# Patient Record
Sex: MALE | Race: WHITE | NOT HISPANIC OR LATINO | Employment: OTHER | URBAN - METROPOLITAN AREA
[De-identification: names, ages, dates, MRNs, and addresses within clinical notes are randomized per-mention and may not be internally consistent; named-entity substitution may affect disease eponyms.]

---

## 2017-06-26 ENCOUNTER — GENERIC CONVERSION - ENCOUNTER (OUTPATIENT)
Dept: OTHER | Facility: OTHER | Age: 82
End: 2017-06-26

## 2017-06-26 ENCOUNTER — APPOINTMENT (OUTPATIENT)
Dept: LAB | Facility: CLINIC | Age: 82
End: 2017-06-26
Payer: MEDICARE

## 2017-06-26 ENCOUNTER — ALLSCRIPTS OFFICE VISIT (OUTPATIENT)
Dept: OTHER | Facility: OTHER | Age: 82
End: 2017-06-26

## 2017-06-26 ENCOUNTER — TRANSCRIBE ORDERS (OUTPATIENT)
Dept: LAB | Facility: CLINIC | Age: 82
End: 2017-06-26

## 2017-06-26 DIAGNOSIS — N40.1 ENLARGED PROSTATE WITH LOWER URINARY TRACT SYMPTOMS (LUTS): ICD-10-CM

## 2017-06-26 DIAGNOSIS — I25.10 ATHEROSCLEROTIC HEART DISEASE OF NATIVE CORONARY ARTERY WITHOUT ANGINA PECTORIS: ICD-10-CM

## 2017-06-26 DIAGNOSIS — Z12.11 ENCOUNTER FOR SCREENING FOR MALIGNANT NEOPLASM OF COLON: ICD-10-CM

## 2017-06-26 LAB
ALBUMIN SERPL BCP-MCNC: 3.7 G/DL (ref 3.5–5)
ALP SERPL-CCNC: 63 U/L (ref 46–116)
ALT SERPL W P-5'-P-CCNC: 26 U/L (ref 12–78)
ANION GAP SERPL CALCULATED.3IONS-SCNC: 7 MMOL/L (ref 4–13)
AST SERPL W P-5'-P-CCNC: 21 U/L (ref 5–45)
BILIRUB SERPL-MCNC: 0.68 MG/DL (ref 0.2–1)
BILIRUB UR QL STRIP: ABNORMAL
BUN SERPL-MCNC: 25 MG/DL (ref 5–25)
CALCIUM SERPL-MCNC: 8.6 MG/DL (ref 8.3–10.1)
CHLORIDE SERPL-SCNC: 107 MMOL/L (ref 100–108)
CHOLEST SERPL-MCNC: 141 MG/DL (ref 50–200)
CLARITY UR: ABNORMAL
CO2 SERPL-SCNC: 28 MMOL/L (ref 21–32)
COLOR UR: YELLOW
CREAT SERPL-MCNC: 1.02 MG/DL (ref 0.6–1.3)
GFR SERPL CREATININE-BSD FRML MDRD: >60 ML/MIN/1.73SQ M
GLUCOSE P FAST SERPL-MCNC: 103 MG/DL (ref 65–99)
GLUCOSE UR STRIP-MCNC: NEGATIVE MG/DL
HDLC SERPL-MCNC: 67 MG/DL (ref 40–60)
HGB UR QL STRIP.AUTO: NEGATIVE
KETONES UR STRIP-MCNC: ABNORMAL MG/DL
LDLC SERPL CALC-MCNC: 64 MG/DL (ref 0–100)
LEUKOCYTE ESTERASE UR QL STRIP: NEGATIVE
NITRITE UR QL STRIP: NEGATIVE
PH UR STRIP.AUTO: 5.5 [PH] (ref 4.5–8)
POTASSIUM SERPL-SCNC: 4.6 MMOL/L (ref 3.5–5.3)
PROT SERPL-MCNC: 7.4 G/DL (ref 6.4–8.2)
PROT UR STRIP-MCNC: NEGATIVE MG/DL
SODIUM SERPL-SCNC: 142 MMOL/L (ref 136–145)
SP GR UR STRIP.AUTO: 1.03 (ref 1–1.03)
TRIGL SERPL-MCNC: 48 MG/DL
UROBILINOGEN UR QL STRIP.AUTO: 0.2 E.U./DL

## 2017-06-26 PROCEDURE — 36415 COLL VENOUS BLD VENIPUNCTURE: CPT

## 2017-06-26 PROCEDURE — 81003 URINALYSIS AUTO W/O SCOPE: CPT

## 2017-06-26 PROCEDURE — 80053 COMPREHEN METABOLIC PANEL: CPT

## 2017-06-26 PROCEDURE — 80061 LIPID PANEL: CPT

## 2017-08-09 ENCOUNTER — GENERIC CONVERSION - ENCOUNTER (OUTPATIENT)
Dept: OTHER | Facility: OTHER | Age: 82
End: 2017-08-09

## 2018-01-10 NOTE — RESULT NOTES
Verified Results  (1) COMPREHENSIVE METABOLIC PANEL [id number] 09:76DU [employer] Order Number: [id number]     Test Name Result Flag Reference   SODIUM 142 mmol/L  136-145   POTASSIUM 4 6 mmol/L  3 5-5 3   CHLORIDE 107 mmol/L  100-108   CARBON DIOXIDE 28 mmol/L  21-32   ANION GAP (CALC) 7 mmol/L  4-13   BLOOD UREA NITROGEN 25 mg/dL  5-25   CREATININE 1 02 mg/dL  0 60-1 30   Standardized to IDMS reference method   CALCIUM 8 6 mg/dL  8 3-10 1   BILI, TOTAL 0 68 mg/dL  0 20-1 00   ALK PHOSPHATAS 63 U/L     ALT (SGPT) 26 U/L  12-78   AST(SGOT) 21 U/L  5-45   ALBUMIN 3 7 g/dL  3 5-5 0   TOTAL PROTEIN 7 4 g/dL  6 4-8 2   eGFR Non-African American      >60 0 ml/min/1 73[address] [facility] Disease Education Program recommendations are as follows:  GFR calculation is accurate only with a steady state creatinine  Chronic Kidney disease less than 60 ml/min/1 73 sq  meters  Kidney failure less than 15 ml/min/1 73 sq  meters  GLUCOSE FASTING 103 mg/dL H 65-99     (1) LIPID PANEL, FASTING [id number] 11:06AM [employer] Order Number: [id number]     Test Name Result Flag Reference   CHOLESTEROL 141 mg/dL     HDL,DIRECT 67 mg/dL H 40-60   Specimen collection should occur prior to Metamizole administration due to the potential for falsely depressed results  LDL CHOLESTEROL CALCULATED 64 mg/dL  0-100   Triglyceride:         Normal              <150 mg/dl       Borderline High    150-199 mg/dl       High               200-499 mg/dl       Very High          >499 mg/dl  Cholesterol:         Desirable        <200 mg/dl      Borderline High  200-239 mg/dl      High             >239 mg/dl  HDL Cholesterol:        High    >59 mg/dL      Low     <41 mg/dL  LDL CALCULATED:    This screening LDL is a calculated result  It does not have the accuracy of the Direct Measured LDL in the monitoring of patients with hyperlipidemia and/or statin therapy     Direct Measure LDL (XJY209) must be ordered separately in these patients  TRIGLYCERIDES 48 mg/dL  <=150   Specimen collection should occur prior to N-Acetylcysteine or Metamizole administration due to the potential for falsely depressed results  (1) URINALYSIS (will reflex a microscopy if leukocytes, occult blood, protein or nitrites are not within normal limits) [id number] 11:06AM Beba Bryant Order Number: [id number]     Test Name Result Flag Reference   COLOR Yellow     CLARITY Turbid     SPECIFIC GRAVITY UA 1 030  1 003-1 030   PH UA 5 5  4 5-8 0   LEUKOCYTE ESTERASE UA Negative  Negative   NITRITE UA Negative  Negative   PROTEIN UA Negative mg/dl  Negative   GLUCOSE UA Negative mg/dl  Negative   KETONES UA Trace mg/dl A Negative   UROBILINOGEN UA 0 2 E U /dl  0 2, 1 0 E U /dl   BILIRUBIN UA  A Negative   Interference- unable to analyze   The dipstick result may be falsely positive do to interfering substances  We recommend reliance upon serum bilirubin, liver & kidney function tests to guide patient care if clinically indicated     BLOOD UA Negative  Negative

## 2018-01-13 VITALS
BODY MASS INDEX: 27.43 KG/M2 | HEIGHT: 68 IN | WEIGHT: 181 LBS | HEART RATE: 70 BPM | RESPIRATION RATE: 16 BRPM | SYSTOLIC BLOOD PRESSURE: 120 MMHG | DIASTOLIC BLOOD PRESSURE: 70 MMHG

## 2018-06-27 RX ORDER — ROSUVASTATIN CALCIUM 10 MG/1
10 TABLET, COATED ORAL DAILY
Refills: 0 | COMMUNITY
Start: 2018-04-16 | End: 2018-08-20 | Stop reason: ALTCHOICE

## 2018-06-27 RX ORDER — METOPROLOL SUCCINATE 25 MG/1
0.5 TABLET, EXTENDED RELEASE ORAL DAILY
COMMUNITY
End: 2020-05-27 | Stop reason: SDUPTHER

## 2018-06-27 RX ORDER — GLUCOSAMINE HCL 500 MG
1 TABLET ORAL DAILY
COMMUNITY

## 2018-06-27 RX ORDER — CYANOCOBALAMIN (VITAMIN B-12) 1000 MCG
1 TABLET, EXTENDED RELEASE ORAL DAILY
COMMUNITY

## 2018-06-27 RX ORDER — ASPIRIN 81 MG/1
81 TABLET ORAL DAILY
COMMUNITY
End: 2021-10-27 | Stop reason: SDUPTHER

## 2018-06-29 ENCOUNTER — OFFICE VISIT (OUTPATIENT)
Dept: FAMILY MEDICINE CLINIC | Facility: CLINIC | Age: 83
End: 2018-06-29
Payer: MEDICARE

## 2018-06-29 ENCOUNTER — APPOINTMENT (OUTPATIENT)
Dept: LAB | Facility: CLINIC | Age: 83
End: 2018-06-29
Payer: MEDICARE

## 2018-06-29 VITALS
WEIGHT: 179.4 LBS | BODY MASS INDEX: 27.19 KG/M2 | TEMPERATURE: 97.4 F | HEIGHT: 68 IN | SYSTOLIC BLOOD PRESSURE: 128 MMHG | OXYGEN SATURATION: 96 % | DIASTOLIC BLOOD PRESSURE: 72 MMHG | HEART RATE: 81 BPM

## 2018-06-29 DIAGNOSIS — I10 BENIGN ESSENTIAL HYPERTENSION: ICD-10-CM

## 2018-06-29 DIAGNOSIS — Z00.00 MEDICARE ANNUAL WELLNESS VISIT, SUBSEQUENT: Primary | ICD-10-CM

## 2018-06-29 DIAGNOSIS — M19.041 PRIMARY OSTEOARTHRITIS OF BOTH HANDS: ICD-10-CM

## 2018-06-29 DIAGNOSIS — Z95.5 HISTORY OF HEART ARTERY STENT: ICD-10-CM

## 2018-06-29 DIAGNOSIS — M19.042 PRIMARY OSTEOARTHRITIS OF BOTH HANDS: ICD-10-CM

## 2018-06-29 DIAGNOSIS — I25.10 ARTERIOSCLEROTIC CORONARY ARTERY DISEASE: ICD-10-CM

## 2018-06-29 LAB
ALBUMIN SERPL BCP-MCNC: 3.5 G/DL (ref 3.5–5)
ALP SERPL-CCNC: 73 U/L (ref 46–116)
ALT SERPL W P-5'-P-CCNC: 25 U/L (ref 12–78)
ANION GAP SERPL CALCULATED.3IONS-SCNC: 4 MMOL/L (ref 4–13)
AST SERPL W P-5'-P-CCNC: 21 U/L (ref 5–45)
BILIRUB SERPL-MCNC: 0.49 MG/DL (ref 0.2–1)
BUN SERPL-MCNC: 31 MG/DL (ref 5–25)
CALCIUM SERPL-MCNC: 8.8 MG/DL (ref 8.3–10.1)
CHLORIDE SERPL-SCNC: 106 MMOL/L (ref 100–108)
CHOLEST SERPL-MCNC: 113 MG/DL (ref 50–200)
CO2 SERPL-SCNC: 27 MMOL/L (ref 21–32)
CREAT SERPL-MCNC: 0.94 MG/DL (ref 0.6–1.3)
GFR SERPL CREATININE-BSD FRML MDRD: 75 ML/MIN/1.73SQ M
GLUCOSE P FAST SERPL-MCNC: 102 MG/DL (ref 65–99)
HDLC SERPL-MCNC: 53 MG/DL (ref 40–60)
LDLC SERPL CALC-MCNC: 47 MG/DL (ref 0–100)
NONHDLC SERPL-MCNC: 60 MG/DL
POTASSIUM SERPL-SCNC: 4.7 MMOL/L (ref 3.5–5.3)
PROT SERPL-MCNC: 7.7 G/DL (ref 6.4–8.2)
SODIUM SERPL-SCNC: 137 MMOL/L (ref 136–145)
TRIGL SERPL-MCNC: 64 MG/DL

## 2018-06-29 PROCEDURE — 36415 COLL VENOUS BLD VENIPUNCTURE: CPT | Performed by: FAMILY MEDICINE

## 2018-06-29 PROCEDURE — 80061 LIPID PANEL: CPT | Performed by: FAMILY MEDICINE

## 2018-06-29 PROCEDURE — G0439 PPPS, SUBSEQ VISIT: HCPCS | Performed by: FAMILY MEDICINE

## 2018-06-29 PROCEDURE — 99213 OFFICE O/P EST LOW 20 MIN: CPT | Performed by: FAMILY MEDICINE

## 2018-06-29 PROCEDURE — 80053 COMPREHEN METABOLIC PANEL: CPT | Performed by: FAMILY MEDICINE

## 2018-06-29 RX ORDER — MELOXICAM 7.5 MG/1
TABLET ORAL
Qty: 30 TABLET | Refills: 0 | Status: SHIPPED | OUTPATIENT
Start: 2018-06-29 | End: 2018-08-06 | Stop reason: SDUPTHER

## 2018-06-29 NOTE — PROGRESS NOTES
Rob BRODY  [facility]  [facility]  [address]  [address]  [place], [place], [id number] MEDICARE SUBSEQUENT VISIT NOTE ( part 2 )          Problem List Items Addressed This Visit        Cardiovascular and Mediastinum    Arteriosclerotic coronary artery disease    Relevant Orders    Lipid panel    Benign essential hypertension    Relevant Orders    Comprehensive metabolic panel       Musculoskeletal and Integument    Primary osteoarthritis of both hands    Relevant Medications    meloxicam (MOBIC) 7 5 mg tablet       Other    History of heart artery stent      Other Visit Diagnoses     Medicare annual wellness visit, subsequent    -  Primary          Patient Instructions     He is in today for a Subsequent Medicare evaluation/ regular check  He continues to do very well, continues to remain active at the gym over the winter months, active at home during the summer, just back from [address], no cardiovascular complaints  He will continue on aspirin 81 mg daily, Crestor 10 mg daily along with metoprolol 12 5 mg daily as is  He last saw Cardiology at [place] in August, he does plan to follow up with them  He has done Widespace screening yearly, we reviewed his results from last year which are essentially unremarkable  He does have hand arthritis, avoid overuse, could try meloxicam 7 5 mg 1 or 2 daily sparingly with food watching for stomach upset, consider hand therapy/hand specialist if persists or worsens  He has no other arthritic complaints  Immunization History   Administered Date(s) Administered    Influenza Split High Dose Preservative Free IM 10/27/2016    Pneumococcal Polysaccharide PPV23 09/01/2006, 09/16/2014    TD (adult) Preservative Free 09/01/2006    Zoster 01/01/2012       Discussed Pneumococcal vaccines,    Prevnar  is up to date   Pneumovax  is up to date  He does do yearly Flu shot     Tdap/tetanus shot will be done at a future date  (done every 10 yrs for superficial cuts, every 5 yrs for deep wounds)   Can also look into coverage for new shingles shot, Shingrix (indicated if over 48years of age ) Can do that at pharmacy  Was never a smoker     Regarding Colon Cancer screening, we discussed Colonoscopy vs ColoGuard :    Not indicated     Discussed pros and cons regarding Prostate Cancer screening,   PSA blood test not indicated    We discussed end of life planning, does have a  "LIVING WILL"       Glaucoma screening is   due  Last one 2 yrs ago    We did review previous blood work,  His blood work from June 2017 showed HDL 67, LDL 64  CMP within normal limits other than borderline glucose, urinalysis was negative  He is up to date with Lipid screening  He is up to date with Diabetes screening    Discussed importance of routine exercise, healthy diet     Sanjana Garcia -  Can see Dermatology for full body skin exam/skin cancer screening, -  Still see Dr Janet Greenberg re scalp actinics   should see Dentist routinely ( does)    We will see him again in 12 months, sooner as needed          Health Maintenance     Health Maintenance   Topic Date Due    DTaP,Tdap,and Td Vaccines (1 - Tdap) 06/14/1956    PNEUMOCOCCAL POLYSACCHARIDE VACCINE AGE 72 AND OVER  06/14/2001    GLAUCOMA SCREENING 67+ YR  06/14/2003    INFLUENZA VACCINE  09/01/2018    Fall Risk  06/29/2019    Depression Screening PHQ-9  06/29/2019       Immunization History   Administered Date(s) Administered    Influenza 10/09/2017    Influenza Split High Dose Preservative Free IM 10/27/2016    Pneumococcal Polysaccharide PPV23 09/01/2006, 09/16/2014    TD (adult) Preservative Free 09/01/2006    Zoster 01/01/2012       AWV Clinical     ISAR:   Previous hospitalizations?:  No       Once in a Lifetime Medicare Screening:       Medicare Screening Tests and Risk Assessment:   AAA Risk Assessment    Osteoporosis Risk Assessment    HIV Risk Assessment        Drug and Alcohol Use:   Tobacco use    Cigarettes:  never smoker    Tobacco use duration    Tobacco Cessation Readiness    Alcohol use    Alcohol use:  rare use    Alcohol Treatment Readiness   Illicit Drug Use    Drug use:  never        Diet & Exercise:   Diet   What is your diet?:  Regular, Low Carb, Low Saturated Fat   How many servings a day of the following:   Exercise    Do you currently exercise?:  yes    Frequency:  daily    Type of exercise:  walking, weights   stretching  minutes per day         Cognitive Impairment Screening:   Cognitive Impairment Screening    Do you have difficulty learning or retaining new information?:  No Do you have difficulty handling new tasks?:  No   Do you have difficulty with reasoning?:  No Do you have difficulty with spatial ability and orientation?:  No   Do you have difficulty with language?:  No Do you have difficulty with behavior?:  No       Functional Ability/Level of Safety:   Hearing     Bilateral:  slightly decreased   Hearing aid:  No    Hearing Impairment Assessment    Current Activities    Status:  unlimited ADL's, unlimited IADL's, unlimited social activities   Help needed with the folllowing:    Using the phone:  No Transportation:  No   Shopping:  No Preparing Meals:  No   Doing Housework:  No Doing Laundry:  No   Managing Medications:  No Managing Money:  No   ADL    Feeding:  Independant   Oral hygiene and Facial grooming:  Independant   Bathing:  Independant   Upper Body Dressing:  Independant   Lower Body Dressing:  Independant   Toileting:  Independant   Bed Mobility:  Independant   Fall Risk   Have you fallen in the last 12 months?:  No    Injury History       Home Safety:   Home Safety Risk Factors   Unfamilar with surroundings:  No Uneven floors:  No   Stairs or handrail saftey risk:  Yes Loose rugs:  No   Household clutter:  No Poor household lighting:  No   No grab bars in bathroom:  Yes        Advanced Directives:   Advanced Directives    Living Will:  Yes Patient's End of Life Decisions        Urinary Incontinence:   Do you have urinary incontinence?:  No        Glaucoma:             Patient Care Team:  Enrique Sage DO as PCP - General      ### SEE OTHER NOTE TODAY Re:   CC/HPI/ VITALS/ROS/PMHx/PSHx/PE/ALLERGIES/FAMHx/SOCHx ###

## 2018-06-29 NOTE — PROGRESS NOTES
Rob BRODY  [facility]  [facility]  [address]   [address] [place] [address], [id number] MEDICARE SUBSEQUENT VISIT NOTE ( part 1 )      NAME: Ankur Michel  AGE: 81 y o  SEX: male  : 1935   MRN: [id number]    DATE: 2018  TIME: 12:35 PM    Assessment and Plan     Problem List Items Addressed This Visit        Cardiovascular and Mediastinum    Arteriosclerotic coronary artery disease    Relevant Orders    Lipid panel    Benign essential hypertension    Relevant Orders    Comprehensive metabolic panel       Musculoskeletal and Integument    Primary osteoarthritis of both hands    Relevant Medications    meloxicam (MOBIC) 7 5 mg tablet       Other    History of heart artery stent      Other Visit Diagnoses     Medicare annual wellness visit, subsequent    -  Primary          Medicare Wellness Counseling/ Discussion    Patient Instructions     He is in today for a Subsequent Medicare evaluation/ regular check  He continues to do very well, continues to remain active at the gym over the winter months, active at home during the summer, just back from [address], no cardiovascular complaints  He will continue on aspirin 81 mg daily, Crestor 10 mg daily along with metoprolol 12 5 mg daily as is  He last saw Cardiology at [facility] in August, he does plan to follow up with them  He has done Bright & Bright screening yearly, we reviewed his results from last year which are essentially unremarkable  He does have hand arthritis, avoid overuse, could try meloxicam 7 5 mg 1 or 2 daily sparingly with food watching for stomach upset, consider hand therapy/hand specialist if persists or worsens  He has no other arthritic complaints      Immunization History   Administered Date(s) Administered    Influenza Split High Dose Preservative Free IM 10/27/2016    Pneumococcal Polysaccharide PPV23 2006, 2014    TD (adult) Preservative Free 09/01/2006    Zoster 01/01/2012       Discussed Pneumococcal vaccines,    Prevnar  is up to date   Pneumovax  is up to date  He does do yearly Flu shot  Tdap/tetanus shot will be done at a future date  (done every 10 yrs for superficial cuts, every 5 yrs for deep wounds)   Can also look into coverage for new shingles shot, Shingrix (indicated if over 48years of age ) Can do that at pharmacy  Was never a smoker     Regarding Colon Cancer screening, we discussed Colonoscopy vs ColoGuard :    Not indicated     Discussed pros and cons regarding Prostate Cancer screening,   PSA blood test not indicated    We discussed end of life planning, does have a  "LIVING WILL"       Glaucoma screening is   due  Last one 2 yrs ago    We did review previous blood work,  His blood work from June 2017 showed HDL 67, LDL 64  CMP within normal limits other than borderline glucose, urinalysis was negative  He is up to date with Lipid screening  He is up to date with Diabetes screening  Discussed importance of routine exercise, healthy diet      Also -  Can see Dermatology for full body skin exam/skin cancer screening, -  Still see Dr Kerry Son re scalp actinics   should see Dentist routinely ( does)    We will see him again in 12 months, sooner as needed            Chief Complaint     Chief Complaint   Patient presents with    Hand Pain     bilateral started back in March worsened since        History of Present Illness     Rachel Maynard is a 81y o -year-old male who is in today for an annual visit, he remains in excellent health, his only complaint is stiffness with swelling especially in the morning bilateral hands recent month or so  Occasionally uses Advil  Over the winter months he exercises routinely at [facility], in the warmer weather he is outdoors  Relates he actually sprints 60 yards x2 at the gym  As before he played basketball into his 76s    Recently was in the [address] with his family, he did some hiking, no chest pain or increased shortness of breath  He last saw Cardiology August 2017, he is using medication as directed    Review of Systems     Review of Systems   Constitutional: Negative for activity change (remains very active), appetite change, chills, fatigue, fever and unexpected weight change  HENT: Negative for congestion, mouth sores, nosebleeds, sinus pressure, sore throat and trouble swallowing  Eyes: Negative for visual disturbance  Respiratory: Negative for cough, choking, chest tightness and shortness of breath  Cardiovascular: Negative for chest pain, palpitations and leg swelling  Gastrointestinal: Negative for abdominal pain, blood in stool, constipation, diarrhea, nausea and vomiting  No acid reflux     No change in bowel   Genitourinary: Negative for dysuria and hematuria  Neurological: Negative for dizziness, syncope, speech difficulty, weakness, light-headedness and headaches  Hematological: Does not bruise/bleed easily  Psychiatric/Behavioral: Negative for behavioral problems, confusion and sleep disturbance         Active Problem List     Patient Active Problem List   Diagnosis    Actinic keratosis    Arteriosclerotic coronary artery disease    Basal cell carcinoma of skin    BPH with obstruction/lower urinary tract symptoms    Benign essential hypertension    Hyperlipidemia    History of heart artery stent    Primary osteoarthritis of both hands       Past Medical History:  Past Medical History:   Diagnosis Date    [facility]STEMI (non-ST elevated myocardial infarction) ([facility] ) 2014    Pneumonia     Last Assessed:  10/7/14    Polymyalgia rheumatica ([facility] )     Last Assessed:  11/23/12       Past Surgical History:  Past Surgical History:   Procedure Laterality Date    CORONARY ANGIOPLASTY WITH STENT PLACEMENT      HERNIA REPAIR  2000       Family History:  Family History   Problem Relation Age of Onset    Pulmonary embolism Father     Other Father         Transurethral resection of prostate    Diabetes Sister        Social History:  Social History     Social History    Marital status: /Civil Union     Spouse name: N/A    Number of children: N/A    Years of education: N/A     Occupational History    Not on file  Social History Main Topics    Smoking status: Never Smoker    Smokeless tobacco: Not on file    Alcohol use Yes      Comment: Social    Drug use: Unknown    Sexual activity: Not on file     Other Topics Concern    Not on file     Social History Narrative    Exercises regularly       Objective     Vitals:    06/29/18 1019   BP: 128/72   Pulse: 81   Temp: (!) 97 4 °F (36 3 °C)   SpO2: 96%   Weight: 81 4 kg (179 lb 6 4 oz)   Height: 5' 8" (1 727 m)     Body mass index is 27 28 kg/m²  BP Readings from Last 3 Encounters:   06/29/18 128/72   06/26/17 120/70   07/16/15 122/66       Wt Readings from Last 3 Encounters:   06/29/18 81 4 kg (179 lb 6 4 oz)   06/26/17 82 1 kg (181 lb)   07/16/15 81 8 kg (180 lb 4 oz)       Physical Exam   Constitutional: He is oriented to person, place, and time  He appears well-developed and well-nourished  No distress  Looks great   HENT:   Right Ear: External ear normal    Left Ear: External ear normal    Mouth/Throat: Oropharynx is clear and moist  No oropharyngeal exudate  TM clear   Eyes: Conjunctivae are normal  No scleral icterus  Neck: Neck supple  Cardiovascular: Normal rate and regular rhythm  Murmur (1/6 systolic ejection murmur left sternal border/right 2nd intercostal space) heard  No carotid bruit   Pulmonary/Chest: Effort normal and breath sounds normal  No respiratory distress  He has no wheezes  Abdominal: Soft  There is no tenderness  Musculoskeletal: He exhibits no edema  Mild to moderate degenerative changes DIP/PIP with old deformities distal few fingers,  slightly decreased due to swelling/pain  No open wound    Metacarpophalangeal joints nontender, wrist nontender  Good color, pulses intact   Lymphadenopathy:     He has no cervical adenopathy  Neurological: He is alert and oriented to person, place, and time  Psychiatric: He has a normal mood and affect  His behavior is normal        ALLERGIES:  No Known Allergies    Current Medications     Current Outpatient Prescriptions   Medication Sig Dispense Refill    aspirin (ECOTRIN LOW STRENGTH) 81 mg EC tablet Take 81 mg by mouth daily      Calcium Citrate-Vitamin D (CALCIUM CITRATE + D) 315-250 MG-UNIT TABS Take 1 tablet by mouth daily      Coenzyme Q10 100 MG TABS Take 1 tablet by mouth daily      metoprolol succinate (TOPROL-XL) 25 mg 24 hr tablet Take 0 5 tablets by mouth daily      OMEGA-3 FATTY ACIDS-VITAMIN E PO Take 1 capsule by mouth daily      rosuvastatin (CRESTOR) 10 MG tablet Take 10 mg by mouth daily  0    meloxicam (MOBIC) 7 5 mg tablet Use 1 or 2 a day as need for arthritis hands 30 tablet 0     No current facility-administered medications for this visit            Health Maintenance     See other note today re clinical AWV info             Most recent labs available from [address]   ( others may be available in Care Everywhere / Media sections)  Lab Results   Component Value Date    WBC 6 32 06/30/2015    HGB 13 5 06/30/2015    HCT 40 0 06/30/2015     06/30/2015    CHOL 141 06/26/2017    TRIG 48 06/26/2017    HDL 67 (H) 06/26/2017    ALT 26 06/26/2017    AST 21 06/26/2017     06/26/2017    K 4 6 06/26/2017     06/26/2017    CREATININE 1 02 06/26/2017    BUN 25 06/26/2017    CO2 28 06/26/2017    GLUF 103 (H) 06/26/2017     Lab Results   Component Value Date    LDLCALC 64 06/26/2017     No results found for: TSH    Orders Placed This Encounter   Procedures    Lipid panel    Comprehensive metabolic panel             Reji Guillory DO

## 2018-08-06 DIAGNOSIS — M19.041 PRIMARY OSTEOARTHRITIS OF BOTH HANDS: ICD-10-CM

## 2018-08-06 DIAGNOSIS — M19.042 PRIMARY OSTEOARTHRITIS OF BOTH HANDS: ICD-10-CM

## 2018-08-06 RX ORDER — MELOXICAM 7.5 MG/1
TABLET ORAL
Qty: 30 TABLET | Refills: 0 | Status: SHIPPED | OUTPATIENT
Start: 2018-08-06 | End: 2019-02-18 | Stop reason: ALTCHOICE

## 2018-08-20 ENCOUNTER — APPOINTMENT (OUTPATIENT)
Dept: RADIOLOGY | Facility: MEDICAL CENTER | Age: 83
End: 2018-08-20
Payer: MEDICARE

## 2018-08-20 ENCOUNTER — OFFICE VISIT (OUTPATIENT)
Dept: URGENT CARE | Facility: MEDICAL CENTER | Age: 83
End: 2018-08-20
Payer: MEDICARE

## 2018-08-20 VITALS
HEIGHT: 68 IN | SYSTOLIC BLOOD PRESSURE: 145 MMHG | BODY MASS INDEX: 26.37 KG/M2 | WEIGHT: 174 LBS | OXYGEN SATURATION: 96 % | DIASTOLIC BLOOD PRESSURE: 93 MMHG | RESPIRATION RATE: 20 BRPM | HEART RATE: 99 BPM | TEMPERATURE: 101.5 F

## 2018-08-20 DIAGNOSIS — R50.9 FEVER, UNSPECIFIED FEVER CAUSE: ICD-10-CM

## 2018-08-20 DIAGNOSIS — R50.9 FEVER, UNSPECIFIED FEVER CAUSE: Primary | ICD-10-CM

## 2018-08-20 PROCEDURE — 71046 X-RAY EXAM CHEST 2 VIEWS: CPT

## 2018-08-20 PROCEDURE — 99213 OFFICE O/P EST LOW 20 MIN: CPT | Performed by: FAMILY MEDICINE

## 2018-08-20 PROCEDURE — G0463 HOSPITAL OUTPT CLINIC VISIT: HCPCS | Performed by: FAMILY MEDICINE

## 2018-08-20 RX ORDER — IBUPROFEN 600 MG/1
600 TABLET ORAL ONCE
Status: COMPLETED | OUTPATIENT
Start: 2018-08-20 | End: 2018-08-20

## 2018-08-20 RX ORDER — ROSUVASTATIN CALCIUM 10 MG/1
10 TABLET, COATED ORAL DAILY
COMMUNITY
End: 2020-10-14 | Stop reason: SDUPTHER

## 2018-08-20 RX ADMIN — IBUPROFEN 600 MG: 600 TABLET ORAL at 14:06

## 2018-08-20 NOTE — PROGRESS NOTES
[facility]        NAME: Radha Zaldivar is a 81 y o  male  : 1935    MRN: [id number]  DATE: 2018  TIME: 1:59 PM    Assessment and Plan   Fever, unspecified fever cause [R50 9]  1  Fever, unspecified fever cause  ibuprofen (MOTRIN) tablet 600 mg    XR chest pa & lateral         Patient Instructions       Follow up with PCP in 3-5 days  Proceed to  ER if symptoms worsen  Chief Complaint     Chief Complaint   Patient presents with    Fever     Patient relates he started with shakiness since last night at 9:00 pm  + fever 102 0  Denies cough  Denies chest pain  Denies SOB  Took Tylenol at 10:00 am today  History of Present Illness       Patient here today with 1 day history of fever  Fever and chills began around 9 or 9:30 p m  He knows he had a temperature of a 102° F for which she took Tylenol  Symptoms seem better overnight  However fever and chills return around 12:30 p m  this afternoon  He expresses concern that he might possibly have pneumonia because in the past he had similar symptoms  He denies headache  Denies body ache  No cough for shortness of breath  No abdominal pain  Denies dysuria  Denies nausea, vomiting or diarrhea  Denies any recent exposure  Review of Systems   Review of Systems   Constitutional: Positive for fever  HENT: Negative  Respiratory: Negative  Cardiovascular: Negative  Gastrointestinal: Negative  Neurological: Negative            Current Medications       Current Outpatient Prescriptions:     aspirin (ECOTRIN LOW STRENGTH) 81 mg EC tablet, Take 81 mg by mouth daily, Disp: , Rfl:     Calcium Citrate-Vitamin D (CALCIUM CITRATE + D) 315-250 MG-UNIT TABS, Take 1 tablet by mouth daily, Disp: , Rfl:     Coenzyme Q10 100 MG TABS, Take 1 tablet by mouth daily, Disp: , Rfl:     meloxicam (MOBIC) 7 5 mg tablet, TAKE 1 OR 2 TABLETS BY MOUTH A DAY AS NEEDED FOR ARTHRITIS HANDS, Disp: 30 tablet, Rfl: 0    metoprolol succinate (TOPROL-XL) 25 mg 24 hr tablet, Take 0 5 tablets by mouth daily, Disp: , Rfl:     OMEGA-3 FATTY ACIDS-VITAMIN E PO, Take 1 capsule by mouth daily, Disp: , Rfl:     rosuvastatin (CRESTOR) 10 MG tablet, Take 10 mg by mouth daily, Disp: , Rfl:     Current Facility-Administered Medications:     ibuprofen (MOTRIN) tablet 600 mg, 600 mg, Oral, Once, Severa Counts, MD    Current Allergies     Allergies as of 08/20/2018    (No Known Allergies)            The following portions of the patient's history were reviewed and updated as appropriate: allergies, current medications, past family history, past medical history, past social history, past surgical history and problem list      Past Medical History:   Diagnosis Date    NSTEMI (non-ST elevated myocardial infarction) ([facility] ) 2014    Pneumonia     Last Assessed:  10/7/14    Polymyalgia rheumatica ([facility] )     Last Assessed:  11/23/12       Past Surgical History:   Procedure Laterality Date    CORONARY ANGIOPLASTY WITH STENT PLACEMENT      HERNIA REPAIR  2000       Family History   Problem Relation Age of Onset    Pulmonary embolism Father     Other Father         Transurethral resection of prostate    Diabetes Sister          Medications have been verified  Objective   /93   Pulse 99   Temp (!) 101 5 °F (38 6 °C) (Tympanic)   Resp 20   Ht 5' 8" (1 727 m)   Wt 78 9 kg (174 lb)   SpO2 96%   BMI 26 46 kg/m²        Physical Exam     Physical Exam   Constitutional: He appears well-developed  Tremulous   Neck: Normal range of motion  Neck supple  Cardiovascular: Normal rate, regular rhythm and normal heart sounds  Pulmonary/Chest: Effort normal  He has rales  Decreased breath sounds bilaterally  Mild rhonchi is auscultated left lower lobe  Abdominal: Soft  Bowel sounds are normal    Nursing note and vitals reviewed

## 2018-08-20 NOTE — PATIENT INSTRUCTIONS
chest x-ray reveals no infiltrates or effusion  Patient received ibuprofen 600 mg orally in the office  Advised to continue alternating dose of Tylenol and ibuprofen for now  Increase fluid intake  Follow-up per primary care provider symptoms persist within 24 hours

## 2018-10-16 PROCEDURE — 88305 TISSUE EXAM BY PATHOLOGIST: CPT | Performed by: PATHOLOGY

## 2018-10-17 ENCOUNTER — LAB REQUISITION (OUTPATIENT)
Dept: LAB | Facility: HOSPITAL | Age: 83
End: 2018-10-17
Payer: MEDICARE

## 2018-10-17 DIAGNOSIS — D49.2 NEOPLASM OF UNSPECIFIED BEHAVIOR OF BONE, SOFT TISSUE, AND SKIN: ICD-10-CM

## 2018-11-14 ENCOUNTER — OFFICE VISIT (OUTPATIENT)
Dept: URGENT CARE | Facility: MEDICAL CENTER | Age: 83
End: 2018-11-14
Payer: MEDICARE

## 2018-11-14 ENCOUNTER — APPOINTMENT (OUTPATIENT)
Dept: RADIOLOGY | Facility: MEDICAL CENTER | Age: 83
End: 2018-11-14
Payer: MEDICARE

## 2018-11-14 VITALS
BODY MASS INDEX: 26.37 KG/M2 | HEART RATE: 66 BPM | WEIGHT: 174 LBS | OXYGEN SATURATION: 99 % | SYSTOLIC BLOOD PRESSURE: 128 MMHG | RESPIRATION RATE: 16 BRPM | TEMPERATURE: 95.8 F | HEIGHT: 68 IN | DIASTOLIC BLOOD PRESSURE: 84 MMHG

## 2018-11-14 DIAGNOSIS — S62.646A CLOSED NONDISPLACED FRACTURE OF PROXIMAL PHALANX OF RIGHT LITTLE FINGER, INITIAL ENCOUNTER: Primary | ICD-10-CM

## 2018-11-14 DIAGNOSIS — M79.641 RIGHT HAND PAIN: ICD-10-CM

## 2018-11-14 PROCEDURE — 73130 X-RAY EXAM OF HAND: CPT

## 2018-11-14 PROCEDURE — 29130 APPL FINGER SPLINT STATIC: CPT | Performed by: PHYSICIAN ASSISTANT

## 2018-11-14 PROCEDURE — G0463 HOSPITAL OUTPT CLINIC VISIT: HCPCS | Performed by: PHYSICIAN ASSISTANT

## 2018-11-14 PROCEDURE — 99213 OFFICE O/P EST LOW 20 MIN: CPT | Performed by: PHYSICIAN ASSISTANT

## 2018-11-14 NOTE — PROGRESS NOTES
[facility]        NAME: Danica Colbert is a 81 y o  male  : 1935    MRN: [id number]  DATE: 2018  TIME: 1:22 PM    Assessment and Plan   Closed nondisplaced fracture of proximal phalanx of right little finger, initial encounter [[id number]]  1  Closed nondisplaced fracture of proximal phalanx of right little finger, initial encounter  XR hand 3+ vw right    Ambulatory referral to Orthopedic Surgery    Splint         Patient Instructions     Were splint until seen by Orthopedics  Call [phone] for scheduling for [address] [place] [address] and/or Tylenol as needed for pain control  Follow up with PCP in 3-5 days  Proceed to  ER if symptoms worsen  Chief Complaint     Chief Complaint   Patient presents with    Hand Pain     fell carrying a box and injured right hand mostly affecting 5th digit         History of Present Illness       62-year-old male presents with a right 5th finger injury  Patient reports that he was carrying a box and had an accidental trip and fall and landed onto right hand  Patient reports some pain in right 5th finger  Swelling and bruising as well  Denies any numbness or tingling  Hand Pain    The incident occurred 12 to 24 hours ago  The incident occurred at home  The injury mechanism was a fall  The pain is present in the right fingers  The quality of the pain is described as aching  The pain does not radiate  The pain is moderate  The pain has been fluctuating since the incident  Pertinent negatives include no chest pain, muscle weakness, numbness or tingling  The symptoms are aggravated by movement, lifting and palpation  He has tried nothing for the symptoms  The treatment provided no relief  Review of Systems   Review of Systems   Constitutional: Negative  HENT: Negative  Eyes: Negative  Respiratory: Negative  Cardiovascular: Negative  Negative for chest pain  Gastrointestinal: Negative  Musculoskeletal: Negative  Skin: Negative  Neurological: Negative  Negative for tingling and numbness  Current Medications       Current Outpatient Prescriptions:     aspirin (ECOTRIN LOW STRENGTH) 81 mg EC tablet, Take 81 mg by mouth daily, Disp: , Rfl:     Calcium Citrate-Vitamin D (CALCIUM CITRATE + D) 315-250 MG-UNIT TABS, Take 1 tablet by mouth daily, Disp: , Rfl:     Coenzyme Q10 100 MG TABS, Take 1 tablet by mouth daily, Disp: , Rfl:     meloxicam (MOBIC) 7 5 mg tablet, TAKE 1 OR 2 TABLETS BY MOUTH A DAY AS NEEDED FOR ARTHRITIS HANDS, Disp: 30 tablet, Rfl: 0    metoprolol succinate (TOPROL-XL) 25 mg 24 hr tablet, Take 0 5 tablets by mouth daily, Disp: , Rfl:     OMEGA-3 FATTY ACIDS-VITAMIN E PO, Take 1 capsule by mouth daily, Disp: , Rfl:     rosuvastatin (CRESTOR) 10 MG tablet, Take 10 mg by mouth daily, Disp: , Rfl:     Current Allergies     Allergies as of 11/14/2018    (No Known Allergies)            The following portions of the patient's history were reviewed and updated as appropriate: allergies, current medications, past family history, past medical history, past social history, past surgical history and problem list      Past Medical History:   Diagnosis Date    [facility]STEMI [facility]non-ST elevated myocardial infarction) ([facility] ) 2014    Pneumonia     Last Assessed:  10/7/14    Polymyalgia rheumatica ([facility] )     Last Assessed:  11/23/12       Past Surgical History:   Procedure Laterality Date    CORONARY ANGIOPLASTY WITH STENT PLACEMENT      HERNIA REPAIR  2000       Family History   Problem Relation Age of Onset    Pulmonary embolism Father     Other Father         Transurethral resection of prostate    Diabetes Sister          Medications have been verified          Objective   /84   Pulse 66   Temp (!) 95 8 °F (35 4 °C) (Tympanic)   Resp 16   Ht 5' 8" (1 727 m)   Wt 78 9 kg (174 lb)   SpO2 99%   BMI 26 46 kg/m²        Physical Exam     Physical Exam   Constitutional: He is oriented to person, place, and time  He appears well-developed and well-nourished  No distress  HENT:   Head: Normocephalic and atraumatic  Right Ear: External ear normal    Left Ear: External ear normal    Nose: Nose normal    Mouth/Throat: Oropharynx is clear and moist    Eyes: Conjunctivae are normal  Right eye exhibits no discharge  Left eye exhibits no discharge  Neck: Normal range of motion  Neck supple  Cardiovascular: Normal rate, regular rhythm and intact distal pulses  Pulmonary/Chest: Effort normal  No respiratory distress  Musculoskeletal:        Right hand: He exhibits decreased range of motion, tenderness and bony tenderness  He exhibits normal two-point discrimination, normal capillary refill, no deformity, no laceration and no swelling  Normal sensation noted  Decreased strength (Secondary to pain) noted  Hands:  Neurological: He is alert and oriented to person, place, and time  Skin: Skin is warm and dry  Psychiatric: He has a normal mood and affect  Nursing note and vitals reviewed  X-rays reviewed  Fracture noted to 5th finger on the proximal phalanx      Splint applied by staff

## 2018-11-14 NOTE — PATIENT INSTRUCTIONS
Were splint until seen by Orthopedics  Call [phone] for scheduling for [address] [place] [address] and/or Tylenol as needed for pain control  Follow up with PCP in 3-5 days  Proceed to  ER if symptoms worsen  Finger Fracture   AMBULATORY CARE:   A finger fracture  is a break in 1 or more of the bones in your finger  It is most commonly caused by a direct blow to the finger  Common signs and symptoms include the following:   · Pain, bruising, or swelling    · Weakness or numbness    · Trouble moving your finger    · Finger looks abnormally shaped  Seek care immediately if:   · Your cast or splint gets wet, damaged, or comes off  · Your splint or cast feels too tight  · You have severe pain  · Your injured finger is numb, cold, or pale  Contact your healthcare provider or hand specialist if:   · Your pain or swelling gets worse, even after treatment  · You have questions or concerns about your condition or care  Treatment:   · A cast or splint  may be needed to prevent movement and protect your finger so it can heal      · NSAIDs , such as ibuprofen, help decrease swelling, pain, and fever  This medicine is available with or without a doctor's order  NSAIDs can cause stomach bleeding or kidney problems in certain people  If you take blood thinner medicine, always ask your healthcare provider if NSAIDs are safe for you  Always read the medicine label and follow directions  · Acetaminophen  decreases pain and fever  It is available without a doctor's order  Ask how much to take and how often to take it  Follow directions  Acetaminophen can cause liver damage if not taken correctly  · Prescription pain medicine  may be given  Ask your healthcare provider how to take this medicine safely  Some prescription pain medicines contain acetaminophen  Do not take other medicines that contain acetaminophen without talking to your healthcare provider  Too much acetaminophen may cause liver damage  Prescription pain medicine may cause constipation  Ask your healthcare provider how to prevent or treat constipation  · Closed reduction  may be done to put your bones back into their correct position without surgery  · Open reduction surgery  may be needed to put your bones back into the correct position  An incision is made and the bones are put back in the correct position  This may include the use of special wires, pins, plates or screws  These help keep the broken pieces lined up so your finger can heal correctly  Self-care:   · Wear your splint as directed  Do not remove your splint until you follow up with your healthcare provider or hand specialist      · Apply ice  on your finger for 15 to 20 minutes every hour or as directed  Use an ice pack, or put crushed ice in a plastic bag  Cover it with a towel before you apply it to your skin  Ice helps prevent tissue damage and decreases swelling and pain  · Elevate  your finger above the level of your heart as often as you can  This will help decrease swelling and pain  Prop your hand on pillows or blankets to keep it elevated comfortably  · Go to physical therapy as directed  A physical therapist teaches you exercises to help improve movement and strength, and to decrease pain  Follow up with your healthcare provider or hand specialist within 2 days:  Write down your questions so you remember to ask them during your visits  © 2017 [address] Information is for End User's use only and may not be sold, redistributed or otherwise used for commercial purposes  All illustrations and images included in CareNotes® are the copyrighted property of A D A M [employer] Inc  or Earnest Tavares  The above information is an  only  It is not intended as medical advice for individual conditions or treatments   Talk to your doctor, nurse or pharmacist before following any medical regimen to see if it is safe and effective for you

## 2018-11-15 ENCOUNTER — OFFICE VISIT (OUTPATIENT)
Dept: OBGYN CLINIC | Facility: MEDICAL CENTER | Age: 83
End: 2018-11-15
Payer: MEDICARE

## 2018-11-15 VITALS
WEIGHT: 181 LBS | BODY MASS INDEX: 27.43 KG/M2 | DIASTOLIC BLOOD PRESSURE: 89 MMHG | SYSTOLIC BLOOD PRESSURE: 145 MMHG | HEART RATE: 64 BPM | HEIGHT: 68 IN

## 2018-11-15 DIAGNOSIS — S62.646B OPEN NONDISPLACED FRACTURE OF PROXIMAL PHALANX OF RIGHT LITTLE FINGER, INITIAL ENCOUNTER: Primary | ICD-10-CM

## 2018-11-15 PROCEDURE — 99203 OFFICE O/P NEW LOW 30 MIN: CPT | Performed by: EMERGENCY MEDICINE

## 2018-11-15 NOTE — PROGRESS NOTES
Assessment/Plan:    Diagnoses and all orders for this visit:    Open nondisplaced fracture of proximal phalanx of right little finger, initial encounter    Patient wishes to see Hand for finger fracture  We have discussed splinting options at this time however the patient states he is a very active person and needs the use of his hands  He has elected to be zita-taped with [employer] today  He would like to hold off on using his aluminum foam finger splint and declines ulnar gutter splint  The    Return for Referred to Hand  Chief Complaint:   finger injury    Subjective:   Patient ID: Meli Taylor is a 81 y o  male  New patient presents for an injury to the right 5th digit after a slip and fall at home landing on his hand  He was evaluated with x-rays which showed a spiral type fracture of the proximal phalanx of the 5th digit which is nondisplaced  He states that he is having improvement in symptoms  He has noticed several times hitting his finger causing pain especially when he tries to put his hand in his pocket        Review of Systems   Constitutional: Negative for fever  Respiratory: Negative for shortness of breath  Cardiovascular: Negative for chest pain  Gastrointestinal: Negative for abdominal pain  Musculoskeletal: Positive for arthralgias and joint swelling  Neurological: Negative for weakness  The following portions of the patient's chart were reviewed and updated as appropriate:    Allergy:  No Known Allergies      Past Medical History:   Diagnosis Date    NSTEMI (non-ST elevated myocardial infarction) ([facility] ) 2014    Pneumonia     Last Assessed:  10/7/14    Polymyalgia rheumatica ([facility] )     Last Assessed:  11/23/12       Past Surgical History:   Procedure Laterality Date    CORONARY ANGIOPLASTY WITH STENT PLACEMENT      HERNIA REPAIR  2000       Social History     Social History    Marital status: /Civil Union     Spouse name: N/A    Number of children: N/A  Years of education: N/A     Occupational History    Not on file  Social History Main Topics    Smoking status: Never Smoker    Smokeless tobacco: Never Used    Alcohol use Yes      Comment: Social    Drug use: Unknown    Sexual activity: Not on file     Other Topics Concern    Not on file     Social History Narrative    Exercises regularly       Family History   Problem Relation Age of Onset    Pulmonary embolism Father     Other Father         Transurethral resection of prostate    Diabetes Sister        Medications:    Current Outpatient Prescriptions:     aspirin (ECOTRIN LOW STRENGTH) 81 mg EC tablet, Take 81 mg by mouth daily, Disp: , Rfl:     Calcium Citrate-Vitamin D (CALCIUM CITRATE + D) 315-250 MG-UNIT TABS, Take 1 tablet by mouth daily, Disp: , Rfl:     Coenzyme Q10 100 MG TABS, Take 1 tablet by mouth daily, Disp: , Rfl:     meloxicam (MOBIC) 7 5 mg tablet, TAKE 1 OR 2 TABLETS BY MOUTH A DAY AS NEEDED FOR ARTHRITIS HANDS, Disp: 30 tablet, Rfl: 0    metoprolol succinate (TOPROL-XL) 25 mg 24 hr tablet, Take 0 5 tablets by mouth daily, Disp: , Rfl:     OMEGA-3 FATTY ACIDS-VITAMIN E PO, Take 1 capsule by mouth daily, Disp: , Rfl:     rosuvastatin (CRESTOR) 10 MG tablet, Take 10 mg by mouth daily, Disp: , Rfl:     Patient Active Problem List   Diagnosis    Actinic keratosis    Arteriosclerotic coronary artery disease    Basal cell carcinoma of skin    BPH with obstruction/lower urinary tract symptoms    Benign essential hypertension    Hyperlipidemia    History of heart artery stent    Primary osteoarthritis of both hands       Objective:  Right Hand Exam     Other   Erythema: absent  Sensation: normal    Comments: There is diffuse mild swelling about the dorsal ulnar aspect of the hand and the 5th digit  This area has bruising  He is neurovascularly intact  There is decreased range of motion of the PIP and DIP joints    There is tenderness to palpation of the proximal phalanx of the 5th digit  Skin is intact no signs of infection  There is slight varus deformity of the 5th digit compared to the left  Physical Exam      Neurologic Exam    Procedures    I have personally reviewed pertinent films in PACS  and I have personally reviewed the written report of the pertinent studies     Extensive severe OA diffusely with ND spiral fx 5th digit

## 2018-11-27 ENCOUNTER — OFFICE VISIT (OUTPATIENT)
Dept: OBGYN CLINIC | Facility: CLINIC | Age: 83
End: 2018-11-27
Payer: MEDICARE

## 2018-11-27 ENCOUNTER — HOSPITAL ENCOUNTER (OUTPATIENT)
Dept: RADIOLOGY | Facility: HOSPITAL | Age: 83
Discharge: HOME | End: 2018-11-27
Attending: ORTHOPAEDIC SURGERY
Payer: MEDICARE

## 2018-11-27 VITALS — WEIGHT: 181 LBS | HEIGHT: 68 IN | BODY MASS INDEX: 27.43 KG/M2

## 2018-11-27 DIAGNOSIS — S62.646B OPEN NONDISPLACED FRACTURE OF PROXIMAL PHALANX OF RIGHT LITTLE FINGER, INITIAL ENCOUNTER: ICD-10-CM

## 2018-11-27 DIAGNOSIS — S62.646D CLOSED NONDISPLACED FRACTURE OF PROXIMAL PHALANX OF RIGHT LITTLE FINGER WITH ROUTINE HEALING, SUBSEQUENT ENCOUNTER: Primary | ICD-10-CM

## 2018-11-27 PROBLEM — S62.646A CLOSED NONDISPLACED FRACTURE OF PROXIMAL PHALANX OF RIGHT LITTLE FINGER: Status: ACTIVE | Noted: 2018-11-27

## 2018-11-27 PROCEDURE — 73140 X-RAY EXAM OF FINGER(S): CPT

## 2018-11-27 PROCEDURE — 99213 OFFICE O/P EST LOW 20 MIN: CPT | Performed by: ORTHOPAEDIC SURGERY

## 2018-11-27 NOTE — PATIENT INSTRUCTIONS
Plan:  I showed the x-rays to the patient and his wife  I gave him the option of zita taping the 4th and 5th fingers or dorsal Alumafoam splinting over the 5th finger only  He must continue this full-time for 2-3 weeks  He should back off on his activities so as not to displace this healing fracture  He can always put ice on the area for 15 minutes 4 times a day and take Advil, Aleve, or Tylenol if needed for pain  I will see him back again in 2-3 weeks for repeat x-ray and progression of his activity level

## 2018-11-27 NOTE — PROGRESS NOTES
CC:  Broken right little finger         Assessment:  Fracture proximal phalanx right little finger    Plan:  I showed the x-rays to the patient and his wife  I gave him the option of zita taping the 4th and 5th fingers or dorsal Alumafoam splinting over the 5th finger only  He must continue this full-time for 2-3 weeks  He should back off on his activities so as not to displace this healing fracture  He can always put ice on the area for 15 minutes 4 times a day and take Advil, Aleve, or Tylenol if needed for pain  I will see him back again in 2-3 weeks for repeat x-ray and progression of his activity level  HPI:  This 81-year-old white male slipped and fell at home and injured his right little finger on 11/14/2018  He saw Dr Rob Roland in the office who wanted to splint him, but the patient refused and therefore he has just done zita taping  He is quite active and he continues to use this hand for all of his activities of daily living  He is not complaining of significant pain but still has some swelling  This is his dominant arm    Past medical history, family history,  social history, medication history, and allergies are reviewed  Please see HPI for pertinent review of systems  All other systems reviewed are negative  He does have coronary artery disease status post stenting, history of basal cell carcinoma, BPH, hypertension, hyperlipidemia     Exam:   Ht 5' 8" (1 727 m)   Wt 82 1 kg (181 lb)   BMI 27 52 kg/m²  I removed his zita taping from his right hand  He had some mild residual swelling over the proximal phalanx of the right little finger with no angulation or rotation on flexion of the finger  He was  over the fracture site  He was not tender over the middle or distal phalanges  MCP joint motion was preserved  Radial pulse was normal   He had no cellulitis  Sensation to light touch was intact in all 5 fingers  He had good elbow and wrist motion           Studies Reviewed: I personally reviewed prior x-rays and new x-rays done on 11/27/2018  He has a nondisplaced comminuted fracture of the proximal phalanx of the right little finger  The new x-rays show this comminuted fracture healing essentially nondisplaced    There is no significant new bone formation seen yet

## 2018-11-27 NOTE — LETTER
November 27, 2018     Maty Rodriguez DO  [address]    Patient: Chano Jerez   YOB: 1935   Date of Visit: 11/27/2018       Dear Dr Purvi Flores: Thank you for referring Mary Ellen Caballero to me for evaluation  Below are my notes for this consultation  If you have questions, please do not hesitate to call me  I look forward to following your patient along with you  Sincerely,        Jose Vieyra MD        CC: No Recipients  Jose Vieyra MD  11/27/2018  4:36 PM  Sign at close encounter  CC:  Broken right little finger         Assessment:  Fracture proximal phalanx right little finger    Plan:  I showed the x-rays to the patient and his wife  I gave him the option of zita taping the 4th and 5th fingers or dorsal Alumafoam splinting over the 5th finger only  He must continue this full-time for 2-3 weeks  He should back off on his activities so as not to displace this healing fracture  He can always put ice on the area for 15 minutes 4 times a day and take Advil, Aleve, or Tylenol if needed for pain  I will see him back again in 2-3 weeks for repeat x-ray and progression of his activity level  HPI:  This 81-year-old white male slipped and fell at home and injured his right little finger on 11/14/2018  He saw Dr Carmencita Boas in the office who wanted to splint him, but the patient refused and therefore he has just done zita taping  He is quite active and he continues to use this hand for all of his activities of daily living  He is not complaining of significant pain but still has some swelling  This is his dominant arm    Past medical history, family history,  social history, medication history, and allergies are reviewed  Please see HPI for pertinent review of systems  All other systems reviewed are negative    He does have coronary artery disease status post stenting, history of basal cell carcinoma, BPH, hypertension, hyperlipidemia     Exam:   Ht 5' 8" (1 727 m)   Wt 82 1 kg (181 lb)   BMI 27 52 kg/m²   I removed his zita taping from his right hand  He had some mild residual swelling over the proximal phalanx of the right little finger with no angulation or rotation on flexion of the finger  He was  over the fracture site  He was not tender over the middle or distal phalanges  MCP joint motion was preserved  Radial pulse was normal   He had no cellulitis  Sensation to light touch was intact in all 5 fingers  He had good elbow and wrist motion  Studies Reviewed:  I personally reviewed prior x-rays and new x-rays done on 11/27/2018  He has a nondisplaced comminuted fracture of the proximal phalanx of the right little finger  The new x-rays show this comminuted fracture healing essentially nondisplaced    There is no significant new bone formation seen yet

## 2018-12-10 ENCOUNTER — APPOINTMENT (OUTPATIENT)
Dept: RADIOLOGY | Facility: MEDICAL CENTER | Age: 83
End: 2018-12-10
Payer: MEDICARE

## 2018-12-10 DIAGNOSIS — S62.646D CLOSED NONDISPLACED FRACTURE OF PROXIMAL PHALANX OF RIGHT LITTLE FINGER WITH ROUTINE HEALING, SUBSEQUENT ENCOUNTER: ICD-10-CM

## 2018-12-10 PROCEDURE — 73130 X-RAY EXAM OF HAND: CPT

## 2018-12-12 ENCOUNTER — OFFICE VISIT (OUTPATIENT)
Dept: OBGYN CLINIC | Facility: CLINIC | Age: 83
End: 2018-12-12
Payer: MEDICARE

## 2018-12-12 VITALS
HEIGHT: 68 IN | SYSTOLIC BLOOD PRESSURE: 114 MMHG | WEIGHT: 181 LBS | DIASTOLIC BLOOD PRESSURE: 67 MMHG | BODY MASS INDEX: 27.43 KG/M2 | HEART RATE: 87 BPM

## 2018-12-12 DIAGNOSIS — S62.646D CLOSED NONDISPLACED FRACTURE OF PROXIMAL PHALANX OF RIGHT LITTLE FINGER WITH ROUTINE HEALING, SUBSEQUENT ENCOUNTER: Primary | ICD-10-CM

## 2018-12-12 PROCEDURE — 99213 OFFICE O/P EST LOW 20 MIN: CPT | Performed by: ORTHOPAEDIC SURGERY

## 2018-12-12 NOTE — LETTER
December 12, 2018     Priscilla Joe DO  [address] [facility]  [address]    Patient: Yu Landis   YOB: 1935   Date of Visit: 12/12/2018       Dear Dr Florecita Miller: Thank you for referring Pauline De La Fuente to me for evaluation  Below are my notes for this consultation  If you have questions, please do not hesitate to call me  I look forward to following your patient along with you  Sincerely,        Any Sharp MD        CC: No Recipients    CC:  Broken right little finger         Assessment:  Fracture proximal phalanx right little finger    Plan:  I showed the x-rays to the patient and his daughter  The fracture is healing in a nondisplaced position but there is some either soft tissue or ligament injury that is allowing the finger to deviate a  bit ulnarly  I showed her how to buddy tape the 4th and 5th fingers together for the next month to try to tighten the ligaments somewhat   I also gave him putty to use to stimulate finger motion  He can do what he wants and that is perfectly acceptable as long as he does not have pain, but I cautioned him that heavy lifting or strenuous activity should be avoided as the fracture is not totally healed  He can continue with ice and Advil on an as-needed basis  I will see him back again here in 2 months for repeat x-ray and routine follow-up    HPI:  This 78-year-old white male slipped and fell at home and injured his right little finger on 11/14/2018  He saw Dr Maria G Coleman in the office who wanted to splint him, but the patient refused and therefore he has just done buddy taping  He is quite active and he continues to use this hand for all of his activities of daily living  He is not complaining of significant pain but still has some swelling  This is his dominant arm  He returns on 12/12/18 for follow up  He presents in the alumafoam splint that was provided to him at this last visit   He admits that he takes it off to sleep at night and when he "needs to fix something " He complains that the metal is rubbing and causing a blister  He complains of only mild pain  No other complaints  Past medical history, family history,  social history, medication history, and allergies are reviewed  Please see HPI for pertinent review of systems  All other systems reviewed are negative  He does have coronary artery disease status post stenting, history of basal cell carcinoma, BPH, hypertension, hyperlipidemia  Exam:   /67   Pulse 87   Ht 5' 8" (1 727 m)   Wt 82 1 kg (181 lb)   BMI 27 52 kg/m²       On today's exam of the right small finger, there is no ecchymosis, redness or signs of infection  There is mild swelling to the small finger  The skin was warm, dry and well perfused  There is a small abrasion of the dorsum of the finger due to the metal in the splint  He remains tender to palpation at the PIP joint  He is not tender over the MP joint  The right small finger is mildly deviated to the ulnar side, not at the fracture site  He is unable to flex the small finger secondary to stiffness  He has good wrist motion  There was a 2+ distal radial pulse with brisk capillary refill to all five fingers  Studies Reviewed:  I personally reviewed prior x-rays and new x-rays done on 11/27/2018  He has a nondisplaced comminuted fracture of the proximal phalanx of the right little finger  The new x-rays show this comminuted fracture healing essentially nondisplaced  There is no significant new bone formation seen yet  New hand x-rays done on 12/12/2018 were personally reviewed showed the fracture continues to heal in anatomic position  There is some ulnar drift of the finger with no subluxation at the MCP joint   The PIP and DIP joints remain well reduced      Scribe Attestation    I,:   Marycruz Mcallister MA am acting as a scribe while in the presence of the attending physician :        I,:   Jose Vieyra MD personally performed the services described in this documentation    as scribed in my presence :

## 2018-12-12 NOTE — PROGRESS NOTES
CC:  Broken right little finger         Assessment:  Fracture proximal phalanx right little finger    Plan:  I showed the x-rays to the patient and his daughter  The fracture is healing in a nondisplaced position but there is some either soft tissue or ligament injury that is allowing the finger to deviate a  bit ulnarly  I showed her how to buddy tape the 4th and 5th fingers together for the next month to try to tighten the ligaments somewhat   I also gave him putty to use to stimulate finger motion  He can do what he wants and that is perfectly acceptable as long as he does not have pain, but I cautioned him that heavy lifting or strenuous activity should be avoided as the fracture is not totally healed  He can continue with ice and Advil on an as-needed basis  I will see him back again here in 2 months for repeat x-ray and routine follow-up    HPI:  This 50-year-old white male slipped and fell at home and injured his right little finger on 11/14/2018  He saw Dr Sara Alves in the office who wanted to splint him, but the patient refused and therefore he has just done buddy taping  He is quite active and he continues to use this hand for all of his activities of daily living  He is not complaining of significant pain but still has some swelling  This is his dominant arm  He returns on 12/12/18 for follow up  He presents in the alumafoam splint that was provided to him at this last visit  He admits that he takes it off to sleep at night and when he "needs to fix something " He complains that the metal is rubbing and causing a blister  He complains of only mild pain  No other complaints  Past medical history, family history,  social history, medication history, and allergies are reviewed  Please see HPI for pertinent review of systems  All other systems reviewed are negative  He does have coronary artery disease status post stenting, history of basal cell carcinoma, BPH, hypertension, hyperlipidemia  Exam:   /67   Pulse 87   Ht 5' 8" (1 727 m)   Wt 82 1 kg (181 lb)   BMI 27 52 kg/m²      On today's exam of the right small finger, there is no ecchymosis, redness or signs of infection  There is mild swelling to the small finger  The skin was warm, dry and well perfused  There is a small abrasion of the dorsum of the finger due to the metal in the splint  He remains tender to palpation at the PIP joint  He is not tender over the MP joint  The right small finger is mildly deviated to the ulnar side, not at the fracture site  He is unable to flex the small finger secondary to stiffness  He has good wrist motion  There was a 2+ distal radial pulse with brisk capillary refill to all five fingers  Studies Reviewed:  I personally reviewed prior x-rays and new x-rays done on 11/27/2018  He has a nondisplaced comminuted fracture of the proximal phalanx of the right little finger  The new x-rays show this comminuted fracture healing essentially nondisplaced  There is no significant new bone formation seen yet  New hand x-rays done on 12/12/2018 were personally reviewed showed the fracture continues to heal in anatomic position  There is some ulnar drift of the finger with no subluxation at the MCP joint   The PIP and DIP joints remain well reduced      Scribe Attestation    I,:   John Almanza MA am acting as a scribe while in the presence of the attending physician :        I,:   Jenna Montesinos MD personally performed the services described in this documentation    as scribed in my presence :

## 2018-12-12 NOTE — PATIENT INSTRUCTIONS
Plan:  I showed the x-rays to the patient and his daughter  The fracture is healing in a nondisplaced position but there is some either soft tissue or ligament injury that is allowing the finger to deviate a  bit ulnarly  I showed her how to buddy tape the 4th and 5th fingers together for the next month to try to tighten the ligaments somewhat   I also gave him putty to use to stimulate finger motion  He can do what he wants and that is perfectly acceptable as long as he does not have pain, but I cautioned him that heavy lifting or strenuous activity should be avoided as the fracture is not totally healed  He can continue with ice and Advil on an as-needed basis    I will see him back again here in 2 months for repeat x-ray and routine follow-up

## 2019-02-18 ENCOUNTER — CONSULT (OUTPATIENT)
Dept: FAMILY MEDICINE CLINIC | Facility: CLINIC | Age: 84
End: 2019-02-18
Payer: MEDICARE

## 2019-02-18 VITALS
DIASTOLIC BLOOD PRESSURE: 68 MMHG | HEART RATE: 99 BPM | OXYGEN SATURATION: 70 % | WEIGHT: 187.8 LBS | BODY MASS INDEX: 28.46 KG/M2 | HEIGHT: 68 IN | SYSTOLIC BLOOD PRESSURE: 126 MMHG

## 2019-02-18 DIAGNOSIS — I25.10 ARTERIOSCLEROTIC CORONARY ARTERY DISEASE: Primary | ICD-10-CM

## 2019-02-18 DIAGNOSIS — H25.9 AGE-RELATED CATARACT OF BOTH EYES, UNSPECIFIED AGE-RELATED CATARACT TYPE: ICD-10-CM

## 2019-02-18 DIAGNOSIS — I10 BENIGN ESSENTIAL HYPERTENSION: ICD-10-CM

## 2019-02-18 PROCEDURE — 99213 OFFICE O/P EST LOW 20 MIN: CPT | Performed by: FAMILY MEDICINE

## 2019-02-18 RX ORDER — BIMATOPROST 0.01 %
1 DROPS OPHTHALMIC (EYE) DAILY
Refills: 5 | COMMUNITY
Start: 2019-02-11 | End: 2022-02-21 | Stop reason: ALTCHOICE

## 2019-02-18 NOTE — PATIENT INSTRUCTIONS
He is doing very well here today, he is medically cleared to undergo upcoming staged cataract/eye procedures  I will defer holding aspirin to his surgeon, he can hold this for up to 1 week if needed  He did see Cardiology again back in October, EKG was stable then, continues on Metoprolol 25 milligram 1/2 tablet daily, Crestor 10 milligram daily  Back in June CMP and lipids were fine, cholesterol 113 with HDL 53, LDL 47  I did give him a slip to redo CBC, CMP around June, see no indication to redo lipid panel this year  He will be seeing plastic surgeon in follow-up regarding history BCC scalp/scalp lesions  Continue with routine exercise as is  We will see him again in 6 to 8 months with annual wellness    Call sooner if needed

## 2019-02-18 NOTE — PROGRESS NOTES
FAMILY PRACTICE OFFICE VISIT  [facility] [address]  [address], [address]      NAME: Genna Weber  AGE: 81 y o  SEX: male  : 1935   MRN: [id number]    DATE: 2019  TIME: 4:13 PM    Assessment and Plan     Problem List Items Addressed This Visit        Cardiovascular and Mediastinum    Arteriosclerotic coronary artery disease - Primary    Relevant Orders    Comprehensive metabolic panel    CBC    Benign essential hypertension    Relevant Orders    Comprehensive metabolic panel      Other Visit Diagnoses     Age-related cataract of both eyes, unspecified age-related cataract type        Relevant Medications    LUMIGAN 0 01 % ophthalmic drops          Patient Instructions   He is doing very well here today, he is medically cleared to undergo upcoming staged cataract/eye procedures  I will defer holding aspirin to his surgeon, he can hold this for up to 1 week if needed  He did see Cardiology again back in October, EKG was stable then, continues on Metoprolol 25 milligram 1/2 tablet daily, Crestor 10 milligram daily  Back in  CMP and lipids were fine, cholesterol 113 with HDL 53, LDL 47  I did give him a slip to redo CBC, CMP around , see no indication to redo lipid panel this year  He will be seeing plastic surgeon in follow-up regarding history BCC scalp/scalp lesions  Continue with routine exercise as is  We will see him again in 6 to 8 months with annual wellness    Call sooner if needed      Chief Complaint     Chief Complaint   Patient presents with    Pre-op Exam     Cataract surgery schedule for  right eye, 3/12 left eye w/ Dr Shy Diamond        History of Present Illness   Genna Weber is a 81y o -year-old male who is in today for clearance regarding upcoming cataract surgeries, he has been feeling very well since I saw him last year, he did see his cardiologist in October, has no chest pain or increased shortness of breath  He continues to exercise routinely at the gym  He did have a fall late 2018 with fracture right 5th finger, that healed with some deformity but is much improved  Has had no other falls  Review of Systems   Review of Systems   Constitutional: Negative for activity change, appetite change, fatigue, fever and unexpected weight change  HENT: Negative for sore throat and trouble swallowing  Respiratory: Negative for cough, chest tightness, shortness of breath and wheezing  Cardiovascular: Negative for chest pain, palpitations and leg swelling  Gastrointestinal: Negative for abdominal pain, blood in stool, nausea and vomiting  No acid reflux     No change in bowel   Genitourinary: Negative for dysuria and hematuria  Skin:        Sees plastic surgeon regarding scalp lesions   Neurological: Negative for dizziness, light-headedness and headaches  Hematological: Does not bruise/bleed easily  Psychiatric/Behavioral: Negative for behavioral problems and confusion         Active Problem List     Patient Active Problem List   Diagnosis    Actinic keratosis    Arteriosclerotic coronary artery disease    Basal cell carcinoma of skin    BPH with obstruction/lower urinary tract symptoms    Benign essential hypertension    Hyperlipidemia    History of heart artery stent    Primary osteoarthritis of both hands    Closed nondisplaced fracture of proximal phalanx of right little finger       Past Medical History:  Past Medical History:   Diagnosis Date    [facility]STEMI [facility]non-ST elevated [facility] infarction) ([facility] ) 2014    Pneumonia     Last Assessed:  10/7/14    [facility] )     Last Assessed:  11/23/12       Past Surgical History:  Past Surgical History:   Procedure Laterality Date    CORONARY ANGIOPLASTY WITH STENT PLACEMENT      HERNIA REPAIR  2000       Family History:  Family History   Problem Relation Age of Onset    Pulmonary embolism Father     Other Father         Transurethral resection of prostate    Diabetes Sister        Social History:  Social History     Tobacco Use    Smoking status: Never Smoker    Smokeless tobacco: Never Used   Substance Use Topics    Alcohol use: Yes     Comment: Social       Objective     Vitals:    02/18/19 1515   BP: 126/68   BP Location: Left arm   Patient Position: Sitting   Cuff Size: Large   Pulse: 99   SpO2: (!) 70%   Weight: 85 2 kg (187 lb 12 8 oz)   Height: 5' 8" (1 727 m)     Body mass index is 28 55 kg/m²  BP Readings from Last 3 Encounters:   02/18/19 126/68   12/12/18 114/67   11/15/18 145/89       Wt Readings from Last 3 Encounters:   02/18/19 85 2 kg (187 lb 12 8 oz)   12/12/18 82 1 kg (181 lb)   11/27/18 82 1 kg (181 lb)       Physical Exam   Constitutional: He is oriented to person, place, and time  He appears well-developed and well-nourished  No distress  HENT:   Mouth/Throat: Oropharynx is clear and moist    TM clear b/l   Eyes: No scleral icterus  Neck: Carotid bruit is not present  Cardiovascular: Normal rate and regular rhythm  Murmur (1/6 systolic ejection murmur) heard  No carotid bruit   Pulmonary/Chest: Effort normal and breath sounds normal  No respiratory distress  He has no wheezes  He has no rales  Abdominal: Soft  There is no tenderness  Musculoskeletal: He exhibits no edema  Lymphadenopathy:     He has no cervical adenopathy  Neurological: He is alert and oriented to person, place, and time  No cranial nerve deficit  Coordination normal    Skin: He is not diaphoretic  Psychiatric: He has a normal mood and affect   His behavior is normal        ALLERGIES:  No Known Allergies    Current Medications     Current Outpatient Medications   Medication Sig Dispense Refill    aspirin (ECOTRIN LOW STRENGTH) 81 mg EC tablet Take 81 mg by mouth daily      Calcium Citrate-Vitamin D (CALCIUM CITRATE + D) 315-250 MG-UNIT TABS Take 1 tablet by mouth daily      Coenzyme Q10 100 MG TABS Take 1 tablet by mouth daily      LUMIGAN 0 01 % ophthalmic drops Administer 1 drop to both eyes daily  5    metoprolol succinate (TOPROL-XL) 25 mg 24 hr tablet Take 0 5 tablets by mouth daily      OMEGA-3 FATTY ACIDS-VITAMIN E PO Take 1 capsule by mouth daily      rosuvastatin (CRESTOR) 10 MG tablet Take 10 mg by mouth daily       No current facility-administered medications for this visit                Most recent labs available from [address]   ( others may be available in Care Everywhere / Media sections)  Lab Results   Component Value Date    WBC 6 32 06/30/2015    HGB 13 5 06/30/2015    HCT 40 0 06/30/2015     06/30/2015    TRIG 64 06/29/2018    HDL 53 06/29/2018    ALT 25 06/29/2018    AST 21 06/29/2018     06/30/2015    K 4 7 06/29/2018     06/29/2018    CREATININE 0 94 06/29/2018    BUN 31 (H) 06/29/2018    CO2 27 06/29/2018    GLUF 102 (H) 06/29/2018     Lab Results   Component Value Date    LDLCALC 47 06/29/2018     Lab Results   Component Value Date    [id number] 2 970 03/12/2014         Orders Placed This Encounter   Procedures    Comprehensive metabolic panel    CBC         Marlis Nissen, DO

## 2019-05-22 ENCOUNTER — APPOINTMENT (OUTPATIENT)
Dept: LAB | Facility: MEDICAL CENTER | Age: 84
End: 2019-05-22
Payer: MEDICARE

## 2019-05-22 LAB
ALBUMIN SERPL BCP-MCNC: 3.5 G/DL (ref 3.5–5)
ALP SERPL-CCNC: 78 U/L (ref 46–116)
ALT SERPL W P-5'-P-CCNC: 21 U/L (ref 12–78)
ANION GAP SERPL CALCULATED.3IONS-SCNC: 7 MMOL/L (ref 4–13)
AST SERPL W P-5'-P-CCNC: 18 U/L (ref 5–45)
BILIRUB SERPL-MCNC: 0.6 MG/DL (ref 0.2–1)
BUN SERPL-MCNC: 28 MG/DL (ref 5–25)
CALCIUM SERPL-MCNC: 8.2 MG/DL (ref 8.3–10.1)
CHLORIDE SERPL-SCNC: 110 MMOL/L (ref 100–108)
CO2 SERPL-SCNC: 24 MMOL/L (ref 21–32)
CREAT SERPL-MCNC: 0.86 MG/DL (ref 0.6–1.3)
ERYTHROCYTE [DISTWIDTH] IN BLOOD BY AUTOMATED COUNT: 15 % (ref 11.6–15.1)
GFR SERPL CREATININE-BSD FRML MDRD: 81 ML/MIN/1.73SQ M
GLUCOSE P FAST SERPL-MCNC: 95 MG/DL (ref 65–99)
HCT VFR BLD AUTO: 42.7 % (ref 36.5–49.3)
HGB BLD-MCNC: 13.9 G/DL (ref 12–17)
MCH RBC QN AUTO: 27.7 PG (ref 26.8–34.3)
MCHC RBC AUTO-ENTMCNC: 32.6 G/DL (ref 31.4–37.4)
MCV RBC AUTO: 85 FL (ref 82–98)
PLATELET # BLD AUTO: 302 THOUSANDS/UL (ref 149–390)
PMV BLD AUTO: 10.5 FL (ref 8.9–12.7)
POTASSIUM SERPL-SCNC: 4.7 MMOL/L (ref 3.5–5.3)
PROT SERPL-MCNC: 7.3 G/DL (ref 6.4–8.2)
RBC # BLD AUTO: 5.01 MILLION/UL (ref 3.88–5.62)
SODIUM SERPL-SCNC: 141 MMOL/L (ref 136–145)
WBC # BLD AUTO: 6.05 THOUSAND/UL (ref 4.31–10.16)

## 2019-05-22 PROCEDURE — 36415 COLL VENOUS BLD VENIPUNCTURE: CPT | Performed by: FAMILY MEDICINE

## 2019-05-22 PROCEDURE — 85027 COMPLETE CBC AUTOMATED: CPT | Performed by: FAMILY MEDICINE

## 2019-05-22 PROCEDURE — 80053 COMPREHEN METABOLIC PANEL: CPT | Performed by: FAMILY MEDICINE

## 2019-10-11 ENCOUNTER — TELEPHONE (OUTPATIENT)
Dept: FAMILY MEDICINE CLINIC | Facility: CLINIC | Age: 84
End: 2019-10-11

## 2019-10-11 NOTE — TELEPHONE ENCOUNTER
Pt scheduled his awv with you on 11/22 and wants to know if you you're okay ordering his labs prior to the visit

## 2019-10-11 NOTE — TELEPHONE ENCOUNTER
He did have blood work back in May, just keep appointment and we can discuss the timing of further blood work then

## 2019-11-22 ENCOUNTER — OFFICE VISIT (OUTPATIENT)
Dept: FAMILY MEDICINE CLINIC | Facility: CLINIC | Age: 84
End: 2019-11-22
Payer: MEDICARE

## 2019-11-22 VITALS
SYSTOLIC BLOOD PRESSURE: 124 MMHG | HEART RATE: 64 BPM | DIASTOLIC BLOOD PRESSURE: 84 MMHG | RESPIRATION RATE: 18 BRPM | OXYGEN SATURATION: 94 % | BODY MASS INDEX: 28.19 KG/M2 | WEIGHT: 186 LBS | HEIGHT: 68 IN

## 2019-11-22 DIAGNOSIS — Z23 NEED FOR PNEUMOCOCCAL VACCINE: ICD-10-CM

## 2019-11-22 DIAGNOSIS — E78.5 HYPERLIPIDEMIA, UNSPECIFIED HYPERLIPIDEMIA TYPE: ICD-10-CM

## 2019-11-22 DIAGNOSIS — Z95.5 HISTORY OF HEART ARTERY STENT: ICD-10-CM

## 2019-11-22 DIAGNOSIS — E66.3 OVERWEIGHT WITH BODY MASS INDEX (BMI) 25.0-29.9: ICD-10-CM

## 2019-11-22 DIAGNOSIS — Z00.00 MEDICARE ANNUAL WELLNESS VISIT, SUBSEQUENT: Primary | ICD-10-CM

## 2019-11-22 DIAGNOSIS — I25.10 ARTERIOSCLEROTIC CORONARY ARTERY DISEASE: ICD-10-CM

## 2019-11-22 DIAGNOSIS — I10 BENIGN ESSENTIAL HYPERTENSION: ICD-10-CM

## 2019-11-22 PROCEDURE — 99213 OFFICE O/P EST LOW 20 MIN: CPT | Performed by: FAMILY MEDICINE

## 2019-11-22 PROCEDURE — G0438 PPPS, INITIAL VISIT: HCPCS | Performed by: FAMILY MEDICINE

## 2019-11-22 PROCEDURE — G0009 ADMIN PNEUMOCOCCAL VACCINE: HCPCS | Performed by: FAMILY MEDICINE

## 2019-11-22 PROCEDURE — 90670 PCV13 VACCINE IM: CPT | Performed by: FAMILY MEDICINE

## 2019-11-22 RX ORDER — CARBOXYMETHYLCELLULOSE SODIUM 5 MG/ML
1 SOLUTION/ DROPS OPHTHALMIC 2 TIMES DAILY PRN
COMMUNITY

## 2019-11-22 NOTE — PROGRESS NOTES
Assessment and Plan:     Problem List Items Addressed This Visit        Cardiovascular and Mediastinum    Arteriosclerotic coronary artery disease    Relevant Orders    Ambulatory referral to Cardiology    CBC    Comprehensive metabolic panel    Lipid panel    Benign essential hypertension    Relevant Orders    CBC    Comprehensive metabolic panel    Lipid panel       Other    History of heart artery stent    Relevant Orders    Ambulatory referral to Cardiology    Hyperlipidemia    Relevant Orders    Lipid panel      Other Visit Diagnoses     Medicare annual wellness visit, subsequent    -  Primary    acceptable weight        Need for pneumococcal vaccine        Relevant Orders    PNEUMOCOCCAL CONJUGATE VACCINE 13-VALENT GREATER THAN 6 MONTHS (Completed)           Preventive health issues were discussed with patient, and age appropriate screening tests were ordered as noted in patient's After Visit Summary  Personalized health advice and appropriate referrals for health education or preventive services given if needed, as noted in patient's After Visit Summary      See other note today regarding provider information     History of Present Illness:     Patient presents for Medicare Annual Wellness visit    Patient Care Team:  Heidi Torrez DO as PCP - General     Problem List:     Patient Active Problem List   Diagnosis    Actinic keratosis    Arteriosclerotic coronary artery disease    Basal cell carcinoma of skin    BPH with obstruction/lower urinary tract symptoms    Benign essential hypertension    Hyperlipidemia    History of heart artery stent    Primary osteoarthritis of both hands    Closed nondisplaced fracture of proximal phalanx of right little finger      Past Medical and Surgical History:     Past Medical History:   Diagnosis Date    [facility]STEMI (non-ST elevated myocardial infarction) ([facility] ) 2014    Pneumonia     Last Assessed:  10/7/14    Polymyalgia rheumatica ([facility] )     Last Assessed:  11/23/12 Past Surgical History:   Procedure Laterality Date    CORONARY ANGIOPLASTY WITH STENT PLACEMENT      HERNIA REPAIR  2000      Family History:     Family History   Problem Relation Age of Onset    Pulmonary embolism Father     Other Father         Transurethral resection of prostate    Diabetes Sister       Social History:     Social History     Socioeconomic History    Marital status: /Civil Union     Spouse name: None    Number of children: None    Years of education: None    Highest education level: None   Occupational History    None   Social Needs    Financial resource strain: None    Food insecurity:     Worry: None     Inability: None    Transportation needs:     Medical: None     Non-medical: None   Tobacco Use    Smoking status: Never Smoker    Smokeless tobacco: Never Used   Substance and Sexual Activity    Alcohol use: Yes     Comment: Social    Drug use: Never    Sexual activity: Yes   Lifestyle    Physical activity:     Days per week: None     Minutes per session: None    Stress: None   Relationships    Social connections:     Talks on phone: None     Gets together: None     Attends [employer] service: None     Active member of club or organization: None     Attends meetings of clubs or organizations: None     Relationship status: None    Intimate partner violence:     Fear of current or ex partner: None     Emotionally abused: None     Physically abused: None     Forced sexual activity: None   Other Topics Concern    None   Social History Narrative    Exercises regularly       Medications and Allergies:     Current Outpatient Medications   Medication Sig Dispense Refill    aspirin (ECOTRIN LOW STRENGTH) 81 mg EC tablet Take 81 mg by mouth daily      Calcium Citrate-Vitamin D (CALCIUM CITRATE + D) 315-250 MG-UNIT TABS Take 1 tablet by mouth daily      carboxymethylcellulose (REFRESH TEARS) 0 5 % SOLN 1 drop 2 (two) times a day as needed for dry eyes      Coenzyme Q10 100 MG TABS Take 1 tablet by mouth daily      metoprolol succinate (TOPROL-XL) 25 mg 24 hr tablet Take 0 5 tablets by mouth daily      Misc Natural Products (OSTEO BI-FLEX ADV TRIPLE ST PO) Take by mouth      OMEGA-3 FATTY ACIDS-VITAMIN E PO Take 1 capsule by mouth daily      rosuvastatin (CRESTOR) 10 MG tablet Take 10 mg by mouth daily      LUMIGAN 0 01 % ophthalmic drops Administer 1 drop to both eyes daily  5     No current facility-administered medications for this visit  No Known Allergies   Immunizations:     Immunization History   Administered Date(s) Administered    INFLUENZA 10/09/2017    Influenza Split High Dose Preservative Free IM 10/27/2016, 09/29/2018, 10/23/2019    Influenza TIV (IM) 10/29/2007, 01/12/2009    Pneumococcal Conjugate 13-Valent 11/22/2019    Pneumococcal Polysaccharide PPV23 09/01/2006, 09/16/2014    TD (adult) Preservative Free 09/01/2006    Zoster 01/01/2012      Health Maintenance: There are no preventive care reminders to display for this patient  Topic Date Due    DTaP,Tdap,and Td Vaccines (1 - Tdap) 06/14/1946      Medicare Health Risk Assessment:     /84   Pulse 64   Resp 18   Ht 5' 8" (1 727 m)   Wt 84 4 kg (186 lb)   SpO2 94%   BMI 28 28 kg/m²      Isaias Martínez is here for his Subsequent Wellness visit  Health Risk Assessment:   Patient rates overall health as good  Patient feels that their physical health rating is same  Eyesight was rated as same  Hearing was rated as same  Patient feels that their emotional and mental health rating is same  Pain experienced in the last 7 days has been some  Depression Screening:   PHQ-2 Score: 0      Fall Risk Screening: In the past year, patient has experienced: no history of falling in past year      Home Safety:  Patient does not have trouble with stairs inside or outside of their home  Patient has working smoke alarms and has working carbon monoxide detector  Home safety hazards include: none  Nutrition:   Current diet is Regular, Low Cholesterol, Low Saturated Fat, Low Carb and No Added Salt  Medications:   Patient is currently taking over-the-counter supplements  OTC medications include: see medication list  Patient is able to manage medications  Activities of Daily Living (ADLs)/Instrumental Activities of Daily Living (IADLs):   Walk and transfer into and out of bed and chair?: Yes  Dress and groom yourself?: Yes    Bathe or shower yourself?: Yes    Feed yourself? Yes  Do your laundry/housekeeping?: Yes  Manage your money, pay your bills and track your expenses?: Yes  Make your own meals?: Yes    Do your own shopping?: Yes    Previous Hospitalizations:   Any hospitalizations or ED visits within the last 12 months?: No      Advance Care Planning:   Living will: Yes    Durable POA for healthcare:  Yes    Advanced directive: Yes      PREVENTIVE SCREENINGS      Cardiovascular Screening:    General: Screening Not Indicated and History Lipid Disorder      Diabetes Screening:     General: Screening Current      Prostate Cancer Screening:    General: Screening Not Indicated      Flori Garcia DO

## 2019-11-22 NOTE — PATIENT INSTRUCTIONS
Reviewed health history along with medications, he continues to do very well  We did review previous blood work, back in May CBC, CMP were fine  He is up to date with Lipid screening  Does have history NSTEMI 2014 with past stents  He desires new cardiologist with [facility], referral placed for Dr Mary Boswell -  continue with cardiovascular risk reduction as can, continue aspirin 81 mg daily, metoprolol succinate 12 5 mg daily, Crestor 10 mg daily  Cholesterol last year 113 with HDL 53, LDL 47  I did give slip to redo blood work at least by May 2020  He will continue with his routine exercise, get to the gym routinely  He will re-evaluate with his orthopedist at [facility] regarding restricted range of motion shoulder, saw them years ago  He is up to date with Diabetes screening  Immunization History   Administered Date(s) Administered    INFLUENZA 10/09/2017    Influenza Split High Dose Preservative Free IM 10/27/2016, 09/29/2018, 10/23/2019    Influenza TIV (IM) 10/29/2007, 01/12/2009    Pneumococcal Polysaccharide PPV23 09/01/2006, 09/16/2014    TD (adult) Preservative Free 09/01/2006    Zoster 01/01/2012     Discussed Vaccines,    Prevnar  was given today   Pneumovax  is up to date  He does do yearly Flu shot  Tdap/tetanus shot will be done at a future date  (done every 10 yrs for superficial cuts, every 5 yrs for deep wounds)   Can also look into coverage for new shingles shot, Shingrix  Can do that at pharmacy  Was never a smoker     Regarding Colon Cancer screening,    Not indicated     Discussed pros and cons regarding Prostate Cancer screening,   PSA blood test not indicated    We discussed end of life planning, does have a  "LIVING WILL"     Glaucoma screening is up-to-date -  he will be undergoing laser treatment in February  We will see him again in 12 months, sooner as needed

## 2019-11-22 NOTE — PROGRESS NOTES
BMI Counseling: Body mass index is 28 28 kg/m²  The BMI is above normal  Nutrition recommendations include encouraging healthy choices of fruits and vegetables  Rob MARY GRACE  [facility]  [address]   [address]  [place], [place], [address] MEDICARE SUBSEQUENT VISIT NOTE ( part 1 )      NAME: Justin Parish  AGE: 81 y o  SEX: male  : 1935   MRN: [id number]    DATE: 2019  TIME: 9:30 AM    Assessment and Plan     Problem List Items Addressed This Visit        Cardiovascular and Mediastinum    Arteriosclerotic coronary artery disease    Relevant Orders    Ambulatory referral to Cardiology    CBC    Comprehensive metabolic panel    Lipid panel    Benign essential hypertension    Relevant Orders    CBC    Comprehensive metabolic panel    Lipid panel       Other    History of heart artery stent    Relevant Orders    Ambulatory referral to Cardiology    Hyperlipidemia    Relevant Orders    Lipid panel      Other Visit Diagnoses     Medicare annual wellness visit, subsequent    -  Primary    acceptable weight        Need for pneumococcal vaccine        Relevant Orders    PNEUMOCOCCAL CONJUGATE VACCINE 13-VALENT GREATER THAN 6 MONTHS (Completed)          Medicare Wellness Counseling/ Discussion    Patient Instructions     Reviewed health history along with medications, he continues to do very well  We did review previous blood work, back in May CBC, CMP were fine  He is up to date with Lipid screening  Does have history NSTEMI  with past stents  He desires new cardiologist with [facility], referral placed for Dr Larissa Anglin -  continue with cardiovascular risk reduction as can, continue aspirin 81 mg daily, metoprolol succinate 12 5 mg daily, Crestor 10 mg daily  Cholesterol last year 113 with HDL 53, LDL 47  I did give slip to redo blood work at least by May 2020    He will continue with his routine exercise, get to the gym routinely  He will re-evaluate with his orthopedist at [facility] regarding restricted range of motion shoulder, saw them years ago  He is up to date with Diabetes screening  Immunization History   Administered Date(s) Administered    INFLUENZA 10/09/2017    Influenza Split High Dose Preservative Free IM 10/27/2016, 09/29/2018, 10/23/2019    Influenza TIV (IM) 10/29/2007, 01/12/2009    Pneumococcal Polysaccharide PPV23 09/01/2006, 09/16/2014    TD (adult) Preservative Free 09/01/2006    Zoster 01/01/2012     Discussed Vaccines,    Prevnar  was given today   Pneumovax  is up to date  He does do yearly Flu shot  Tdap/tetanus shot will be done at a future date  (done every 10 yrs for superficial cuts, every 5 yrs for deep wounds)   Can also look into coverage for new shingles shot, Shingrix  Can do that at pharmacy  Was never a smoker     Regarding Colon Cancer screening,    Not indicated     Discussed pros and cons regarding Prostate Cancer screening,   PSA blood test not indicated    We discussed end of life planning, does have a  "LIVING WILL"     Glaucoma screening is up-to-date -  he will be undergoing laser treatment in February  We will see him again in 12 months, sooner as needed  Chief Complaint     Chief Complaint   Patient presents with    Medicare Wellness Visit       History of Present Illness     Alejandrina Aguilar is a 81y o -year-old male who is in today for a routine follow-up/annual wellness, overall he is doing well, continues to exercise routinely at the gym, his only complaint today is some restricted range of motion of shoulders, had a fall years ago  Continues on medication as before, does request a new cardiologist with [facility],    Review of Systems     Review of Systems   Constitutional: Negative for appetite change, fatigue, fever and unexpected weight change  HENT: Negative for sore throat and trouble swallowing      Respiratory: Negative for cough, chest tightness and shortness of breath  Cardiovascular: Negative for chest pain, palpitations and leg swelling  Gastrointestinal: Negative for abdominal pain, blood in stool, nausea and vomiting  No acid reflux     No change in bowel   Genitourinary: Negative for dysuria and hematuria  Neurological: Negative for dizziness, light-headedness and headaches  Hematological: Does not bruise/bleed easily  Psychiatric/Behavioral: Negative for behavioral problems and confusion  Active Problem List     Patient Active Problem List   Diagnosis    Actinic keratosis    Arteriosclerotic coronary artery disease    Basal cell carcinoma of skin    BPH with obstruction/lower urinary tract symptoms    Benign essential hypertension    Hyperlipidemia    History of heart artery stent    Primary osteoarthritis of both hands    Closed nondisplaced fracture of proximal phalanx of right little finger       Past Medical History:  Past Medical History:   Diagnosis Date    NSTEMI (non-ST elevated myocardial infarction) ([facility] ) 2014    Pneumonia     Last Assessed:  10/7/14    Polymyalgia rheumatica ([facility] )     Last Assessed:  11/23/12       Past Surgical History:  Past Surgical History:   Procedure Laterality Date    CORONARY ANGIOPLASTY WITH STENT PLACEMENT      HERNIA REPAIR  2000       Family History:  Family History   Problem Relation Age of Onset    Pulmonary embolism Father     Other Father         Transurethral resection of prostate    Diabetes Sister        Social History:  Social History     Tobacco Use    Smoking status: Never Smoker    Smokeless tobacco: Never Used   Substance Use Topics    Alcohol use: Yes     Comment: Social       Objective     Vitals:    11/22/19 0815   BP: 124/84   Pulse: 64   Resp: 18   SpO2: 94%   Weight: 84 4 kg (186 lb)   Height: 5' 8" (1 727 m)     Body mass index is 28 28 kg/m²      BP Readings from Last 3 Encounters:   11/22/19 124/84   02/18/19 126/68   12/12/18 114/67 Wt Readings from Last 3 Encounters:   11/22/19 84 4 kg (186 lb)   02/18/19 85 2 kg (187 lb 12 8 oz)   12/12/18 82 1 kg (181 lb)       Physical Exam   Constitutional: He is oriented to person, place, and time  He appears well-developed and well-nourished  No distress  HENT:   Right Ear: External ear normal    Left Ear: External ear normal    Mouth/Throat: Oropharynx is clear and moist  No oropharyngeal exudate  TM clear   Eyes: Conjunctivae are normal  No scleral icterus  Cardiovascular: Normal rate, regular rhythm and normal heart sounds  No murmur (1/6 DEBORA LSB) heard  No carotid bruit   Pulmonary/Chest: Effort normal and breath sounds normal  No respiratory distress  Abdominal: Soft  There is no tenderness  Musculoskeletal: He exhibits no edema  Mild to moderate restriction internal/external rotation shoulders bilateral   Lymphadenopathy:     He has no cervical adenopathy  Neurological: He is alert and oriented to person, place, and time  Psychiatric: He has a normal mood and affect   His behavior is normal        ALLERGIES:  No Known Allergies    Current Medications     Current Outpatient Medications   Medication Sig Dispense Refill    aspirin (ECOTRIN LOW STRENGTH) 81 mg EC tablet Take 81 mg by mouth daily      Calcium Citrate-Vitamin D (CALCIUM CITRATE + D) 315-250 MG-UNIT TABS Take 1 tablet by mouth daily      carboxymethylcellulose (REFRESH TEARS) 0 5 % SOLN 1 drop 2 (two) times a day as needed for dry eyes      Coenzyme Q10 100 MG TABS Take 1 tablet by mouth daily      metoprolol succinate (TOPROL-XL) 25 mg 24 hr tablet Take 0 5 tablets by mouth daily      Misc Natural Products (OSTEO BI-FLEX ADV TRIPLE ST PO) Take by mouth      OMEGA-3 FATTY ACIDS-VITAMIN E PO Take 1 capsule by mouth daily      rosuvastatin (CRESTOR) 10 MG tablet Take 10 mg by mouth daily      LUMIGAN 0 01 % ophthalmic drops Administer 1 drop to both eyes daily  5     No current facility-administered medications for this visit            Health Maintenance     See other note today re clinical AWV info             Most recent labs available from [address]   ( others may be available in Care Everywhere / Media sections)  Lab Results   Component Value Date    WBC 6 05 05/22/2019    HGB 13 9 05/22/2019    HCT 42 7 05/22/2019     05/22/2019    TRIG 64 06/29/2018    HDL 53 06/29/2018    ALT 21 05/22/2019    AST 18 05/22/2019     06/30/2015    K 4 7 05/22/2019     (H) 05/22/2019    CREATININE 0 86 05/22/2019    BUN 28 (H) 05/22/2019    CO2 24 05/22/2019    GLUF 95 05/22/2019       Orders Placed This Encounter   Procedures    PNEUMOCOCCAL CONJUGATE VACCINE 13-VALENT GREATER THAN 6 MONTHS    CBC    Comprehensive metabolic panel    Lipid panel    Ambulatory referral to Cardiology             Laila Levin DO

## 2020-05-27 DIAGNOSIS — I10 BENIGN ESSENTIAL HYPERTENSION: ICD-10-CM

## 2020-05-27 DIAGNOSIS — I25.10 ARTERIOSCLEROTIC CORONARY ARTERY DISEASE: Primary | ICD-10-CM

## 2020-05-27 RX ORDER — METOPROLOL SUCCINATE 25 MG/1
12.5 TABLET, EXTENDED RELEASE ORAL DAILY
Qty: 45 TABLET | Refills: 3 | Status: SHIPPED | OUTPATIENT
Start: 2020-05-27 | End: 2020-10-14 | Stop reason: SDUPTHER

## 2020-10-14 DIAGNOSIS — I10 BENIGN ESSENTIAL HYPERTENSION: ICD-10-CM

## 2020-10-14 DIAGNOSIS — I25.10 ARTERIOSCLEROTIC CORONARY ARTERY DISEASE: ICD-10-CM

## 2020-10-14 DIAGNOSIS — E78.5 HYPERLIPIDEMIA, UNSPECIFIED HYPERLIPIDEMIA TYPE: Primary | ICD-10-CM

## 2020-10-14 RX ORDER — METOPROLOL SUCCINATE 25 MG/1
12.5 TABLET, EXTENDED RELEASE ORAL DAILY
Qty: 45 TABLET | Refills: 3 | Status: CANCELLED | OUTPATIENT
Start: 2020-10-14

## 2020-10-14 RX ORDER — METOPROLOL SUCCINATE 25 MG/1
12.5 TABLET, EXTENDED RELEASE ORAL DAILY
Qty: 45 TABLET | Refills: 3 | Status: SHIPPED | OUTPATIENT
Start: 2020-10-14 | End: 2021-10-27 | Stop reason: SDUPTHER

## 2020-10-14 RX ORDER — ROSUVASTATIN CALCIUM 10 MG/1
10 TABLET, COATED ORAL DAILY
Qty: 90 TABLET | Refills: 3 | Status: SHIPPED | OUTPATIENT
Start: 2020-10-14 | End: 2021-10-27 | Stop reason: SDUPTHER

## 2021-01-20 DIAGNOSIS — Z23 ENCOUNTER FOR IMMUNIZATION: ICD-10-CM

## 2021-01-21 ENCOUNTER — IMMUNIZATIONS (OUTPATIENT)
Dept: FAMILY MEDICINE CLINIC | Facility: HOSPITAL | Age: 86
End: 2021-01-21

## 2021-01-21 DIAGNOSIS — Z23 ENCOUNTER FOR IMMUNIZATION: Primary | ICD-10-CM

## 2021-01-21 PROCEDURE — 91301 SARS-COV-2 / COVID-19 MRNA VACCINE (MODERNA) 100 MCG: CPT

## 2021-01-21 PROCEDURE — 0011A SARS-COV-2 / COVID-19 MRNA VACCINE (MODERNA) 100 MCG: CPT

## 2021-02-18 ENCOUNTER — IMMUNIZATIONS (OUTPATIENT)
Dept: FAMILY MEDICINE CLINIC | Facility: HOSPITAL | Age: 86
End: 2021-02-18

## 2021-02-18 DIAGNOSIS — Z23 ENCOUNTER FOR IMMUNIZATION: Primary | ICD-10-CM

## 2021-02-18 PROCEDURE — 91301 SARS-COV-2 / COVID-19 MRNA VACCINE (MODERNA) 100 MCG: CPT

## 2021-02-18 PROCEDURE — 0012A SARS-COV-2 / COVID-19 MRNA VACCINE (MODERNA) 100 MCG: CPT

## 2021-06-16 ENCOUNTER — TELEPHONE (OUTPATIENT)
Dept: FAMILY MEDICINE CLINIC | Facility: CLINIC | Age: 86
End: 2021-06-16

## 2021-07-08 ENCOUNTER — EVALUATION (OUTPATIENT)
Dept: PHYSICAL THERAPY | Facility: MEDICAL CENTER | Age: 86
End: 2021-07-08
Payer: MEDICARE

## 2021-07-08 DIAGNOSIS — M25.562 LEFT KNEE PAIN, UNSPECIFIED CHRONICITY: Primary | ICD-10-CM

## 2021-07-08 PROCEDURE — 97161 PT EVAL LOW COMPLEX 20 MIN: CPT | Performed by: PHYSICAL MEDICINE & REHABILITATION

## 2021-07-08 PROCEDURE — 97112 NEUROMUSCULAR REEDUCATION: CPT | Performed by: PHYSICAL MEDICINE & REHABILITATION

## 2021-07-08 NOTE — LETTER
2021    Seversherita DO Anastasiya  [address] [facility]    Patient: Ricco Castillo   YOB: 1935   Date of Visit: 2021     Encounter Diagnosis     ICD-10-CM    1  Left knee pain, unspecified chronicity  M25 562        Dear Dr Reddy Alcantara:    Thank you for your recent referral of Ricco Castillo  Please review the attached evaluation summary from Jeremías's recent visit  Please verify that you agree with the plan of care by signing the attached order  If you have any questions or concerns, please do not hesitate to call  I sincerely appreciate the opportunity to share in the care of one of your patients and hope to have another opportunity to work with you in the near future  Sincerely,    Veronica Veliz      Referring Provider:      I certify that I have read the below Plan of Care and certify the need for these services furnished under this plan of treatment while under my care  Severa Messick, DO [address] [place] [address]  Via Fax: [phone]          PT Evaluation     Today's date: 2021  Patient name: Ricco Castillo  : 1935  MRN: [id number]  Referring provider: Betzy Schuster DO  Dx:   Encounter Diagnosis     ICD-10-CM    1  Left knee pain, unspecified chronicity  M25 562                   Assessment  Assessment details: Patient is a 81 y o  male who reports to outpatient physical therapy with left knee pain  No further referral appears necessary at this time based upon examination results  Probable movement impairment diagnosis of decreased muscular coordination resulting in pathoanatomical symptoms of pain, decreased ROM, decreased strength and limiting ability to ambulate and exercise  Skilled physical therapy is warranted for the application of the interventions found below in the planned intervention section  Prognosis is good given HEP compliance and attendance to physical therapy 2x for 8 weeks       Please contact me if you have any questions or recommendations  Thank you for the referral and the opportunity to share in Jeremías's care  Patient verbalized understanding of POC, HEP, and return demonstrated HEP  Impairments: abnormal coordination, abnormal gait, abnormal muscle firing, abnormal or restricted ROM, abnormal movement, activity intolerance, impaired balance, impaired physical strength, lacks appropriate home exercise program, pain with function and weight-bearing intolerance  Understanding of Dx/Px/POC: good   Prognosis: good    Goals  Impairment Goals  - Decrease pain by 50% in 4 weeks  - Increase left flexibility by 50% in 4 weeks  - Increase left lower extremity strength golbally to 4/5 in 6 weeks  - Increase left hip abductor and external rotator strength strength to 4/5  6 weeks  Functional Goals  - Return to Prior Level of Function in 8 weeks  - Patient will be independent with HEP in 8 weeks    Plan  Patient would benefit from: skilled PT  Planned modality interventions: cryotherapy  Planned therapy interventions: joint mobilization, manual therapy, neuromuscular re-education, patient education, strengthening, stretching, therapeutic activities, therapeutic exercise, home exercise program, functional ROM exercises, Hall taping and postural training  Frequency: 2-3x week  Treatment plan discussed with: patient        Subjective Evaluation    History of Present Illness  Mechanism of injury: Work/School: retired  Home Life: lives with his wife  Hobbies/exercise: biking, basketball, running,   Pain location: left knee, lateral  HPI: Patient reported he was less active secondary to [place]  He attempted to bike and found he was unable to bike and noted swelling and pain after the attempt along his left leg     Aggravating factors: biking  Relieving factors: rest  Pain  Current pain ratin  At best pain ratin  At worst pain ratin    Patient Goals  Patient goal: increase strength and ROM        Objective     Static Posture     Comments  MMTs:  Hip flex (L1-L2): R: 4/5, L: 4/5  Knee ext (L3-L4): R: 4+/5, L: 4+/5  Knee flex (S2-S3): R: 4+/5, L: 4+/5  Standing PF (S1): R: 3/5, L: 3/5  Sidelying Hip Abd (L4-S1): R: 3/5, L: 3/5  Hip Ext: R: 4/5, L 3+/5    ROM:  Knee Flex (0-130): AROM: R: 120, L: 110:  SLR (0-90): AROM: R: 75, L: 77: Clinical Examination Tests:    Knee:    Trisha's: -;   [place] Brace Compression: -,  Apprehension: -;   Patellar Lift Test: -;   [place] Knee rule: -             Precautions: none      Manuals 7/8/2021                                                                Neuro Re-Ed                                                                                           HEP and Return Demo 10'            Ther Ex                                                                                                                     Ther Activity                                       Gait Training                                       Modalities

## 2021-07-08 NOTE — PROGRESS NOTES
PT Evaluation     Today's date: 2021  Patient name: Freddie Arteaga  : 1935  MRN: [id number]  Referring provider: Otto Abraham DO  Dx:   Encounter Diagnosis     ICD-10-CM    1  Left knee pain, unspecified chronicity  M25 562                   Assessment  Assessment details: Patient is a 81 y o  male who reports to outpatient physical therapy with left knee pain  No further referral appears necessary at this time based upon examination results  Probable movement impairment diagnosis of decreased muscular coordination resulting in pathoanatomical symptoms of pain, decreased ROM, decreased strength and limiting ability to ambulate and exercise  Skilled physical therapy is warranted for the application of the interventions found below in the planned intervention section  Prognosis is good given HEP compliance and attendance to physical therapy 2x for 8 weeks  Please contact me if you have any questions or recommendations  Thank you for the referral and the opportunity to share in Jeremías's care  Patient verbalized understanding of POC, HEP, and return demonstrated HEP  Impairments: abnormal coordination, abnormal gait, abnormal muscle firing, abnormal or restricted ROM, abnormal movement, activity intolerance, impaired balance, impaired physical strength, lacks appropriate home exercise program, pain with function and weight-bearing intolerance  Understanding of Dx/Px/POC: good   Prognosis: good    Goals  Impairment Goals  - Decrease pain by 50% in 4 weeks  - Increase left flexibility by 50% in 4 weeks  - Increase left lower extremity strength golbally to 4/5 in 6 weeks  - Increase left hip abductor and external rotator strength strength to 4/5  6 weeks      Functional Goals  - Return to Prior Level of Function in 8 weeks  - Patient will be independent with HEP in 8 weeks    Plan  Patient would benefit from: skilled PT  Planned modality interventions: cryotherapy  Planned therapy interventions: joint mobilization, manual therapy, neuromuscular re-education, patient education, strengthening, stretching, therapeutic activities, therapeutic exercise, home exercise program, functional ROM exercises, Hall taping and postural training  Frequency: 2-3x week  Treatment plan discussed with: patient        Subjective Evaluation    History of Present Illness  Mechanism of injury: Work/School: retired  Home Life: lives with his wife  Hobbies/exercise: biking, basketball, running,   Pain location: left knee, lateral  HPI: Patient reported he was less active secondary to [place]  He attempted to bike and found he was unable to bike and noted swelling and pain after the attempt along his left leg  Aggravating factors: biking  Relieving factors: rest  Pain  Current pain ratin  At best pain ratin  At worst pain ratin    Patient Goals  Patient goal: increase strength and ROM        Objective     Static Posture     Comments  MMTs:  Hip flex (L1-L2): R: 4/5, L: 4/5  Knee ext (L3-L4): R: 4+/5, L: 4+/5  Knee flex (S2-S3): R: 4+/5, L: 4+/5  Standing PF (S1): R: 3/5, L: 3/5  Sidelying Hip Abd (L4-S1): R: 3/5, L: 3/5  Hip Ext: R: 4/5, L 3+/5    ROM:  Knee Flex (0-130): AROM: R: 120, L: 110:  SLR (0-90): AROM: R: 75, L: 77: Clinical Examination Tests:    Knee:    Trisha's: -;   [place] Compression: -,  Apprehension: -;   Patellar Lift Test: -;   [place] Knee rule: -             Precautions: none      Manuals 2021                                                                Neuro Re-Ed                                                                                           HEP and Return Demo 10'            Ther Ex                                                                                                                     Ther Activity                                       Gait Training                                       Modalities

## 2021-07-16 ENCOUNTER — OFFICE VISIT (OUTPATIENT)
Dept: PHYSICAL THERAPY | Facility: MEDICAL CENTER | Age: 86
End: 2021-07-16
Payer: MEDICARE

## 2021-07-16 DIAGNOSIS — M25.562 LEFT KNEE PAIN, UNSPECIFIED CHRONICITY: Primary | ICD-10-CM

## 2021-07-16 PROCEDURE — 97110 THERAPEUTIC EXERCISES: CPT | Performed by: PHYSICAL MEDICINE & REHABILITATION

## 2021-07-16 PROCEDURE — 97112 NEUROMUSCULAR REEDUCATION: CPT | Performed by: PHYSICAL MEDICINE & REHABILITATION

## 2021-07-16 NOTE — PROGRESS NOTES
Daily Note     Today's date: 2021  Patient name: Jimy Juan  : 1935  MRN: [id number]  Referring provider: Edward Vargas DO  Dx:   Encounter Diagnosis     ICD-10-CM    1  Left knee pain, unspecified chronicity  M25 562                   Subjective: Weston Bradford reported "I had some issues with some of the exercises "      Objective: See treatment diary below      Assessment: Tolerated treatment well  Patient would benefit from continued PT  Jimy Juan was able to initiate his therapy program which shows progress towards his goals  After review his HEP he reported "that works better, I was doing some of them too hard "      Plan: Continue per plan of care        Precautions: none      Manuals 2021                                                               Neuro Re-Ed             SLR  3x10           Sidelying SLR  3x10           Prone SLR  3x10                                                  HEP and Return Demo 10'            Ther Ex             Quad stretch  4x30"           Heel slides  3x10           Standing Calf Stretch  4x30"                                                                            Ther Activity                                       Gait Training                                       Modalities

## 2021-07-21 ENCOUNTER — APPOINTMENT (OUTPATIENT)
Dept: PHYSICAL THERAPY | Facility: MEDICAL CENTER | Age: 86
End: 2021-07-21
Payer: MEDICARE

## 2021-07-23 ENCOUNTER — APPOINTMENT (OUTPATIENT)
Dept: PHYSICAL THERAPY | Facility: MEDICAL CENTER | Age: 86
End: 2021-07-23
Payer: MEDICARE

## 2021-07-28 ENCOUNTER — APPOINTMENT (OUTPATIENT)
Dept: PHYSICAL THERAPY | Facility: MEDICAL CENTER | Age: 86
End: 2021-07-28
Payer: MEDICARE

## 2021-07-30 ENCOUNTER — APPOINTMENT (OUTPATIENT)
Dept: PHYSICAL THERAPY | Facility: MEDICAL CENTER | Age: 86
End: 2021-07-30
Payer: MEDICARE

## 2021-10-27 ENCOUNTER — OFFICE VISIT (OUTPATIENT)
Dept: FAMILY MEDICINE CLINIC | Facility: CLINIC | Age: 86
End: 2021-10-27
Payer: MEDICARE

## 2021-10-27 VITALS
HEART RATE: 76 BPM | DIASTOLIC BLOOD PRESSURE: 90 MMHG | RESPIRATION RATE: 12 BRPM | BODY MASS INDEX: 27.49 KG/M2 | SYSTOLIC BLOOD PRESSURE: 140 MMHG | OXYGEN SATURATION: 98 % | WEIGHT: 181.4 LBS | HEIGHT: 68 IN

## 2021-10-27 DIAGNOSIS — R93.89 ABNORMAL FINDINGS ON DIAGNOSTIC IMAGING OF OTHER SPECIFIED BODY STRUCTURES: ICD-10-CM

## 2021-10-27 DIAGNOSIS — R79.9 ABNORMAL FINDING OF BLOOD CHEMISTRY, UNSPECIFIED: ICD-10-CM

## 2021-10-27 DIAGNOSIS — E78.5 HYPERLIPIDEMIA, UNSPECIFIED HYPERLIPIDEMIA TYPE: ICD-10-CM

## 2021-10-27 DIAGNOSIS — Z00.00 MEDICARE ANNUAL WELLNESS VISIT, SUBSEQUENT: Primary | ICD-10-CM

## 2021-10-27 DIAGNOSIS — I10 BENIGN ESSENTIAL HYPERTENSION: ICD-10-CM

## 2021-10-27 DIAGNOSIS — I25.10 ARTERIOSCLEROTIC CORONARY ARTERY DISEASE: ICD-10-CM

## 2021-10-27 PROCEDURE — 1123F ACP DISCUSS/DSCN MKR DOCD: CPT | Performed by: INTERNAL MEDICINE

## 2021-10-27 PROCEDURE — G0439 PPPS, SUBSEQ VISIT: HCPCS | Performed by: INTERNAL MEDICINE

## 2021-10-27 PROCEDURE — 99214 OFFICE O/P EST MOD 30 MIN: CPT | Performed by: INTERNAL MEDICINE

## 2021-10-27 RX ORDER — METOPROLOL SUCCINATE 25 MG/1
12.5 TABLET, EXTENDED RELEASE ORAL DAILY
Qty: 45 TABLET | Refills: 3 | Status: SHIPPED | OUTPATIENT
Start: 2021-10-27

## 2021-10-27 RX ORDER — ROSUVASTATIN CALCIUM 10 MG/1
10 TABLET, COATED ORAL DAILY
Qty: 90 TABLET | Refills: 3 | Status: ON HOLD | OUTPATIENT
Start: 2021-10-27 | End: 2022-02-17 | Stop reason: SDUPTHER

## 2021-10-27 RX ORDER — ASPIRIN 81 MG/1
81 TABLET ORAL DAILY
Qty: 90 TABLET | Refills: 1 | Status: SHIPPED | OUTPATIENT
Start: 2021-10-27 | End: 2022-03-29 | Stop reason: ALTCHOICE

## 2021-11-01 ENCOUNTER — APPOINTMENT (OUTPATIENT)
Dept: LAB | Facility: MEDICAL CENTER | Age: 86
End: 2021-11-01
Payer: MEDICARE

## 2021-11-01 DIAGNOSIS — R79.9 ABNORMAL FINDING OF BLOOD CHEMISTRY, UNSPECIFIED: ICD-10-CM

## 2021-11-01 DIAGNOSIS — R93.89 ABNORMAL FINDINGS ON DIAGNOSTIC IMAGING OF OTHER SPECIFIED BODY STRUCTURES: ICD-10-CM

## 2021-11-01 DIAGNOSIS — I25.10 ARTERIOSCLEROTIC CORONARY ARTERY DISEASE: ICD-10-CM

## 2021-11-01 LAB
25(OH)D3 SERPL-MCNC: 28.9 NG/ML (ref 30–100)
ALBUMIN SERPL BCP-MCNC: 3.5 G/DL (ref 3.5–5)
ALP SERPL-CCNC: 76 U/L (ref 46–116)
ALT SERPL W P-5'-P-CCNC: 24 U/L (ref 12–78)
ANION GAP SERPL CALCULATED.3IONS-SCNC: 7 MMOL/L (ref 4–13)
AST SERPL W P-5'-P-CCNC: 22 U/L (ref 5–45)
BASOPHILS # BLD AUTO: 0.05 THOUSANDS/ΜL (ref 0–0.1)
BASOPHILS NFR BLD AUTO: 1 % (ref 0–1)
BILIRUB SERPL-MCNC: 0.96 MG/DL (ref 0.2–1)
BILIRUB UR QL STRIP: NEGATIVE
BUN SERPL-MCNC: 29 MG/DL (ref 5–25)
CALCIUM SERPL-MCNC: 8.7 MG/DL (ref 8.3–10.1)
CHLORIDE SERPL-SCNC: 109 MMOL/L (ref 100–108)
CHOLEST SERPL-MCNC: 154 MG/DL (ref 50–200)
CLARITY UR: CLEAR
CO2 SERPL-SCNC: 24 MMOL/L (ref 21–32)
COLOR UR: NORMAL
CREAT SERPL-MCNC: 1.08 MG/DL (ref 0.6–1.3)
EOSINOPHIL # BLD AUTO: 0.15 THOUSAND/ΜL (ref 0–0.61)
EOSINOPHIL NFR BLD AUTO: 2 % (ref 0–6)
ERYTHROCYTE [DISTWIDTH] IN BLOOD BY AUTOMATED COUNT: 15.3 % (ref 11.6–15.1)
EST. AVERAGE GLUCOSE BLD GHB EST-MCNC: 120 MG/DL
GFR SERPL CREATININE-BSD FRML MDRD: 62 ML/MIN/1.73SQ M
GLUCOSE P FAST SERPL-MCNC: 99 MG/DL (ref 65–99)
GLUCOSE UR STRIP-MCNC: NEGATIVE MG/DL
HBA1C MFR BLD: 5.8 %
HCT VFR BLD AUTO: 43.1 % (ref 36.5–49.3)
HDLC SERPL-MCNC: 65 MG/DL
HGB BLD-MCNC: 13.9 G/DL (ref 12–17)
HGB UR QL STRIP.AUTO: NEGATIVE
IMM GRANULOCYTES # BLD AUTO: 0.01 THOUSAND/UL (ref 0–0.2)
IMM GRANULOCYTES NFR BLD AUTO: 0 % (ref 0–2)
KETONES UR STRIP-MCNC: NEGATIVE MG/DL
LDLC SERPL CALC-MCNC: 72 MG/DL (ref 0–100)
LEUKOCYTE ESTERASE UR QL STRIP: NEGATIVE
LYMPHOCYTES # BLD AUTO: 1.38 THOUSANDS/ΜL (ref 0.6–4.47)
LYMPHOCYTES NFR BLD AUTO: 22 % (ref 14–44)
MCH RBC QN AUTO: 27.9 PG (ref 26.8–34.3)
MCHC RBC AUTO-ENTMCNC: 32.3 G/DL (ref 31.4–37.4)
MCV RBC AUTO: 87 FL (ref 82–98)
MONOCYTES # BLD AUTO: 0.7 THOUSAND/ΜL (ref 0.17–1.22)
MONOCYTES NFR BLD AUTO: 11 % (ref 4–12)
NEUTROPHILS # BLD AUTO: 4 THOUSANDS/ΜL (ref 1.85–7.62)
NEUTS SEG NFR BLD AUTO: 64 % (ref 43–75)
NITRITE UR QL STRIP: NEGATIVE
NONHDLC SERPL-MCNC: 89 MG/DL
NRBC BLD AUTO-RTO: 0 /100 WBCS
PH UR STRIP.AUTO: 5.5 [PH]
PLATELET # BLD AUTO: 280 THOUSANDS/UL (ref 149–390)
PMV BLD AUTO: 10.8 FL (ref 8.9–12.7)
POTASSIUM SERPL-SCNC: 5.1 MMOL/L (ref 3.5–5.3)
PROT SERPL-MCNC: 6.9 G/DL (ref 6.4–8.2)
PROT UR STRIP-MCNC: NEGATIVE MG/DL
RBC # BLD AUTO: 4.98 MILLION/UL (ref 3.88–5.62)
SODIUM SERPL-SCNC: 140 MMOL/L (ref 136–145)
SP GR UR STRIP.AUTO: 1.03 (ref 1–1.03)
TRIGL SERPL-MCNC: 83 MG/DL
TSH SERPL DL<=0.05 MIU/L-ACNC: 2.34 UIU/ML (ref 0.36–3.74)
UROBILINOGEN UR QL STRIP.AUTO: 0.2 E.U./DL
WBC # BLD AUTO: 6.29 THOUSAND/UL (ref 4.31–10.16)

## 2021-11-01 PROCEDURE — 83036 HEMOGLOBIN GLYCOSYLATED A1C: CPT

## 2021-11-01 PROCEDURE — 80061 LIPID PANEL: CPT

## 2021-11-01 PROCEDURE — 84443 ASSAY THYROID STIM HORMONE: CPT

## 2021-11-01 PROCEDURE — 80053 COMPREHEN METABOLIC PANEL: CPT

## 2021-11-01 PROCEDURE — 36415 COLL VENOUS BLD VENIPUNCTURE: CPT

## 2021-11-01 PROCEDURE — 81003 URINALYSIS AUTO W/O SCOPE: CPT | Performed by: INTERNAL MEDICINE

## 2021-11-01 PROCEDURE — 85025 COMPLETE CBC W/AUTO DIFF WBC: CPT

## 2021-11-01 PROCEDURE — 82306 VITAMIN D 25 HYDROXY: CPT

## 2022-01-19 ENCOUNTER — HOSPITAL ENCOUNTER (INPATIENT)
Facility: HOSPITAL | Age: 87
LOS: 1 days | Discharge: HOME | DRG: 312 | End: 2022-01-21
Attending: EMERGENCY MEDICINE | Admitting: HOSPITALIST
Payer: MEDICARE

## 2022-01-19 ENCOUNTER — APPOINTMENT (EMERGENCY)
Dept: RADIOLOGY | Facility: HOSPITAL | Age: 87
DRG: 312 | End: 2022-01-19
Payer: MEDICARE

## 2022-01-19 ENCOUNTER — APPOINTMENT (EMERGENCY)
Dept: CT IMAGING | Facility: HOSPITAL | Age: 87
DRG: 312 | End: 2022-01-19
Payer: MEDICARE

## 2022-01-19 DIAGNOSIS — R55 SYNCOPE: Primary | ICD-10-CM

## 2022-01-19 PROBLEM — R11.10 VOMITING: Status: ACTIVE | Noted: 2022-01-19

## 2022-01-19 LAB
2HR DELTA HS TROPONIN: 0 NG/L
4HR DELTA HS TROPONIN: 0 NG/L
ALBUMIN SERPL BCP-MCNC: 3.3 G/DL (ref 3.5–5)
ALP SERPL-CCNC: 69 U/L (ref 46–116)
ALT SERPL W P-5'-P-CCNC: 22 U/L (ref 12–78)
ANION GAP SERPL CALCULATED.3IONS-SCNC: 10 MMOL/L (ref 4–13)
AST SERPL W P-5'-P-CCNC: 22 U/L (ref 5–45)
ATRIAL RATE: 59 BPM
ATRIAL RATE: 60 BPM
BASOPHILS # BLD AUTO: 0.06 THOUSANDS/ΜL (ref 0–0.1)
BASOPHILS NFR BLD AUTO: 1 % (ref 0–1)
BILIRUB SERPL-MCNC: 0.64 MG/DL (ref 0.2–1)
BUN SERPL-MCNC: 27 MG/DL (ref 5–25)
CALCIUM ALBUM COR SERPL-MCNC: 8.6 MG/DL (ref 8.3–10.1)
CALCIUM SERPL-MCNC: 8 MG/DL (ref 8.3–10.1)
CARDIAC TROPONIN I PNL SERPL HS: 5 NG/L
CHLORIDE SERPL-SCNC: 105 MMOL/L (ref 100–108)
CO2 SERPL-SCNC: 25 MMOL/L (ref 21–32)
CREAT SERPL-MCNC: 0.95 MG/DL (ref 0.6–1.3)
EOSINOPHIL # BLD AUTO: 0.17 THOUSAND/ΜL (ref 0–0.61)
EOSINOPHIL NFR BLD AUTO: 2 % (ref 0–6)
ERYTHROCYTE [DISTWIDTH] IN BLOOD BY AUTOMATED COUNT: 14.7 % (ref 11.6–15.1)
GFR SERPL CREATININE-BSD FRML MDRD: 72 ML/MIN/1.73SQ M
GLUCOSE SERPL-MCNC: 130 MG/DL (ref 65–140)
HCT VFR BLD AUTO: 41.6 % (ref 36.5–49.3)
HGB BLD-MCNC: 13.7 G/DL (ref 12–17)
IMM GRANULOCYTES # BLD AUTO: 0.03 THOUSAND/UL (ref 0–0.2)
IMM GRANULOCYTES NFR BLD AUTO: 0 % (ref 0–2)
LIPASE SERPL-CCNC: 67 U/L (ref 73–393)
LYMPHOCYTES # BLD AUTO: 1.28 THOUSANDS/ΜL (ref 0.6–4.47)
LYMPHOCYTES NFR BLD AUTO: 16 % (ref 14–44)
MAGNESIUM SERPL-MCNC: 1.8 MG/DL (ref 1.6–2.6)
MCH RBC QN AUTO: 28 PG (ref 26.8–34.3)
MCHC RBC AUTO-ENTMCNC: 32.9 G/DL (ref 31.4–37.4)
MCV RBC AUTO: 85 FL (ref 82–98)
MONOCYTES # BLD AUTO: 0.9 THOUSAND/ΜL (ref 0.17–1.22)
MONOCYTES NFR BLD AUTO: 11 % (ref 4–12)
NEUTROPHILS # BLD AUTO: 5.65 THOUSANDS/ΜL (ref 1.85–7.62)
NEUTS SEG NFR BLD AUTO: 70 % (ref 43–75)
NRBC BLD AUTO-RTO: 0 /100 WBCS
P AXIS: 45 DEGREES
P AXIS: 63 DEGREES
PLATELET # BLD AUTO: 260 THOUSANDS/UL (ref 149–390)
PMV BLD AUTO: 10.1 FL (ref 8.9–12.7)
POTASSIUM SERPL-SCNC: 4.9 MMOL/L (ref 3.5–5.3)
PR INTERVAL: 178 MS
PR INTERVAL: 184 MS
PROT SERPL-MCNC: 6.9 G/DL (ref 6.4–8.2)
QRS AXIS: 25 DEGREES
QRS AXIS: 68 DEGREES
QRSD INTERVAL: 96 MS
QRSD INTERVAL: 96 MS
QT INTERVAL: 408 MS
QT INTERVAL: 414 MS
QTC INTERVAL: 408 MS
QTC INTERVAL: 409 MS
RBC # BLD AUTO: 4.9 MILLION/UL (ref 3.88–5.62)
SODIUM SERPL-SCNC: 140 MMOL/L (ref 136–145)
T WAVE AXIS: -31 DEGREES
T WAVE AXIS: 23 DEGREES
VENTRICULAR RATE: 59 BPM
VENTRICULAR RATE: 60 BPM
WBC # BLD AUTO: 8.09 THOUSAND/UL (ref 4.31–10.16)

## 2022-01-19 PROCEDURE — 70450 CT HEAD/BRAIN W/O DYE: CPT

## 2022-01-19 PROCEDURE — 93005 ELECTROCARDIOGRAM TRACING: CPT

## 2022-01-19 PROCEDURE — 96360 HYDRATION IV INFUSION INIT: CPT

## 2022-01-19 PROCEDURE — 85025 COMPLETE CBC W/AUTO DIFF WBC: CPT | Performed by: EMERGENCY MEDICINE

## 2022-01-19 PROCEDURE — 99285 EMERGENCY DEPT VISIT HI MDM: CPT

## 2022-01-19 PROCEDURE — 80053 COMPREHEN METABOLIC PANEL: CPT | Performed by: EMERGENCY MEDICINE

## 2022-01-19 PROCEDURE — 99285 EMERGENCY DEPT VISIT HI MDM: CPT | Performed by: EMERGENCY MEDICINE

## 2022-01-19 PROCEDURE — 99220 PR INITIAL OBSERVATION CARE/DAY 70 MINUTES: CPT | Performed by: INTERNAL MEDICINE

## 2022-01-19 PROCEDURE — 84484 ASSAY OF TROPONIN QUANT: CPT | Performed by: INTERNAL MEDICINE

## 2022-01-19 PROCEDURE — G1004 CDSM NDSC: HCPCS

## 2022-01-19 PROCEDURE — 96361 HYDRATE IV INFUSION ADD-ON: CPT

## 2022-01-19 PROCEDURE — 71045 X-RAY EXAM CHEST 1 VIEW: CPT

## 2022-01-19 PROCEDURE — 93010 ELECTROCARDIOGRAM REPORT: CPT | Performed by: INTERNAL MEDICINE

## 2022-01-19 PROCEDURE — 36415 COLL VENOUS BLD VENIPUNCTURE: CPT | Performed by: EMERGENCY MEDICINE

## 2022-01-19 PROCEDURE — 83690 ASSAY OF LIPASE: CPT | Performed by: EMERGENCY MEDICINE

## 2022-01-19 PROCEDURE — 83735 ASSAY OF MAGNESIUM: CPT | Performed by: EMERGENCY MEDICINE

## 2022-01-19 PROCEDURE — 72125 CT NECK SPINE W/O DYE: CPT

## 2022-01-19 PROCEDURE — 84484 ASSAY OF TROPONIN QUANT: CPT | Performed by: EMERGENCY MEDICINE

## 2022-01-19 RX ORDER — METOPROLOL SUCCINATE 25 MG/1
12.5 TABLET, EXTENDED RELEASE ORAL DAILY
Status: DISCONTINUED | OUTPATIENT
Start: 2022-01-20 | End: 2022-01-20

## 2022-01-19 RX ORDER — ASPIRIN 325 MG
325 TABLET ORAL ONCE
Status: COMPLETED | OUTPATIENT
Start: 2022-01-19 | End: 2022-01-19

## 2022-01-19 RX ORDER — ASPIRIN 81 MG/1
81 TABLET ORAL DAILY
Status: DISCONTINUED | OUTPATIENT
Start: 2022-01-20 | End: 2022-01-21 | Stop reason: HOSPADM

## 2022-01-19 RX ORDER — ACETAMINOPHEN 325 MG/1
650 TABLET ORAL EVERY 6 HOURS PRN
Status: DISCONTINUED | OUTPATIENT
Start: 2022-01-19 | End: 2022-01-21 | Stop reason: HOSPADM

## 2022-01-19 RX ORDER — HEPARIN SODIUM 5000 [USP'U]/ML
5000 INJECTION, SOLUTION INTRAVENOUS; SUBCUTANEOUS EVERY 8 HOURS SCHEDULED
Status: DISCONTINUED | OUTPATIENT
Start: 2022-01-19 | End: 2022-01-21 | Stop reason: HOSPADM

## 2022-01-19 RX ORDER — ONDANSETRON 2 MG/ML
4 INJECTION INTRAMUSCULAR; INTRAVENOUS EVERY 6 HOURS PRN
Status: DISCONTINUED | OUTPATIENT
Start: 2022-01-19 | End: 2022-01-21 | Stop reason: HOSPADM

## 2022-01-19 RX ORDER — ATORVASTATIN CALCIUM 40 MG/1
40 TABLET, FILM COATED ORAL
Status: DISCONTINUED | OUTPATIENT
Start: 2022-01-20 | End: 2022-01-21 | Stop reason: HOSPADM

## 2022-01-19 RX ORDER — MINERAL OIL AND PETROLATUM 150; 830 MG/G; MG/G
OINTMENT OPHTHALMIC
Status: DISCONTINUED | OUTPATIENT
Start: 2022-01-19 | End: 2022-01-21 | Stop reason: HOSPADM

## 2022-01-19 RX ORDER — B-COMPLEX WITH VITAMIN C
2 TABLET ORAL
Status: DISCONTINUED | OUTPATIENT
Start: 2022-01-20 | End: 2022-01-21 | Stop reason: HOSPADM

## 2022-01-19 RX ORDER — ONDANSETRON 2 MG/ML
1 INJECTION INTRAMUSCULAR; INTRAVENOUS ONCE
Status: COMPLETED | OUTPATIENT
Start: 2022-01-19 | End: 2022-01-19

## 2022-01-19 RX ADMIN — ASPIRIN 325 MG ORAL TABLET 325 MG: 325 PILL ORAL at 19:55

## 2022-01-19 RX ADMIN — SODIUM CHLORIDE 500 ML: 0.9 INJECTION, SOLUTION INTRAVENOUS at 13:31

## 2022-01-19 NOTE — ASSESSMENT & PLAN NOTE
Unclear if related to trauma from fall  Could possibly be inferior related angina given EKG with inferior related T-wave inversions  No prior EKGs to compare    No further events  Continue to monitor

## 2022-01-19 NOTE — ED PROVIDER NOTES
History  Chief Complaint   Patient presents with    Vomiting     Patient began vomiting today  Started to feel "off" and confused  Had near syncopal episode  Patient fell and hit head/nose on floor  Feels similar to previous MI      82 YO male presents after an episodes of dizziness and weakness preceding a fall  Pt states he had not eaten much today, had been shoveling some snow, he notes this was pretty mild work  States he was walking inside his residence when he had a sudden onset of confusion and weakness  States his vision seemed wavy  He denies feeling of lightheadedness during this but notes falling and striking his head  Pt does not recall losing consciousness  He had no chest pain or shortness of breath, no palpitations  Pt was brought by his wife to the cough to lay down, states he did have retching  Pt states he did have a similar episode years prior, at that time he went to the hospital and needed to have stents placed  At time of physician evaluation pt denies any symptoms, states he feels generally well  Pt denies CP/SOB/F/C/D/C, no dysuria, burning on urination or blood in urine  History provided by:  Patient and significant other   used: No    Dizziness  Quality:  Room spinning and imbalance  Severity:  Moderate  Onset quality:  Sudden  Duration:  1 hour  Timing:  Constant  Progression:  Improving  Chronicity:  Recurrent  Relieved by:  Nothing  Worsened by:  Nothing  Ineffective treatments:  None tried  Associated symptoms: nausea and vomiting    Associated symptoms: no chest pain, no shortness of breath and no weakness        Prior to Admission Medications   Prescriptions Last Dose Informant Patient Reported? Taking?    Calcium Citrate-Vitamin D (CALCIUM CITRATE + D) 315-250 MG-UNIT TABS  Self Yes No   Sig: Take 1 tablet by mouth daily   Coenzyme Q10 100 MG TABS  Self Yes No   Sig: Take 1 tablet by mouth daily   LUMIGAN 0 01 % ophthalmic drops  Self Yes No   Sig: Administer 1 drop to both eyes daily   Misc Natural Products (OSTEO BI-FLEX ADV TRIPLE ST PO)  Self Yes No   Sig: Take by mouth   OMEGA-3 FATTY ACIDS-VITAMIN E PO  Self Yes No   Sig: Take 1 capsule by mouth daily   aspirin (ECOTRIN LOW STRENGTH) 81 mg EC tablet   No No   Sig: Take 1 tablet (81 mg total) by mouth daily   carboxymethylcellulose (REFRESH TEARS) 0 5 % SOLN  Self Yes No   Si drop 2 (two) times a day as needed for dry eyes   metoprolol succinate (TOPROL-XL) 25 mg 24 hr tablet   No No   Sig: Take 0 5 tablets (12 5 mg total) by mouth daily   rosuvastatin (CRESTOR) 10 MG tablet   No No   Sig: Take 1 tablet (10 mg total) by mouth daily      Facility-Administered Medications: None       Past Medical History:   Diagnosis Date    [facility] )     NSTEMI (non-ST elevated myocardial infarction) ([facility] )     Pneumonia     Last Assessed:  10/7/14    Polymyalgia rheumatica ([facility] )     Last Assessed:  12       Past Surgical History:   Procedure Laterality Date    CORONARY ANGIOPLASTY WITH STENT PLACEMENT      HERNIA REPAIR         Family History   Problem Relation Age of Onset    Pulmonary embolism Father     Other Father         Transurethral resection of prostate    Diabetes Sister      I have reviewed and agree with the history as documented  E-Cigarette/Vaping     E-Cigarette/Vaping Substances     Social History     Tobacco Use    Smoking status: Never Smoker    Smokeless tobacco: Never Used   Substance Use Topics    Alcohol use: Yes     Comment: Social    Drug use: Never       Review of Systems   Constitutional: Negative for fever  HENT: Negative for dental problem  Eyes: Negative for visual disturbance  Respiratory: Negative for shortness of breath  Cardiovascular: Negative for chest pain  Gastrointestinal: Positive for nausea and vomiting  Negative for abdominal pain  Genitourinary: Negative for dysuria and frequency     Musculoskeletal: Negative for neck pain and neck stiffness  Skin: Negative for rash  Neurological: Positive for dizziness  Negative for weakness and light-headedness  Psychiatric/Behavioral: Negative for agitation, behavioral problems and confusion  All other systems reviewed and are negative  Physical Exam  Physical Exam  Vitals and nursing note reviewed  Constitutional:       Appearance: He is well-developed  HENT:      Head: Normocephalic  Comments: Abrasion to the frontal scalp  Eyes:      Extraocular Movements: Extraocular movements intact  Cardiovascular:      Rate and Rhythm: Normal rate  Pulmonary:      Effort: Pulmonary effort is normal    Abdominal:      General: There is no distension  Musculoskeletal:         General: Normal range of motion  Cervical back: Normal range of motion  Skin:     Findings: No rash  Neurological:      Mental Status: He is alert and oriented to person, place, and time     Psychiatric:         Behavior: Behavior normal          Vital Signs  ED Triage Vitals   Temperature Pulse Respirations Blood Pressure SpO2   01/19/22 1304 01/19/22 1303 01/19/22 1303 01/19/22 1304 01/19/22 1303   97 6 °F (36 4 °C) 63 16 162/73 100 %      Temp Source Heart Rate Source Patient Position - Orthostatic VS BP Location FiO2 (%)   01/19/22 1304 01/19/22 1303 -- -- --   Oral Monitor         Pain Score       01/19/22 1259       4           Vitals:    01/19/22 1303 01/19/22 1304   BP:  162/73   Pulse: 63          Visual Acuity      ED Medications  Medications   ondansetron (FOR EMS ONLY) (ZOFRAN) 4 mg/2 mL injection 4 mg (0 mg Does not apply Given to EMS 1/19/22 1328)   sodium chloride 0 9 % bolus 500 mL (500 mL Intravenous New Bag 1/19/22 1331)       Diagnostic Studies  Results Reviewed     Procedure Component Value Units Date/Time    HS Troponin I 4hr [[id number]]     Lab Status: No result Specimen: Blood     Lipase [[id number]]  (Abnormal) Collected: 01/19/22 1310    Lab Status: Final result Specimen: Blood Updated: 01/19/22 1422     Lipase 67 u/L     Magnesium [[id number]]  (Normal) Collected: 01/19/22 1310    Lab Status: Final result Specimen: Blood Updated: 01/19/22 1422     Magnesium 1 8 mg/dL     HS Troponin 0hr (reflex protocol) [[id number]]  (Normal) Collected: 01/19/22 1310    Lab Status: Final result Specimen: Blood Updated: 01/19/22 1335     hs TnI 0hr 5 ng/L     HS Troponin I 2hr [[id number]]     Lab Status: No result Specimen: Blood     Comprehensive metabolic panel [[id number]]  (Abnormal) Collected: 01/19/22 1310    Lab Status: Final result Specimen: Blood Updated: 01/19/22 1334     Sodium 140 mmol/L      Potassium 4 9 mmol/L      Chloride 105 mmol/L      CO2 25 mmol/L      ANION GAP 10 mmol/L      BUN 27 mg/dL      Creatinine 0 95 mg/dL      Glucose 130 mg/dL      Calcium 8 0 mg/dL      Corrected Calcium 8 6 mg/dL      AST 22 U/L      ALT 22 U/L      Alkaline Phosphatase 69 U/L      Total Protein 6 9 g/dL      Albumin 3 3 g/dL      Total Bilirubin 0 64 mg/dL      eGFR 72 ml/min/1 73sq m     Narrative:      [place] guidelines for Chronic Kidney Disease (CKD):     Stage 1 with normal or high GFR (GFR > 90 mL/min/1 73 square meters)    Stage 2 Mild CKD (GFR = 60-89 mL/min/1 73 square meters)    Stage 3A Moderate CKD (GFR = 45-59 mL/min/1 73 square meters)    Stage 3B Moderate CKD (GFR = 30-44 mL/min/1 73 square meters)    Stage 4 Severe CKD (GFR = 15-29 mL/min/1 73 square meters)    Stage 5 End Stage CKD (GFR <15 mL/min/1 73 square meters)  Note: GFR calculation is accurate only with a steady state creatinine    CBC and differential [[id number]] Collected: 01/19/22 1309    Lab Status: Final result Specimen: Blood Updated: 01/19/22 1312     WBC 8 09 Thousand/uL      RBC 4 90 Million/uL      Hemoglobin 13 7 g/dL      Hematocrit 41 6 %      MCV 85 fL      MCH 28 0 pg      MCHC 32 9 g/dL      RDW 14 7 %      MPV 10 1 fL      Platelets 915 Thousands/uL      nRBC 0 /100 WBCs      Neutrophils Relative 70 %      Immat GRANS % 0 %      Lymphocytes Relative 16 %      Monocytes Relative 11 %      Eosinophils Relative 2 %      Basophils Relative 1 %      Neutrophils Absolute 5 65 Thousands/µL      Immature Grans Absolute 0 03 Thousand/uL      Lymphocytes Absolute 1 28 Thousands/µL      Monocytes Absolute 0 90 Thousand/µL      Eosinophils Absolute 0 17 Thousand/µL      Basophils Absolute 0 06 Thousands/µL                  CT head without contrast   Final Result by Vianca Su MD (01/19 1438)      No acute intracranial abnormality  Workstation performed: [id number]         CT cervical spine without contrast   Final Result by Vianca Su MD (01/19 1438)      No cervical spine fracture or traumatic malalignment  Workstation performed: [id number]         XR chest 1 view portable   Final Result by Ivelisse Erazo MD (01/19 1445)      No acute cardiopulmonary disease                    Workstation performed: [id number]                    Procedures  ECG 12 Lead Documentation Only    Date/Time: 1/19/2022 1:34 PM  Performed by: Candance Blow, MD  Authorized by: Candance Blow, MD     ECG reviewed by me, the ED Provider: yes    Patient location:  ED  Previous ECG:     Previous ECG:  Unavailable  Interpretation:     Interpretation: non-specific    Rate:     ECG rate:  59    ECG rate assessment: normal    Rhythm:     Rhythm: sinus rhythm and sinus bradycardia    QRS:     QRS axis:  Normal    QRS intervals:  Normal  Conduction:     Conduction: normal    ST segments:     ST segments:  Normal  T waves:     T waves: inverted      Inverted:  II, III and aVF             ED Course                                 JACKIE Risk Score      Most Recent Value   Age >= 65 1 Filed at: 01/19/2022 1529   Known CAD (stenosis >= 50%) 1 Filed at: 01/19/2022 1529   Recent (<=24 hrs) Service Angina 0 Filed at: 01/19/2022 1529   ST Deviation >= 0 5 mm 1 Filed at: 01/19/2022 1529   3+ CAD Risk Factors (FHx, HTN, HLP, DM, Smoker) 0 Filed at: 01/19/2022 1529   Aspirin Use Past 7 Days 1 Filed at: 01/19/2022 1529   Elevated Cardiac Markers 0 Filed at: 01/19/2022 1529   JACKIE Risk Score (Calculated) 4 Filed at: 01/19/2022 1529                  MDM  Number of Diagnoses or Management Options  Syncope: new and requires workup  Diagnosis management comments: 1  Fall - Pt states sudden confusion, weakness  Pt states similar in the past when he was found to have CAD requiring stenting  Pt denies any symptoms at this time  Will check ECG and troponin, CBC for anemia, metabolic panel for electrolyte abnormalities and dehydration, urine for infection  Pt may require admission due to concern for a cardiac etiology  Will CT head and C-spine to rule out significant trauma  Amount and/or Complexity of Data Reviewed  Clinical lab tests: ordered and reviewed  Tests in the radiology section of CPT®: ordered and reviewed  Obtain history from someone other than the patient: yes  Independent visualization of images, tracings, or specimens: yes        Disposition  Final diagnoses:   Syncope     Time reflects when diagnosis was documented in both MDM as applicable and the Disposition within this note     Time User Action Codes Description Comment    1/19/2022  3:06 PM Trena Snellen E Add [R55] Syncope       ED Disposition     ED Disposition Condition Date/Time Comment    Admit Stable Wed Jan 19, 2022  3:06 PM Case was discussed with VIVIANA and the patient's admission status was agreed to be Admission Status: observation status to the service of Dr Simpson    Follow-up Information    None         Patient's Medications   Discharge Prescriptions    No medications on file       No discharge procedures on file      PDMP Review     None          ED Provider  Electronically Signed by           Latricia Miranda MD  01/19/22 0944

## 2022-01-19 NOTE — ASSESSMENT & PLAN NOTE
Patient presents with syncopal event with presyncopal aura described as lightheadedness and dizziness  He denies losing consciousness  He denies any chest pain, palpitations, shortness of breath during event  He currently is asymptomatic and at baseline  Likely this was a vasovagal event    But given patient's cardiac history is reasonable to observe in trend troponins/EKGs and monitor overnight on telemetry  Patient also has had no recent cardiac imaging and most recent follow-up with cardiologist within 2018  Will update echocardiogram, if this cannot be completed in a timely manner and can be followed up as an outpatient

## 2022-01-19 NOTE — H&P
Emile 18 1935, 81 y o  male MRN: [id number]  Unit/Bed#: ED 20 Encounter: [id number]  Primary Care Provider: Marina Julio DO   Date and time admitted to hospital: 1/19/2022 12:56 PM    * Syncope  Assessment & Plan  Patient presents with syncopal event with presyncopal aura described as lightheadedness and dizziness  He denies losing consciousness  He denies any chest pain, palpitations, shortness of breath during event  He currently is asymptomatic and at baseline  Likely this was a vasovagal event  But given patient's cardiac history is reasonable to observe in trend troponins/EKGs and monitor overnight on telemetry  Patient also has had no recent cardiac imaging and most recent follow-up with cardiologist within 2018  Will update echocardiogram, if this cannot be completed in a timely manner and can be followed up as an outpatient    Vomiting  Assessment & Plan  Unclear if related to trauma from fall  Could possibly be inferior related angina given EKG with inferior related T-wave inversions  No prior EKGs to compare  No further events  Continue to monitor    Hyperlipidemia  Assessment & Plan  Crestor as an outpatient, atorvastatin while inpatient    Benign essential hypertension  Assessment & Plan  Continue metoprolol    Arteriosclerotic coronary artery disease  Assessment & Plan  Prior NSTEMI in 2014 status post NATALIA to LAD x2    VTE Prophylaxis: heparin   Code Status: Level 1 full code     Anticipated Length of Stay:  Patient will be admitted on an Observation basis with an anticipated length of stay of  Less than 2 midnights  Justification for Hospital Stay:  Concern for cardiac syncope related event warrants overnight observation and telemetry monitoring    Total Time for Visit, including Counseling / Coordination of Care: 60 minutes  Greater than 50% of this total time spent on direct patient counseling and coordination of care      Chief Complaint:   Syncope    History of Present Illness:    Prince Cronin is a 81 y o  male With past medical history of coronary artery disease status post PCI 2014, hypertension who presents the hospital with syncopal event  Patient was shoveling snow  He went to walk inside and reported sudden onset of confusion and weakness as well as change in vision  He did fall and strike his head  He did not lose consciousness  Following this event he felt nauseous and had episode of vomiting  He denies any chest pain, palpitations, shortness of breath, diaphoresis prior to event  He is currently asymptomatic at this time  In 2014 prior to NSTEMI, patient woke up from bed with confusion  He did not have chest pain at that time     CT head, CTA unremarkable for fracture/acute findings on admission  Review of Systems:    Review of Systems   Constitutional: Negative for chills and fever  HENT: Negative for sore throat and trouble swallowing  Eyes: Negative for photophobia and visual disturbance  Respiratory: Negative for shortness of breath and wheezing  Cardiovascular: Negative for chest pain and palpitations  Gastrointestinal: Negative for constipation, diarrhea, nausea and vomiting  Genitourinary: Negative for difficulty urinating and dysuria  Musculoskeletal: Negative for arthralgias and myalgias  Skin: Negative for rash and wound  Neurological: Negative for dizziness, light-headedness and headaches  Past Medical and Surgical History:     Past Medical History:   Diagnosis Date    Cancer [facility])     NSTEMI (non-ST elevated myocardial infarction) ([facility] ) 2014    Pneumonia     Last Assessed:  10/7/14    Polymyalgia rheumatica ([facility] )     Last Assessed:  11/23/12       Past Surgical History:   Procedure Laterality Date    CORONARY ANGIOPLASTY WITH STENT PLACEMENT      HERNIA REPAIR  2000       Meds/Allergies:    Prior to Admission medications    Medication Sig Start Date End Date Taking? Authorizing Provider   aspirin (ECOTRIN LOW STRENGTH) 81 mg EC tablet Take 1 tablet (81 mg total) by mouth daily 10/27/21   Scott Meigs, MD   Calcium Citrate-Vitamin D (CALCIUM CITRATE + D) 315-250 MG-UNIT TABS Take 1 tablet by mouth daily    Historical Provider, MD   carboxymethylcellulose (REFRESH TEARS) 0 5 % SOLN 1 drop 2 (two) times a day as needed for dry eyes    Historical Provider, MD   Coenzyme Q10 100 MG TABS Take 1 tablet by mouth daily    Historical Provider, MD   LUMIGAN 0 01 % ophthalmic drops Administer 1 drop to both eyes daily 2/11/19   Historical Provider, MD   metoprolol succinate (TOPROL-XL) 25 mg 24 hr tablet Take 0 5 tablets (12 5 mg total) by mouth daily 10/27/21   Scott Meigs, MD   Misc Natural Products (OSTEO BI-FLEX ADV TRIPLE ST PO) Take by mouth    Historical Provider, MD   OMEGA-3 FATTY ACIDS-VITAMIN E PO Take 1 capsule by mouth daily    Historical Provider, MD   rosuvastatin (CRESTOR) 10 MG tablet Take 1 tablet (10 mg total) by mouth daily 10/27/21   Scott Meigs, MD       Allergies: No Known Allergies    Social History:     Marital Status: /Civil Union   Substance Use History:   Social History     Substance and Sexual Activity   Alcohol Use Yes    Comment: Social     Social History     Tobacco Use   Smoking Status Never Smoker   Smokeless Tobacco Never Used     Social History     Substance and Sexual Activity   Drug Use Never       Family History:    non-contributory    Physical Exam:     Vitals:   Blood Pressure: 162/73 (01/19/22 1304)  Pulse: 63 (01/19/22 1303)  Temperature: 97 6 °F (36 4 °C) (01/19/22 1304)  Temp Source: Oral (01/19/22 1304)  Respirations: 16 (01/19/22 1303)  SpO2: 100 % (01/19/22 1303)    Physical Exam  Vitals reviewed  Constitutional:       General: He is not in acute distress  Appearance: He is well-developed  He is not ill-appearing, toxic-appearing or diaphoretic  HENT:      Head: Normocephalic and atraumatic        Mouth/Throat:      Mouth: Mucous membranes are moist       Pharynx: No oropharyngeal exudate  Eyes:      General: No scleral icterus  Extraocular Movements: Extraocular movements intact  Conjunctiva/sclera: Conjunctivae normal    Cardiovascular:      Rate and Rhythm: Normal rate and regular rhythm  Heart sounds: Normal heart sounds  Pulmonary:      Effort: Pulmonary effort is normal  No respiratory distress  Breath sounds: Normal breath sounds  No wheezing or rales  Abdominal:      General: There is no distension  Palpations: Abdomen is soft  Tenderness: There is no abdominal tenderness  There is no guarding or rebound  Musculoskeletal:         General: No swelling, tenderness or deformity  Skin:     General: Skin is warm and dry  Neurological:      General: No focal deficit present  Mental Status: He is alert  Mental status is at baseline  Psychiatric:         Mood and Affect: Mood normal          Behavior: Behavior normal          Thought Content: Thought content normal          Judgment: Judgment normal           Additional Data:     Lab Results: I have reviewed pertinent results     Results from last 7 days   Lab Units 01/19/22  1309   WBC Thousand/uL 8 09   HEMOGLOBIN g/dL 13 7   HEMATOCRIT % 41 6   PLATELETS Thousands/uL 260   NEUTROS PCT % 70   LYMPHS PCT % 16   MONOS PCT % 11   EOS PCT % 2     Results from last 7 days   Lab Units 01/19/22  1310   SODIUM mmol/L 140   POTASSIUM mmol/L 4 9   CHLORIDE mmol/L 105   CO2 mmol/L 25   BUN mg/dL 27*   CREATININE mg/dL 0 95   ANION GAP mmol/L 10   CALCIUM mg/dL 8 0*   ALBUMIN g/dL 3 3*   TOTAL BILIRUBIN mg/dL 0 64   ALK PHOS U/L 69   ALT U/L 22   AST U/L 22   GLUCOSE RANDOM mg/dL 130                       Imaging: I have reviewed pertinent imaging     CT head without contrast   Final Result by Harlan Briggs MD (01/19 3228)      No acute intracranial abnormality                    Workstation performed: [id number]         CT cervical spine without contrast   Final Result by Rollo Bernheim, MD (01/19 1304)      No cervical spine fracture or traumatic malalignment  Workstation performed: [id number]         XR chest 1 view portable   Final Result by Ana Lucero MD (01/19 3004)      No acute cardiopulmonary disease  Workstation performed: [id number]             EKG, Pathology, and Other Studies Reviewed on Admission:   · EKG: Reviewed     Allscripts / Epic Records Reviewed    ** Please Note: This note has been constructed using a voice recognition system   **

## 2022-01-19 NOTE — APP STUDENT NOTE
PROMISE STUDENT  Inpatient Progress Note for TRAINING ONLY  Not Part of Legal Medical Record       H&P Exam - Pretty Sanchez 81 y o  male MRN: [id number]    Unit/Bed#: ED 20 Encounter: [id number]    Assessment/Plan:  1  Syncope  - Pt presented to ED s/p syncope at home  He noted dizziness and lightheadedness prior to falling and hitting his head  Denies loss of consciousness  Denies CP, SOB, palpitations during event and on physical examination today  Notes he hadn't eaten or had any water prior to event that day  - Admit pt on observation with telemetry given pt's past hx of NSTEMI 2014 s/p 2 stents  - no recent cardiac events  - Troponin - 5  - CXR, CT head w/o, and CT cervical spine w/o - negative  - EKG - sinus siena  - Follow troponin and EKGs, monitor on telemetry for observation  - ordered echocardiogram     2  Vomiting  - unsure if secondary to fall  - has since resolved  - continue to monitor    3  HLD  - pt takes crestor as outpatient  - continue crestor    4  HTN  - Continue metroprolol    5  Arteriosclerotic coronary artery disease  - Prior NSTEMI 2014 s/p NATALIA to LAD x2  - [facility] cardiology    History of Present Illness   H C  is a 82 y/o male with significant PMH of CAD with prior NSTEMI in 2014 s/p NATALIA to LADx2,, HTN, HLD, presenting to the ED today accompanied by wife due to syncope and fall  Around 11 am pt went to retrieve the mail in the house, suddenly felt dizzy and fell to the ground hitting his head  Denies loss of consciousness  After the fall, pt noted significant nausea and nonproductive retching  He denied CP, SOB, confusion at time of fall and at time of evaluation  Notes last time he had similar symptoms was when he had a prior MI in 2014  He required 2 stents placed in the LAD       ROS: History obtained from the patient and wife  General ROS: negative for - chills, fatigue or fever  Psychological ROS: negative for - concentration difficulties, disorientation or memory difficulties  Ophthalmic ROS: negative for - blurry vision, decreased vision or double vision  Respiratory ROS: no cough, shortness of breath, or wheezing  Cardiovascular ROS: no chest pain or dyspnea on exertion  Gastrointestinal ROS: no abdominal pain, change in bowel habits, or black or bloody stools  Neurological ROS: negative    Historical Information   Past Medical History:   Diagnosis Date    Cancer ([place] [address] )     NSTEMI (non-ST elevated myocardial infarction) ([place] [address] ) 2014    Pneumonia     Last Assessed:  10/7/14    Polymyalgia rheumatica ([place] [address] )     Last Assessed:  11/23/12     Past Surgical History:   Procedure Laterality Date    CORONARY ANGIOPLASTY WITH STENT PLACEMENT      HERNIA REPAIR  2000     Social History   Social History     Substance and Sexual Activity   Alcohol Use Yes    Comment: Social     Social History     Substance and Sexual Activity   Drug Use Never     Social History     Tobacco Use   Smoking Status Never Smoker   Smokeless Tobacco Never Used     Family History: non-contributory    Meds/Allergies   all medications and allergies reviewed  No Known Allergies    Objective   First Vitals:   Blood Pressure: 162/73 (01/19/22 1304)  Pulse: 63 (01/19/22 1303)  Temperature: 97 6 °F (36 4 °C) (01/19/22 1304)  Temp Source: Oral (01/19/22 1304)  Respirations: 16 (01/19/22 1303)  SpO2: 100 % (01/19/22 1303)    Current Vitals:   Blood Pressure: 162/73 (01/19/22 1304)  Pulse: 63 (01/19/22 1303)  Temperature: 97 6 °F (36 4 °C) (01/19/22 1304)  Temp Source: Oral (01/19/22 1304)  Respirations: 16 (01/19/22 1303)  SpO2: 100 % (01/19/22 1303)    No intake or output data in the 24 hours ending 01/19/22 1533    Invasive Devices  Report    Peripheral Intravenous Line            Peripheral IV 01/19/22 Left Antecubital <1 day                Physical Exam: General appearance: alert and oriented, in no acute distress  Head: Normocephalic, without obvious abnormality, atraumatic  Eyes: conjunctivae/corneas clear  PERRL, EOM's intact  Fundi benign  Lungs: clear to auscultation bilaterally  Heart: regular rate and rhythm, S1, S2 normal, no murmur, click, rub or gallop  Abdomen: soft, non-tender; bowel sounds normal; no masses,  no organomegaly  Extremities: extremities normal, warm and well-perfused; no cyanosis, clubbing, or edema  Pulses: 2+ and symmetric  Skin: Skin color, texture, turgor normal  No rashes or lesions     Lab Results:   Component      Latest Ref Rng & Units 1/19/2022   WBC      4 31 - 10 16 Thousand/uL 8 09   Red Blood Cell Count      3 88 - 5 62 Million/uL 4 90   Hemoglobin      12 0 - 17 0 g/dL 13 7   HCT      36 5 - 49 3 % 41 6   MCV      82 - 98 fL 85   MCH      26 8 - 34 3 pg 28 0   MCHC      31 4 - 37 4 g/dL 32 9   RDW      11 6 - 15 1 % 14 7   MPV      8 9 - 12 7 fL 10 1   Platelet Count      569 - 390 Thousands/uL 260     Component      Latest Ref Rng & Units 1/19/2022   Sodium      136 - 145 mmol/L 140   Potassium      3 5 - 5 3 mmol/L 4 9   Chloride      100 - 108 mmol/L 105   CO2      21 - 32 mmol/L 25   Anion Gap      4 - 13 mmol/L 10   BUN      5 - 25 mg/dL 27 (H)   Creatinine      0 60 - 1 30 mg/dL 0 95   Glucose, Random      65 - 140 mg/dL 130   Calcium      8 3 - 10 1 mg/dL 8 0 (L)   CORRECTED CALCIUM      8 3 - 10 1 mg/dL 8 6   AST      5 - 45 U/L 22   ALT      12 - 78 U/L 22   Alkaline Phosphatase      46 - 116 U/L 69   Total Protein      6 4 - 8 2 g/dL 6 9   Albumin      3 5 - 5 0 g/dL 3 3 (L)   TOTAL BILIRUBIN      0 20 - 1 00 mg/dL 0 64   eGFR      ml/min/1 73sq m 72     Component      Latest Ref Rng & Units 1/19/2022   hs TnI 0hr      "Refer to ACS Flowchart"- see link ng/L 5     Component      Latest Ref Rng & Units 1/19/2022   Lipase      73 - 393 u/L 67 (L)     Imaging:   - CXR - No acute cardiopulmonary disease   - CT head w/o - No acute intracranial abnormality  - CT cervical spine w/o - No cervical spine fracture or traumatic malalignment    EKG, Pathology, and Other Studies: EKG showed inferior related T-wave inversions, no prior to compare  Code Status: No Order  Advance Directive and Living Will:      Power of :    POLST:      Counseling / Coordination of Care: Total floor / unit time spent today 30 minutes  and Greater than 50% of total time was spent with the patient and / or family counseling and / or coordination of care       Nathanael AVILES

## 2022-01-19 NOTE — NURSING NOTE
Bedside admission nurse completed inpatient admission questionnaire  Patient comfortable and has no further needs at this time  Call bell in place  Pt frustrated why he has to stay here  Updated primary RN  Wife at bedside

## 2022-01-20 ENCOUNTER — APPOINTMENT (OUTPATIENT)
Dept: NON INVASIVE DIAGNOSTICS | Facility: HOSPITAL | Age: 87
DRG: 312 | End: 2022-01-20
Payer: MEDICARE

## 2022-01-20 LAB
ANION GAP SERPL CALCULATED.3IONS-SCNC: 9 MMOL/L (ref 4–13)
AORTIC ROOT: 2.9 CM
APICAL FOUR CHAMBER EJECTION FRACTION: 59 %
BILIRUB UR QL STRIP: NEGATIVE
BUN SERPL-MCNC: 24 MG/DL (ref 5–25)
CALCIUM SERPL-MCNC: 8.1 MG/DL (ref 8.3–10.1)
CHLORIDE SERPL-SCNC: 106 MMOL/L (ref 100–108)
CHOLEST SERPL-MCNC: 134 MG/DL
CLARITY UR: CLEAR
CO2 SERPL-SCNC: 26 MMOL/L (ref 21–32)
COLOR UR: ABNORMAL
CREAT SERPL-MCNC: 0.85 MG/DL (ref 0.6–1.3)
E WAVE DECELERATION TIME: 255 MS
FRACTIONAL SHORTENING: 40 % (ref 28–44)
GFR SERPL CREATININE-BSD FRML MDRD: 79 ML/MIN/1.73SQ M
GLUCOSE SERPL-MCNC: 85 MG/DL (ref 65–140)
GLUCOSE UR STRIP-MCNC: NEGATIVE MG/DL
HDLC SERPL-MCNC: 63 MG/DL
HGB UR QL STRIP.AUTO: NEGATIVE
INTERVENTRICULAR SEPTUM IN DIASTOLE (PARASTERNAL SHORT AXIS VIEW): 0.9 CM
KETONES UR STRIP-MCNC: ABNORMAL MG/DL
LAAS-AP4: 28 CM2
LDLC SERPL CALC-MCNC: 61 MG/DL (ref 0–100)
LEFT ATRIUM SIZE: 4.1 CM
LEFT INTERNAL DIMENSION IN SYSTOLE: 2.8 CM (ref 2.1–4)
LEFT VENTRICULAR INTERNAL DIMENSION IN DIASTOLE: 4.7 CM (ref 4.78–7.12)
LEFT VENTRICULAR POSTERIOR WALL IN END DIASTOLE: 0.9 CM
LEFT VENTRICULAR STROKE VOLUME: 73 ML
LEUKOCYTE ESTERASE UR QL STRIP: NEGATIVE
MV E'TISSUE VEL-SEP: 9 CM/S
MV PEAK A VEL: 0.6 M/S
MV PEAK E VEL: 63 CM/S
NITRITE UR QL STRIP: NEGATIVE
NONHDLC SERPL-MCNC: 71 MG/DL
PH UR STRIP.AUTO: 5.5 [PH]
POTASSIUM SERPL-SCNC: 4.3 MMOL/L (ref 3.5–5.3)
PROT UR STRIP-MCNC: NEGATIVE MG/DL
RIGHT ATRIAL 2D VOLUME: 66 ML
RIGHT ATRIUM AREA SYSTOLE A4C: 21.3 CM2
RIGHT VENTRICLE ID DIMENSION: 3.8 CM
SL CV LV EF: 65
SL CV PED ECHO LEFT VENTRICLE DIASTOLIC VOLUME (MOD BIPLANE) 2D: 102 ML
SL CV PED ECHO LEFT VENTRICLE SYSTOLIC VOLUME (MOD BIPLANE) 2D: 29 ML
SODIUM SERPL-SCNC: 141 MMOL/L (ref 136–145)
SP GR UR STRIP.AUTO: >=1.03 (ref 1–1.03)
TR MAX PG: 22 MMHG
TRICUSPID VALVE PEAK REGURGITATION VELOCITY: 2.35 M/S
TRIGL SERPL-MCNC: 50 MG/DL
UROBILINOGEN UR QL STRIP.AUTO: 0.2 E.U./DL
Z-SCORE OF LEFT VENTRICULAR DIMENSION IN END SYSTOLE: -2.15

## 2022-01-20 PROCEDURE — 81003 URINALYSIS AUTO W/O SCOPE: CPT | Performed by: INTERNAL MEDICINE

## 2022-01-20 PROCEDURE — 93306 TTE W/DOPPLER COMPLETE: CPT | Performed by: INTERNAL MEDICINE

## 2022-01-20 PROCEDURE — 93306 TTE W/DOPPLER COMPLETE: CPT

## 2022-01-20 PROCEDURE — 99222 1ST HOSP IP/OBS MODERATE 55: CPT | Performed by: INTERNAL MEDICINE

## 2022-01-20 PROCEDURE — 97162 PT EVAL MOD COMPLEX 30 MIN: CPT

## 2022-01-20 PROCEDURE — 80048 BASIC METABOLIC PNL TOTAL CA: CPT | Performed by: INTERNAL MEDICINE

## 2022-01-20 PROCEDURE — 97166 OT EVAL MOD COMPLEX 45 MIN: CPT

## 2022-01-20 PROCEDURE — 80061 LIPID PANEL: CPT | Performed by: INTERNAL MEDICINE

## 2022-01-20 RX ORDER — METOPROLOL SUCCINATE 25 MG/1
12.5 TABLET, EXTENDED RELEASE ORAL DAILY
Status: DISCONTINUED | OUTPATIENT
Start: 2022-01-22 | End: 2022-01-21 | Stop reason: HOSPADM

## 2022-01-20 RX ADMIN — ASPIRIN 81 MG: 81 TABLET, COATED ORAL at 08:39

## 2022-01-20 RX ADMIN — Medication 2 TABLET: at 08:39

## 2022-01-20 RX ADMIN — HEPARIN SODIUM 5000 UNITS: 5000 INJECTION INTRAVENOUS; SUBCUTANEOUS at 14:35

## 2022-01-20 RX ADMIN — HEPARIN SODIUM 5000 UNITS: 5000 INJECTION INTRAVENOUS; SUBCUTANEOUS at 22:27

## 2022-01-20 RX ADMIN — METOPROLOL SUCCINATE 12.5 MG: 25 TABLET, EXTENDED RELEASE ORAL at 08:39

## 2022-01-20 RX ADMIN — BIMATOPROST 1 DROP: 0.1 SOLUTION/ DROPS OPHTHALMIC at 08:38

## 2022-01-20 RX ADMIN — ATORVASTATIN CALCIUM 40 MG: 40 TABLET, FILM COATED ORAL at 18:05

## 2022-01-20 NOTE — PHYSICAL THERAPY NOTE
PHYSICAL THERAPY EVALUATION          Patient Name: Win ABBASI Date: 1/20/2022   PT EVALUATION    81 y o     [id number]    Syncope [R55]  Vomiting [R11 10]    Past Medical History:   Diagnosis Date    Cancer [facility])     [facility]    Pneumonia     Last Assessed:  10/7/14    [facility]ymyalgia rheumatica ([facility] )     Last Assessed:  11/23/12    Polymyalgia rheumatica ([facility] ) 2011     Past Surgical History:   Procedure Laterality Date    APPENDECTOMY      CORONARY ANGIOPLASTY WITH STENT PLACEMENT      HERNIA REPAIR  2000 01/20/22 1140   PT Last Visit   PT Visit Date 01/20/22   Note Type   Note type Evaluation   Pain Assessment   Pain Assessment Tool 0-10   Pain Score No Pain   Restrictions/Precautions   Other Precautions Fall Risk  (neema)   Home Living   Type of [address] [place] [address] Two level;Bed/bath upstairs   Home Equipment Walker; Other (Comment)  (walking sticks)   Prior Function   Level of [place] Independent with ADLs and functional mobility   Lives With Spouse   Falls in the last 6 months 1 to 4  (2)   Vocational Retired   Comments at baseline without an AD  spouse has some medical issues but indep  uses grocery  services   General   Additional Pertinent History pt admitted 1/19/22 for syncope  activity as tolerated orders  hx of CA, PMR, NSTEMI   Cognition   Overall Cognitive Status WFL   Arousal/Participation Cooperative   Orientation Level Oriented X4   Memory Within functional limits   Following Commands Follows all commands and directions without difficulty   Subjective   Subjective "I have some balance issues"   RLE Assessment   RLE Assessment WFL   LLE Assessment   LLE Assessment WFL   Coordination   Sensation WFL  (pt denies n/t)   Bed Mobility   Supine to Sit 6  Modified independent   Additional items HOB elevated; Increased time required   Transfers   Sit to Stand 5 Supervision   Stand to Sit 5  Supervision   Additional items Armrests; Increased time required   Ambulation/Elevation   Gait pattern Wide ELADIA;WNL  (occasionally unsteady)   Gait Assistance 5  Supervision   Additional items Assist x 1   Assistive Device None   Distance >350'   Balance   Dynamic Sitting Fair +   Static Standing Fair   Dynamic Standing Fair -   Ambulatory Fair -   Endurance Deficit   Endurance Deficit No  (Vitals 132/94, 76, 100% at rest; 132/80, 76, 100% post amb )   Activity Tolerance   Activity Tolerance Patient tolerated treatment well   Medical Staff Kathy OT   Nurse Made Aware Flori VALENCIA   Assessment   Prognosis Good   Problem List Impaired balance   Assessment Meredeth Runner is a 81 y o  male admitted to [facility] on 1/19/2022 for Syncope  PT was consulted and pt was seen on 1/20/2022 for mobility assessment and d/c planning  Pt presents w fall risk, neema monitoring  At baseline is indep without an AD  States living active lifestyle and interested in working out at home (was going to gym but stopped with pandemic)  Pt is currently functioning at a modified independent assistance level for bed mobility, supervision assistance x1 level for transfers and ambulation without an AD  Pt demonstrated no difficulty ambulating community distances  Was occasionally unsteady but no gross LOB observed  Given occ unsteadiness and pt concerns about balance issues proceeded with further balance testing  Current DGI score is predictive of falls  Romberg FTEO=30" w mild sway; FTEC=20" moderate sway, testing stopped d/t eyes open  Would discussed benefits of OPPT f/u to address balance issues contributing to fall risk  We also reviewed safety precautions pt could take at home including rest breaks between positional changes to monitor sx  Verbally instructed on standing HEP including hip abd, hip ext and marches in place (to work on stability in SLS) w use of stable surface (ie counter) for support   Pt will benefit from continued skilled IP PT to address the above mentioned impairments  in order to maximize recovery and increase functional independence when completing mobility and ADLs  At this time PT recommendations for d/c are home w OPPT when medically stable  Barriers to Discharge None   Goals   Patient Goals go home, improve balance   STG Expiration Date 01/30/22   Short Term Goal #1 1)  Pt will perform bed mobility indep demonstrating appropriate technique 100% of the time in order to improve function  2)  Perform all transfers Eloy demonstrating safe and appropriate technique 100% of the time in order to improve ability to negotiate safely in home environment  3) Amb with least restrictive AD > 500'x1 with mod I in order to demonstrate ability to negotiate in home environment  4)  Improve ambulatory balance 1/2 grade in order to optimize ability to perform functional tasks and reduce fall risk  5) Increase activity tolerance to 45 minutes in order to improve endurance to functional tasks  6)  Negotiate stairs using most appropriate technique and mod I in order to be able to negotiate safely in home environment  7) PT for ongoing patient and family/caregiver education, DME needs and d/c planning in order to promote highest level of function in least restrictive environment  Plan   Treatment/Interventions Functional transfer training;LE strengthening/ROM; Elevations; Therapeutic exercise;Patient/family training;Equipment eval/education; Bed mobility;Gait training; Compensatory technique education;Spoke to nursing;OT   PT Frequency 1-2x/wk   Recommendation   PT Discharge Recommendation Home with outpatient rehabilitation   Additional Comments The patient's AM-[facility] Basic Mobility Inpatient Short Form Raw Score is 20  A Raw score of greater than 16 suggests the patient may benefit from discharge to home  Please also refer to the recommendation of the Physical Therapist for safe discharge planning     AM-PAC Basic Mobility Inpatient   Turning in Bed Without Bedrails 4   Lying on Back to Sitting on Edge of Flat Bed 4   Moving Bed to Chair 3   Standing Up From Chair 3   Walk in Room 3   Climb 3-5 Stairs 3   Basic Mobility Inpatient Raw Score 20   Basic Mobility Standardized Score 43 99   Highest Level Of Mobility   JH-HLM Goal 6: Walk 10 steps or more   JH-HLM Highest Level of Mobility 8: Walk 250 feet ot more   JH-HLM Goal Achieved Yes   Dynamic Gait Index   Gait level surface  2  (occ imbalance)   Change in gait speed 3   Gait with horizontal head turns  2   Gait with vertical head turns  3   Gait and pivot turn 2   Step over obstacle 2   Step around obstacle 2   Steps 2  (assumed, unable to test on exam)   Total score  18   End of Consult   Patient Position at End of Consult Bedside chair; All needs within reach   End of Consult Comments verbalized understanding to fall risk precautions   History: co - morbidities, fall risk  Exam: impairments in systems including balance, neuromuscular (balance, gait, transfers, motor function and sensation), am-pac, cardiopulmonary, cognition  Clinical: unstable/unpredictable; ongoing medical testing for admitting dx  Complexity: moderate      Milton Dugan, PT

## 2022-01-20 NOTE — ASSESSMENT & PLAN NOTE
Unclear if related to trauma from fall  Could possibly be inferior related angina given EKG with inferior related T-wave inversions  No prior EKGs to compare  No further events    Improved  Continue to monitor

## 2022-01-20 NOTE — OCCUPATIONAL THERAPY NOTE
Occupational Therapy Evaluation     Patient Name: Yu MEDINA Date: 1/20/2022  Problem List  Principal Problem:    Syncope  Active Problems:    Arteriosclerotic coronary artery disease    Benign essential hypertension    Hyperlipidemia    Vomiting    Past Medical History  Past Medical History:   Diagnosis Date    Cancer [facility])     [facility] 2014    Pneumonia     Last Assessed:  10/7/14    [facility]a [facility] )     Last Assessed:  11/23/12    [facility]     Past Surgical History  Past Surgical History:   Procedure Laterality Date    APPENDECTOMY      CORONARY ANGIOPLASTY WITH STENT PLACEMENT      HERNIA REPAIR  2000 01/20/22 1141   OT Last Visit   OT Visit Date 01/20/22   Note Type   Note type Evaluation   Restrictions/Precautions   Other Precautions Fall Risk  (Jemal)   Pain Assessment   Pain Assessment Tool 0-10   Pain Score No Pain   Home Living   Type of [address] [place] [address] Two level;Bed/bath upstairs;1/2 bath on main level   Bathroom Shower/Tub Walk-in shower   Bathroom Toilet Standard   Bathroom Equipment Grab bars in shower; Shower chair   [facility]  (walking sticks, not using PTA)   Additional Comments pt lives w/ spouse who is able to assist w/ him   Prior Function   Level of [place] Independent with ADLs and functional mobility   Lives With [place] Help From Family   ADL Assistance Independent   IADLs Independent   Falls in the last 6 months 1 to 4  (2)   Vocational Retired   Comments pt driving PTA, reports no AD use of in home, shares IADL tasks w/ wife   Lifestyle   Autonomy per pt independent w/ ADLs, independent w/ functional transfers and mobility w/ no AD, independent w/ IADLs, driving   Reciprocal Relationships spouse   Service to Others retired from [employer] then worked other jobs   Intrinsic Gratification walking, used to run marathons and bike   Subjective   Subjective "I am doing better"   ADL   Where Assessed Chair   Eating Assistance 7  Independent   Grooming Assistance [address] 5  Supervision/Setup   LB Bathing Assistance 5  Supervision/Setup   UB Dressing Assistance 5  Supervision/Setup   LB Dressing Assistance 5  Supervision/Setup   Toileting Assistance  5  Supervision/Setup   Functional Assistance 5  Supervision/Setup   Bed Mobility   Supine to Sit 6  Modified independent   Additional items HOB elevated; Bedrails; Increased time required   Transfers   Sit to Stand 5  Supervision   Additional items Armrests   Stand to Sit 5  Supervision   Additional items Armrests; Increased time required   Functional Mobility   Functional Mobility 5  Supervision   Additional Comments w/ no AD, 1-2 LOB when challenging his balance and able to maintain   Balance   Static Sitting Good   Dynamic Sitting Fair +   Static Standing Fair   Dynamic Standing Fair -   Ambulatory Fair -   Activity Tolerance   Activity Tolerance Patient tolerated treatment well   Medical Staff Made Aware PT Edwina Genao   Nurse Made Aware appropriate to see per Myra VALENCIA   RUE Assessment   RUE Assessment WFL  (4/5)   LUE Assessment   LUE Assessment WFL  (4/5)   Hand Function   Gross Motor Coordination Functional   Fine Motor Coordination Functional   Sensation   Light Touch No apparent deficits   Proprioception   Proprioception No apparent deficits   Vision-Basic Assessment   Current Vision Wears glasses all the time   Vision - Complex Assessment   Ocular Range of Motion [facility]   Acuity Able to read clock/calendar on wall without difficulty   Perception   Inattention/Neglect Appears intact   Cognition   Overall Cognitive Status [facility]   Arousal/Participation Alert; Cooperative   Attention Within functional limits   Orientation Level Oriented X4   Memory Within functional limits   Following Commands Follows one step commands without difficulty   Comments pt engages in appropriate conversations, receptive to safety education on taking transitions slowly counting to 10 prior to positional changes   Assessment   Assessment Pt is a 81 y o  male seen for OT evaluation s/p admit to [facility] on 1/19/2022 w/ possible vasovagal event when shoveling snow and increased confusion  Comorbidities affecting pt's functional performance at time of assessment include: hyperlipidemia, HTN, vomiting, arteriosclerotic CAD  Personal factors affecting pt at time of IE include:difficulty performing IADLS   Prior to admission, pt was living w/ spouse and reports independent w/ ADLs, independent w/ functional transfers and mobility w/ no AD, independent w/ IADLs, driving  Upon evaluation: Pt requires MOD I UB ADLs, setup LB ADLs, supervision functional transfers, MOD I bed mobility, supervision functional mobility w/ one LOB and able to maintain balance 2* the following deficits impacting occupational performance: decreased endurance, slightly impaired balance, fall risk  BP: 132/94 EOB and 132/80  Pt appears to be at baseline for ADLs, functional transfers and mobility w/ no inpt OT needs warranted at this time  Will d/c OT  From OT standpoint, recommendation at time of d/c would be home w/ family support  The patient's raw score on the AM-PAC Daily Activity inpatient short form is 22, standardized score is 47 1, greater than 39 4  Patients at this level are likely to benefit from discharge to home  Please refer to the recommendation of the Occupational Therapist for safe discharge planning     Goals   Patient Goals "go home and improve balance"   Plan   OT Frequency Eval only   Recommendation   OT Discharge Recommendation No rehabilitation needs  (HOME w/ support)   OT - OK to Discharge Yes  (when medically stable)   AM-PAC Daily Activity Inpatient   Lower Body Dressing 3   Bathing 3   Toileting 4   Upper Body Dressing 4   Grooming 4   Eating 4   Daily Activity Raw Score 22   Daily Activity Standardized Score (Calc for Raw Score >=11) 47  1   AM-PAC Applied Cognition Inpatient   Following a Speech/Presentation 4   Understanding Ordinary Conversation 4   Taking Medications 4   Remembering Where Things Are Placed or Put Away 4   Remembering List of 4-5 Errands 4   Taking Care of Complicated Tasks 4   Applied Cognition Raw Score 24   Applied Cognition Standardized Score 62 21     Documentation completed by: Niru Contreras MS, OTR/L

## 2022-01-20 NOTE — ASSESSMENT & PLAN NOTE
Prior NSTEMI in 2014 status post NATALIA to LAD x2  Pt presented with presyncopal episode  Cardiology evaluated patient  For myocardial stress test tomorrow morning

## 2022-01-20 NOTE — ED NOTES
Orthostatic VS  Lying, BP- 127/79, 79bpm  Sitting, BP- 112/70, 75bpm  Standing, BP- 115/72, 90bpm     Rob Bell, ANNIE  01/19/22 1953

## 2022-01-20 NOTE — CONSULTS
Consult - Cardiology   Meli Taylor 81 y o  male MRN: [id number]  Unit/Bed#: E5 -01 Encounter: [id number]        Reason For Consult:  Syncope            ASSESSMENT:  Recurrent syncope   Coronary artery disease   - prior NSTEMI in 2014, S/P NATALIA to LAD x 2  Hypertension   - Rx, Toprol XL 12 5 mg daily HS  Hyperlipidemia   - Rx, rosuvastatin 10 mg daily  - most recent lipid panel 1/20/22: Cholesterol 134, triglycerides 50, HDL 63, LDL 61  Other:  - type 2 diabetes mellitus    PLAN/ DISCUSSION:     · Repeat orthostatic blood pressures today  · Continue to monitor on telemetry for dysrhythmias  · Negative cardiac enzymes: HS troponin trend 5, 5, 5  However, ECG appears ischemic, will discuss possible nuclear stress test with patient  · Currently on Toprol XL 12 5 mg daily  · Echocardiogram ordered to assess cardiac structure and function  · Continue aspirin 81 mg daily, atorvastatin 40 mg daily, Toprol XL 12 5 mg daily  · Monitor volume status with strict intake/output, daily weight  · Monitor electrolytes closely; maintain potassium > 4, magnesium > 2  History Of Present Illness:  56-year-old male presented to [facility] post syncopal event  Per patient, he had walked to the mail box and back into the house; when walking through the house he noted that he became "disoriented"  He denied loss of consciousness, notes that his wife was speaking to him through the event  He developed dry heaves as he was still lying on the floor  The dry heaves resolved upon sitting in he chair  He denied dizziness, lightheadedness, chest pain, shortness of breath with this event  To note, patient stated that he had a similar episode in November when he was cutting his hedges  He woke up on the ground after landing on his shoulder  Upon assessment and evaluation, patient resting in bed comfortably  He denies current shortness of breath, chest pain, dizziness, lightheadedness   When sitting at the edge of the bed and standing, he notes that he felt off balance  Please see significant cardiac history and problems enumerated above  Patient was last seen by his outpatient cardiologist, Dr Emperatriz Machado with [facility]  in 2018  During visit, patient had no cardiac complaints  Noted normal LV function from note review  Past Medical History:        Past Medical History:   Diagnosis Date    [facility]     NSTEMI (non-ST elevated myocardial infarction) ([facility] ) 2014    Pneumonia     Last Assessed:  10/7/14    Polymyalgia rheumatica ([facility] )     Last Assessed:  11/23/12    Polymyalgia rheumatica ([facility] ) 2011      Past Surgical History:   Procedure Laterality Date    APPENDECTOMY      CORONARY ANGIOPLASTY WITH STENT PLACEMENT      HERNIA REPAIR  2000        Allergy:        No Known Allergies    Medications:       Prior to Admission medications    Medication Sig Start Date End Date Taking?  Authorizing Provider   aspirin (ECOTRIN LOW STRENGTH) 81 mg EC tablet Take 1 tablet (81 mg total) by mouth daily 10/27/21  Yes Marv Beach MD   Calcium Citrate-Vitamin D (CALCIUM CITRATE + D) 315-250 MG-UNIT TABS Take 1 tablet by mouth daily   Yes Historical Provider, MD   carboxymethylcellulose (REFRESH TEARS) 0 5 % SOLN 1 drop 2 (two) times a day as needed for dry eyes   Yes Historical Provider, MD   Coenzyme Q10 100 MG TABS Take 1 tablet by mouth daily   Yes Historical Provider, MD   metoprolol succinate (TOPROL-XL) 25 mg 24 hr tablet Take 0 5 tablets (12 5 mg total) by mouth daily  Patient taking differently: Take 12 5 mg by mouth daily Takes at night  10/27/21  Yes Marv Beach MD   Misc Natural Products (OSTEO BI-FLEX ADV TRIPLE ST PO) Take by mouth   Yes Historical Provider, MD   OMEGA-3 FATTY ACIDS-VITAMIN E PO Take 1 capsule by mouth daily   Yes Historical Provider, MD SCHWABIGAN 0 01 % ophthalmic drops Administer 1 drop to both eyes daily  Patient not taking: Reported on 1/19/2022 2/11/19   Historical Provider, MD rosuvastatin (CRESTOR) 10 MG tablet Take 1 tablet (10 mg total) by mouth daily  Patient not taking: Reported on 1/19/2022  10/27/21   Casper Peña MD       Family History:     Family History   Problem Relation Age of Onset    Pulmonary embolism Father     Other Father         Transurethral resection of prostate    Diabetes Sister         Social History:       Social History     Socioeconomic History    Marital status: /Civil Union     Spouse name: None    Number of children: None    Years of education: None    Highest education level: None   Occupational History    None   Tobacco Use    Smoking status: Never Smoker    Smokeless tobacco: Never Used   Vaping Use    Vaping Use: Never used   Substance and Sexual Activity    Alcohol use: Yes     Comment: Social    Drug use: Never    Sexual activity: Yes   Other Topics Concern    None   Social History Narrative    Exercises regularly     Social Determinants of Health     Financial Resource Strain: Not on file   Food Insecurity: Not on file   Transportation Needs: Not on file   Physical Activity: Not on file   Stress: Not on file   Social Connections: Not on file   Intimate Partner Violence: Not on file   Housing Stability: Not on file       ROS:  Negative except otherwise aforementioned above  Remainder review of systems is negative    Exam:  General:  alert, oriented and in no distress, cooperative  Head: Normocephalic, atraumatic  Eyes:  EOMI  Pupils - equal, round, reactive to accomodation  No icterus  Normal Conjunctiva     Oropharynx: moist and normal-appearing mucosa  Neck: supple, symmetrical, trachea midline and no JVD  Heart: regular rate, regular rhythm  Respiratory effort / Chest Inspection: unlabored  Lungs:  normal air entry, lungs clear to auscultation and no rales, rhonchi or wheezing   Abdomen: flat, normal findings: bowel sounds normal and soft, non-tender  Lower Limbs:  no pitting edema  Musculoskeletal: ROM grossly normal    DATA:      ECG:                       Telemetry: NSR upon exam          Weights: Wt Readings from Last 3 Encounters:   10/27/21 82 3 kg (181 lb 6 4 oz)   11/22/19 84 4 kg (186 lb)   02/18/19 85 2 kg (187 lb 12 8 oz)   , There is no height or weight on file to calculate BMI           Lab Studies:           Results from last 7 days   Lab Units 01/20/22  0541   TRIGLYCERIDES mg/dL 50   HDL mg/dL 63     Results from last 7 days   Lab Units 01/19/22  1309   WBC Thousand/uL 8 09   HEMOGLOBIN g/dL 13 7   HEMATOCRIT % 41 6   PLATELETS Thousands/uL 260   ,   Results from last 7 days   Lab Units 01/20/22  0541 01/19/22  1310   POTASSIUM mmol/L 4 3 4 9   CHLORIDE mmol/L 106 105   CO2 mmol/L 26 25   BUN mg/dL 24 27*   CREATININE mg/dL 0 85 0 95   CALCIUM mg/dL 8 1* 8 0*   ALK PHOS U/L  --  69   ALT U/L  --  22   AST U/L  --  22

## 2022-01-20 NOTE — ASSESSMENT & PLAN NOTE
Patient presents with syncopal event with presyncopal aura described as lightheadedness and dizziness  He denies losing consciousness  He denies any chest pain, palpitations, shortness of breath during event  He currently is asymptomatic and at baseline  Likely this was a vasovagal event  But given patient's cardiac history is reasonable to observe in trend troponins/EKGs and monitor overnight on telemetry  Patient also has had no recent cardiac imaging and most recent follow-up with cardiologist within 2018  Echocardiogram is pending  Appreciate cardiology evaluation  Patient for myocardial stress test tomorrow morning

## 2022-01-20 NOTE — PLAN OF CARE
Problem: Potential for Falls  Goal: Patient will remain free of falls  Description: INTERVENTIONS:  - Educate patient/family on patient safety including physical limitations  - Instruct patient to call for assistance with activity   - Consult OT/PT to assist with strengthening/mobility   - Keep Call bell within reach  - Keep bed low and locked with side rails adjusted as appropriate  - Keep care items and personal belongings within reach  - Initiate and maintain comfort rounds  - Make Fall Risk Sign visible to staff  - Apply yellow socks and bracelet for high fall risk patients  - Consider moving patient to room near nurses station  Outcome: Progressing     Problem: PAIN - ADULT  Goal: Verbalizes/displays adequate comfort level or baseline comfort level  Description: Interventions:  - Encourage patient to monitor pain and request assistance  - Assess pain using appropriate pain scale  - Administer analgesics based on type and severity of pain and evaluate response  - Implement non-pharmacological measures as appropriate and evaluate response  - Consider cultural and social influences on pain and pain management  - Notify physician/advanced practitioner if interventions unsuccessful or patient reports new pain  Outcome: Progressing     Problem: SAFETY ADULT  Goal: Maintain or return to baseline ADL function  Description: INTERVENTIONS:  -  Assess patient's ability to carry out ADLs; assess patient's baseline for ADL function and identify physical deficits which impact ability to perform ADLs (bathing, care of mouth/teeth, toileting, grooming, dressing, etc )  - Assess/evaluate cause of self-care deficits   - Assess range of motion  - Assess patient's mobility; develop plan if impaired  - Assess patient's need for assistive devices and provide as appropriate  - Encourage maximum independence but intervene and supervise when necessary  - Involve family in performance of ADLs  - Assess for home care needs following discharge   - Consider OT consult to assist with ADL evaluation and planning for discharge  - Provide patient education as appropriate  Outcome: Progressing  Goal: Maintains/Returns to pre admission functional level  Description: INTERVENTIONS:  - Perform BMAT or MOVE assessment daily    - Set and communicate daily mobility goal to care team and patient/family/caregiver     - Collaborate with rehabilitation services on mobility goals if consulted  - Out of bed for toileting  - Record patient progress and toleration of activity level   Outcome: Progressing     Problem: DISCHARGE PLANNING  Goal: Discharge to home or other facility with appropriate resources  Description: INTERVENTIONS:  - Identify barriers to discharge w/patient and caregiver  - Arrange for needed discharge resources and transportation as appropriate  - Identify discharge learning needs (meds, wound care, etc )  - Arrange for interpretive services to assist at discharge as needed  - Refer to Case Management Department for coordinating discharge planning if the patient needs post-hospital services based on physician/advanced practitioner order or complex needs related to functional status, cognitive ability, or social support system  Outcome: Progressing

## 2022-01-20 NOTE — PROGRESS NOTES
[facility]  Progress Note - Ese Michaels 1935, 81 y o  male MRN: [id number]  Unit/Bed#: E5 -01 Encounter: [id number]  Primary Care Provider: Wilman Martell DO   Date and time admitted to hospital: 1/19/2022 12:56 PM    Vomiting  Assessment & Plan  Unclear if related to trauma from fall  Could possibly be inferior related angina given EKG with inferior related T-wave inversions  No prior EKGs to compare  No further events  Improved  Continue to monitor    Hyperlipidemia  Assessment & Plan  Crestor as an outpatient, atorvastatin while inpatient    Benign essential hypertension  Assessment & Plan  Continue metoprolol    Arteriosclerotic coronary artery disease  Assessment & Plan  Prior NSTEMI in 2014 status post NATALIA to LAD x2  Pt presented with presyncopal episode  Cardiology evaluated patient  For myocardial stress test tomorrow morning  * Syncope  Assessment & Plan  Patient presents with syncopal event with presyncopal aura described as lightheadedness and dizziness  He denies losing consciousness  He denies any chest pain, palpitations, shortness of breath during event  He currently is asymptomatic and at baseline  Likely this was a vasovagal event  But given patient's cardiac history is reasonable to observe in trend troponins/EKGs and monitor overnight on telemetry  Patient also has had no recent cardiac imaging and most recent follow-up with cardiologist within 2018  Echocardiogram is pending  Appreciate cardiology evaluation  Patient for myocardial stress test tomorrow morning  VTE Pharmacologic Prophylaxis:  Heparin subQ    Patient Centered Rounds: I performed bedside rounds with nursing staff today  Discussions with Specialists or Other Care Team Provider:  Discussed with cardiology    Education and Discussions with Family / Patient: Updated  (wife) at bedside  Time Spent for Care: 20 minutes   More than 50% of total time spent on counseling and coordination of care as described above  Current Length of Stay: 0 day(s)  Current Patient Status: Observation   Certification Statement: The patient will continue to require additional inpatient hospital stay due to Cardiac workup  Discharge Plan:  Probably tomorrow if cardiac stress test comes back okay  Await echo  PT/OT recommended home with outpatient PT    Code Status: Level 1 - Full Code    Subjective:   Patient was resting comfortably in chair  He denies having any headache or neck pain  He denies having any chest pain or shortness of breadth  He did not get further episode of lightheadedness  PT OT evaluated him and recommended home with outpatient PT  Objective:     Vitals:   Temp (24hrs), Av 5 °F (36 4 °C), Min:97 3 °F (36 3 °C), Max:97 7 °F (36 5 °C)    Temp:  [97 3 °F (36 3 °C)-97 7 °F (36 5 °C)] 97 3 °F (36 3 °C)  HR:  [54-90] 62  Resp:  [16-18] 18  BP: ()/(58-94) 97/58  SpO2:  [96 %-100 %] 100 %  Body mass index is 27 52 kg/m²  Input and Output Summary (last 24 hours):      Intake/Output Summary (Last 24 hours) at 2022 1452  Last data filed at 2022 1153  Gross per 24 hour   Intake 500 ml   Output 200 ml   Net 300 ml       Physical Exam:   Awake alert oriented x3, elderly pleasant gentleman  Neck supple  S1-S2 audible, no murmur  Abdomen soft nontender nondistended  No edema cyanosis or clubbing  Nonfocal neurological exam    Additional Data:     Labs:  Results from last 7 days   Lab Units 22  1309   WBC Thousand/uL 8 09   HEMOGLOBIN g/dL 13 7   HEMATOCRIT % 41 6   PLATELETS Thousands/uL 260   NEUTROS PCT % 70   LYMPHS PCT % 16   MONOS PCT % 11   EOS PCT % 2     Results from last 7 days   Lab Units 22  0541 22  1310 22  1310   SODIUM mmol/L 141   < > 140   POTASSIUM mmol/L 4 3   < > 4 9   CHLORIDE mmol/L 106   < > 105   CO2 mmol/L 26   < > 25   BUN mg/dL 24   < > 27*   CREATININE mg/dL 0 85   < > 0 95   ANION GAP mmol/L 9   < > 10   CALCIUM mg/dL 8 1*   < > 8 0*   ALBUMIN g/dL  --   --  3 3*   TOTAL BILIRUBIN mg/dL  --   --  0 64   ALK PHOS U/L  --   --  69   ALT U/L  --   --  22   AST U/L  --   --  22   GLUCOSE RANDOM mg/dL 85   < > 130    < > = values in this interval not displayed  Lines/Drains:  Invasive Devices  Report    Peripheral Intravenous Line            Peripheral IV 01/19/22 Left Antecubital 1 day                  Telemetry:  Telemetry Orders (From admission, onward)             24 Hour Telemetry Monitoring  Continuous x 24 Hours (Telem)        Expiring   References:    Telemetry Guidelines   Question:  Reason for 24 Hour Telemetry  Answer:  Syncope suspected to be cardiac in origin                 Telemetry Reviewed:  Yes  Indication for Continued Telemetry Use:  To rule out any arrhythmias/ cardiac workup             Imaging: Reviewed radiology reports from this admission including: chest xray, CT head and cervical spine    Recent Cultures (last 7 days):         Last 24 Hours Medication List:   Current Facility-Administered Medications   Medication Dose Route Frequency Provider Last Rate    acetaminophen  650 mg Oral Q6H PRN Jeremías Nestle, DO      artificial tear   Both Eyes HS PRN Jeremías Nestle, DO      aspirin  81 mg Oral Daily Jeremías Nestle, DO      atorvastatin  40 mg Oral Daily With Bright Things, DO      bimatoprost  1 drop Both Eyes Daily Jeremías Auryle, DO      calcium carbonate-vitamin D  2 tablet Oral Daily With Breakfast Jeremías Emely, DO      heparin (porcine)  5,000 Units Subcutaneous [facility] Jeremías Han, DO      [START ON 1/22/2022] metoprolol succinate  12 5 mg Oral Daily HUSAM Vasquez      ondansetron  4 mg Intravenous Q6H PRN Jeremías Han DO          Today, Patient Was Seen By: Annabelle Chin MD    **Please Note: This note may have been constructed using a voice recognition system  **

## 2022-01-20 NOTE — PLAN OF CARE
Problem: Potential for Falls  Goal: Patient will remain free of falls  Description: INTERVENTIONS:  - Educate patient/family on patient safety including physical limitations  - Instruct patient to call for assistance with activity   - Consult OT/PT to assist with strengthening/mobility   - Keep Call bell within reach  - Keep bed low and locked with side rails adjusted as appropriate  - Keep care items and personal belongings within reach  - Initiate and maintain comfort rounds  - Make Fall Risk Sign visible to staff    Problem: PAIN - ADULT  Goal: Verbalizes/displays adequate comfort level or baseline comfort level  Description: Interventions:  - Encourage patient to monitor pain and request assistance  - Assess pain using appropriate pain scale  - Administer analgesics based on type and severity of pain and evaluate response  - Implement non-pharmacological measures as appropriate and evaluate response  - Consider cultural and social influences on pain and pain management  - Notify physician/advanced practitioner if interventions unsuccessful or patient reports new pain  Outcome: Progressing     Problem: SAFETY ADULT  Goal: Maintain or return to baseline ADL function  Description: INTERVENTIONS:  -  Assess patient's ability to carry out ADLs; assess patient's baseline for ADL function and identify physical deficits which impact ability to perform ADLs (bathing, care of mouth/teeth, toileting, grooming, dressing, etc )  - Assess/evaluate cause of self-care deficits   - Assess range of motion  - Assess patient's mobility; develop plan if impaired  - Assess patient's need for assistive devices and provide as appropriate  - Encourage maximum independence but intervene and supervise when necessary  - Involve family in performance of ADLs  - Assess for home care needs following discharge   - Consider OT consult to assist with ADL evaluation and planning for discharge  - Provide patient education as appropriate  Outcome: Progressing  Goal: Maintains/Returns to pre admission functional level  Description: INTERVENTIONS:  - Perform BMAT or MOVE assessment daily    - Set and communicate daily mobility goal to care team and patient/family/caregiver     - Collaborate with rehabilitation services on mobility goals if consulted  -  Problem: DISCHARGE PLANNING  Goal: Discharge to home or other facility with appropriate resources  Description: INTERVENTIONS:  - Identify barriers to discharge w/patient and caregiver  - Arrange for needed discharge resources and transportation as appropriate  - Identify discharge learning needs (meds, wound care, etc )  - Arrange for interpretive services to assist at discharge as needed  - Refer to Case Management Department for coordinating discharge planning if the patient needs post-hospital services based on physician/advanced practitioner order or complex needs related to functional status, cognitive ability, or social support system  Outcome: Progressing   - Out of bed for toileting  - Record patient progress and toleration of activity level   Outcome: Progressing   - Apply yellow socks and bracelet for high fall risk patients  - Consider moving patient to room near nurses station  Outcome: Progressing

## 2022-01-21 ENCOUNTER — APPOINTMENT (INPATIENT)
Dept: NUCLEAR MEDICINE | Facility: HOSPITAL | Age: 87
DRG: 312 | End: 2022-01-21
Payer: MEDICARE

## 2022-01-21 ENCOUNTER — APPOINTMENT (INPATIENT)
Dept: NON INVASIVE DIAGNOSTICS | Facility: HOSPITAL | Age: 87
DRG: 312 | End: 2022-01-21
Payer: MEDICARE

## 2022-01-21 VITALS
RESPIRATION RATE: 16 BRPM | TEMPERATURE: 97.8 F | HEART RATE: 60 BPM | WEIGHT: 181 LBS | SYSTOLIC BLOOD PRESSURE: 101 MMHG | OXYGEN SATURATION: 95 % | BODY MASS INDEX: 27.43 KG/M2 | DIASTOLIC BLOOD PRESSURE: 65 MMHG | HEIGHT: 68 IN

## 2022-01-21 PROBLEM — R11.10 VOMITING: Status: RESOLVED | Noted: 2022-01-19 | Resolved: 2022-01-21

## 2022-01-21 LAB
MAX HR PERCENT: 85 %
NUC STRESS EJECTION FRACTION: 72 %
RATE PRESSURE PRODUCT: NORMAL
SL CV REST NUCLEAR ISOTOPE DOSE: 10 MCI
SL CV STRESS NUCLEAR ISOTOPE DOSE: 31.2 MCI
SL CV STRESS RECOVERY BP: NORMAL MMHG
SL CV STRESS RECOVERY HR: 99 BPM
STRESS ANGINA INDEX: 0
STRESS BASELINE BP: NORMAL MMHG
STRESS BASELINE HR: 68 BPM
STRESS O2 SAT REST: 98 %
STRESS PEAK HR: 115 BPM
STRESS POST ESTIMATED WORKLOAD: 1 METS
STRESS POST EXERCISE DUR MIN: 3 MIN
STRESS POST EXERCISE DUR SEC: 4 SEC
STRESS POST O2 SAT PEAK: 96 %
STRESS POST PEAK BP: 147 MMHG
STRESS/REST PERFUSION RATIO: 1.08

## 2022-01-21 PROCEDURE — A9502 TC99M TETROFOSMIN: HCPCS

## 2022-01-21 PROCEDURE — 99239 HOSP IP/OBS DSCHRG MGMT >30: CPT | Performed by: HOSPITALIST

## 2022-01-21 PROCEDURE — 93016 CV STRESS TEST SUPVJ ONLY: CPT | Performed by: INTERNAL MEDICINE

## 2022-01-21 PROCEDURE — 99232 SBSQ HOSP IP/OBS MODERATE 35: CPT | Performed by: INTERNAL MEDICINE

## 2022-01-21 PROCEDURE — 78452 HT MUSCLE IMAGE SPECT MULT: CPT

## 2022-01-21 PROCEDURE — 93018 CV STRESS TEST I&R ONLY: CPT | Performed by: INTERNAL MEDICINE

## 2022-01-21 PROCEDURE — 78452 HT MUSCLE IMAGE SPECT MULT: CPT | Performed by: INTERNAL MEDICINE

## 2022-01-21 PROCEDURE — 93017 CV STRESS TEST TRACING ONLY: CPT

## 2022-01-21 PROCEDURE — G1004 CDSM NDSC: HCPCS

## 2022-01-21 RX ADMIN — HEPARIN SODIUM 5000 UNITS: 5000 INJECTION INTRAVENOUS; SUBCUTANEOUS at 14:18

## 2022-01-21 RX ADMIN — REGADENOSON 0.4 MG: 0.08 INJECTION, SOLUTION INTRAVENOUS at 11:32

## 2022-01-21 RX ADMIN — ATORVASTATIN CALCIUM 40 MG: 40 TABLET, FILM COATED ORAL at 16:01

## 2022-01-21 RX ADMIN — HEPARIN SODIUM 5000 UNITS: 5000 INJECTION INTRAVENOUS; SUBCUTANEOUS at 06:32

## 2022-01-21 RX ADMIN — ASPIRIN 81 MG: 81 TABLET, COATED ORAL at 08:15

## 2022-01-21 RX ADMIN — BIMATOPROST 1 DROP: 0.1 SOLUTION/ DROPS OPHTHALMIC at 08:15

## 2022-01-21 RX ADMIN — Medication 2 TABLET: at 08:15

## 2022-01-21 NOTE — CASE MANAGEMENT
Case Management Assessment & Discharge Planning Note    Patient name Lily Joyce  Location [place] [address] /[facility]-* MRN [id number]  : 1935 Date 2022       Current Admission Date: 2022  Current Admission Diagnosis:Syncope   Patient Active Problem List    Diagnosis Date Noted    Vomiting 2022    Syncope 2022    Closed nondisplaced fracture of proximal phalanx of right little finger 2018    History of heart artery stent 2018    Primary osteoarthritis of both hands 2018    Basal cell carcinoma of skin 2015    BPH with obstruction/lower urinary tract symptoms 2014    Benign essential hypertension 2013    Actinic keratosis 2013    Arteriosclerotic coronary artery disease 2013    Hyperlipidemia 2012      LOS (days): 1  Geometric Mean LOS (GMLOS) (days): 2 30  Days to GMLOS:1 4     OBJECTIVE:    Risk of Unplanned Readmission Score: 10         Current admission status: Inpatient       Preferred Pharmacy:   [place] [address]  Phone: [phone] Fax: [phone]    Primary Care Provider: Floyd Willard DO    Primary Insurance: MEDICARE  Secondary Insurance: BLUE CROSS    ASSESSMENT:    Patient Information  Admitted from[de-identified] Home  Mental Status: Alert  During Assessment patient was accompanied by: Not accompanied during assessment  Assessment information provided by[de-identified] Patient  Primary Caregiver: Self  Support Systems: Spouse/significant other  [place] of Residence: [address] [place] [address] do you live in?: [address] entry access options   Select all that apply : Stairs  Number of steps to enter home : 1  Type of Current Residence: 2 story home  Living Arrangements: Lives w/ Spouse/significant other    Activities of Daily Living Prior to Admission  Functional Status: Independent  Completes ADLs independently?: Yes  Ambulates independently?: Yes    Patient Information Continued  Income Source: Pension/[facility]  Does patient have a history of substance abuse?: No  Does patient have a history of Mental Health Diagnosis?: No    Means of Transportation  Means of Transport to Appts[de-identified] Drives Self    DISCHARGE DETAILS:    Discharge planning discussed with[de-identified] Patient and spouse  Freedom of Choice: Yes     CM contacted family/caregiver?: Yes  Were Treatment Team discharge recommendations reviewed with patient/caregiver?: Yes  Did patient/caregiver verbalize understanding of patient care needs?: Yes  Were patient/caregiver advised of the risks associated with not following Treatment Team discharge recommendations?: Yes    Contacts  Patient Contacts: Le Hughes  Relationship to Patient[de-identified] Family  Contact Method:  In Person  Reason/Outcome: Continuity of [address] Planning    Treatment Team Recommendation: Home (Home with Outpatient PT services)  Discharge Destination Plan[de-identified] Home (Home with Outpatient PT services)  Transport at Discharge : Family

## 2022-01-21 NOTE — PROGRESS NOTES
Progress Note - Cardiology   Chiquita Job 81 y o  male MRN: [id number]  Unit/Bed#: E5 -01 Encounter: [id number]          Assessment / Plan  Recurrent near syncope    - HPI:  Had been on his feet at least several minutes w/ acute feeling of lightheadedness/weakness  W/o CP or ectopy then fall striking his head on a piece of furniture denying any LOC, then some dry heaves while on the ground without other sequelae and able to get up shortly thereafter   - Unremarkable troponin but ischemic ECG changes on arrival (inferolateral TWI) - improved on subsequent tracing   - Negative orthostatic BP check   - Echo with normal EF and no wall motion abnormalities, mild valve disease  Coronary artery disease              - prior NSTEMI in 2014 --> S/P NATALIA x2 -LAD   Hypertension              - Rx, Toprol XL 12 5 mg HS  Hyperlipidemia              - Rx, rosuvastatin 10 mg daily               - FLP 1/20/22: Cholesterol 134, triglycerides 50, HDL 63, LDL 61  Other:  - type 2 diabetes mellitus    · Stress test completed this morning with review forthcoming  If negative for myocardial ischemia advise outpatient event recorder  · Continue aspirin, atorvastatin, Toprol as taken prior to admission      Subjective:  Patient offers no complaint this morning  He denies any subjective orthostasis, chest pain, dyspnea, or perceived cardiac ectopy      Vitals:  Vitals:    01/20/22 1015   Weight: 82 1 kg (181 lb)   ,  Vitals:    01/20/22 1425 01/20/22 1511 01/20/22 2254 01/21/22 0756   BP: 97/58 110/65 135/78 101/57   BP Location: Right arm   Left arm   Pulse: 62 55  62   Resp:   18 18   Temp:  (!) 97 2 °F (36 2 °C) 97 9 °F (36 6 °C) (!) 97 3 °F (36 3 °C)   TempSrc:    Oral   SpO2:  96%  95%   Weight:       Height:           Exam:  General:  Alert normally conversant and comfortable-appearing  Heart:  Regular with controlled rate and no pathologic murmur or rub  Lungs:  Clear throughout  Lower Limbs:  No edema           Telemetry: Not presently on telemetry    Medications:    Current Facility-Administered Medications:     acetaminophen (TYLENOL) tablet 650 mg, 650 mg, Oral, Q6H PRN, Lara Batch, DO    artificial tear (LUBRIFRESH P M ) ophthalmic ointment, , Both Eyes, HS PRN, Lara Batch, DO    aspirin (ECOTRIN LOW STRENGTH) EC tablet 81 mg, 81 mg, Oral, Daily, [facility], DO, 81 mg at 01/21/22 0815    atorvastatin (LIPITOR) tablet 40 mg, 40 mg, Oral, Daily With Gene Ervin, DO, 40 mg at 01/20/22 1805    bimatoprost (LUMIGAN) 0 01 % ophthalmic solution 1 drop, 1 drop, Both Eyes, Daily, Lara Batch, DO, 1 drop at 01/21/22 0815    calcium carbonate-vitamin D (OSCAL-D) 500 mg-200 units per tablet 2 tablet, 2 tablet, Oral, Daily With Breakfast, [facility], DO, 2 tablet at 01/21/22 0815    heparin (porcine) subcutaneous injection 5,000 Units, 5,000 Units, Subcutaneous, Q8H [facility], [facility], DO, 5,000 Units at 01/21/22 0632    [START ON 1/22/2022] metoprolol succinate (TOPROL-XL) 24 hr tablet 12 5 mg, 12 5 mg, Oral, Daily, HUSAM Vasquez    ondansetron (ZOFRAN) injection 4 mg, 4 mg, Intravenous, Q6H PRN, Lara Batch, DO      Labs/Data:        Results from last 7 days   Lab Units 01/19/22  1309   WBC Thousand/uL 8 09   HEMOGLOBIN g/dL 13 7   HEMATOCRIT % 41 6   PLATELETS Thousands/uL 260     Results from last 7 days   Lab Units 01/20/22  0541 01/19/22  1310   POTASSIUM mmol/L 4 3 4 9   CHLORIDE mmol/L 106 105   CO2 mmol/L 26 25   BUN mg/dL 24 27*

## 2022-01-21 NOTE — NURSING NOTE
Discharge instructions reviewed with pt  He is aware of follow up appointments and medication changes, he has no questions or concerns  Family is providing ride home

## 2022-01-21 NOTE — DISCHARGE SUMMARY
[address]  Discharge- Ese Michaels 1935, 81 y o  male MRN: [id number]  Unit/Bed#: E5 -01 Encounter: [id number]  Primary Care Provider: Wilman Martell DO   Date and time admitted to hospital: 1/19/2022 12:56 PM    Hyperlipidemia  Assessment & Plan  Crestor as an outpatient    Benign essential hypertension  Assessment & Plan  Continue metoprolol    Arteriosclerotic coronary artery disease  Assessment & Plan  Prior NSTEMI in 2014 status post NATALIA to LAD x2  Pt presented with presyncopal episode  Cardiology evaluated patient  Pt had myocardial stress test 1/21/22 that was neg      * Syncope  Assessment & Plan  Patient presented with syncopal event with presyncopal aura described as lightheadedness and dizziness  He denies losing consciousness  He denies any chest pain, palpitations, shortness of breath during event  He currently is asymptomatic and at baseline  Likely this was a vasovagal event  But given patient's cardiac history is reasonable to observe in trend troponins/EKGs and monitor overnight on telemetry  Patient also has had no recent cardiac imaging and most recent follow-up with cardiologist within 2018  Echocardiogram done  Appreciate cardiology evaluation  Status post myocardial perfusion stress test:  Negative  Patient is stable for discharge home today per Cardiology        Cardiology will arrange outpatient event monitor for this patient  Patient is to follow with Cardiology as an outpatient        Medical Problems             Resolved Problems  Date Reviewed: 7/8/2021          Resolved    Vomiting 1/21/2022     Resolved by  Crys Ambrose MD                Admission Date:   Admission Orders (From admission, onward)     Ordered        01/20/22 1524  Inpatient Admission  Once            01/19/22 1506  Place in Observation  Once                        Admitting Diagnosis: Syncope [R55]  Vomiting [R11 10]    HPI:Jeremías Coyle is a 81 y o  male With past medical history of coronary artery disease status post PCI 2014, hypertension who presents the hospital with syncopal event  Patient was shoveling snow  He went to walk inside and reported sudden onset of confusion and weakness as well as change in vision  He did fall and strike his head  He did not lose consciousness  Following this event he felt nauseous and had episode of vomiting  He denies any chest pain, palpitations, shortness of breath, diaphoresis prior to event  He is currently asymptomatic at this time  In 2014 prior to NSTEMI, patient woke up from bed with confusion  He did not have chest pain at that time     CT head, CTA unremarkable for fracture/acute findings on admission  Procedures Performed:   Orders Placed This Encounter   Procedures    ED ECG Documentation Only       Summary of Hospital Course: see the [facility]    Complications:  None    Condition at Discharge: good         Discharge instructions/Information to patient and family:   See after visit summary for information provided to patient and family  Provisions for Follow-Up Care:  See after visit summary for information related to follow-up care and any pertinent home health orders  PCP: Ariadne Herzog DO    Disposition: Home    Planned Readmission: No    Discharge Statement   I spent 35 minutes discharging the patient  This time was spent on the day of discharge  I had direct contact with the patient on the day of discharge  Additional documentation is required if more than 30 minutes were spent on discharge  Discharge Medications:  See after visit summary for reconciled discharge medications provided to patient and family

## 2022-01-21 NOTE — PLAN OF CARE
Problem: Potential for Falls  Goal: Patient will remain free of falls  Description: INTERVENTIONS:  - Educate patient/family on patient safety including physical limitations  - Instruct patient to call for assistance with activity   - Consult OT/PT to assist with strengthening/mobility   - Keep Call bell within reach  - Keep bed low and locked with side rails adjusted as appropriate  - Keep care items and personal belongings within reach  - Initiate and maintain comfort rounds  - Make Fall Risk Sign visible to staff  - Offer Toileting every 2 Hours, in advance of need  - Initiate/Maintain bed alarm  - Apply yellow socks and bracelet for high fall risk patients  - Consider moving patient to room near nurses station  Outcome: Progressing     Problem: PAIN - ADULT  Goal: Verbalizes/displays adequate comfort level or baseline comfort level  Description: Interventions:  - Encourage patient to monitor pain and request assistance  - Assess pain using appropriate pain scale  - Administer analgesics based on type and severity of pain and evaluate response  - Implement non-pharmacological measures as appropriate and evaluate response  - Consider cultural and social influences on pain and pain management  - Notify physician/advanced practitioner if interventions unsuccessful or patient reports new pain  Outcome: Progressing     Problem: SAFETY ADULT  Goal: Maintain or return to baseline ADL function  Description: INTERVENTIONS:  -  Assess patient's ability to carry out ADLs; assess patient's baseline for ADL function and identify physical deficits which impact ability to perform ADLs (bathing, care of mouth/teeth, toileting, grooming, dressing, etc )  - Assess/evaluate cause of self-care deficits   - Assess range of motion  - Assess patient's mobility; develop plan if impaired  - Assess patient's need for assistive devices and provide as appropriate  - Encourage maximum independence but intervene and supervise when necessary  - Involve family in performance of ADLs  - Assess for home care needs following discharge   - Consider OT consult to assist with ADL evaluation and planning for discharge  - Provide patient education as appropriate  Outcome: Progressing  Goal: Maintains/Returns to pre admission functional level  Description: INTERVENTIONS:  - Perform BMAT or MOVE assessment daily    - Set and communicate daily mobility goal to care team and patient/family/caregiver  - Collaborate with rehabilitation services on mobility goals if consulted  - Perform Range of Motion 3 times a day  - Reposition patient every 2 hours    - Dangle patient 2 times a day  - Stand patient 3 times a day  - Ambulate patient 3 times a day  - Out of bed to chair 3 times a day   - Out of bed for meals 3 times a day  - Out of bed for toileting  - Record patient progress and toleration of activity level   Outcome: Progressing     Problem: DISCHARGE PLANNING  Goal: Discharge to home or other facility with appropriate resources  Description: INTERVENTIONS:  - Identify barriers to discharge w/patient and caregiver  - Arrange for needed discharge resources and transportation as appropriate  - Identify discharge learning needs (meds, wound care, etc )  - Arrange for interpretive services to assist at discharge as needed  - Refer to Case Management Department for coordinating discharge planning if the patient needs post-hospital services based on physician/advanced practitioner order or complex needs related to functional status, cognitive ability, or social support system  Outcome: Progressing

## 2022-01-21 NOTE — ASSESSMENT & PLAN NOTE
Patient presented with syncopal event with presyncopal aura described as lightheadedness and dizziness  He denies losing consciousness  He denies any chest pain, palpitations, shortness of breath during event  He currently is asymptomatic and at baseline  Likely this was a vasovagal event    But given patient's cardiac history is reasonable to observe in trend troponins/EKGs and monitor overnight on telemetry  Patient also has had no recent cardiac imaging and most recent follow-up with cardiologist within 2018  Echocardiogram done  Appreciate cardiology evaluation  Status post myocardial perfusion stress test:  Negative  Patient is stable for discharge home today per Cardiology

## 2022-01-21 NOTE — ASSESSMENT & PLAN NOTE
Prior NSTEMI in 2014 status post NATALIA to LAD x2  Pt presented with presyncopal episode  Cardiology evaluated patient    Pt had myocardial stress test 1/21/22 that was neg

## 2022-01-21 NOTE — DISCHARGE INSTRUCTIONS
You will be receiving a heart monitor in the mail with instructions for use  Any questions please call the office of Dr Ayanna Lino Cardiology        ------------------------------------------------------------------------------------------

## 2022-01-24 ENCOUNTER — TRANSITIONAL CARE MANAGEMENT (OUTPATIENT)
Dept: FAMILY MEDICINE CLINIC | Facility: CLINIC | Age: 87
End: 2022-01-24

## 2022-01-28 NOTE — PHYSICIAN ADVISOR
Current patient class: Inpatient  The patient is currently on Hospital Day: 3 at [address] [place] [address]      The patient was admitted to the hospital at  3:24 PM on 1/20/22 for the following diagnosis:  Syncope [R55]  Vomiting [R11 10]       There was documentation in the medical record of an expected length of stay of at least 2 midnights  The patient was therefore expected to satisfy the 2 midnight benchmark and given the 2 midnight presumption was appropriate for INPATIENT ADMISSION  Given this expectation of a satisfying stay, CMS instructs us that the patient is most often appropriate for inpatient admission under part A provided medical necessity is documented in the chart  After review of the relevant documentation, labs, vital signs and test results, the patient is appropriate for INPATIENT ADMISSION  Admission to the hospital as an inpatient is a complex decision making process which requires the practitioner to consider the patients presenting complaint, history and physical examination and all relevant testing  With this in mind, in this case, the patient was deemed appropriate for INPATIENT ADMISSION  After review of the documentation and testing available at the time of the admission, I concur with this clinical determination of medical necessity  For the reason noted, the patient was discharged before reaching 2 midnights as an inpatient  Rationale is as follows: The patient is a 81 yrs old Male who presented to the ED at 1/19/2022 12:56 PM with a chief complaint of Vomiting (Patient began vomiting today  Started to feel "off" and confused  Had near syncopal episode  Patient fell and hit head/nose on floor  Feels similar to previous MI )  Patient was seen in consultation by Cardiology  They felt this was secondary to vasovagal versus orthostatic  Patient was continued on telemetry on day 2 of admission    Given the patient's ischemic findings in near syncope patient was schedule for a stress test as well  Given the need for further diagnostic studies in a patient with near syncopal episode with concern for an ischemic event, the patient did cross the 2 midnight benchmark as set by Medicare and is inpatient status appropriate  The patients vitals on arrival were   ED Triage Vitals   Temperature Pulse Respirations Blood Pressure SpO2   01/19/22 1304 01/19/22 1303 01/19/22 1303 01/19/22 1304 01/19/22 1303   97 6 °F (36 4 °C) 63 16 162/73 100 %      Temp Source Heart Rate Source Patient Position - Orthostatic VS BP Location FiO2 (%)   01/19/22 1304 01/19/22 1303 01/19/22 1700 01/19/22 1700 --   Oral Monitor Lying Right arm       Pain Score       01/19/22 1259       4           Past Medical History:   Diagnosis Date    Cancer ([facility] )     NSTEMI (non-ST elevated myocardial infarction) ([facility] ) 2014    Pneumonia     Last Assessed:  10/7/14    Polymyalgia rheumatica ([facility] )     Last Assessed:  11/23/12    Polymyalgia rheumatica ([facility] ) 2011     Past Surgical History:   Procedure Laterality Date    APPENDECTOMY      CORONARY ANGIOPLASTY WITH STENT PLACEMENT      HERNIA REPAIR  2000           Consults have been placed to:   IP CONSULT TO CARDIOLOGY    Vitals:    01/20/22 1511 01/20/22 2254 01/21/22 0756 01/21/22 1513   BP: 110/65 135/78 101/57 101/65   BP Location:   Left arm Left arm   Pulse: 55  62 60   Resp:  18 18 16   Temp: (!) 97 2 °F (36 2 °C) 97 9 °F (36 6 °C) (!) 97 3 °F (36 3 °C) 97 8 °F (36 6 °C)   TempSrc:   Oral Oral   SpO2: 96%  95% 95%   Weight:       Height:           Most recent labs:    No results for input(s): WBC, HGB, HCT, PLT, K, NA, CALCIUM, BUN, CREATININE, LIPASE, AMYLASE, INR, TROPONINI, CKTOTAL, AST, ALT, ALKPHOS, BILITOT in the last 72 hours  Scheduled Meds:  Continuous Infusions:No current facility-administered medications for this encounter  PRN Meds:      Surgical procedures (if appropriate):

## 2022-02-14 ENCOUNTER — APPOINTMENT (EMERGENCY)
Dept: CT IMAGING | Facility: HOSPITAL | Age: 87
DRG: 066 | End: 2022-02-14
Payer: MEDICARE

## 2022-02-14 ENCOUNTER — HOSPITAL ENCOUNTER (INPATIENT)
Facility: HOSPITAL | Age: 87
LOS: 2 days | Discharge: HOME | DRG: 066 | End: 2022-02-17
Attending: EMERGENCY MEDICINE | Admitting: INTERNAL MEDICINE
Payer: MEDICARE

## 2022-02-14 DIAGNOSIS — I25.10 ARTERIOSCLEROTIC CORONARY ARTERY DISEASE: ICD-10-CM

## 2022-02-14 DIAGNOSIS — R42 VERTIGO: Primary | ICD-10-CM

## 2022-02-14 DIAGNOSIS — R11.2 NAUSEA AND VOMITING: ICD-10-CM

## 2022-02-14 DIAGNOSIS — R10.9 ABDOMINAL PAIN: ICD-10-CM

## 2022-02-14 DIAGNOSIS — E78.5 HYPERLIPIDEMIA, UNSPECIFIED HYPERLIPIDEMIA TYPE: ICD-10-CM

## 2022-02-14 DIAGNOSIS — R07.9 CHEST PAIN: ICD-10-CM

## 2022-02-14 DIAGNOSIS — R00.2 HEART PALPITATIONS: ICD-10-CM

## 2022-02-14 DIAGNOSIS — I63.9 CEREBELLAR STROKE (HCC): ICD-10-CM

## 2022-02-14 DIAGNOSIS — R55 SYNCOPE: ICD-10-CM

## 2022-02-14 PROBLEM — R29.90 STROKE-LIKE SYMPTOMS: Status: ACTIVE | Noted: 2022-02-14

## 2022-02-14 LAB
2HR DELTA HS TROPONIN: -2 NG/L
4HR DELTA HS TROPONIN: -2 NG/L
ALBUMIN SERPL BCP-MCNC: 3.4 G/DL (ref 3.5–5)
ALP SERPL-CCNC: 68 U/L (ref 46–116)
ALT SERPL W P-5'-P-CCNC: 23 U/L (ref 12–78)
ANION GAP SERPL CALCULATED.3IONS-SCNC: 13 MMOL/L (ref 4–13)
APTT PPP: 25 SECONDS (ref 23–37)
AST SERPL W P-5'-P-CCNC: 22 U/L (ref 5–45)
ATRIAL RATE: 84 BPM
BASOPHILS # BLD AUTO: 0.05 THOUSANDS/ΜL (ref 0–0.1)
BASOPHILS NFR BLD AUTO: 1 % (ref 0–1)
BILIRUB DIRECT SERPL-MCNC: 0.15 MG/DL (ref 0–0.2)
BILIRUB SERPL-MCNC: 0.75 MG/DL (ref 0.2–1)
BUN SERPL-MCNC: 34 MG/DL (ref 5–25)
CALCIUM SERPL-MCNC: 8.6 MG/DL (ref 8.3–10.1)
CARDIAC TROPONIN I PNL SERPL HS: 5 NG/L
CARDIAC TROPONIN I PNL SERPL HS: 5 NG/L
CARDIAC TROPONIN I PNL SERPL HS: 7 NG/L
CHLORIDE SERPL-SCNC: 104 MMOL/L (ref 100–108)
CO2 SERPL-SCNC: 20 MMOL/L (ref 21–32)
CREAT SERPL-MCNC: 1.05 MG/DL (ref 0.6–1.3)
EOSINOPHIL # BLD AUTO: 0.14 THOUSAND/ΜL (ref 0–0.61)
EOSINOPHIL NFR BLD AUTO: 2 % (ref 0–6)
ERYTHROCYTE [DISTWIDTH] IN BLOOD BY AUTOMATED COUNT: 14.7 % (ref 11.6–15.1)
FLUAV RNA RESP QL NAA+PROBE: NEGATIVE
FLUBV RNA RESP QL NAA+PROBE: NEGATIVE
GFR SERPL CREATININE-BSD FRML MDRD: 64 ML/MIN/1.73SQ M
GLUCOSE SERPL-MCNC: 146 MG/DL (ref 65–140)
GLUCOSE SERPL-MCNC: 195 MG/DL (ref 65–140)
HCT VFR BLD AUTO: 40.5 % (ref 36.5–49.3)
HGB BLD-MCNC: 13.7 G/DL (ref 12–17)
IMM GRANULOCYTES # BLD AUTO: 0.02 THOUSAND/UL (ref 0–0.2)
IMM GRANULOCYTES NFR BLD AUTO: 0 % (ref 0–2)
INR PPP: 0.98 (ref 0.84–1.19)
LIPASE SERPL-CCNC: 47 U/L (ref 73–393)
LYMPHOCYTES # BLD AUTO: 1.59 THOUSANDS/ΜL (ref 0.6–4.47)
LYMPHOCYTES NFR BLD AUTO: 25 % (ref 14–44)
MCH RBC QN AUTO: 28 PG (ref 26.8–34.3)
MCHC RBC AUTO-ENTMCNC: 33.8 G/DL (ref 31.4–37.4)
MCV RBC AUTO: 83 FL (ref 82–98)
MONOCYTES # BLD AUTO: 0.81 THOUSAND/ΜL (ref 0.17–1.22)
MONOCYTES NFR BLD AUTO: 13 % (ref 4–12)
NEUTROPHILS # BLD AUTO: 3.89 THOUSANDS/ΜL (ref 1.85–7.62)
NEUTS SEG NFR BLD AUTO: 59 % (ref 43–75)
NRBC BLD AUTO-RTO: 0 /100 WBCS
P AXIS: 90 DEGREES
PLATELET # BLD AUTO: 245 THOUSANDS/UL (ref 149–390)
PLATELET # BLD AUTO: 265 THOUSANDS/UL (ref 149–390)
PMV BLD AUTO: 10.4 FL (ref 8.9–12.7)
PMV BLD AUTO: 10.4 FL (ref 8.9–12.7)
POTASSIUM SERPL-SCNC: 4.3 MMOL/L (ref 3.5–5.3)
PR INTERVAL: 174 MS
PROT SERPL-MCNC: 7.2 G/DL (ref 6.4–8.2)
PROTHROMBIN TIME: 12.8 SECONDS (ref 11.6–14.5)
QRS AXIS: 69 DEGREES
QRSD INTERVAL: 102 MS
QT INTERVAL: 340 MS
QTC INTERVAL: 401 MS
RBC # BLD AUTO: 4.9 MILLION/UL (ref 3.88–5.62)
RSV RNA RESP QL NAA+PROBE: NEGATIVE
SARS-COV-2 RNA RESP QL NAA+PROBE: NEGATIVE
SODIUM SERPL-SCNC: 137 MMOL/L (ref 136–145)
T WAVE AXIS: 85 DEGREES
VENTRICULAR RATE: 84 BPM
WBC # BLD AUTO: 6.5 THOUSAND/UL (ref 4.31–10.16)

## 2022-02-14 PROCEDURE — 84484 ASSAY OF TROPONIN QUANT: CPT | Performed by: INTERNAL MEDICINE

## 2022-02-14 PROCEDURE — 83690 ASSAY OF LIPASE: CPT | Performed by: EMERGENCY MEDICINE

## 2022-02-14 PROCEDURE — 82948 REAGENT STRIP/BLOOD GLUCOSE: CPT

## 2022-02-14 PROCEDURE — 93005 ELECTROCARDIOGRAM TRACING: CPT

## 2022-02-14 PROCEDURE — 99220 PR INITIAL OBSERVATION CARE/DAY 70 MINUTES: CPT | Performed by: INTERNAL MEDICINE

## 2022-02-14 PROCEDURE — 80048 BASIC METABOLIC PNL TOTAL CA: CPT | Performed by: EMERGENCY MEDICINE

## 2022-02-14 PROCEDURE — 36415 COLL VENOUS BLD VENIPUNCTURE: CPT | Performed by: EMERGENCY MEDICINE

## 2022-02-14 PROCEDURE — 84484 ASSAY OF TROPONIN QUANT: CPT | Performed by: EMERGENCY MEDICINE

## 2022-02-14 PROCEDURE — 70450 CT HEAD/BRAIN W/O DYE: CPT

## 2022-02-14 PROCEDURE — 85730 THROMBOPLASTIN TIME PARTIAL: CPT | Performed by: EMERGENCY MEDICINE

## 2022-02-14 PROCEDURE — 74174 CTA ABD&PLVS W/CONTRAST: CPT

## 2022-02-14 PROCEDURE — 0241U HB NFCT DS VIR RESP RNA 4 TRGT: CPT | Performed by: INTERNAL MEDICINE

## 2022-02-14 PROCEDURE — 96375 TX/PRO/DX INJ NEW DRUG ADDON: CPT

## 2022-02-14 PROCEDURE — 99204 OFFICE O/P NEW MOD 45 MIN: CPT | Performed by: PSYCHIATRY & NEUROLOGY

## 2022-02-14 PROCEDURE — 96361 HYDRATE IV INFUSION ADD-ON: CPT

## 2022-02-14 PROCEDURE — 93010 ELECTROCARDIOGRAM REPORT: CPT | Performed by: INTERNAL MEDICINE

## 2022-02-14 PROCEDURE — G1004 CDSM NDSC: HCPCS

## 2022-02-14 PROCEDURE — 80076 HEPATIC FUNCTION PANEL: CPT | Performed by: EMERGENCY MEDICINE

## 2022-02-14 PROCEDURE — 85610 PROTHROMBIN TIME: CPT | Performed by: EMERGENCY MEDICINE

## 2022-02-14 PROCEDURE — 71275 CT ANGIOGRAPHY CHEST: CPT

## 2022-02-14 PROCEDURE — 99285 EMERGENCY DEPT VISIT HI MDM: CPT

## 2022-02-14 PROCEDURE — 96374 THER/PROPH/DIAG INJ IV PUSH: CPT

## 2022-02-14 PROCEDURE — 99285 EMERGENCY DEPT VISIT HI MDM: CPT | Performed by: EMERGENCY MEDICINE

## 2022-02-14 PROCEDURE — 85025 COMPLETE CBC W/AUTO DIFF WBC: CPT | Performed by: EMERGENCY MEDICINE

## 2022-02-14 PROCEDURE — 85049 AUTOMATED PLATELET COUNT: CPT | Performed by: INTERNAL MEDICINE

## 2022-02-14 RX ORDER — ONDANSETRON 2 MG/ML
4 INJECTION INTRAMUSCULAR; INTRAVENOUS ONCE
Status: COMPLETED | OUTPATIENT
Start: 2022-02-14 | End: 2022-02-14

## 2022-02-14 RX ORDER — KETOROLAC TROMETHAMINE 10 MG/1
10 TABLET, FILM COATED ORAL ONCE
Status: DISCONTINUED | OUTPATIENT
Start: 2022-02-14 | End: 2022-02-17 | Stop reason: HOSPADM

## 2022-02-14 RX ORDER — ONDANSETRON 2 MG/ML
4 INJECTION INTRAMUSCULAR; INTRAVENOUS EVERY 6 HOURS PRN
Status: DISCONTINUED | OUTPATIENT
Start: 2022-02-14 | End: 2022-02-17 | Stop reason: HOSPADM

## 2022-02-14 RX ORDER — MAGNESIUM HYDROXIDE/ALUMINUM HYDROXICE/SIMETHICONE 120; 1200; 1200 MG/30ML; MG/30ML; MG/30ML
30 SUSPENSION ORAL EVERY 6 HOURS PRN
Status: DISCONTINUED | OUTPATIENT
Start: 2022-02-14 | End: 2022-02-17 | Stop reason: HOSPADM

## 2022-02-14 RX ORDER — SODIUM CHLORIDE 9 MG/ML
75 INJECTION, SOLUTION INTRAVENOUS CONTINUOUS
Status: DISPENSED | OUTPATIENT
Start: 2022-02-14 | End: 2022-02-15

## 2022-02-14 RX ORDER — ASPIRIN 81 MG/1
81 TABLET, CHEWABLE ORAL DAILY
Status: DISCONTINUED | OUTPATIENT
Start: 2022-02-14 | End: 2022-02-17 | Stop reason: HOSPADM

## 2022-02-14 RX ORDER — MECLIZINE HCL 12.5 MG/1
25 TABLET ORAL ONCE
Status: COMPLETED | OUTPATIENT
Start: 2022-02-14 | End: 2022-02-14

## 2022-02-14 RX ORDER — DIAZEPAM 5 MG/ML
2 INJECTION, SOLUTION INTRAMUSCULAR; INTRAVENOUS ONCE
Status: COMPLETED | OUTPATIENT
Start: 2022-02-14 | End: 2022-02-14

## 2022-02-14 RX ORDER — ASPIRIN 81 MG/1
243 TABLET, CHEWABLE ORAL ONCE
Status: COMPLETED | OUTPATIENT
Start: 2022-02-14 | End: 2022-02-14

## 2022-02-14 RX ORDER — ATORVASTATIN CALCIUM 40 MG/1
40 TABLET, FILM COATED ORAL EVERY EVENING
Status: DISCONTINUED | OUTPATIENT
Start: 2022-02-14 | End: 2022-02-17 | Stop reason: HOSPADM

## 2022-02-14 RX ORDER — CALCIUM CARBONATE 200(500)MG
1000 TABLET,CHEWABLE ORAL DAILY PRN
Status: DISCONTINUED | OUTPATIENT
Start: 2022-02-14 | End: 2022-02-17 | Stop reason: HOSPADM

## 2022-02-14 RX ORDER — ONDANSETRON 2 MG/ML
1 INJECTION INTRAMUSCULAR; INTRAVENOUS ONCE
Status: COMPLETED | OUTPATIENT
Start: 2022-02-14 | End: 2022-02-14

## 2022-02-14 RX ORDER — METOPROLOL SUCCINATE 25 MG/1
12.5 TABLET, EXTENDED RELEASE ORAL DAILY
Status: DISCONTINUED | OUTPATIENT
Start: 2022-02-14 | End: 2022-02-17 | Stop reason: HOSPADM

## 2022-02-14 RX ADMIN — ATORVASTATIN CALCIUM 40 MG: 40 TABLET, FILM COATED ORAL at 17:27

## 2022-02-14 RX ADMIN — BIMATOPROST 1 DROP: 0.1 SOLUTION/ DROPS OPHTHALMIC at 15:48

## 2022-02-14 RX ADMIN — SODIUM CHLORIDE 75 ML/HR: 0.9 INJECTION, SOLUTION INTRAVENOUS at 16:28

## 2022-02-14 RX ADMIN — ENOXAPARIN SODIUM 40 MG: 40 INJECTION SUBCUTANEOUS at 14:35

## 2022-02-14 RX ADMIN — MECLIZINE 25 MG: 12.5 TABLET ORAL at 11:51

## 2022-02-14 RX ADMIN — MORPHINE SULFATE 1 MG: 2 INJECTION, SOLUTION INTRAMUSCULAR; INTRAVENOUS at 17:27

## 2022-02-14 RX ADMIN — SODIUM CHLORIDE 500 ML: 0.9 INJECTION, SOLUTION INTRAVENOUS at 10:34

## 2022-02-14 RX ADMIN — DIAZEPAM 2 MG: 10 INJECTION, SOLUTION INTRAMUSCULAR; INTRAVENOUS at 10:33

## 2022-02-14 RX ADMIN — IOHEXOL 100 ML: 350 INJECTION, SOLUTION INTRAVENOUS at 10:52

## 2022-02-14 RX ADMIN — ASPIRIN 243 MG: 81 TABLET, CHEWABLE ORAL at 11:51

## 2022-02-14 RX ADMIN — ONDANSETRON 4 MG: 2 INJECTION INTRAMUSCULAR; INTRAVENOUS at 10:31

## 2022-02-14 NOTE — ED PROVIDER NOTES
History  Chief Complaint   Patient presents with    Chest Pain     Pt  brought in via EMS  Pt  had a episode of dizzines at home where he lowered himself to the ground  Pt  began to have pressure like chest pain and vomitting  Pt  given 4mg of zofran prehospital      80 yo M h/o CAD s/p stenting presenting with EMS from home for evaluation  Difficulty obtaining history from patient initially as he was retching and uncomfortable  C/o feeling dizzy, nauseous, CP and abdominal pain with diffuse tenderness  Accucheck/EKG done immediately and taken for CT scan  Ultimately able to obtain more history from pt and wife- States he woke up in normal state of health and was standing getting his clothes on to get ready for an appointment when he got "dizzy" and fell/slid down recliner, unclear how he landed  He does not know if he struck his head or lost consciousness  Wife did not witness the fall, heard the fall and wife states he was conscious when she found him  Pt did have loss of bladder/bowel control per EMS  He reports vertigo as dizziness and dry heaving/retching, was given zofran prehospital with mild improvement  He c/o abdominal and chest pain  He states this is the same thing that happened when he was just admitted to the hospital a couple weeks ago  He had Zio patch placed within the past week  Prior to Admission Medications   Prescriptions Last Dose Informant Patient Reported? Taking?    Calcium Citrate-Vitamin D (CALCIUM CITRATE + D) 315-250 MG-UNIT TABS  Self Yes No   Sig: Take 1 tablet by mouth daily   Coenzyme Q10 100 MG TABS  Self Yes No   Sig: Take 1 tablet by mouth daily   LUMIGAN 0 01 % ophthalmic drops  Self Yes No   Sig: Administer 1 drop to both eyes daily   Patient not taking: Reported on 1/19/2022    [facility] Natural Products (OSTEO BI-FLEX ADV TRIPLE ST PO)  Self Yes No   Sig: Take by mouth   OMEGA-3 FATTY ACIDS-VITAMIN E PO  Self Yes No   Sig: Take 1 capsule by mouth daily   aspirin (ECOTRIN LOW STRENGTH) 81 mg EC tablet  Self No No   Sig: Take 1 tablet (81 mg total) by mouth daily   carboxymethylcellulose (REFRESH TEARS) 0 5 % SOLN  Self Yes No   Si drop 2 (two) times a day as needed for dry eyes   metoprolol succinate (TOPROL-XL) 25 mg 24 hr tablet  Self No No   Sig: Take 0 5 tablets (12 5 mg total) by mouth daily   Patient taking differently: Take 12 5 mg by mouth daily Takes at night    rosuvastatin (CRESTOR) 10 MG tablet  Self No No   Sig: Take 1 tablet (10 mg total) by mouth daily   Patient not taking: Reported on 2022       Facility-Administered Medications: None       Past Medical History:   Diagnosis Date    Cancer [facility])     NSTEMI (non-ST elevated myocardial infarction) [facility] [address] )     Pneumonia     Last Assessed:  10/7/14    [facility] )     Last Assessed:  12    [facility]algia rheumatica ([facility] )        Past Surgical History:   Procedure Laterality Date    APPENDECTOMY      CORONARY ANGIOPLASTY WITH STENT PLACEMENT      HERNIA REPAIR         Family History   Problem Relation Age of Onset    Pulmonary embolism Father     Other Father         Transurethral resection of prostate    Diabetes Sister      I have reviewed and agree with the history as documented  E-Cigarette/Vaping    E-Cigarette Use Never User      E-Cigarette/Vaping Substances    Nicotine No     THC No     CBD No     Flavoring No     Other No     Unknown No      Social History     Tobacco Use    Smoking status: Never Smoker    Smokeless tobacco: Never Used   Vaping Use    Vaping Use: Never used   Substance Use Topics    Alcohol use: Yes     Comment: Social    Drug use: Never       Review of Systems   Constitutional: Negative for chills, fever and unexpected weight change  HENT: Negative for ear pain, rhinorrhea and sore throat  Eyes: Negative for pain and visual disturbance  Respiratory: Negative for cough and shortness of breath      Cardiovascular: Positive for chest pain  Negative for leg swelling  Gastrointestinal: Positive for abdominal pain, nausea and vomiting  Negative for constipation and diarrhea  Endocrine: Negative for polydipsia, polyphagia and polyuria  Genitourinary: Negative for dysuria, frequency, hematuria and urgency  Musculoskeletal: Negative for back pain, myalgias and neck pain  Skin: Negative for color change and rash  Allergic/Immunologic: Negative for environmental allergies and immunocompromised state  Neurological: Positive for dizziness  Negative for seizures, facial asymmetry, speech difficulty, weakness, light-headedness, numbness and headaches  Hematological: Negative for adenopathy  Does not bruise/bleed easily  Psychiatric/Behavioral: Negative for agitation and confusion  All other systems reviewed and are negative  Physical Exam  Physical Exam  Vitals and nursing note reviewed  Constitutional:       Appearance: He is well-developed  He is ill-appearing and diaphoretic  HENT:      Head: Normocephalic and atraumatic  Nose: Nose normal    Eyes:      Extraocular Movements: Extraocular movements intact  Conjunctiva/sclera: Conjunctivae normal       Pupils: Pupils are equal, round, and reactive to light  Comments: EOMI, No nystagmus   Cardiovascular:      Rate and Rhythm: Normal rate and regular rhythm  Heart sounds: Normal heart sounds  Pulmonary:      Effort: Pulmonary effort is normal  No respiratory distress  Breath sounds: Normal breath sounds  No stridor  No wheezing or rales  Chest:      Chest wall: No tenderness  Abdominal:      General: There is no distension  Palpations: Abdomen is soft  Tenderness: There is abdominal tenderness  There is no guarding or rebound  Comments: Diffuse abdominal ttp, no rebound/guarding   Musculoskeletal:         General: No deformity  Cervical back: Normal range of motion and neck supple  Skin:     General: Skin is warm  Findings: No rash  Neurological:      Mental Status: He is alert and oriented to person, place, and time  Cranial Nerves: No cranial nerve deficit  Sensory: No sensory deficit  Motor: No weakness or abnormal muscle tone  Coordination: Coordination normal       Comments: CN 2-12 intact, Clear speech, oriented x3, tongue midline, normal coordination, strength equal throughout, sensation grossly intact, normal coordination, unable to assess gait due to sx   Psychiatric:         Thought Content:  Thought content normal          Judgment: Judgment normal          Vital Signs  ED Triage Vitals   Temperature Pulse Respirations Blood Pressure SpO2   02/14/22 1014 02/14/22 1014 02/14/22 1014 02/14/22 1014 02/14/22 1014   98 3 °F (36 8 °C) 83 20 129/57 95 %      Temp Source Heart Rate Source Patient Position - Orthostatic VS BP Location FiO2 (%)   02/14/22 1014 02/14/22 1014 02/14/22 1014 02/14/22 1014 --   Oral Monitor Lying Right arm       Pain Score       02/14/22 1306       No Pain           Vitals:    02/14/22 1014 02/14/22 1231 02/14/22 1306   BP: 129/57 109/65 129/74   Pulse: 83 65 57   Patient Position - Orthostatic VS: Lying Lying Lying         Visual Acuity  Visual Acuity      Most Recent Value   R Pupil Size (mm) 3          ED Medications  Medications   metoprolol succinate (TOPROL-XL) 24 hr tablet 12 5 mg (has no administration in time range)   atorvastatin (LIPITOR) tablet 40 mg (has no administration in time range)   ondansetron (ZOFRAN) injection 4 mg (has no administration in time range)   aluminum-magnesium hydroxide-simethicone (MYLANTA) oral suspension 30 mL (has no administration in time range)   calcium carbonate (TUMS) chewable tablet 1,000 mg (has no administration in time range)   enoxaparin (LOVENOX) subcutaneous injection 40 mg (has no administration in time range)   aspirin chewable tablet 81 mg (has no administration in time range)   bimatoprost (LUMIGAN) 0 01 % ophthalmic solution 1 drop (has no administration in time range)   ondansetron (FOR EMS ONLY) (ZOFRAN) 4 mg/2 mL injection 4 mg (0 mg Does not apply Given to EMS 2/14/22 1014)   ondansetron (ZOFRAN) injection 4 mg (4 mg Intravenous Given 2/14/22 1031)   diazepam (VALIUM) injection 2 mg (2 mg Intravenous Given 2/14/22 1033)   sodium chloride 0 9 % bolus 500 mL (0 mL Intravenous Stopped 2/14/22 1235)   iohexol (OMNIPAQUE) 350 MG/ML injection (SINGLE-DOSE) 100 mL (100 mL Intravenous Given 2/14/22 1052)   meclizine (ANTIVERT) tablet 25 mg (25 mg Oral Given 2/14/22 1151)   aspirin chewable tablet 243 mg (243 mg Oral Given 2/14/22 1151)       Diagnostic Studies  Results Reviewed     Procedure Component Value Units Date/Time    HS Troponin I 2hr [[id number]]  (Normal) Collected: 02/14/22 1226    Lab Status: Final result Specimen: Blood from Arm, Right Updated: 02/14/22 1304     hs TnI 2hr 5 ng/L      Delta 2hr hsTnI -2 ng/L     HS Troponin I 4hr [[id number]]     Lab Status: No result Specimen: Blood     Hepatic function panel [[id number]]  (Abnormal) Collected: 02/14/22 1031    Lab Status: Final result Specimen: Blood from Arm, Right Updated: 02/14/22 1120     Total Bilirubin 0 75 mg/dL      Bilirubin, Direct 0 15 mg/dL      Alkaline Phosphatase 68 U/L      AST 22 U/L      ALT 23 U/L      Total Protein 7 2 g/dL      Albumin 3 4 g/dL     Lipase [[id number]]  (Abnormal) Collected: 02/14/22 1031    Lab Status: Final result Specimen: Blood from Arm, Right Updated: 02/14/22 1120     Lipase 47 u/L     Basic metabolic panel [[id number]]  (Abnormal) Collected: 02/14/22 1031    Lab Status: Final result Specimen: Blood from Arm, Right Updated: 02/14/22 1120     Sodium 137 mmol/L      Potassium 4 3 mmol/L      Chloride 104 mmol/L      CO2 20 mmol/L      ANION GAP 13 mmol/L      BUN 34 mg/dL      Creatinine 1 05 mg/dL      Glucose 195 mg/dL      Calcium 8 6 mg/dL      eGFR 64 ml/min/1 73sq m     Narrative:      [place] guidelines for Chronic Kidney Disease (CKD):     Stage 1 with normal or high GFR (GFR > 90 mL/min/1 73 square meters)    Stage 2 Mild CKD (GFR = 60-89 mL/min/1 73 square meters)    Stage 3A Moderate CKD (GFR = 45-59 mL/min/1 73 square meters)    Stage 3B Moderate CKD (GFR = 30-44 mL/min/1 73 square meters)    Stage 4 Severe CKD (GFR = 15-29 mL/min/1 73 square meters)    Stage 5 End Stage CKD (GFR <15 mL/min/1 73 square meters)  Note: GFR calculation is accurate only with a steady state creatinine    HS Troponin 0hr (reflex protocol) [[id number]]  (Normal) Collected: 02/14/22 1031    Lab Status: Final result Specimen: Blood from Arm, Right Updated: 02/14/22 1108     hs TnI 0hr 7 ng/L     Protime-INR [[id number]]  (Normal) Collected: 02/14/22 1031    Lab Status: Final result Specimen: Blood from Arm, Right Updated: 02/14/22 1059     Protime 12 8 seconds      INR 0 98    APTT [[id number]]  (Normal) Collected: 02/14/22 1031    Lab Status: Final result Specimen: Blood from Arm, Right Updated: 02/14/22 1059     PTT 25 seconds     CBC and differential [[id number]]  (Abnormal) Collected: 02/14/22 1031    Lab Status: Final result Specimen: Blood from Arm, Right Updated: 02/14/22 1042     WBC 6 50 Thousand/uL      RBC 4 90 Million/uL      Hemoglobin 13 7 g/dL      Hematocrit 40 5 %      MCV 83 fL      MCH 28 0 pg      MCHC 33 8 g/dL      RDW 14 7 %      MPV 10 4 fL      Platelets 723 Thousands/uL      nRBC 0 /100 WBCs      Neutrophils Relative 59 %      Immat GRANS % 0 %      Lymphocytes Relative 25 %      Monocytes Relative 13 %      Eosinophils Relative 2 %      Basophils Relative 1 %      Neutrophils Absolute 3 89 Thousands/µL      Immature Grans Absolute 0 02 Thousand/uL      Lymphocytes Absolute 1 59 Thousands/µL      Monocytes Absolute 0 81 Thousand/µL      Eosinophils Absolute 0 14 Thousand/µL      Basophils Absolute 0 05 Thousands/µL     Fingerstick Glucose (POCT) [[id number]]  (Abnormal) Collected: 02/14/22 1024 Lab Status: Final result Updated: 02/14/22 1026     POC Glucose 146 mg/dl                  CTA dissection protocol chest/abdomen/pelvis   ED Interpretation by Basil Gonzalez DO (02/14 1132)   IMPRESSION:     No thoracic or abdominal aortic intramural hematoma, dissection, aneurysm or penetrating ulcer      No acute findings in the chest, abdomen or pelvis  Final Result by Sky Mccord MD (02/14 1130)      No thoracic or abdominal aortic intramural hematoma, dissection, aneurysm or penetrating ulcer  No acute findings in the chest, abdomen or pelvis  Workstation performed: [id number]         CT head without contrast   ED Interpretation by Basil Gonzalez DO (02/14 1121)   FINDINGS:     PARENCHYMA:  No intracranial mass, mass effect or midline shift  No CT signs of acute infarction  No acute parenchymal hemorrhage      VENTRICLES AND EXTRA-AXIAL SPACES:  Mildly dilated compatible with mild atrophy      VISUALIZED ORBITS AND PARANASAL SINUSES:  Mild mucosal thickening of the ethmoid sinuses bilaterally      CALVARIUM AND EXTRACRANIAL SOFT TISSUES:  Normal      IMPRESSION:     No acute intracranial abnormality  Final Result by Chasity Taylor MD (02/14 1112)      No acute intracranial abnormality  Workstation performed: [id number]         MRI Inpatient Order    (Results Pending)              Procedures  Procedures         ED Course  ED Course as of 02/14/22 1354   Mon Feb 14, 2022   1025 Accucheck 146   1033 EKG: NSR @ 84 bpm, RAD, normal intervals, nonspecific st cahnges   1038 EKG: NSR @ 84 bpm, normal intervals, qtc 401, poor R wave progression, nonspecific st changes, similar to prior from 1/19/22   1057 Pt reassessed after going to CT scan  He reports feeling better than when he came in but still having some nausea and dizziness and wants to lay on his side  No focal deficits on repeat exam and still having abdominal tenderness   Repeat vitals WNL   1103 Called pt's wife, no answer, he thinks she is coming to the hospital   1129 Wife at bedside, no further history obtained other than having episode of slurred speech a few days ago that lasted about 5 minutes and then resolved, no other sx at that time  Sx same as last admission, but more severe today  Pt still with dizziness/abdominal pain             HEART Risk Score      Most Recent Value   Heart Score Risk Calculator    History 1 Filed at: 02/14/2022 1120   ECG 0 Filed at: 02/14/2022 1120   Age 2 Filed at: 02/14/2022 1120   Risk Factors 2 Filed at: 02/14/2022 1120   Troponin 0 Filed at: 02/14/2022 1120   HEART Score 5 Filed at: 02/14/2022 1120           Stroke Assessment     Row Name 02/14/22 1040             NIH Stroke Scale    Interval Baseline      Level of Consciousness (1a ) 0      LOC Questions (1b ) 0      LOC Commands (1c ) 0      Best Gaze (2 ) 0      Visual (3 ) 0      Facial Palsy (4 ) 0      Motor Arm, Left (5a ) 0      Motor Arm, Right (5b ) 0      Motor Leg, Left (6a ) 0      Motor Leg, Right (6b ) 0      Limb Ataxia (7 ) 0      Sensory (8 ) 0      Best Language (9 ) 0      Dysarthria (10 ) 0      Extinction and Inattention (11 ) (Formerly Neglect) 0      Total 0                Most Recent Value   TPA Decision Options    TPA Decision Patient not a TPA candidate  Patient is not a candidate options Unclear time of onset outside appropriate time window  SBIRT 22yo+      Most Recent Value   SBIRT (22 yo +)    In order to provide better care to our patients, we are screening all of our patients for alcohol and drug use  Would it be okay to ask you these screening questions? No Filed at: 02/14/2022 1052                    MDM  Number of Diagnoses or Management Options  Abdominal pain  Chest pain  Nausea and vomiting  Vertigo  Diagnosis management comments: 80 yo M with vertigo/CP/abdominal pain/N/V, concern for cardiac event or CVA  Given ASA   Admitted for further workup/management Amount and/or Complexity of Data Reviewed  Clinical lab tests: reviewed and ordered  Tests in the radiology section of CPT®: ordered and reviewed  Tests in the medicine section of CPT®: ordered and reviewed  Review and summarize past medical records: yes  Independent visualization of images, tracings, or specimens: yes        Disposition  Final diagnoses:   Vertigo   Chest pain   Abdominal pain   Nausea and vomiting     Time reflects when diagnosis was documented in both MDM as applicable and the Disposition within this note     Time User Action Codes Description Comment    2/14/2022 11:38 AM Lannis Gurpreet A Add [R42] Vertigo     2/14/2022 11:38 AM Lannis Gurpreet A Add [R07 9] Chest pain     2/14/2022 11:38 AM Lannis Gurpreet A Add [R10 9] Abdominal pain     2/14/2022 11:38 AM Lannis Gurpreet A Add [R11 2] Nausea and vomiting     2/14/2022 11:54 AM Jere Quest Add [R55] Syncope       ED Disposition     ED Disposition Condition Date/Time Comment    Admit Stable Mon Feb 14, 2022 11:38 AM Case was discussed with VIVIANA and the patient's admission status was agreed to be Admission Status: observation status to the service of Dr Manuel Slater           Follow-up Information    None         Current Discharge Medication List      CONTINUE these medications which have NOT CHANGED    Details   aspirin (ECOTRIN LOW STRENGTH) 81 mg EC tablet Take 1 tablet (81 mg total) by mouth daily  Qty: 90 tablet, Refills: 1    Associated Diagnoses: Arteriosclerotic coronary artery disease      Calcium Citrate-Vitamin D (CALCIUM CITRATE + D) 315-250 MG-UNIT TABS Take 1 tablet by mouth daily      carboxymethylcellulose (REFRESH TEARS) 0 5 % SOLN 1 drop 2 (two) times a day as needed for dry eyes      Coenzyme Q10 100 MG TABS Take 1 tablet by mouth daily      LUMIGAN 0 01 % ophthalmic drops Administer 1 drop to both eyes daily  Refills: 5      metoprolol succinate (TOPROL-XL) 25 mg 24 hr tablet Take 0 5 tablets (12 5 mg total) by mouth daily  Qty: 45 tablet, Refills: 3 Associated Diagnoses: Arteriosclerotic coronary artery disease; Benign essential hypertension      Misc Natural Products (OSTEO BI-FLEX ADV TRIPLE ST PO) Take by mouth      OMEGA-3 FATTY ACIDS-VITAMIN E PO Take 1 capsule by mouth daily      rosuvastatin (CRESTOR) 10 MG tablet Take 1 tablet (10 mg total) by mouth daily  Qty: 90 tablet, Refills: 3    Associated Diagnoses: Arteriosclerotic coronary artery disease; Hyperlipidemia, unspecified hyperlipidemia type             No discharge procedures on file      PDMP Review     None          ED Provider  Electronically Signed by           Maryam Jackson DO  02/14/22 5973

## 2022-02-14 NOTE — CONSULTS
Consultation - Neurology   Meli Tayolr 81 y o  male MRN: [id number]  Unit/Bed#: E4 -01 Encounter: [id number]      Assessment/Plan   60-year-old male with history of CAD/NSTEMI, documented history of PMR, presenting today 2/14 with episode of dizziness, fall/near syncope with associated chest pain, palpitations and dry heaving/retching  As noted below, had similar type presentation several weeks ago; was admitted with thorough cardiac workup including Zio patch placement  Unclear origin of symptoms, will rule out posterior circulation pathology/cerebrovascular event  * Stroke-like symptoms  Assessment & Plan  -see above:  -CT head negative for acute intracranial pathology  -CT dissection protocol negative for acute pathology/injury  -obtain vessel imaging (CTA head/neck, will order)  -MRI brain pending  -recent echocardiogram in late January 2022 with normal EF, no regional wall motion abnormalities, mild to moderate atrial dilation bilaterally; no need to repeat from neurologic standpoint  -continue aspirin/statin  -hemoglobin A1c and lipid panel pending  -frequent neuro checks  -telemetry monitoring  -stroke education to be provided  -therapy evaluations  -reviewed recent cardiac workup January 2022: Unrevealing stress test  -would interrogate ZIO patch during admission    Will further discuss plan of care with attending neurologist      Recommendations for outpatient neurological follow up have yet to be determined  History of Present Illness     Reason for Consult / Principal Problem:  Dizziness, fall, nausea, chest pain  History limited as patient/wife are tangential historians  HPI: Meli Taylor is a 81 y o  male with history as mentioned above in assessment who neurology is asked to evaluate in regards to dizziness/near syncopal episode, with chest discomfort, palpitations, dry heaving/retching      Patient presented from home this morning via EMS after experiencing disease/syncopal event:  Event was discussed with patient and wife at bedside this afternoon:  He awoke in her normal state of health, was putting on clothing/getting dressed, upon doing so he began to feel suddenly lightheaded and if he was going to fall  He ultimately fell into a nearby recliner; called for his wife and then recommended she put him on the floor  Following this, he began with chest discomfort, repeated dry heaving/retching  During this time period, he felt somewhat improved lying on his side, symptoms were ear intensified with lying on his back  He was so generally weak he could not get up on his own, thus EMS was called (they carried him into the stretcher)  Additionally he was incontinent of bowel during this episode which he has never experienced before  Upon arrival, patient was still symptomatic:  CT head was negative for acute intracranial pathology, CT dissection protocol was with no clear thoracic/abdominal aortic injury/pathology  Throughout the course of the morning and afternoon here he continued to feel symptomatic when lying on his back (would feel some improvement lying on aside); approximately half an hour before exam (around 3:30 PM) he finally began to feel better  Of note he was recently hospitalized for similar type of presentation in mid January 2022:  Presyncopal aura with lightheadedness, dizziness, no clear loss of consciousness  Patient white noted with is quite similar in regards to the symptoms, just not as long-lasting nor as severe as the episode today  It was felt this was potentially vasovagal in nature; echo was with normal left ventricular function, no severe valvular disease, stress test was negative for myocardial ischemia, cardiac enzymes were negative  He was advised to have hold her/prolonged cardiac rhythm monitoring (had ZIO patch placed recently)  Patient quite active/functional baseline, takes baby aspirin daily      Inpatient consult to Neurology  Consult performed by: Mak Campoverde PA-C  Consult ordered by: Janell Singletary DO          Review of Systems   Constitutional: Negative for chills, diaphoresis, fatigue and fever  HENT: Negative  Eyes: Negative for photophobia and visual disturbance  Respiratory: Positive for chest tightness  Negative for shortness of breath  Cardiovascular: Positive for chest pain  Negative for palpitations  Gastrointestinal: Positive for nausea and vomiting  Historical Information   Past Medical History:   Diagnosis Date    Cancer [facility])     NSTEMI (non-ST elevated myocardial infarction) ([facility] ) 2014    Pneumonia     Last Assessed:  10/7/14    Polymyalgia rheumatica ([place] [address] )     Last Assessed:  11/23/12    Polymyalgia rheumatica ([place] [address] ) 2011     Past Surgical History:   Procedure Laterality Date    APPENDECTOMY      CORONARY ANGIOPLASTY WITH STENT PLACEMENT      HERNIA REPAIR  2000     Social History   Social History     Substance and Sexual Activity   Alcohol Use Yes    Comment: Social     Social History     Substance and Sexual Activity   Drug Use Never     E-Cigarette/Vaping    E-Cigarette Use Never User      E-Cigarette/Vaping Substances    Nicotine No     THC No     CBD No     Flavoring No     Other No     Unknown No      Social History     Tobacco Use   Smoking Status Never Smoker   Smokeless Tobacco Never Used     Family History:   Family History   Problem Relation Age of Onset    Pulmonary embolism Father     Other Father         Transurethral resection of prostate    Diabetes Sister        Review of previous medical records was completed       Meds/Allergies   current meds:   Current Facility-Administered Medications   Medication Dose Route Frequency    aluminum-magnesium hydroxide-simethicone (MYLANTA) oral suspension 30 mL  30 mL Oral Q6H PRN    aspirin chewable tablet 81 mg  81 mg Oral Daily    atorvastatin (LIPITOR) tablet 40 mg  40 mg Oral QPM    bimatoprost (LUMIGAN) 0 01 % ophthalmic solution 1 drop  1 drop Both Eyes Daily    calcium carbonate (TUMS) chewable tablet 1,000 mg  1,000 mg Oral Daily PRN    enoxaparin (LOVENOX) subcutaneous injection 40 mg  40 mg Subcutaneous Daily    metoprolol succinate (TOPROL-XL) 24 hr tablet 12 5 mg  12 5 mg Oral Daily    ondansetron (ZOFRAN) injection 4 mg  4 mg Intravenous Q6H PRN    and PTA meds:   Prior to Admission Medications   Prescriptions Last Dose Informant Patient Reported? Taking? Calcium Citrate-Vitamin D (CALCIUM CITRATE + D) 315-250 MG-UNIT TABS  Self Yes No   Sig: Take 1 tablet by mouth daily   Coenzyme Q10 100 MG TABS  Self Yes No   Sig: Take 1 tablet by mouth daily   LUMIGAN 0 01 % ophthalmic drops  Self Yes No   Sig: Administer 1 drop to both eyes daily   Patient not taking: Reported on 2022    [facility] Natural Products (OSTEO BI-FLEX ADV TRIPLE ST PO)  Self Yes No   Sig: Take by mouth   OMEGA-3 FATTY ACIDS-VITAMIN E PO  Self Yes No   Sig: Take 1 capsule by mouth daily   aspirin (ECOTRIN LOW STRENGTH) 81 mg EC tablet  Self No No   Sig: Take 1 tablet (81 mg total) by mouth daily   carboxymethylcellulose (REFRESH TEARS) 0 5 % SOLN  Self Yes No   Si drop 2 (two) times a day as needed for dry eyes   metoprolol succinate (TOPROL-XL) 25 mg 24 hr tablet  Self No No   Sig: Take 0 5 tablets (12 5 mg total) by mouth daily   Patient taking differently: Take 12 5 mg by mouth daily Takes at night    rosuvastatin (CRESTOR) 10 MG tablet  Self No No   Sig: Take 1 tablet (10 mg total) by mouth daily   Patient not taking: Reported on 2022       Facility-Administered Medications: None       No Known Allergies    Objective   Vitals:Blood pressure 123/63, pulse 58, temperature (!) 95 8 °F (35 4 °C), temperature source Temporal, resp  rate 18, SpO2 100 %  ,There is no height or weight on file to calculate BMI      Intake/Output Summary (Last 24 hours) at 2022 1456  Last data filed at 2022 1430  Gross per 24 hour   Intake 500 ml   Output 100 ml   Net 400 ml       Invasive Devices: Invasive Devices  Report    Peripheral Intravenous Line            Peripheral IV 02/14/22 Right Antecubital <1 day                Physical Exam  Constitutional:       Appearance: Normal appearance  HENT:      Head: Normocephalic and atraumatic  Eyes:      Extraocular Movements: Extraocular movements intact and EOM normal       Conjunctiva/sclera: Conjunctivae normal       Pupils: Pupils are equal, round, and reactive to light  Cardiovascular:      Rate and Rhythm: Normal rate and regular rhythm  Pulmonary:      Effort: Pulmonary effort is normal       Breath sounds: Normal breath sounds  Abdominal:      General: There is no distension  Musculoskeletal:         General: Normal range of motion  Cervical back: Normal range of motion and neck supple  Skin:     General: Skin is warm and dry  Neurological:      Mental Status: He is alert and oriented to person, place, and time  Coordination: Finger-Nose-Finger Test and Heel to [facility] Test normal       Deep Tendon Reflexes: Strength normal    Psychiatric:         Speech: Speech normal        Neurologic Exam     Mental Status   Oriented to person, place, and time  Speech: speech is normal   Able to read  Able to repeat  Normal comprehension  Cranial Nerves     CN II   Visual fields full to confrontation  CN III, IV, VI   Pupils are equal, round, and reactive to light  Extraocular motions are normal    Nystagmus: none     CN V   Facial sensation intact  CN VII   Facial expression full, symmetric  CN VIII   CN VIII normal      CN IX, X   CN IX normal    CN X normal      CN XI   CN XI normal      CN XII   CN XII normal      No appreciable nystagmus, recurrence of symptoms with EOMs, no symptomatic head turning side to side  Motor Exam   Muscle bulk: normal  Overall muscle tone: normal  Right arm pronator drift: absent  Left arm pronator drift: absent    Strength   Strength 5/5 throughout  Sensory Exam   Light touch normal    Vibration normal      Temperature sensation cool throughout  Gait, Coordination, and Reflexes     Coordination   Finger to nose coordination: normal  Heel to shin coordination: normal    Tremor   Resting tremor: absent  Intention tremor: absent  Patient would not relax LE, thus patellar DTR's testing inaccurate, otherwise diminished DTR's throughout, no ankle clonus, no up-going plantar response  Lab Results:   CBC:   Results from last 7 days   Lab Units 02/14/22  1434 02/14/22  1031   WBC Thousand/uL  --  6 50   RBC Million/uL  --  4 90   HEMOGLOBIN g/dL  --  13 7   HEMATOCRIT %  --  40 5   MCV fL  --  83   PLATELETS Thousands/uL 245 265   , BMP/CMP:   Results from last 7 days   Lab Units 02/14/22  1031   SODIUM mmol/L 137   POTASSIUM mmol/L 4 3   CHLORIDE mmol/L 104   CO2 mmol/L 20*   BUN mg/dL 34*   CREATININE mg/dL 1 05   CALCIUM mg/dL 8 6   AST U/L 22   ALT U/L 23   ALK PHOS U/L 68   EGFR ml/min/1 73sq m 64   , Vitamin B12:   , HgBA1C:   , TSH:   , Coagulation:   Results from last 7 days   Lab Units 02/14/22  1031   INR  0 98   , Lipid Profile:   , Ammonia:   , Urinalysis:       Invalid input(s): URIBILINOGEN, Drug Screen:   , Medication Drug Levels:       Invalid input(s): CARBAMAZEPINE,  PHENOBARB, LACOSAMIDE, OXCARBAZEPINE  Imaging Studies: I have personally reviewed pertinent films in PACS   CTA dissection protocol chest/abdomen/pelvis   ED Interpretation by Angel Lam DO (02/14 1132)   IMPRESSION:     No thoracic or abdominal aortic intramural hematoma, dissection, aneurysm or penetrating ulcer      No acute findings in the chest, abdomen or pelvis  Final Result by Cyndi Doran MD (02/14 1130)      No thoracic or abdominal aortic intramural hematoma, dissection, aneurysm or penetrating ulcer  No acute findings in the chest, abdomen or pelvis                 Workstation performed: [id number]         CT head without contrast   ED Interpretation by Bebe Scruggs DO (02/14 1121)   FINDINGS:     PARENCHYMA:  No intracranial mass, mass effect or midline shift  No CT signs of acute infarction  No acute parenchymal hemorrhage      VENTRICLES AND EXTRA-AXIAL SPACES:  Mildly dilated compatible with mild atrophy      VISUALIZED ORBITS AND PARANASAL SINUSES:  Mild mucosal thickening of the ethmoid sinuses bilaterally      CALVARIUM AND EXTRACRANIAL SOFT TISSUES:  Normal      IMPRESSION:     No acute intracranial abnormality  Final Result by Syed Galvez MD (02/14 1112)      No acute intracranial abnormality  Workstation performed: [id number]         MRI Inpatient Order    (Results Pending)       EKG, Pathology, and Other Studies: I have personally reviewed pertinent reports  VTE Prophylaxis: Sequential compression device (Venodyne)  and Enoxaparin (Lovenox)    Code Status: Level 1 - Full Code    Total time spent today 45 minutes  Discussed plan of care with patient and primary team: will obtain CTA/MRI, unclear origin of symptoms, neuro checks, therapies

## 2022-02-14 NOTE — PLAN OF CARE
Problem: Potential for Falls  Goal: Patient will remain free of falls  Description: INTERVENTIONS:  - Educate patient/family on patient safety including physical limitations  - Instruct patient to call for assistance with activity   - Consult OT/PT to assist with strengthening/mobility   - Keep Call bell within reach  - Keep bed low and locked with side rails adjusted as appropriate  - Keep care items and personal belongings within reach  - Initiate and maintain comfort rounds  - Make Fall Risk Sign visible to staff  - Offer Toileting every 2 Hours, in advance of need  - Initiate/Maintain bed alarm  - Obtain necessary fall risk management equipment: call bell, bed alarm  - Apply yellow socks and bracelet for high fall risk patients  - Consider moving patient to room near nurses station  Outcome: Progressing     Problem: MOBILITY - ADULT  Goal: Maintain or return to baseline ADL function  Description: INTERVENTIONS:  -  Assess patient's ability to carry out ADLs; assess patient's baseline for ADL function and identify physical deficits which impact ability to perform ADLs (bathing, care of mouth/teeth, toileting, grooming, dressing, etc )  - Assess/evaluate cause of self-care deficits   - Assess range of motion  - Assess patient's mobility; develop plan if impaired  - Assess patient's need for assistive devices and provide as appropriate  - Encourage maximum independence but intervene and supervise when necessary  - Involve family in performance of ADLs  - Assess for home care needs following discharge   - Consider OT consult to assist with ADL evaluation and planning for discharge  - Provide patient education as appropriate  Outcome: Progressing     Problem: Prexisting or High Potential for Compromised Skin Integrity  Goal: Skin integrity is maintained or improved  Description: INTERVENTIONS:  - Identify patients at risk for skin breakdown  - Assess and monitor skin integrity  - Assess and monitor nutrition and hydration status  - Monitor labs   - Assess for incontinence   - Turn and reposition patient  - Assist with mobility/ambulation  - Relieve pressure over bony prominences  - Avoid friction and shearing  - Provide appropriate hygiene as needed including keeping skin clean and dry  - Evaluate need for skin moisturizer/barrier cream  - Collaborate with interdisciplinary team   - Patient/family teaching  - Consider wound care consult   Outcome: Progressing     Problem: Nutrition  Goal: Nutrition/Hydration status is improving  Description: Monitor and assess patient's nutrition/hydration status for malnutrition (ex- brittle hair, bruises, dry skin, pale skin and conjunctiva, muscle wasting, smooth red tongue, and disorientation)  Collaborate with interdisciplinary team and initiate plan and interventions as ordered  Monitor patient's weight and dietary intake as ordered or per policy  Utilize nutrition screening tool and intervene per policy  Determine patient's food preferences and provide high-protein, high-caloric foods as appropriate  - Assist patient with eating   - Allow adequate time for meals   - Encourage patient to take dietary supplement as ordered  - Collaborate with clinical nutritionist   - Include patient/family/caregiver in decisions related to nutrition    Outcome: Progressing

## 2022-02-14 NOTE — ASSESSMENT & PLAN NOTE
81-year-old male with history of CAD/NSTEMI, history of PMR, presented 2/14 with episode of dizziness, fall/near syncope with associated chest pain, palpitations and dry heaving/retching      Patient had similar type presentation several weeks ago; was admitted with thorough cardiac workup including Zio patch placement  He has also had other suspicious episodes, including episode dysarthria while driving, and instance of being unable to  objects off the floor  MRI brain per Radiology demonstrated possible punctate infarct in the cerebellar vermis versus artifact (no ADC or FLAIR correlate)  CTA head/neck demonstrated no flow consequential stenosis or LVO  Recent echocardiogram in late January 2022 with normal EF, no regional wall motion abnormalities, mild to moderate atrial dilation bilaterally  A1c 6 2, LDL 53      Significance of cerebellar diffusion abnormality unclear  Would be interested to see with the Zio Patch recorded leading up to and during patient's most recent event  Cannot entirely rule out cerebellar infarct as etiology of patient's latest symptomatic episode  Plan:  -Zio patch has been sent back to be interpreted  No evidence of AFib on telemetry thus far   -patient should follow-up with Cardiology and Neurology as outpatient, and return to the ED should he have any further suspicious symptoms  -ENT consulted  Appreciate recommendations  -orthostatic vital signs ordered but not yet checked  -continue aspirin 81 mg and will add Plavix 75mg daily; continue DAPT x3 weeks and then d/c ASA and continue Plavix monotherapy   Continue atorvastatin 40 mg daily   -telemetry monitoring  -PT/OT

## 2022-02-14 NOTE — H&P
[address]  H&P- Pretty Sanchez 1935, 81 y o  male MRN: [id number]  Unit/Bed#: ED 22 Encounter: [id number]  Primary Care Provider: Lulu Rubi DO   Date and time admitted to hospital: 2/14/2022 10:06 AM    Assessment and Plan  * Stroke-like symptoms  Assessment & Plan  Patient presenting with stroke-like symptoms, reported slurring of speech approximately 2 days ago  Currently undergoing evaluation for heart palpitations  NIH stroke score of 0  Will place on stroke pathway given concern for cerebellar etiology  Has recent echocardiogram, will not need to be reordered  MRI of the brain ordered  Neurologic checks  Risk stratification, previous A1c of 5 8  Start Lipitor, previously on Crestor    Heart palpitations  Assessment & Plan  Patient undergoing workup for symptomatic PVCs  As the Zio Patch in place      Hyperlipidemia  Assessment & Plan  Resume Lipitor    Benign essential hypertension  Assessment & Plan  Resume with hold parameters        Code Status:  Full code    VTE Prophylaxis: Enoxaparin (Lovenox)  / sequential compression device     POLST: There is no POLST form on file for this patient (pre-hospital)  Discussion with family:  Wife at bedside    Anticipated Length of Stay:  Patient will be admitted on an Observation basis with an anticipated length of stay of  less than 2 midnights  Justification for Hospital Stay: Stroke-like symptoms     Total Time for Visit, including Counseling / Coordination of Care: 45 minutes  Greater than 50% of this total time spent on direct patient counseling and coordination of care  Chief Complaint:     Chief Complaint   Patient presents with    Chest Pain     Pt  brought in via EMS  Pt  had a episode of dizzines at home where he lowered himself to the ground  Pt  began to have pressure like chest pain and vomitting   Pt  given 4mg of zofran prehospital        History of Present Illness:    Pretty Sanchez is a 81 y o  male who presents with vertigo lasting approximately 1 hour  He also reported having chest discomfort and heart palpitations  There was some reported of slurring speech approximately 3 days ago  He denies any difficulty swallowing, reports having right arm tremors  Prior to my evaluation be given meclizine and Valium without any relief  He denies any medication change, he has been using his Zio patch to record any arrhythmias  Review of Systems:    A complete and comprehensive 14 point organ system review was performed and all other systems are negative other than stated above in the HPI    Past Medical and Surgical History:     Past Medical History:   Diagnosis Date    Cancer [facility])     [facility]    Pneumonia     Last Assessed:  10/7/14    [facility]a ([email or web address] )     Last Assessed:  11/23/12    Polymyalgia rheumatica ([email or web address] ) 2011       Past Surgical History:   Procedure Laterality Date    APPENDECTOMY      CORONARY ANGIOPLASTY WITH STENT PLACEMENT      HERNIA REPAIR  2000       Meds/Allergies:    Prior to Admission medications    Medication Sig Start Date End Date Taking?  Authorizing Provider   aspirin (ECOTRIN LOW STRENGTH) 81 mg EC tablet Take 1 tablet (81 mg total) by mouth daily 10/27/21   Claudell Cool, MD   Calcium Citrate-Vitamin D (CALCIUM CITRATE + D) 315-250 MG-UNIT TABS Take 1 tablet by mouth daily    Historical Provider, MD   carboxymethylcellulose (REFRESH TEARS) 0 5 % SOLN 1 drop 2 (two) times a day as needed for dry eyes    Historical Provider, MD   Coenzyme Q10 100 MG TABS Take 1 tablet by mouth daily    Historical Provider, MD GUTIERREZ 0 01 % ophthalmic drops Administer 1 drop to both eyes daily  Patient not taking: Reported on 1/19/2022 2/11/19   Historical Provider, MD   metoprolol succinate (TOPROL-XL) 25 mg 24 hr tablet Take 0 5 tablets (12 5 mg total) by mouth daily  Patient taking differently: Take 12 5 mg by mouth daily Takes at night  10/27/21   Timur Cruz MD   [facility] (OSTEO BI-FLEX ADV TRIPLE ST PO) Take by mouth    Historical Provider, MD   OMEGA-3 FATTY ACIDS-VITAMIN E PO Take 1 capsule by mouth daily    Historical Provider, MD   rosuvastatin (CRESTOR) 10 MG tablet Take 1 tablet (10 mg total) by mouth daily  Patient not taking: Reported on 1/19/2022  10/27/21   Timur Cruz MD     I have reviewed home medications with patient personally      Allergies: No Known Allergies    Social History:     Marital Status: /Civil Union   Occupation:  Retired    Substance Use History:   Social History     Substance and Sexual Activity   Alcohol Use Yes    Comment: Social     Social History     Tobacco Use   Smoking Status Never Smoker   Smokeless Tobacco Never Used     Social History     Substance and Sexual Activity   Drug Use Never       Family History:    Family History   Problem Relation Age of Onset    Pulmonary embolism Father     Other Father         Transurethral resection of prostate    Diabetes Sister        Physical Exam:     Vitals:   Blood Pressure: 109/65 (02/14/22 1231)  Pulse: 65 (02/14/22 1231)  Temperature: 98 3 °F (36 8 °C) (02/14/22 1014)  Temp Source: Oral (02/14/22 1014)  Respirations: 22 (02/14/22 1231)  SpO2: 99 % (02/14/22 1231)      General: well appearing, no acute distress  HEENT: atraumatic, PERRLA, moist mucosa, normal pharynx, normal tonsils and adenoids, normal tongue, no fluid in sinuses  Neck: Trachea midline, no carotid bruit, no masses  Respiratory: normal chest wall expansion, CTA B, no r/r/w, no rubs  Cardiovascular: RRR, no m/r/g, Normal S1 and S2  Abdomen: Soft, non-tender, non-distended, normal bowel sounds in all quadrants, no hepatosplenomegaly, no tympany  Rectal: deferred  Musculoskeletal: normal ROM in upper and lower extremities  Integumentary: warm, dry, and pink, with no rash, purpura, or petechia  Heme/Lymph: no lymphadenopathy, no bruises  Neurological: Cranial Nerves II-XII grossly intact  Psychiatric: cooperative with normal mood, affect, and cognition    Additional Data:     Lab Results: I have personally reviewed pertinent reports  Results from last 7 days   Lab Units 02/14/22  1031   WBC Thousand/uL 6 50   HEMOGLOBIN g/dL 13 7   HEMATOCRIT % 40 5   PLATELETS Thousands/uL 265   NEUTROS PCT % 59   LYMPHS PCT % 25   MONOS PCT % 13*   EOS PCT % 2     Results from last 7 days   Lab Units 02/14/22  1031   SODIUM mmol/L 137   POTASSIUM mmol/L 4 3   CHLORIDE mmol/L 104   CO2 mmol/L 20*   BUN mg/dL 34*   CREATININE mg/dL 1 05   ANION GAP mmol/L 13   CALCIUM mg/dL 8 6   ALBUMIN g/dL 3 4*   TOTAL BILIRUBIN mg/dL 0 75   ALK PHOS U/L 68   ALT U/L 23   AST U/L 22   GLUCOSE RANDOM mg/dL 195*     Results from last 7 days   Lab Units 02/14/22  1031   INR  0 98     Results from last 7 days   Lab Units 02/14/22  1024   POC GLUCOSE mg/dl 146*             Results from last 7 days   Lab Units 02/14/22  1031   HS TNI 0HR ng/L 7       Imaging: I have personally reviewed pertinent reports  CTA dissection protocol chest/abdomen/pelvis   ED Interpretation by Capo Chadwick DO (02/14 1132)   IMPRESSION:     No thoracic or abdominal aortic intramural hematoma, dissection, aneurysm or penetrating ulcer      No acute findings in the chest, abdomen or pelvis  Final Result by Maki Galdamez MD (02/14 1130)      No thoracic or abdominal aortic intramural hematoma, dissection, aneurysm or penetrating ulcer  No acute findings in the chest, abdomen or pelvis  Workstation performed: [id number]         CT head without contrast   ED Interpretation by Capo Chadwick DO (02/14 1121)   FINDINGS:     PARENCHYMA:  No intracranial mass, mass effect or midline shift  No CT signs of acute infarction    No acute parenchymal hemorrhage      VENTRICLES AND EXTRA-AXIAL SPACES:  Mildly dilated compatible with mild atrophy      VISUALIZED ORBITS AND PARANASAL SINUSES:  Mild mucosal thickening of the ethmoid sinuses bilaterally      CALVARIUM AND EXTRACRANIAL SOFT TISSUES:  Normal      IMPRESSION:     No acute intracranial abnormality  Final Result by Terra Caceres MD (02/14 1112)      No acute intracranial abnormality  Workstation performed: [id number]             EKG, Pathology, and Other Studies Reviewed on Admission:   · EKG:  Head CT reviewed, no acute intracranial abnormality    Allscripts / Epic Records Reviewed: Yes     ** Please Note: This note was completed in part utilizing M-Modal Fluency Direct Software  Grammatical errors, random word insertions, spelling mistakes, and incomplete sentences may be an occasional consequence of this system secondary to software limitations, ambient noise, and hardware issues  If you have any questions or concerns about the content, text, or information contained within the body of this dictation, please contact the provider for clarification  **

## 2022-02-14 NOTE — ASSESSMENT & PLAN NOTE
Patient presenting with stroke-like symptoms, reported slurring of speech approximately 2 days ago  Currently undergoing evaluation for heart palpitations  NIH stroke score of 0  Will place on stroke pathway given concern for cerebellar etiology    Has recent echocardiogram, will not need to be reordered  MRI of the brain ordered  Neurologic checks  Risk stratification, previous A1c of 5 8  Start Lipitor, previously on Crestor

## 2022-02-15 ENCOUNTER — APPOINTMENT (OUTPATIENT)
Dept: MRI IMAGING | Facility: HOSPITAL | Age: 87
DRG: 066 | End: 2022-02-15
Payer: MEDICARE

## 2022-02-15 ENCOUNTER — APPOINTMENT (OUTPATIENT)
Dept: CT IMAGING | Facility: HOSPITAL | Age: 87
DRG: 066 | End: 2022-02-15
Payer: MEDICARE

## 2022-02-15 ENCOUNTER — TELEPHONE (OUTPATIENT)
Dept: CARDIOLOGY CLINIC | Facility: CLINIC | Age: 87
End: 2022-02-15

## 2022-02-15 PROBLEM — R42 DIZZINESS AND GIDDINESS: Status: ACTIVE | Noted: 2022-02-14

## 2022-02-15 LAB
ANION GAP SERPL CALCULATED.3IONS-SCNC: 9 MMOL/L (ref 4–13)
BASOPHILS # BLD AUTO: 0.02 THOUSANDS/ΜL (ref 0–0.1)
BASOPHILS NFR BLD AUTO: 0 % (ref 0–1)
BUN SERPL-MCNC: 25 MG/DL (ref 5–25)
CALCIUM SERPL-MCNC: 8.4 MG/DL (ref 8.3–10.1)
CHLORIDE SERPL-SCNC: 105 MMOL/L (ref 100–108)
CHOLEST SERPL-MCNC: 126 MG/DL
CO2 SERPL-SCNC: 24 MMOL/L (ref 21–32)
CREAT SERPL-MCNC: 0.92 MG/DL (ref 0.6–1.3)
EOSINOPHIL # BLD AUTO: 0.03 THOUSAND/ΜL (ref 0–0.61)
EOSINOPHIL NFR BLD AUTO: 0 % (ref 0–6)
ERYTHROCYTE [DISTWIDTH] IN BLOOD BY AUTOMATED COUNT: 15 % (ref 11.6–15.1)
EST. AVERAGE GLUCOSE BLD GHB EST-MCNC: 131 MG/DL
GFR SERPL CREATININE-BSD FRML MDRD: 75 ML/MIN/1.73SQ M
GLUCOSE SERPL-MCNC: 100 MG/DL (ref 65–140)
HBA1C MFR BLD: 6.2 %
HCT VFR BLD AUTO: 39.2 % (ref 36.5–49.3)
HDLC SERPL-MCNC: 60 MG/DL
HGB BLD-MCNC: 12.6 G/DL (ref 12–17)
IMM GRANULOCYTES # BLD AUTO: 0.03 THOUSAND/UL (ref 0–0.2)
IMM GRANULOCYTES NFR BLD AUTO: 0 % (ref 0–2)
LDLC SERPL CALC-MCNC: 53 MG/DL (ref 0–100)
LYMPHOCYTES # BLD AUTO: 0.86 THOUSANDS/ΜL (ref 0.6–4.47)
LYMPHOCYTES NFR BLD AUTO: 12 % (ref 14–44)
MCH RBC QN AUTO: 26.8 PG (ref 26.8–34.3)
MCHC RBC AUTO-ENTMCNC: 32.1 G/DL (ref 31.4–37.4)
MCV RBC AUTO: 83 FL (ref 82–98)
MONOCYTES # BLD AUTO: 0.75 THOUSAND/ΜL (ref 0.17–1.22)
MONOCYTES NFR BLD AUTO: 10 % (ref 4–12)
NEUTROPHILS # BLD AUTO: 5.68 THOUSANDS/ΜL (ref 1.85–7.62)
NEUTS SEG NFR BLD AUTO: 78 % (ref 43–75)
NRBC BLD AUTO-RTO: 0 /100 WBCS
PLATELET # BLD AUTO: 262 THOUSANDS/UL (ref 149–390)
PMV BLD AUTO: 10.4 FL (ref 8.9–12.7)
POTASSIUM SERPL-SCNC: 4.3 MMOL/L (ref 3.5–5.3)
RBC # BLD AUTO: 4.71 MILLION/UL (ref 3.88–5.62)
SODIUM SERPL-SCNC: 138 MMOL/L (ref 136–145)
TRIGL SERPL-MCNC: 65 MG/DL
WBC # BLD AUTO: 7.37 THOUSAND/UL (ref 4.31–10.16)

## 2022-02-15 PROCEDURE — 70498 CT ANGIOGRAPHY NECK: CPT

## 2022-02-15 PROCEDURE — G1004 CDSM NDSC: HCPCS

## 2022-02-15 PROCEDURE — 80048 BASIC METABOLIC PNL TOTAL CA: CPT | Performed by: INTERNAL MEDICINE

## 2022-02-15 PROCEDURE — 99232 SBSQ HOSP IP/OBS MODERATE 35: CPT | Performed by: INTERNAL MEDICINE

## 2022-02-15 PROCEDURE — 70496 CT ANGIOGRAPHY HEAD: CPT

## 2022-02-15 PROCEDURE — 80061 LIPID PANEL: CPT | Performed by: INTERNAL MEDICINE

## 2022-02-15 PROCEDURE — 70551 MRI BRAIN STEM W/O DYE: CPT

## 2022-02-15 PROCEDURE — 99214 OFFICE O/P EST MOD 30 MIN: CPT | Performed by: PSYCHIATRY & NEUROLOGY

## 2022-02-15 PROCEDURE — 83036 HEMOGLOBIN GLYCOSYLATED A1C: CPT | Performed by: INTERNAL MEDICINE

## 2022-02-15 PROCEDURE — 92610 EVALUATE SWALLOWING FUNCTION: CPT

## 2022-02-15 PROCEDURE — 85025 COMPLETE CBC W/AUTO DIFF WBC: CPT | Performed by: INTERNAL MEDICINE

## 2022-02-15 RX ORDER — MECLIZINE HCL 12.5 MG/1
25 TABLET ORAL EVERY 8 HOURS PRN
Status: DISCONTINUED | OUTPATIENT
Start: 2022-02-15 | End: 2022-02-17 | Stop reason: HOSPADM

## 2022-02-15 RX ORDER — ECHINACEA PURPUREA EXTRACT 125 MG
1 TABLET ORAL
Status: DISCONTINUED | OUTPATIENT
Start: 2022-02-15 | End: 2022-02-17 | Stop reason: HOSPADM

## 2022-02-15 RX ADMIN — ATORVASTATIN CALCIUM 40 MG: 40 TABLET, FILM COATED ORAL at 17:27

## 2022-02-15 RX ADMIN — IOHEXOL 85 ML: 350 INJECTION, SOLUTION INTRAVENOUS at 12:08

## 2022-02-15 RX ADMIN — BIMATOPROST 1 DROP: 0.1 SOLUTION/ DROPS OPHTHALMIC at 09:59

## 2022-02-15 RX ADMIN — ALUMINUM HYDROXIDE, MAGNESIUM HYDROXIDE, AND SIMETHICONE 30 ML: 200; 200; 20 SUSPENSION ORAL at 14:09

## 2022-02-15 RX ADMIN — MECLIZINE 25 MG: 12.5 TABLET ORAL at 14:10

## 2022-02-15 RX ADMIN — ENOXAPARIN SODIUM 40 MG: 40 INJECTION SUBCUTANEOUS at 10:03

## 2022-02-15 RX ADMIN — ASPIRIN 81 MG: 81 TABLET, CHEWABLE ORAL at 10:03

## 2022-02-15 NOTE — PHYSICAL THERAPY NOTE
PHYSICAL THERAPY NOTE          Patient Name: Eligio Rdidle  JTYTG'C Date: 2/15/2022    PT consult received  Chart reviewed  Attempted PT eval however RN & pt requested to defer at this time 2* to severe dizziness & overall not feeling well  Will continue to follow as appropriate   Ramonia Screen, PT

## 2022-02-15 NOTE — PLAN OF CARE
Problem: Potential for Falls  Goal: Patient will remain free of falls  Description: INTERVENTIONS:  - Educate patient/family on patient safety including physical limitations  - Instruct patient to call for assistance with activity   - Consult OT/PT to assist with strengthening/mobility   - Keep Call bell within reach  - Keep bed low and locked with side rails adjusted as appropriate  - Keep care items and personal belongings within reach  - Initiate and maintain comfort rounds  - Make Fall Risk Sign visible to staff  - Offer Toileting every  Hours, in advance of need  - Initiate/Maintain alarm  - Obtain necessary fall risk management equipment:   - Apply yellow socks and bracelet for high fall risk patients  - Consider moving patient to room near nurses station  Outcome: Progressing     Problem: MOBILITY - ADULT  Goal: Maintain or return to baseline ADL function  Description: INTERVENTIONS:  -  Assess patient's ability to carry out ADLs; assess patient's baseline for ADL function and identify physical deficits which impact ability to perform ADLs (bathing, care of mouth/teeth, toileting, grooming, dressing, etc )  - Assess/evaluate cause of self-care deficits   - Assess range of motion  - Assess patient's mobility; develop plan if impaired  - Assess patient's need for assistive devices and provide as appropriate  - Encourage maximum independence but intervene and supervise when necessary  - Involve family in performance of ADLs  - Assess for home care needs following discharge   - Consider OT consult to assist with ADL evaluation and planning for discharge  - Provide patient education as appropriate  Outcome: Progressing  Goal: Maintains/Returns to pre admission functional level  Description: INTERVENTIONS:  - Perform BMAT or MOVE assessment daily    - Set and communicate daily mobility goal to care team and patient/family/caregiver     - Collaborate with rehabilitation services on mobility goals if consulted  - Perform Range of Motion  times a day  - Reposition patient every  hours  - Dangle patient  times a day  - Stand patient  times a day  - Ambulate patient  times a day  - Out of bed to chair times a day   - Out of bed for meals times a day  - Out of bed for toileting  - Record patient progress and toleration of activity level   Outcome: Progressing     Problem: Prexisting or High Potential for Compromised Skin Integrity  Goal: Skin integrity is maintained or improved  Description: INTERVENTIONS:  - Identify patients at risk for skin breakdown  - Assess and monitor skin integrity  - Assess and monitor nutrition and hydration status  - Monitor labs   - Assess for incontinence   - Turn and reposition patient  - Assist with mobility/ambulation  - Relieve pressure over bony prominences  - Avoid friction and shearing  - Provide appropriate hygiene as needed including keeping skin clean and dry  - Evaluate need for skin moisturizer/barrier cream  - Collaborate with interdisciplinary team   - Patient/family teaching  - Consider wound care consult   Outcome: Progressing     Problem: Neurological Deficit  Goal: Neurological status is stable or improving  Description: Interventions:  - Monitor and assess patient's level of consciousness, motor function, sensory function, and level of assistance needed for ADLs  - Monitor and report changes from baseline  Collaborate with interdisciplinary team to initiate plan and implement interventions as ordered  - Provide and maintain a safe environment  - Consider seizure precautions  - Consider fall precautions  - Consider aspiration precautions  - Consider bleeding precautions  Outcome: Progressing     Problem: Activity Intolerance/Impaired Mobility  Goal: Mobility/activity is maintained at optimum level for patient  Description: Interventions:  - Assess and monitor patient  barriers to mobility and need for assistive/adaptive devices    - Assess patient's emotional response to limitations  - Collaborate with interdisciplinary team and initiate plans and interventions as ordered  - Encourage independent activity per ability   - Maintain proper body alignment  - Perform active/passive rom as tolerated/ordered  - Plan activities to conserve energy   - Turn patient as appropriate  Outcome: Progressing     Problem: Communication Impairment  Goal: Ability to express needs and understand communication  Description: Assess patient's communication skills and ability to understand information  Patient will demonstrate use of effective communication techniques, alternative methods of communication and understanding even if not able to speak  - Encourage communication and provide alternate methods of communication as needed  - Collaborate with case management/ for discharge needs  - Include patient/family/caregiver in decisions related to communication  Outcome: Progressing     Problem: Potential for Aspiration  Goal: Non-ventilated patient's risk of aspiration is minimized  Description: Assess and monitor vital signs, respiratory status, and labs (WBC)  Monitor for signs of aspiration (tachypnea, cough, rales, wheezing, cyanosis, fever)  - Assess and monitor patient's ability to swallow  - Place patient up in chair to eat if possible  - HOB up at 90 degrees to eat if unable to get patient up into chair   - Supervise patient during oral intake  - Instruct patient/ family to take small bites  - Instruct patient/ family to take small single sips when taking liquids  - Follow patient-specific strategies generated by speech pathologist   Outcome: Progressing  Goal: Ventilated patient's risk of aspiration is minimized  Description: Assess and monitor vital signs, respiratory status, airway cuff pressure, and labs (WBC)  Monitor for signs of aspiration (tachypnea, cough, rales, wheezing, cyanosis, fever)      - Elevate head of bed 30 degrees if patient has tube feeding   - Monitor tube feeding  Outcome: Progressing     Problem: Nutrition  Goal: Nutrition/Hydration status is improving  Description: Monitor and assess patient's nutrition/hydration status for malnutrition (ex- brittle hair, bruises, dry skin, pale skin and conjunctiva, muscle wasting, smooth red tongue, and disorientation)  Collaborate with interdisciplinary team and initiate plan and interventions as ordered  Monitor patient's weight and dietary intake as ordered or per policy  Utilize nutrition screening tool and intervene per policy  Determine patient's food preferences and provide high-protein, high-caloric foods as appropriate  - Assist patient with eating   - Allow adequate time for meals   - Encourage patient to take dietary supplement as ordered  - Collaborate with clinical nutritionist   - Include patient/family/caregiver in decisions related to nutrition    Outcome: Progressing

## 2022-02-15 NOTE — ASSESSMENT & PLAN NOTE
Patient presenting with stroke-like symptoms,   Will place on stroke pathway given concern for cerebellar etiology    MRI negative for acute stroke  Suspect peripheral etiology  Due to ongoing symptoms, ENT consultation placed  Has failed Valium, meclizine  Suspect viral labyrinthitis versus Meniere's versus benign paradoxical positional vertigo  Symptoms appear to reproduce with head movement

## 2022-02-15 NOTE — PROGRESS NOTES
[address] [place] [address]  Progress Note - Meredeth Runner 1935, 81 y o  male MRN: [id number]  Unit/Bed#: E4 [facility]-01 Encounter: [id number]  Primary Care Provider: Dulce Renteria DO   Date and time admitted to hospital: 2/14/2022 10:06 AM    * Dizziness and giddiness  Assessment & Plan  Patient presenting with stroke-like symptoms,   Will place on stroke pathway given concern for cerebellar etiology  MRI negative for acute stroke  Suspect peripheral etiology  Due to ongoing symptoms, ENT consultation placed  Has failed Valium, meclizine  Suspect viral labyrinthitis versus Meniere's versus benign paradoxical positional vertigo  Symptoms appear to reproduce with head movement      Heart palpitations  Assessment & Plan  Patient undergoing workup for symptomatic PVCs  As the Zio Patch in place      Hyperlipidemia  Assessment & Plan  Resume Lipitor    Benign essential hypertension  Assessment & Plan  Resume with hold parameters      The patient was changed from observation to inpatient secondary to persistent vertigo, disequilibrium  requiring an additional 2 midnights for treatment and management  For the past family and social history and review of systems please see observation H&P which was dated 02/14/2022, and is located in the patient's chart  I have reviewed the information and there have been no changes        [facility] Progress Note  Patient: Meredeth Runner 81 y o  male   MRN: [id number]  PCP: Dulce Renteria DO  Unit/Bed#: E4 [facility]-01 Encounter: [id number]  Date Of Visit: 02/15/22    Assessment:    Principal Problem:    Dizziness and giddiness  Active Problems:    Benign essential hypertension    Hyperlipidemia    Heart palpitations      Plan:    · ENT evaluation  · Symptom control       VTE Pharmacologic Prophylaxis:   Pharmacologic: Enoxaparin (Lovenox)  Mechanical VTE Prophylaxis in Place: Yes    Patient Centered Rounds: I have performed bedside rounds with nursing staff today     Discussions with Specialists or Other Care Team Provider:      Education and Discussions with Family / Patient:  Discussed with wife at bedside    Time Spent for Care: 45 minutes  More than 50% of total time spent on counseling and coordination of care as described above  Current Length of Stay: 0 day(s)    Current Patient Status: Inpatient   Certification Statement: The patient will continue to require additional inpatient hospital stay due to Persistent vertigo disequilibrium    Discharge Plan / Estimated Discharge Date:  48 hours    Code Status: Level 1 - Full Code      Subjective:   Patient seen and examined, reports feeling a sensation of dizziness  Also reports wavy lines when looking at the air conditioning system  No chest pain, no nausea vomiting    Reports worse with head movement    A complete and comprehensive 14 point organ system review has been performed and all other systems are negative other than stated above  Objective:     Vitals:   Temp (24hrs), Av 1 °F (36 2 °C), Min:96 4 °F (35 8 °C), Max:97 6 °F (36 4 °C)    Temp:  [96 4 °F (35 8 °C)-97 6 °F (36 4 °C)] 96 8 °F (36 °C)  HR:  [58-87] 60  Resp:  [18-20] 18  BP: (104-142)/(67-89) 125/75  SpO2:  [90 %-97 %] 97 %  Body mass index is 26 1 kg/m²  Input and Output Summary (last 24 hours):        Intake/Output Summary (Last 24 hours) at 2/15/2022 1745  Last data filed at 2/15/2022 0759  Gross per 24 hour   Intake --   Output 700 ml   Net -700 ml       Physical Exam:     General: well appearing, no acute distress  HEENT: atraumatic, PERRLA, moist mucosa, normal pharynx, normal tonsils and adenoids, normal tongue, no fluid in sinuses  Neck: Trachea midline, no carotid bruit, no masses  Respiratory: normal chest wall expansion, CTA B, no r/r/w, no rubs  Cardiovascular: RRR, no m/r/g, Normal S1 and S2  Abdomen: Soft, non-tender, non-distended, normal bowel sounds in all quadrants, no hepatosplenomegaly, no tympany  Rectal: deferred  Musculoskeletal: normal ROM in upper and lower extremities  Integumentary: warm, dry, and pink, with no rash, purpura, or petechia  Heme/Lymph: no lymphadenopathy, no bruises  Neurological: Cranial Nerves II-XII grossly intact, no tics, normal sensation to pressure and light touch  Psychiatric: cooperative with normal mood, affect, and cognition      Additional Data:     Labs:    Results from last 7 days   Lab Units 02/15/22  0444   WBC Thousand/uL 7 37   HEMOGLOBIN g/dL 12 6   HEMATOCRIT % 39 2   PLATELETS Thousands/uL 262   NEUTROS PCT % 78*   LYMPHS PCT % 12*   MONOS PCT % 10   EOS PCT % 0     Results from last 7 days   Lab Units 02/15/22  0444 02/14/22  1031 02/14/22  1031   POTASSIUM mmol/L 4 3   < > 4 3   CHLORIDE mmol/L 105   < > 104   CO2 mmol/L 24   < > 20*   BUN mg/dL 25   < > 34*   CREATININE mg/dL 0 92   < > 1 05   CALCIUM mg/dL 8 4   < > 8 6   ALK PHOS U/L  --   --  68   ALT U/L  --   --  23   AST U/L  --   --  22    < > = values in this interval not displayed  Results from last 7 days   Lab Units 02/14/22  1031   INR  0 98       * I Have Reviewed All Lab Data Listed Above  * Additional Pertinent Lab Tests Reviewed:  All [facility] Admission Reviewed    Imaging:    Imaging Reports Reviewed Today Include:  MRI of the brain, CT of the head  Imaging Personally Reviewed by Myself Includes:  MRI of the brain CTA of the head    Recent Cultures (last 7 days):           Last 24 Hours Medication List:   Current Facility-Administered Medications   Medication Dose Route Frequency Provider Last Rate    aluminum-magnesium hydroxide-simethicone  30 mL Oral Q6H PRN Katlin Breach, DO      aspirin  81 mg Oral Daily Dipesh D Gib Seats, DO      atorvastatin  40 mg Oral QPM Dipesh Heard, DO      bimatoprost  1 drop Both Eyes Daily Dipesh HENRIQUEZ Heard, DO      calcium carbonate  1,000 mg Oral Daily PRN Katlin Breach, DO      enoxaparin  40 mg Subcutaneous Daily Dipesh D Gib Seats, DO      ketorolac  10 mg Oral Once Dipesh Heard, DO      meclizine  25 mg Oral Q8H PRN Gene Rung, DO      metoprolol succinate  12 5 mg Oral Daily Dipesh Heard, DO      ondansetron  4 mg Intravenous Q6H PRN [place] Rung, DO      sodium chloride  1 spray Each Nare Q1H PRN Gene Rung, DO          Today, Patient Was Seen By: Checo Cornejo DO    ** Please Note: This note was completed in part utilizing UserEvents Direct Software  Grammatical errors, random word insertions, spelling mistakes, and incomplete sentences may be an occasional consequence of this system secondary to software limitations, ambient noise, and hardware issues  If you have any questions or concerns about the content, text, or information contained within the body of this dictation, please contact the provider for clarification   **

## 2022-02-15 NOTE — TELEPHONE ENCOUNTER
Wife patient was admitted due to episode Zio was removed due to needing MRI instructed wife to place in mail and send back so can be reviewed to see if anything shows when event occurred  Pt has upcoming OV next week

## 2022-02-15 NOTE — SPEECH THERAPY NOTE
Speech Language/Pathology  Speech/Language Pathology  Assessment    Patient Name: Jaye Heredia  ESSRM'O Date: 2/15/2022     Problem List  Principal Problem:    Stroke-like symptoms  Active Problems:    Benign essential hypertension    Hyperlipidemia    Heart palpitations    Past Medical History  Past Medical History:   Diagnosis Date    Cancer ([facility] )     NSTEMI (non-ST elevated myocardial infarction) ([facility] ) 2014    Pneumonia     Last Assessed:  10/7/14    Polymyalgia rheumatica ([facility] )     Last Assessed:  11/23/12    Polymyalgia rheumatica ([facility] ) 2011     Past Surgical History  Past Surgical History:   Procedure Laterality Date    APPENDECTOMY      CORONARY ANGIOPLASTY WITH STENT PLACEMENT      HERNIA REPAIR  2000        Bedside Swallow Evaluation:    Summary:  Pt presented w/ clear and fluent speech  Reported he feels dizzy, particularly when turning his head to the left  He was able to eat breakfast  Did not want a pillow  Pt/wife advised to not eat/drink w/ his head tilted back as this increases risk of choking  He was easily able to bring his head to midline  Stated this was the first jagjit he was able to sit upright and feels a bit better  No facial asymmetry  Mastication, bolus formation,and transfer wnl  prompt swallow  Laryngeal rise appeared adequate  Oral and pharyngeal stages WNL  Recommendations:  Diet: Regular  Liquid:thin  Meds:as tolerated  Supervision:assist if needed  Positioning:Upright  Strategies: Pt to take PO/Meds only when fully alert and upright  Head at midline  Oral care  Aspiration precautions  Reflux precautions  Eval only, No f/u tx indicated  Please reconsult if status declines/concerns arise  Consider consult w/:  Nutrition    H&P/Admit info/ pertinent provider notes: (PMH noted above)  Attestation signed by Estephanie Belle DO at 2/14/2022  7:44 PM       Split/Shared Statement (No Critical Care Time)     I saw/examined the patient   I agree with the Advanced Practitioner's note with the following additions/exceptions:     Reviewed the history again with patient it and his wife  Basically as described below  He developed sensation of dizziness weakness requiring him to go to ground  Thereafter he had severe nausea and retching, possibly with some associated vertiginous symptoms  In a separate episode a week ago he describes dropping a pen and being unable to initially pick it up with his right hand those eventually successfully did so  Minutes later while driving his wife to an appointment there was apparently some transient difficulty with inability to get his words out  That episode was dissimilar to this, but the prior hospital admission a appears to have been for an event similar to now  His neurological examination is quite unremarkable  Without focal deficits or nystagmus on exam   There was some minor action tremor in both limbs right slightly more than left, but not really ataxic movements  Symptom complex does not particularly fit into any classic vascular distribution nor is it suggestive of epileptic origin given his preserved recall  Cannot entirely exclude posterior circulation TIA, but would agree with need to exclude paroxysmal dysrhythmia with global hypoperfusion  We will request CTA of head and neck to exclude any predisposing large vessel stenosis, and pursue MRI of the for any residual ischemic changes, despite unremarkable exam      Sonido Eaton, DO       Special Studies:  CT head neg acute  Noted cta dissection protocol and CT c spine  MRI and cta head/neck pending  Previous VBS:  none    Patient's goal: wants to know what is wrong w/ him    Did the pt report pain? no  If yes, was nursing notified/was it addressed?     Reason for consult:  R/o aspiration  Determine safest and least restrictive diet  New neuro event  Stroke protocol  Food allergies:  none  Current diet:  regular  Premorbid diet[de-identified]  regular  O2 requirement:  none  Voice/Speech:  wnl  Social:  Home w/ wife  Follows commands:  yes                      Cognitive Status:  A&O  Oral [facility] exam:  Dentition:natural  Labial strength and ROM:+  Lingual strength and ROM:+  Mandibular strength and ROM:+  Secretion management:+    Items administered:  Oatmeal, banana   Thin liquid    Results d/w:  Pt, nursing, family

## 2022-02-15 NOTE — OCCUPATIONAL THERAPY NOTE
Occupational Therapy Note:    Patient Name: Danica Colbert  [id number] Date: 2/15/2022    OT consult received  Chart reviewed  Attempted to see pt for OT eval this PM, however RN and pt requested to defer at this time 2* dizziness and pt not feeling well  Will cx and complete OT eval at later time as medically appropriate      HERBERTH Mays/CONOR

## 2022-02-15 NOTE — NURSING NOTE
Complains of not feeling right  BP checked and Ok  Complains of dizziness and some epigastric discomfort

## 2022-02-15 NOTE — CONSULTS
Consult for stroke  Pt's wife was in the room and answered most of my questions  Per recall, pt has been eating well at home, but since the stroke-like symptoms started, he has been eating less due to not being able to sit up for a while because of dizziness  He reports having no issues chewing or swallowing  Pt and pt's wife stated they have been educated on a heart healthy diet, and did not have any questions at the time  Will follow up to monitor for adequate PO intake

## 2022-02-15 NOTE — PROGRESS NOTES
Progress Note - Neurology   Pretty Sanchez 81 y o  male MRN: [id number]  Unit/Bed#: E4 -01 Encounter: [id number]      Assessment/Plan     * Dizziness and giddiness  Assessment & Plan  79-year-old male with history of CAD/NSTEMI, history of PMR, presented 2/14 with episode of dizziness, fall/near syncope with associated chest pain, palpitations and dry heaving/retching      Patient had similar type presentation several weeks ago; was admitted with thorough cardiac workup including Zio patch placement  MRI brain demonstrated no acute changes and specifically no infarct  CTA head/neck demonstrated no flow consequential stenosis or LVO  Recent echocardiogram in late January 2022 with normal EF, no regional wall motion abnormalities, mild to moderate atrial dilation bilaterally      Unclear origin of symptoms  Would send Zio patch to rule out cardiac dysrhythmia  Do not feel strongly episodes are neurologic in origin  Plan:  -send Zio patch back to be interpreted   -check orthostatics  -continue aspirin/statin  -telemetry monitoring  -PT/OT    Hyperlipidemia  Assessment & Plan  Stable; continue statin  Benign essential hypertension  Assessment & Plan  Goal normotension  Check orthostatics  Pretty Sanchez will not need outpatient follow up with Neurology  He will not require outpatient neurological testing  Subjective:   Patient reports sitting up for lunch and starting to eat, but having to lie back down in the left lateral decubitus position due to intense headache  The headache seemed to resolve after lying down  No retching today  ROS: 12 point review of systems performed and negative except as indicated above  Vitals: Blood pressure 125/69, pulse 58, temperature 97 6 °F (36 4 °C), temperature source Temporal, resp  rate 18, height 5' 10" (1 778 m), weight 82 5 kg (181 lb 14 1 oz), SpO2 90 %  ,Body mass index is 26 1 kg/m²      Physical Exam:   General:  Patient is well-developed, well-nourished, and in no acute distress  HEENT:  Head normocephalic  Eyes anicteric  Cardiovascular:  With regular rhythm  Lungs:  Normal effort  Nonlabored breathing  Extremities:  With no significant edema  Skin: No rashes  Neurologic:  Patient is alert, pleasantly interactive, and appropriately conversational   No obvious symbolic language difficulty or dysarthria  Gait deferred for safety  Cranial Nerves:   II: Visual fields full to confrontation  Pupils equal, round, reactive to light with normal accomodation  Cannot visualize optic fundi  III,IV,VI: extraocular movements intact with no nystagmus  V: Sensation in the V1 through V3 distributions intact to light touch bilaterally  VII: Face is symmetric with no weakness noted  VIII: Audition intact to finger rub bilaterally  IX/X: Uvula midline  Soft palate elevation symmetric  XII: Tongue midline with no atrophy or fasciculations with appropriate movement  Coordination:  Accurate with finger-to-nose maneuvers bilaterally  No obvious lateralized extremity weakness  Lab, Imaging and other studies:   CBC:   Results from last 7 days   Lab Units 02/15/22  0444 02/14/22  1434 02/14/22  1031   WBC Thousand/uL 7 37  --  6 50   RBC Million/uL 4 71  --  4 90   HEMOGLOBIN g/dL 12 6  --  13 7   HEMATOCRIT % 39 2  --  40 5   MCV fL 83  --  83   PLATELETS Thousands/uL 262 245 265   , BMP/CMP:   Results from last 7 days   Lab Units 02/15/22  0444 02/14/22  1031   SODIUM mmol/L 138 137   POTASSIUM mmol/L 4 3 4 3   CHLORIDE mmol/L 105 104   CO2 mmol/L 24 20*   BUN mg/dL 25 34*   CREATININE mg/dL 0 92 1 05   CALCIUM mg/dL 8 4 8 6   AST U/L  --  22   ALT U/L  --  23   ALK PHOS U/L  --  68   EGFR ml/min/1 73sq m 75 64     VTE Prophylaxis: Sequential compression device (Venodyne)     Counseling / Coordination of Care  Total time spent today 25 minutes   Greater than 50% of total time was spent with the patient and / or family counseling and / or coordination of care  A description of the counseling / coordination of care: Extensive discussion with patient's wife at bedside regarding results of MRI brain and CTA head/neck  Discussed with primary team as well

## 2022-02-16 PROBLEM — I63.9 CEREBELLAR STROKE (HCC): Status: ACTIVE | Noted: 2022-02-16

## 2022-02-16 PROCEDURE — 99232 SBSQ HOSP IP/OBS MODERATE 35: CPT | Performed by: INTERNAL MEDICINE

## 2022-02-16 PROCEDURE — 97163 PT EVAL HIGH COMPLEX 45 MIN: CPT

## 2022-02-16 PROCEDURE — 97166 OT EVAL MOD COMPLEX 45 MIN: CPT

## 2022-02-16 PROCEDURE — 99233 SBSQ HOSP IP/OBS HIGH 50: CPT | Performed by: PSYCHIATRY & NEUROLOGY

## 2022-02-16 PROCEDURE — 99222 1ST HOSP IP/OBS MODERATE 55: CPT | Performed by: INTERNAL MEDICINE

## 2022-02-16 RX ORDER — CLOPIDOGREL BISULFATE 75 MG/1
75 TABLET ORAL DAILY
Status: DISCONTINUED | OUTPATIENT
Start: 2022-02-16 | End: 2022-02-17 | Stop reason: HOSPADM

## 2022-02-16 RX ADMIN — ASPIRIN 81 MG: 81 TABLET, CHEWABLE ORAL at 09:44

## 2022-02-16 RX ADMIN — ENOXAPARIN SODIUM 40 MG: 40 INJECTION SUBCUTANEOUS at 09:45

## 2022-02-16 RX ADMIN — METOPROLOL SUCCINATE 12.5 MG: 25 TABLET, EXTENDED RELEASE ORAL at 09:44

## 2022-02-16 RX ADMIN — BIMATOPROST 1 DROP: 0.1 SOLUTION/ DROPS OPHTHALMIC at 09:46

## 2022-02-16 RX ADMIN — ATORVASTATIN CALCIUM 40 MG: 40 TABLET, FILM COATED ORAL at 17:28

## 2022-02-16 RX ADMIN — CLOPIDOGREL BISULFATE 75 MG: 75 TABLET ORAL at 17:28

## 2022-02-16 NOTE — PLAN OF CARE
Problem: Potential for Falls  Goal: Patient will remain free of falls  Description: INTERVENTIONS:  - Educate patient/family on patient safety including physical limitations  - Instruct patient to call for assistance with activity   - Consult OT/PT to assist with strengthening/mobility   - Keep Call bell within reach  - Keep bed low and locked with side rails adjusted as appropriate  - Keep care items and personal belongings within reach  - Initiate and maintain comfort rounds  - Make Fall Risk Sign visible to staff  - Offer Toileting every  Hours, in advance of need  - Initiate/Maintain alarm  - Obtain necessary fall risk management equipment  - Apply yellow socks and bracelet for high fall risk patients  - Consider moving patient to room near nurses station  Outcome: Progressing     Problem: MOBILITY - ADULT  Goal: Maintain or return to baseline ADL function  Description: INTERVENTIONS:  -  Assess patient's ability to carry out ADLs; assess patient's baseline for ADL function and identify physical deficits which impact ability to perform ADLs (bathing, care of mouth/teeth, toileting, grooming, dressing, etc )  - Assess/evaluate cause of self-care deficits   - Assess range of motion  - Assess patient's mobility; develop plan if impaired  - Assess patient's need for assistive devices and provide as appropriate  - Encourage maximum independence but intervene and supervise when necessary  - Involve family in performance of ADLs  - Assess for home care needs following discharge   - Consider OT consult to assist with ADL evaluation and planning for discharge  - Provide patient education as appropriate  Outcome: Progressing  Goal: Maintains/Returns to pre admission functional level  Description: INTERVENTIONS:  - Perform BMAT or MOVE assessment daily    - Set and communicate daily mobility goal to care team and patient/family/caregiver     - Collaborate with rehabilitation services on mobility goals if consulted  - Perform Range of Motion  times a day  - Reposition patient every  hours  - Dangle patient  times a day  - Stand patient  times a day  - Ambulate patient  times a day  - Out of bed to chair  times a day   - Out of bed for meals  times a day  - Out of bed for toileting  - Record patient progress and toleration of activity level   Outcome: Progressing     Problem: Prexisting or High Potential for Compromised Skin Integrity  Goal: Skin integrity is maintained or improved  Description: INTERVENTIONS:  - Identify patients at risk for skin breakdown  - Assess and monitor skin integrity  - Assess and monitor nutrition and hydration status  - Monitor labs   - Assess for incontinence   - Turn and reposition patient  - Assist with mobility/ambulation  - Relieve pressure over bony prominences  - Avoid friction and shearing  - Provide appropriate hygiene as needed including keeping skin clean and dry  - Evaluate need for skin moisturizer/barrier cream  - Collaborate with interdisciplinary team   - Patient/family teaching  - Consider wound care consult   Outcome: Progressing     Problem: Neurological Deficit  Goal: Neurological status is stable or improving  Description: Interventions:  - Monitor and assess patient's level of consciousness, motor function, sensory function, and level of assistance needed for ADLs  - Monitor and report changes from baseline  Collaborate with interdisciplinary team to initiate plan and implement interventions as ordered  - Provide and maintain a safe environment  - Consider seizure precautions  - Consider fall precautions  - Consider aspiration precautions  - Consider bleeding precautions  Outcome: Progressing     Problem: Activity Intolerance/Impaired Mobility  Goal: Mobility/activity is maintained at optimum level for patient  Description: Interventions:  - Assess and monitor patient  barriers to mobility and need for assistive/adaptive devices    - Assess patient's emotional response to limitations  - Collaborate with interdisciplinary team and initiate plans and interventions as ordered  - Encourage independent activity per ability   - Maintain proper body alignment  - Perform active/passive rom as tolerated/ordered  - Plan activities to conserve energy   - Turn patient as appropriate  Outcome: Progressing     Problem: Communication Impairment  Goal: Ability to express needs and understand communication  Description: Assess patient's communication skills and ability to understand information  Patient will demonstrate use of effective communication techniques, alternative methods of communication and understanding even if not able to speak  - Encourage communication and provide alternate methods of communication as needed  - Collaborate with case management/ for discharge needs  - Include patient/family/caregiver in decisions related to communication  Outcome: Progressing     Problem: Potential for Aspiration  Goal: Non-ventilated patient's risk of aspiration is minimized  Description: Assess and monitor vital signs, respiratory status, and labs (WBC)  Monitor for signs of aspiration (tachypnea, cough, rales, wheezing, cyanosis, fever)  - Assess and monitor patient's ability to swallow  - Place patient up in chair to eat if possible  - HOB up at 90 degrees to eat if unable to get patient up into chair   - Supervise patient during oral intake  - Instruct patient/ family to take small bites  - Instruct patient/ family to take small single sips when taking liquids  - Follow patient-specific strategies generated by speech pathologist   Outcome: Progressing  Goal: Ventilated patient's risk of aspiration is minimized  Description: Assess and monitor vital signs, respiratory status, airway cuff pressure, and labs (WBC)  Monitor for signs of aspiration (tachypnea, cough, rales, wheezing, cyanosis, fever)      - Elevate head of bed 30 degrees if patient has tube feeding   - Monitor tube feeding  Outcome: Progressing     Problem: Nutrition  Goal: Nutrition/Hydration status is improving  Description: Monitor and assess patient's nutrition/hydration status for malnutrition (ex- brittle hair, bruises, dry skin, pale skin and conjunctiva, muscle wasting, smooth red tongue, and disorientation)  Collaborate with interdisciplinary team and initiate plan and interventions as ordered  Monitor patient's weight and dietary intake as ordered or per policy  Utilize nutrition screening tool and intervene per policy  Determine patient's food preferences and provide high-protein, high-caloric foods as appropriate  - Assist patient with eating   - Allow adequate time for meals   - Encourage patient to take dietary supplement as ordered  - Collaborate with clinical nutritionist   - Include patient/family/caregiver in decisions related to nutrition    Outcome: Progressing

## 2022-02-16 NOTE — CONSULTS
Consult - Cardiology   Genna Weber 81 y o  male MRN: [id number]  Unit/Bed#: E4 -01 Encounter: [id number]        Reason For Consult: Recurrent near-syncope            ASSESSMENT:  · Recurrent near-syncope; with recent history of near syncope/syncope x 2         - patient felt strongly that he did not fully lose consciousness prior and current hospitalization  · Coronary artery disease   - NSTEMI S/P PCI/NATALIA to LAD x 2, 2014  - pharmacologic nuclear stress test negative for myocardial ischemia, rare PVCs, gated EF 72%, January 2022  · LVEF 22%, normal diastolic function, top normal RV size with normal function, mild LA dilatation, moderate RA dilatation, mild MR, MAC, trace TR with PASP 25-30 mmHg, January 2022  · Hypertension        - OP Rx, Toprol-XL 0 5 mg daily HS  · Dyslipidemia   - OP Rx, rosuvastatin 10 mg daily  - most recent lipid panel 2/15/22: Cholesterol 126, triglycerides 65, HDL 60, LDL 53      Other:  - type 2 diabetes mellitus    PLAN/ DISCUSSION:     · Patient with recurrent near- syncope; currently undergoing neurology, cardiology, and ENT evaluation  · Zio patch had been sent out for interpretation yesterday, 2/15/22  · CT brain: Stable examination with no mass, hemorrhage or acute territorial infarction identified  CT angiography: Minor atherosclerotic change of the left bifurcation  No occlusion, thrombus or vascular abnormality identified otherwise  · MRI: Punctate focus of diffusion abnormality in the cerebral vermis may represent punctuate recent lacunar infarct or may be artifactual as there is no corresponding T2 or FLAIR abnormality  Mild scattered chronic microangiopathic changes noted  · Recommend to avoid empirically anticoagulating the patient unless patient has been diagnosed with a dysrhythmia  Patient with recurrent near-syncope/syncope, thus current anticoagulation risk is elevated     · Rule out dysrhythmias, tachy-siena syndrome, pauses, etc that attribute to the near-syncope/syncope  If this is the case, will need to discuss with electrophysiology team for potential device placement  · Agree to check orthostatic vital signs  · Negative cardiac enzymes; high sensitivity troponin trend 7, 5, 5   · Continue aspirin 81 mg daily, atorvastatin 40 mg daily  · Currently on Toprol XL 12 5 mg daily, will continue to monitor heart rate rhythm on telemetry  · Monitor volume status with strict intake/output, daily weight  · Maintain potassium > 4, magnesium > 2  History Of Present Illness:  52-year-old male presented to [facility] on 2/14/22 with near-syncope, dry heaving/wretching  Per patient and wife today, patient denied palpitations, chest pain, shortness of breath with this event  Per patient, he had "lost his footing", felt weak, and lowered himself to the chair  In the past, the son told him if this occurred, to lower himself to the floor, thus patient lowered himself from the chair to the floor  Patient then began to dry heave and wretch loudly  His wife placed him on his side and he continued to dry heave and wretch  Additionally, last Tuesday, when patient attempted to  a pen from the ground it took him multiple attempts until he was able to complete the task  When he was driving his wife to [employer], he was noted to have slurred speech without any associated symptoms  Upon assessment and evaluation, patient resting comfortably in the chair  Patient is without shortness of breath, chest pain, dizziness, lightheadedness  Per patient and wife, patient with headache yesterday and today after eating breakfast  He denies palpitations  He states that when he moves his head from side to side, he felt the headache more  Please see significant cardiac history and problems enumerated above  Patient was recently hospitalized on 1/19/22 through 1/21/22 secondary to near-syncope/syncope   During hospitalization, ischemic evaluation with stress test completed revealing no ischemia  Please see significant cardiac history and problems enumerated above  Past Medical History:        Past Medical History:   Diagnosis Date    Cancer ([place] [address] )     NSTEMI (non-ST elevated myocardial infarction) ([place] [address] ) 2014    Pneumonia     Last Assessed:  10/7/14    Polym[facility]algia rheumatica ([facility] )     Last Assessed:  11/23/12    Polymyalgia rheumatica ([place] [address] ) 2011      Past Surgical History:   Procedure Laterality Date    APPENDECTOMY      CORONARY ANGIOPLASTY WITH STENT PLACEMENT      HERNIA REPAIR  2000        Allergy:        No Known Allergies    Medications:       Prior to Admission medications    Medication Sig Start Date End Date Taking?  Authorizing Provider   aspirin (ECOTRIN LOW STRENGTH) 81 mg EC tablet Take 1 tablet (81 mg total) by mouth daily 10/27/21  Yes Casper Peña MD   Calcium Citrate-Vitamin D (CALCIUM CITRATE + D) 315-250 MG-UNIT TABS Take 1 tablet by mouth daily    Historical Provider, MD   carboxymethylcellulose (REFRESH TEARS) 0 5 % SOLN 1 drop 2 (two) times a day as needed for dry eyes    Historical Provider, MD   Coenzyme Q10 100 MG TABS Take 1 tablet by mouth daily    Historical Provider, MD SCHWABIGAN 0 01 % ophthalmic drops Administer 1 drop to both eyes daily  Patient not taking: Reported on 1/19/2022 2/11/19   Historical Provider, MD   metoprolol succinate (TOPROL-XL) 25 mg 24 hr tablet Take 0 5 tablets (12 5 mg total) by mouth daily  Patient taking differently: Take 12 5 mg by mouth daily Takes at night  10/27/21   Casper Peña MD   Misc Natural Products (OSTEO BI-FLEX ADV TRIPLE ST PO) Take by mouth    Historical Provider, MD   OMEGA-3 FATTY ACIDS-VITAMIN E PO Take 1 capsule by mouth daily    Historical Provider, MD   rosuvastatin (CRESTOR) 10 MG tablet Take 1 tablet (10 mg total) by mouth daily  Patient not taking: Reported on 1/19/2022  10/27/21   Casper Peña MD       Family History:     Family History   Problem Relation Age of Onset    Pulmonary embolism Father     Other Father         Transurethral resection of prostate    Diabetes Sister         Social History:       Social History     Socioeconomic History    Marital status: /Civil Union     Spouse name: None    Number of children: None    Years of education: None    Highest education level: None   Occupational History    None   Tobacco Use    Smoking status: Never Smoker    Smokeless tobacco: Never Used   Vaping Use    Vaping Use: Never used   Substance and Sexual Activity    Alcohol use: Yes     Comment: Social    Drug use: Never    Sexual activity: Yes   Other Topics Concern    None   Social History Narrative    Exercises regularly     Social Determinants of Health     Financial Resource Strain: Not on file   Food Insecurity: Not on file   Transportation Needs: Not on file   Physical Activity: Not on file   Stress: Not on file   Social Connections: Not on file   Intimate Partner Violence: Not on file   Housing Stability: Not on file       ROS:  Negative except otherwise aforementioned above  Remainder review of systems is negative    Exam:  General:  alert, oriented and in no distress, cooperative  Head: Normocephalic, atraumatic  Eyes:  EOMI  Pupils - equal, round, reactive to accomodation  No icterus  Normal Conjunctiva     Oropharynx: moist and normal-appearing mucosa  Neck: supple, symmetrical, trachea midline   Heart:  Regular rate, regular rhythm   Respiratory effort / Chest Inspection: unlabored  Lungs:  normal air entry, lungs clear to auscultation and no rales, rhonchi or wheezing   Abdomen: flat, normal findings: bowel sounds normal and soft, non-tender  Lower Limbs:  no pitting edema  Pulses[de-identified]  RLE - DP: present 2+                 LLE - DP: present 2+  Musculoskeletal: ROM grossly normal    DATA:      ECG:                       Telemetry: sinus bradycardia           Echocardiogram completed on 1/20/22:         Left Ventricle: Left ventricular cavity size is normal  The left ventricular ejection fraction is 65% by visual estimation  Systolic function is normal  Wall motion is normal  Diastolic function is normal     Right Ventricle: Right ventricular cavity size is top-normal  Systolic function is normal     Left Atrium: The atrium is mildly dilated    Right Atrium: The atrium is moderately dilated    Aortic Valve: There is mild regurgitation    Mitral Valve: There is mild thickening  There is mild calcification  The leaflets exhibit normal mobility  There is mild annular calcification  There is trace regurgitation    Tricuspid Valve: There is trace regurgitation  Pulmonary artery systolic pressures are estimated at 25-30 mmHg  Ischemic Testing/ stress test completed on 1/21/22:       Pharmacologic nuclear stress test appears negative for myocardial ischemia    ECG portion of stress test is negative for myocardial ischemia    Stress ECG: The patient after exercising for 3 min and 4 sec and had a maximal HR of 115 bpm (85 % of MPHR) and 1 0 METS  The patient experienced no angina during the test  Blood pressure demonstrated a normal response and heart rate demonstrated a normal response to stress    Stress Function: Left ventricular function post-stress is normal  Post-stress ejection fraction is 72 %    Rare PVCs  Weights: Wt Readings from Last 3 Encounters:   02/14/22 82 5 kg (181 lb 14 1 oz)   01/20/22 82 1 kg (181 lb)   10/27/21 82 3 kg (181 lb 6 4 oz)   , Body mass index is 26 1 kg/m²           Lab Studies:           Results from last 7 days   Lab Units 02/15/22  0444   TRIGLYCERIDES mg/dL 65   HDL mg/dL 60     Results from last 7 days   Lab Units 02/15/22  0444 02/14/22  1434 02/14/22  1031   WBC Thousand/uL 7 37  --  6 50   HEMOGLOBIN g/dL 12 6  --  13 7   HEMATOCRIT % 39 2  --  40 5   PLATELETS Thousands/uL 262 245 265   ,   Results from last 7 days   Lab Units 02/15/22  0444 02/14/22  1031   POTASSIUM mmol/L 4 3 4 3   CHLORIDE mmol/L 105 104   CO2 mmol/L 24 20*   BUN mg/dL 25 34*   CREATININE mg/dL 0 92 1 05   CALCIUM mg/dL 8 4 8 6   ALK PHOS U/L  --  68   ALT U/L  --  23   AST U/L  --  22

## 2022-02-16 NOTE — OCCUPATIONAL THERAPY NOTE
Occupational Therapy Evaluation     Patient Name: Kecia Ball  CEMTJ'A Date: 2/16/2022  Problem List  Principal Problem:    Dizziness and giddiness  Active Problems:    Benign essential hypertension    Hyperlipidemia    Heart palpitations    Past Medical History  Past Medical History:   Diagnosis Date    Cancer ([facility] )     NSTEMI (non-ST elevated myocardial infarction) ([place] [address] ) 2014    Pneumonia     Last Assessed:  10/7/14    Polymyalgia rheumatica ([facility] )     Last Assessed:  11/23/12    Polymyalgia rheumatica ([facility] ) 2011     Past Surgical History  Past Surgical History:   Procedure Laterality Date    APPENDECTOMY      CORONARY ANGIOPLASTY WITH STENT PLACEMENT      HERNIA REPAIR  2000 02/16/22 1034   OT Last Visit   OT Visit Date 02/16/22   Note Type   Note type Evaluation   Restrictions/Precautions   Weight Bearing Precautions Per Order No   Other Precautions Telemetry; Fall Risk   Pain Assessment   Pain Assessment Tool 0-10   Pain Score No Pain   Home Living   Type of [address] [place] [address] Two level;1/2 bath on main level;Bed/bath upstairs; Laundry in basement  (1+1 SAM, FOS to 2nd floor bed/bath  Laundry in basement)   Bathroom Shower/Tub Tub/shower unit   Bathroom Toilet Raised   Bathroom Equipment Grab bars in shower;Commode   [facility]; Other (Comment)  (Walking sticks)   Additional Comments Pt lives with spouse in a two level house with 1+1 SAM and FOS to 2nd floor bed/bath  Spouse is retired and able to assist as needed  Prior Function   Level of [place] Independent with ADLs and functional mobility   Lives With Spouse   Receives Help From Family   ADL Assistance Independent   IADLs Independent   Falls in the last 6 months 1 to 4  (1 2* tripping; 2 2* dizziness)   Vocational Retired   Comments At baseline, pt was I w/ ADLs, IADLs, and functional transfers/mobility w/o use of AD  Very active at baseline  (+)   (+) falls PTA  Lifestyle   Autonomy At baseline, pt was I w/ ADLs, IADLs, and functional transfers/mobility w/o use of AD  Very active at baseline  (+)   (+) falls PTA  Reciprocal Relationships Spouse   Service to Others Retired- Pt reports was in the [employer], Traveling- plans to go to [facility]  66  (WDL) WD   ADL   Where Assessed Edge of bed   Eating Assistance 7  Independent   Grooming Assistance 7  Independent   UB Bathing Assistance 6  Modified Independent   LB Bathing Assistance 5  Supervision/Setup   UB Dressing Assistance 6  Modified independent   [address] [facility]  5  Supervision/Setup   Functional Assistance 5  Supervision/Setup   Bed Mobility   Supine to Sit 6  Modified independent   Additional items HOB elevated; Bedrails; Increased time required   Sit to Supine Unable to assess   Additional Comments Pt seated EOB w/ PT present at end of session  Call bell and phone within reach  All needs met and pt reports no further questions for OT at this time   Transfers   Sit to Stand 5  Supervision   Additional items Bedrails; Increased time required;Verbal cues   Stand to Sit 5  Supervision   Additional items Bedrails; Increased time required;Verbal cues   Functional Mobility   Functional Mobility 4  Minimal assistance   Additional Comments CGA, Assist x1 w/o use of AD  Pt reported slight lightheadedness with mobility/turning   Resolved when seated   Balance   Static Sitting Good   Dynamic Sitting Fair +   Static Standing Fair   Dynamic Standing Fair -   Ambulatory Poor +   Activity Tolerance   Activity Tolerance Patient tolerated treatment well;Treatment limited secondary to medical complications (Comment)   Medical Staff Made Aware DARIAN Gross   Nurse Made Aware yes; ANNIE Sorto   RUE Assessment   RUE Assessment WFL   RUE Strength   RUE Overall Strength Within Functional Limits - able to perform ADL tasks with strength  (4/5 throughout)   LUE Assessment   LUE Assessment X  (limited shldr flex (*), elbow-distal=WFL)   LUE Strength   LUE Overall Strength Within Functional Limits - able to perform ADL tasks with strength  (4-/5 throughout)   Hand Function   Gross Motor Coordination Functional   Fine Motor Coordination Functional   Sensation   Light Touch No apparent deficits   Proprioception   Proprioception No apparent deficits   Vision-Basic Assessment   Current Vision Wears glasses all the time   Vision - Complex Assessment   Ocular Range of Motion [facility]   Acuity Able to read clock/calendar on wall without difficulty; Able to read employee name badge without difficulty   Perception   Inattention/Neglect Appears intact   Cognition   Overall Cognitive Status [facility]   Arousal/Participation Alert; Cooperative   Attention Within functional limits   Orientation Level Oriented X4   Memory Within functional limits   Following Commands Follows all commands and directions without difficulty   Comments Pleasant and cooperative  Engages in conversation appropriately   Assessment   Prognosis Good   Assessment Pt is a 81 y o  male seen for OT evaluation s/p adm to [facility]  on 2/14/2022 w/ vertigo for approx  1 hour, chest discomfort, heart palpitations, and slurred speech 3 days ago  Pt admitted w/ dizziness and giddiness  CT head (-) for acute intracranial abnormality  MRI brain: "Punctate focus of diffusion abnormality in the cerebellar vermis may represent punctate recent lacunar infarct or may be artifactual as there is no corresponding T2 or FLAIR abnormality " Comorbidities affecting pts functional performance include a significant PMH of CA, NSTEMI, and Polymyalgia rheumatica  Pt with active OT orders and activity orders for Activity as tolerated  Pt lives with spouse in a two level house with 1+1 SAM and FOS to 2nd floor bed/bath  Spouse is retired and able to assist as needed   At baseline, pt was I w/ ADLs, IADLs, and functional transfers/mobility w/o use of AD  Very active at baseline  (+)   (+) falls PTA  Upon evaluation, pt currently functioning at a [address] I level for overall ADLs, Mod I for bed mobility, Supervision for transfers, and Min A (CGA) for functional mobility 2* the following deficits impacting occupational performance: lightheadedness/dizziness and decreased tolerance and personal factors of: steps to enter environment, difficulty performing IADLS  and steps within home environment  These impairments, however, do not limit pts ability to safely engage in all baseline areas of occupation  No further acute OT needs identified at this time  Recommend continued mobilization with hospital staff while in the hospital to increase pts endurance and strength upon D/C  From OT standpoint, recommend D/C home with family support when medically cleared  D/C pt from OT caseload at this time  Goals   Patient Goals To figure out the cause behind his symptoms   Plan   OT Frequency Eval only  (D/C OT)   Recommendation   OT Discharge Recommendation No rehabilitation needs   OT - OK to Discharge Yes  (when medically cleared)   Additional Comments  The patient's raw score on the AM-PAC Daily Activity inpatient short form is 21, standardized score is 44 27, greater than 39 4  Patients at this level are likely to benefit from discharge to home  Please refer to the recommendation of the Occupational Therapist for safe discharge planning     AM-PAC Daily Activity Inpatient   Lower Body Dressing 3   Bathing 3   Toileting 3   Upper Body Dressing 4   Grooming 4   Eating 4   Daily Activity Raw Score 21   Daily Activity Standardized Score (Calc for Raw Score >=11) 44 27   AM-PAC Applied Cognition Inpatient   Following a Speech/Presentation 4   Understanding Ordinary Conversation 4   Taking Medications 4   Remembering Where Things Are Placed or Put Away 4   Remembering List of 4-5 Errands 4   Taking Care of Complicated Tasks 4   Applied Cognition Raw Score 24   Applied Cognition Standardized Score 62 21       Tiera Enriquez, OTR/L

## 2022-02-16 NOTE — PLAN OF CARE
Problem: PHYSICAL THERAPY ADULT  Goal: Performs mobility at highest level of function for planned discharge setting  See evaluation for individualized goals  Description: Treatment/Interventions: Functional transfer training,LE strengthening/ROM,Elevations,Therapeutic exercise,Endurance training,Patient/family training,Bed mobility,Gait training,Spoke to nursing,OT,Family,Spoke to MD  Equipment Recommended: Sheri Turner (trial RW next tx session)       See flowsheet documentation for full assessment, interventions and recommendations  Note: Prognosis: Good  Problem List: Decreased endurance,Impaired balance,Decreased mobility  Assessment: Pt  81 y  o male presented w/ c/o dizziness, near syncope, chest discomfort & heart palpitations & stroke-like symptoms w/ reports of slurring of speech x 2days ago prior to admission  Pt admitted to r/o stroke, vertigo & for cardiac work-up  MRI: Punctate focus of diffusion abnormality in the cerebellar vermis may represent punctate recent lacunar infarct or may be artifactual as there is no corresponding T2 or FLAIR abnormality  Correlate with clinical symptoms  Pt referred to PT for mobility assessment & D/C planning w/ orders to ambulate pt  Please see flowsheet above for information re: home set-up & PLOF as well as objective findings during PT assessment  PTA, pt reports being I w/o AD  On eval, pt functioning minimally below baseline hence will continue skilled PT to improve function & safety  NO dizziness & nystagmus noted during change in positions as well as w/ eye & head movements  (+) slight dizziness reported w/ turns during amb but immediately resolved w/ rest  Pt reports NO ear pain, ringing in the ear & recent ear/ sinus infection  Pt also describe dizziness as lightheadedness & NOT room spinning sensation  Pt also reports one episode of dizziness associated w/ low BP of 95 systolic while here in hospital  Orthostatic BP order in placed, Nsg staff to follow  [place]-Hallpike manuever not indicated at this time as symptoms less likely BPPV  More likely neurologic w/ (+) MRI finding  The patient's AM-PAC Basic Mobility Inpatient Short Form Raw Score is 20  A Raw score of greater than 16 suggests the patient may benefit from discharge to home  Please also refer to the recommendation of the Physical Therapist for safe discharge planning  From PT standpoint, will anticipate good progress in PT for safe D/C to home  Will recommend OPPT & family support at D/C  No SOB & dizziness reported t/o session  Nsg staff most recent vital signs as follows: /86 (BP Location: Right arm)   Pulse 73   Temp (!) 97 4 °F (36 3 °C) (Temporal)   Resp 18   Ht 5' 10" (1 778 m)   Wt 82 5 kg (181 lb 14 1 oz)   SpO2 93%   BMI 26 10 kg/m²   Fall precautions reinforced w/ good understanding  CM to follow  Nsg staff to continue to mobilized pt (OOB in chair for all meals & ambulate in room/unit) as tolerated to prevent further decline in function  Nsg notified  Co-eval was necessary to complete this PT eval for the pt's best interest given pt's medical acuity & complexity  Barriers to Discharge: None        PT Discharge Recommendation: Home with outpatient rehabilitation (OPPT pending progress)          See flowsheet documentation for full assessment

## 2022-02-16 NOTE — ASSESSMENT & PLAN NOTE
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

## 2022-02-16 NOTE — PROGRESS NOTES
Progress Note - Neurology   Pretty Laura 81 y o  male MRN: [id number]  Unit/Bed#: E4 -01 Encounter: [id number]      Assessment/Plan     * Dizziness and giddiness  Assessment & Plan  43-year-old male with history of CAD/NSTEMI, history of PMR, presented 2/14 with episode of dizziness, fall/near syncope with associated chest pain, palpitations and dry heaving/retching      Patient had similar type presentation several weeks ago; was admitted with thorough cardiac workup including Zio patch placement  He has also had other suspicious episodes, including episode dysarthria while driving, and instance of being unable to  objects off the floor  MRI brain per Radiology demonstrated possible punctate infarct in the cerebellar vermis versus artifact (no ADC or FLAIR correlate)  CTA head/neck demonstrated no flow consequential stenosis or LVO  Recent echocardiogram in late January 2022 with normal EF, no regional wall motion abnormalities, mild to moderate atrial dilation bilaterally  A1c 6 2, LDL 53      Significance of cerebellar diffusion abnormality unclear  Would be interested to see with the Zio Patch recorded leading up to and during patient's most recent event  Cannot entirely rule out cerebellar infarct as etiology of patient's latest symptomatic episode  Plan:  -Zio patch has been sent back to be interpreted  No evidence of AFib on telemetry thus far   -patient should follow-up with Cardiology and Neurology as outpatient, and return to the ED should he have any further suspicious symptoms  -ENT consulted  Appreciate recommendations  -orthostatic vital signs ordered but not yet checked  -continue aspirin 81 mg and will add Plavix 75mg daily; continue DAPT x3 weeks and then d/c ASA and continue Plavix monotherapy  Continue atorvastatin 40 mg daily   -telemetry monitoring  -PT/OT    Hyperlipidemia  Assessment & Plan  Stable; continue statin      Benign essential hypertension  Assessment & Plan  Goal normotension  Check orthostatics  Chuck Lester will need follow up in in 6 weeks with neurovascular attending  He will not require outpatient neurological testing  Subjective:   Patient felt lightheaded after breakfast, with frontal headache which seemed worse when turning his head to the right  MRI read as possible cerebellar infarct vs  Artifact  Patient's wife mailed in [place] patch last evening to be interpreted  ROS: 12 point review of systems performed and negative except as indicated above  Vitals: Blood pressure 124/86, pulse 73, temperature (!) 97 4 °F (36 3 °C), temperature source Temporal, resp  rate 18, height 5' 10" (1 778 m), weight 82 5 kg (181 lb 14 1 oz), SpO2 93 %  ,Body mass index is 26 1 kg/m²  Physical Exam:   General:  Patient is well-developed, well-nourished, and in no acute distress  HEENT:  Head normocephalic  Eyes anicteric  Cardiovascular:  With regular rhythm  Lungs:  Normal effort  Nonlabored breathing  Extremities:  With no significant edema  Skin: No rashes  Neurologic:  Patient is alert, pleasantly interactive, and appropriately conversational   No obvious symbolic language difficulty or dysarthria  Gait deferred for safety  EOMs appear intact  No facial asymmetry  Patient moving all 4 extremities without obvious lateralized weakness      Lab, Imaging and other studies:   CBC:   Results from last 7 days   Lab Units 02/15/22  0444 02/14/22  1434 02/14/22  1031   WBC Thousand/uL 7 37  --  6 50   RBC Million/uL 4 71  --  4 90   HEMOGLOBIN g/dL 12 6  --  13 7   HEMATOCRIT % 39 2  --  40 5   MCV fL 83  --  83   PLATELETS Thousands/uL 262 245 265   , BMP/CMP:   Results from last 7 days   Lab Units 02/15/22  0444 02/14/22  1031   SODIUM mmol/L 138 137   POTASSIUM mmol/L 4 3 4 3   CHLORIDE mmol/L 105 104   CO2 mmol/L 24 20*   BUN mg/dL 25 34*   CREATININE mg/dL 0 92 1 05   CALCIUM mg/dL 8 4 8 6   AST U/L  --  22   ALT U/L  --  23   ALK PHOS U/L  --  68   EGFR ml/min/1 73sq m 75 64   , HgBA1C:   Results from last 7 days   Lab Units 02/15/22  0444   HEMOGLOBIN A1C % 6 2*   , Lipid Profile:   Results from last 7 days   Lab Units 02/15/22  0444   HDL mg/dL 60   LDL CALC mg/dL 53   TRIGLYCERIDES mg/dL 65     VTE Prophylaxis: Enoxaparin (Lovenox)    Counseling / Coordination of Care  Total time spent today 45 minutes  Greater than 50% of total time was spent with the patient and / or family counseling and / or coordination of care  A description of the counseling / coordination of care: extensive d/w patient, his wife, daughter, grandson, and Cardiology at bedside re: MRI result and plan going forward to check Zio patch and f/u [facility]   D/w primary team

## 2022-02-16 NOTE — CONSULTS
Consultation - ENT   Devi Velarde 81 y o  male MRN: [id number]  Unit/Bed#: E4 -01 Encounter: [id number]      Assessment/Plan     Assessment:  Dizziness of unknown etiology  - presently  Patient asymoptomatic  Symptoms may be related to acute labyrinthitis vs bppv; must rule out cardiac vs neurologis etiology first    Holter monitor recently sent for evaluation and results pending with recent blood work neegative for acute cardiac event and negative stress test related to workup in January  CTA of brain and neck performed today with no abnormality to explain present complaints  MRI performed today shows 'Punctate focus of diffusion abnormality in the cerebellar vermis may represent punctate recent lacunar infarct or may be artifactual as there is no corresponding T2 or FLAIR abnormality " defer to neurology to determine significance  Plan:  Rule out neurologic and or cardiac etiology for recent events  As described by patient and his wife initial event and recent event that required this hospiitalization can possibly be variant of vertigo , such as labyrinthitis or benign positional vertigo  Event last week is concerning since patient and wife describe ? Of neurologic deficits with slurred speech and inability to articulate for short period of time which is not consistent with vertiginous event  I discussed vestibular evaluation with physical therapist  She will review whether safe to perform jared-Hallpike on patient to determine if BPPV plays a roll  Once patient cleared neurologically and from cardiac perspective may follow up with ENT as outpatient  Further evaluation can be performed in office and VNG may be considered as outpatient  If vertigo appears to be primary concern:   Not c/w menieres, which onset is usually earlier in life and associated with triad symptoms (fluctuating hearing loss, tinnitus as well as often increased tinnitus prior to event)   Diagnosis is often made by exclusion of all other possible causes of dizziness and additional workup can be considered as outpatient  Information regarding Meniere's listed below:      [Ménière's is a disorder of the inner ear with recurring set of symptoms of episodic vertigo (hallucination of motion), fluctuating hearing loss that eventually can be permanent and tinnitus  It usually affects only 1 ear  It can occur at any age but in most cases it begins b/t 19-56 years old  Audiometry is part of the workup and can be performed in outpatient setting  VNG can be considered for persistent symptoms  Usual treatment is a low-sodium diet (less than 2 g/daily, no caffeine, decrease stress  Addition of daily (potassium sparing) diuretic is often helpful  Additional pharmacologic options include betahistine (75 mg 3x/daily)   Transtympanic steroid injection may also be considered  Neurology evaluation and brain imaging may be useful to rule out neoplasm versus alternate neurologic process - this is ongoing during present hospitalization      Neuro-otologic labs can be considered (r/o autoimmune/infectious/metabolic process) including:  ELHAM, RF,lyme titer/western blot, TSH/Free T4, FTA ab, HSP-70/68kd Ab ]    -patient should be discharged with antiemetic (suppository or po - patient preference) to be taken prn    -Meclizine may be taken 2-3x/daily (12 5 mg) with valium 5 mg q12 hours prn severe vertigo  Aware that patient had no relief with Meclizine and valium recently but no alternate medications for sudden vertigo    -Patient instructed to take antiemetic at first sign of nausea, may take  Meclizine or if severe symptoms, valium and go directly to bed  Of course if neurologic deficits , patient has cognitive impairment, chest pain etc   He/wife should call [phone]  Outpatient follow up will be arranged          History of Present Illness   Physician Requesting Consult: Manny Moritz, DO  Reason for Consult / Principal Problem: Dizziness  HPI: Belen Silva is a 81y o  year old male who presents with recurrent episodes of dizziness associated with nausea and dry heaving  Initial event in January occurred suddenly  Patient reports he bent over and when he stood up he felt as if he was in motion with immediate nausea and dry heaving  He denies syncope and was able to get to a chair  Unfortunately he felt so weak he required transport to the hospital for further evaluation  His hospitalization was uneventful and he was discharged with plans for Holter monitoring  He received the Holter monitor last week, placed on Tuesday  He describes a 2nd event which differs from the 1st and most recent event for which he is presently hospitalized described as 1 week ago he had difficulty bending over to  a pen  After multiple tries he was successful iand thought very little of it afterwards  A few minutes later he was driving with his wife and describes inability to phonate  His wife said when he did speak his speech was slurred with eventual recovery after few minutes  Most recent event occurred Monday well the patient was getting dressed  Reports symptoms began after she turned to his right  Again he felt lightheaded, was able to get to a chair and slide to the ground  He again developed nausea with dry heaving  He was transferred by ambulance to the hospital   Notes reflect he was given ondansetron as well as meclizine and Valium with no relief of symptoms at that time  He denies any change in hearing, difficulty hearing, tinnitus or prodrome preceding dizzy events  He reports he has no hallucination of motion at this time  He feels well and is in good spirits  He reports he developed forehead pressure with a headache after eating breakfast this morning  He describes sensation as lightheadedness    At the time he fell lateral rotation of his head either left or right with worsened symptoms but he denies hallucination of motion, associated nausea or dry heaving  He denies chest pain or shortness of breath, any speech disturbance or slurred speech, extremity weakness or difficulty ambulating  He is an avid runner and used in formally ran multiple marathons  He reports he is quite active in played basketball until 77years of age  He still walks regularly and was walking and jogging as recently as summer of 22  He has notice that he has difficulty ambulating in a straight line which he noticed in the summer or fall and proceeded these events  Inpatient consult to ENT  Consult performed by: Lakia Grady DO  Consult ordered by: Yony Lema DO          Review of Systems   Constitutional: Negative for chills and fever  HENT: Negative for ear pain and sore throat  Eyes: Negative for pain and visual disturbance  Respiratory: Negative for cough and shortness of breath  Cardiovascular: Negative for chest pain and palpitations  Gastrointestinal: Negative for abdominal pain and vomiting  Genitourinary: Negative for dysuria and hematuria  Musculoskeletal: Negative for arthralgias and back pain  Skin: Negative for color change and rash  Neurological: Positive for dizziness and light-headedness  Negative for seizures and syncope  All other systems reviewed and are negative        Historical Information   Past Medical History:   Diagnosis Date    Cancer [facility])     NSTEMI (non-ST elevated myocardial infarction) ([facility] ) 2014    Pneumonia     Last Assessed:  10/7/14    Polymyalgia rheumatica ([facility] )     Last Assessed:  11/23/12    Polymyalgia rheumatica ([facility] ) 2011     Past Surgical History:   Procedure Laterality Date    APPENDECTOMY      CORONARY ANGIOPLASTY WITH STENT PLACEMENT      HERNIA REPAIR  2000     Social History   Social History     Substance and Sexual Activity   Alcohol Use Yes    Comment: Social     Social History     Substance and Sexual Activity   Drug Use Never     E-Cigarette/Vaping    E-Cigarette Use Never User      E-Cigarette/Vaping Substances    Nicotine No     THC No     CBD No     Flavoring No     Other No     Unknown No      Social History     Tobacco Use   Smoking Status Never Smoker   Smokeless Tobacco Never Used     Family History: non-contributory    Meds/Allergies   all current active meds have been reviewed, current meds:   Current Facility-Administered Medications   Medication Dose Route Frequency    aluminum-magnesium hydroxide-simethicone (MYLANTA) oral suspension 30 mL  30 mL Oral Q6H PRN    aspirin chewable tablet 81 mg  81 mg Oral Daily    atorvastatin (LIPITOR) tablet 40 mg  40 mg Oral QPM    bimatoprost (LUMIGAN) 0 01 % ophthalmic solution 1 drop  1 drop Both Eyes Daily    calcium carbonate (TUMS) chewable tablet 1,000 mg  1,000 mg Oral Daily PRN    enoxaparin (LOVENOX) subcutaneous injection 40 mg  40 mg Subcutaneous Daily    ketorolac (TORADOL) tablet 10 mg  10 mg Oral Once    meclizine (ANTIVERT) tablet 25 mg  25 mg Oral Q8H PRN    metoprolol succinate (TOPROL-XL) 24 hr tablet 12 5 mg  12 5 mg Oral Daily    ondansetron (ZOFRAN) injection 4 mg  4 mg Intravenous Q6H PRN    sodium chloride (OCEAN) 0 65 % nasal spray 1 spray  1 spray Each Nare Q1H PRN    and PTA meds:   Prior to Admission Medications   Prescriptions Last Dose Informant Patient Reported? Taking?    Calcium Citrate-Vitamin D (CALCIUM CITRATE + D) 315-250 MG-UNIT TABS  Self Yes No   Sig: Take 1 tablet by mouth daily   Coenzyme Q10 100 MG TABS  Self Yes No   Sig: Take 1 tablet by mouth daily   LUMIGAN 0 01 % ophthalmic drops  Self Yes No   Sig: Administer 1 drop to both eyes daily   Patient not taking: Reported on 1/19/2022    [facility] Natural Products (OSTEO BI-FLEX ADV TRIPLE ST PO)  Self Yes No   Sig: Take by mouth   OMEGA-3 FATTY ACIDS-VITAMIN E PO  Self Yes No   Sig: Take 1 capsule by mouth daily   aspirin (ECOTRIN LOW STRENGTH) 81 mg EC tablet 2/14/2022 at Unknown time Self No Yes   Sig: Take 1 tablet (81 mg total) by mouth daily   carboxymethylcellulose (REFRESH TEARS) 0 5 % SOLN  Self Yes No   Si drop 2 (two) times a day as needed for dry eyes   metoprolol succinate (TOPROL-XL) 25 mg 24 hr tablet  Self No No   Sig: Take 0 5 tablets (12 5 mg total) by mouth daily   Patient taking differently: Take 12 5 mg by mouth daily Takes at night    rosuvastatin (CRESTOR) 10 MG tablet  Self No No   Sig: Take 1 tablet (10 mg total) by mouth daily   Patient not taking: Reported on 2022       Facility-Administered Medications: None       No Known Allergies    Objective       Intake/Output Summary (Last 24 hours) at 2022 1024  Last data filed at 2022 0340  Gross per 24 hour   Intake --   Output 1790 ml   Net -1790 ml       Invasive Devices  Report    Peripheral Intravenous Line            Peripheral IV 22 Dorsal (posterior); Left Hand 2 days    Peripheral IV 02/15/22 Left;Ventral (anterior) Forearm 1 day                Physical Exam     PHYSICAL  EXAMINATION    CONSTITUTION:    appears appropriate for age  No evidence of any acute distress  Communicates normally  Voice quality is clear  Alert and oriented  HEAD/FACE:    atraumatic, normocephalic on inspection  No scars present  Salivary glands are normal in texture and size without any asymmetry  Facial nerve function is symmetric and normal     EYES:    Extraocular muscles intact in both eyes, normal gaze bilaterally and no evidence of nystagmus  Pupils equal, round, and accommodate to light bilaterally  EARS:    External ears normal     External canals are clear and dry, minimal wax nonobstructing right  Tympanic membranes intact with normal mobility, no effusion, no retraction, no perforation  Post auricular area is normal    NOSE:    External nose without deformity  Internal mucosa pink and moist     Septum midline  Nasal turbinates normal in color and size  ORAL CAVITY:    lips normal and healthy in appearance  Dentition normal with mild dental crowding noted  Dentition in good repair  Gums healthy, pink and moist     Tongue appears pink and moist with no lesions  Floor of mouth pink, moist, and smooth  Submandibular ducts patent with clear saliva  Parotid ducts patent with clear saliva  Oral mucosa pink and moist     Hard palate normal in appearance without any lesions  OROPHARYNX:    soft palate pink and moist without any lesions  Uvula midline without any lesions  Tonsils grade 1 bilaterally  Posterior pharynx pink and moist without any lesions    NECK:    supple and symmetric  No masses noted  Trachea midline  No thyromegaly or nodules noted  LYMPH:    No palpable adenopathy in left or right neck    SKIN:    No rashes  No lesions noted  NEURO EXAMINATION:  Alert and oriented x 3  Mood is normal  Affect is normal  Speech is articulate and language is fluent  No incoordination present  No nystagmus appreciated  No involuntary movement or psychomotor activity  No focal deficits appreciated  Cranial nerves 2-12 grossly intact  No evidence of facial paralysis  Lab Results:   CBC, basic metabolic profile wnl and IJPY1G - 6 2  Covid , flu and RSV testing negative  Imaging Studies: I have personally reviewed pertinent films in PACS  EKG, Pathology, and Other Studies: I have personally reviewed pertinent reports  CTA head and neck w wo contrast    Result Date: 2/15/2022  Narrative: CTA NECK AND BRAIN WITH AND WITHOUT CONTRAST INDICATION: Neuro deficit, acute, stroke suspected stroke rule out, dizziness, N/V COMPARISON:   CT brain dated 2/14/2022  MRI brain dated 2/15/2022  TECHNIQUE:  Routine CT imaging of the Brain without contrast   Post contrast imaging was performed after administration of iodinated contrast through the neck and brain  Post contrast axial 0 625 mm images timed to opacify the arterial system  3D rendering was performed on an independent workstation  MIP reconstructions performed  Coronal reconstructions were performed of the noncontrast portion of the brain  Radiation dose length product (DLP) for this visit:  021 329 93 28 mGy-cm   This examination, like all CT scans performed in the [facility], was performed utilizing techniques to minimize radiation dose exposure, including the use of iterative reconstruction and automated exposure control  IV Contrast:  85 mL of iohexol (OMNIPAQUE)  IMAGE QUALITY:   Diagnostic FINDINGS: NONCONTRAST BRAIN PARENCHYMA: Decreased attenuation is noted in periventricular and subcortical white matter demonstrating an appearance that is statistically most likely to represent mild microangiopathic change; this appearance is similar when compared to most recent prior examination  No CT signs of acute infarction  No intracranial mass, mass effect or midline shift  No acute parenchymal hemorrhage  Stable mild cerebral volume loss  VENTRICLES AND EXTRA-AXIAL SPACES:  Normal for the patient's age  VISUALIZED ORBITS AND PARANASAL SINUSES:  Unremarkable  CERVICAL VASCULATURE AORTIC ARCH AND GREAT VESSELS:  Normal aortic arch and great vessel origins  Normal visualized subclavian vessels  RIGHT VERTEBRAL ARTERY CERVICAL SEGMENT:  Normal origin  The vessel is normal in caliber throughout the neck  LEFT VERTEBRAL ARTERY CERVICAL SEGMENT:  Normal origin  The vessel is normal in caliber throughout the neck  RIGHT EXTRACRANIAL CAROTID SEGMENT:  Normal caliber common carotid artery  Normal bifurcation and cervical internal carotid artery  No stenosis or dissection  LEFT EXTRACRANIAL CAROTID SEGMENT:  Mild atherosclerotic disease of the left common carotid artery bifurcation with minimal, less than 20% narrowing of the distal common carotid artery and ICA origin  Above this the vessel is normal in caliber  NASCET criteria was used to determine the degree of internal carotid artery diameter stenosis   INTRACRANIAL VASCULATURE INTERNAL CAROTID ARTERIES:  Minor calcification of the intracranial internal carotid arteries without stenosis  No occlusion  ANTERIOR CIRCULATION:  Symmetric A1 segments and anterior cerebral arteries with normal enhancement  Normal anterior communicating artery  MIDDLE CEREBRAL ARTERY CIRCULATION:  M1 segment and middle cerebral artery branches demonstrate normal enhancement bilaterally  DISTAL VERTEBRAL ARTERIES:  Normal distal vertebral arteries  Posterior inferior cerebellar artery origins are normal  Normal vertebral basilar junction  BASILAR ARTERY:  Basilar artery is normal in caliber  Normal superior cerebellar arteries  POSTERIOR CEREBRAL ARTERIES: Both posterior cerebral arteries arises from the basilar tip  Both arteries demonstrate normal enhancement  Normal posterior communicating arteries  VENOUS STRUCTURES:  Normal  NON VASCULAR ANATOMY BONY STRUCTURES:  Multilevel cervical spondylitic degenerative change with mild to moderate canal stenosis and foraminal narrowing  There is no acute osseous abnormality identified  SOFT TISSUES OF THE NECK:  Normal  THORACIC INLET:  Unremarkable  Impression: CT brain: Stable examination with no mass, hemorrhage or acute territorial infarction identified  CT angiography: Minor atherosclerotic change of the left bifurcation  No occlusion, thrombosis or vascular abnormality identified otherwise  Workstation performed: [id number]     XR chest 1 view portable    Result Date: 1/19/2022  Narrative: CHEST INDICATION:   near syncope  COMPARISON:  Chest radiograph dated 8/20/2018  EXAM PERFORMED/VIEWS:  XR CHEST PORTABLE FINDINGS: Cardiomediastinal silhouette appears unremarkable  No focal airspace consolidation  No pneumothorax or pleural effusion  Osseous structures appear within normal limits for patient age  Focal area of sclerosis in the scapula is similar to prior, likely benign  Impression: No acute cardiopulmonary disease   Workstation performed: [id number]     CT head without contrast    Result Date: 2/14/2022  Narrative: CT BRAIN - WITHOUT CONTRAST INDICATION:   Ataxia, head trauma vertigo, fall, possible head trauma  COMPARISON:  1/19/2022  TECHNIQUE:  CT examination of the brain was performed  In addition to axial images, sagittal and coronal 2D reformatted images were created and submitted for interpretation  Radiation dose length product (DLP) for this visit:  959 mGy-cm   This examination, like all CT scans performed in the [facility], was performed utilizing techniques to minimize radiation dose exposure, including the use of iterative reconstruction and automated exposure control  IMAGE QUALITY:  Diagnostic  FINDINGS: PARENCHYMA:  No intracranial mass, mass effect or midline shift  No CT signs of acute infarction  No acute parenchymal hemorrhage  VENTRICLES AND EXTRA-AXIAL SPACES:  Mildly dilated compatible with mild atrophy  VISUALIZED ORBITS AND PARANASAL SINUSES:  Mild mucosal thickening of the ethmoid sinuses bilaterally  CALVARIUM AND EXTRACRANIAL SOFT TISSUES:  Normal      Impression: No acute intracranial abnormality  Workstation performed: [id number]     CT head without contrast    Result Date: 1/19/2022  Narrative: CT BRAIN - WITHOUT CONTRAST INDICATION:   Head trauma, moderate-severe fall, head injury  Near syncopal episodes with fall and head strike COMPARISON:  None  TECHNIQUE:  CT examination of the brain was performed  In addition to axial images, sagittal and coronal 2D reformatted images were created and submitted for interpretation  Radiation dose length product (DLP) for this visit:  865 mGy-cm   This examination, like all CT scans performed in the [facility], was performed utilizing techniques to minimize radiation dose exposure, including the use of iterative reconstruction and automated exposure control  IMAGE QUALITY:  Diagnostic   FINDINGS: PARENCHYMA:  No intracranial mass, mass effect or midline shift  No CT signs of acute infarction  No acute parenchymal hemorrhage  VENTRICLES AND EXTRA-AXIAL SPACES: Sulcal atrophy is proportionate to the ventricles  VISUALIZED ORBITS AND PARANASAL SINUSES:  Unremarkable  CALVARIUM AND EXTRACRANIAL SOFT TISSUES:  Normal      Impression: No acute intracranial abnormality  Workstation performed: [id number]     CT cervical spine without contrast    Result Date: 1/19/2022  Narrative: CT CERVICAL SPINE - WITHOUT CONTRAST INDICATION:   Neck trauma (Age >= 65y) fall  Head strike COMPARISON:  None  TECHNIQUE:  CT examination of the cervical spine was performed without intravenous contrast   Contiguous axial images were obtained  Sagittal and coronal reconstructions were performed  Radiation dose length product (DLP) for this visit:  459 mGy-cm   This examination, like all CT scans performed in the [facility], was performed utilizing techniques to minimize radiation dose exposure, including the use of iterative reconstruction and automated exposure control  IMAGE QUALITY:  Diagnostic  FINDINGS: ALIGNMENT:  Grade 1 retrolisthesis of C3 as compared to C2 is identified as well as grade 1 anterolisthesis of C5 as compared to C6  These changes appear degenerative  VERTEBRAL BODIES:  No fracture  DEGENERATIVE CHANGES:  Moderate multilevel cervical degenerative changes are noted  No critical central canal stenosis  Facet hypertrophy is also seen  Canal narrowing is most pronounced at C3-C4 where it is moderate to severe related to osteophytes and facet hypertrophy  Moderate canal narrowing is also seen at C6-7 related to osteophytes  Multilevel bony foraminal narrowing is also identified most pronounced at C3-4, C4-5 and C6-7  PREVERTEBRAL AND PARASPINAL SOFT TISSUES:  Unremarkable  THORACIC INLET:  Normal      Impression: No cervical spine fracture or traumatic malalignment    Workstation performed: [id number]     MRI brain wo contrast    Result Date: 2/15/2022  Narrative: MRI BRAIN WITHOUT CONTRAST INDICATION: Vertigo slurring speech  COMPARISON:   None  TECHNIQUE:  Sagittal T1, axial T2, axial FLAIR, axial T1, axial Gradient and axial diffusion imaging  IMAGE QUALITY:  Diagnostic  FINDINGS: BRAIN PARENCHYMA:  Punctate focus of diffusion abnormality potentially artifactual in the cerebellar vermis series 4 image 7-8  This also may represent punctate recent lacunar infarct although there is no corresponding FLAIR or T2 abnormality  Small scattered hyperintensities on T2/FLAIR imaging are noted in the periventricular and subcortical white matter demonstrating an appearance that is statistically most likely to represent mild microangiopathic change  VENTRICLES:  Normal for the patient's age  SELLA AND PITUITARY GLAND:  Normal  ORBITS:  Bilateral lens replacements  PARANASAL SINUSES:  Normal  VASCULATURE:  Evaluation of the major intracranial vasculature demonstrates appropriate flow voids  CALVARIUM AND SKULL BASE:  Normal  EXTRACRANIAL SOFT TISSUES:  Normal      Impression: Punctate focus of diffusion abnormality in the cerebellar vermis may represent punctate recent lacunar infarct or may be artifactual as there is no corresponding T2 or FLAIR abnormality  Correlate with clinical symptoms  Mild scattered chronic microangiopathic changes are noted  Workstation performed: [id number]     CTA dissection protocol chest/abdomen/pelvis    Result Date: 2/14/2022  Narrative: CTA - CHEST, ABDOMEN AND PELVIS - WITHOUT AND WITH IV CONTRAST INDICATION:   CP, abdominal pain, vertigo, vomiting    "82 yo M h/o CAD s/p stenting presenting with EMS from home for evaluation  Difficult to obtain history from pt, he appears very uncomfortable, laying on his side and retching  States she woke up in normal state of health and was standing getting his clothes on when he got "dizzy" and fell, unclear how he landed  He does not know if he struck his head or lost consciousness   He reports vertigo and retching, was given zofran prehospital with mild improvement  He c/o abdominal and chest pain  COMPARISON:  None  TECHNIQUE: CT examination of the chest, abdomen and pelvis was performed both prior to and after the administration of intravenous contrast   The noncontrast portion of this examination was performed utilizing low radiation dose technique  Thin section angiographic arterial phase post contrast technique was used in order to evaluate for aortic dissection  3D reformatted images and volume rendering were performed on an independent workstation  Additionally, axial, sagittal, and coronal 2D reformatted images were created from the source data and submitted for interpretation  Radiation dose length product (DLP) for this visit:  1057 mGy-cm   This examination, like all CT scans performed in the [facility], was performed utilizing techniques to minimize radiation dose exposure, including the use of iterative reconstruction and automated exposure control  IV Contrast:  100 mL of iohexol (OMNIPAQUE) Enteric Contrast:  Enteric contrast was not administered  FINDINGS: AORTA:  There is no aortic dissection or intramural hematoma  There is no aortic aneurysm  Advanced atherosclerotic changes are not present  Patent origins of the great vessels off the aortic arch  Patent origins of the celiac artery, SMA, renal arteries, HORTENCIA and common/external/internal iliac arteries  CHEST LUNGS: No acute findings  4 mm right lower lobe  Potential nodule #5/66  5 mm right middle lobe subpleural nodule #5/66  PLEURA:  Unremarkable  HEART/PULMONARY ARTERIAL TREE: Borderline heart size  Coronary artery calcifications  No pericardial effusion  MEDIASTINUM AND FELISA:  Small hiatal hernia  CHEST WALL AND LOWER NECK:   Unremarkable  ABDOMEN LIVER/BILIARY TREE:  Unremarkable  GALLBLADDER:  No calcified gallstones  No pericholecystic inflammatory change  SPLEEN:  Unremarkable   PANCREAS: Unremarkable  ADRENAL GLANDS:  Unremarkable  KIDNEYS/URETERS:  One or more simple renal cyst(s) is noted  Otherwise unremarkable kidneys  No hydronephrosis  STOMACH AND BOWEL:  Uncomplicated duodenal diverticulum  APPENDIX:  No findings to suggest appendicitis  ABDOMINOPELVIC CAVITY:  No ascites or free intraperitoneal air  No lymphadenopathy  Mild left-sided mesenteric edema with small associated subcentimeter lymph nodes 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  PELVIS REPRODUCTIVE ORGANS:  Prostamegaly  URINARY BLADDER:  3 mm and smaller dependent bladder calculi  ABDOMINAL WALL/INGUINAL REGIONS:  Unremarkable  OSSEOUS STRUCTURES:  No acute fracture or destructive osseous lesion  Impression: No thoracic or abdominal aortic intramural hematoma, dissection, aneurysm or penetrating ulcer  No acute findings in the chest, abdomen or pelvis  Workstation performed: [id number]     Echo complete w/ contrast if indicated    Result Date: 1/20/2022  Narrative: [employer]  Left Ventricle: Left ventricular cavity size is normal  The left ventricular ejection fraction is 65% by visual estimation  Systolic function is normal  Wall motion is normal  Diastolic function is normal    Right Ventricle: Right ventricular cavity size is top-normal  Systolic function is normal    Left Atrium: The atrium is mildly dilated    Right Atrium: The atrium is moderately dilated    Aortic Valve: There is mild regurgitation    Mitral Valve: There is mild thickening  There is mild calcification  The leaflets exhibit normal mobility  There is mild annular calcification  There is trace regurgitation    Tricuspid Valve: There is trace regurgitation  Pulmonary artery systolic pressures are estimated at 25-30 mmHg  NM myocardial perfusion spect (rx stress and/or rest)    Result Date: 1/21/2022  Narrative: [employer]  Pharmacologic nuclear stress test appears negative for myocardial ischemia     ECG portion of stress test is negative for myocardial ischemia    Stress ECG: The patient after exercising for 3 min and 4 sec and had a maximal HR of 115 bpm (85 % of MPHR) and 1 0 METS  The patient experienced no angina during the test  Blood pressure demonstrated a normal response and heart rate demonstrated a normal response to stress    Stress Function: Left ventricular function post-stress is normal  Post-stress ejection fraction is 72 %    Rare PVCs  Code Status: Level 1 - Full Code  Advance Directive and Living Will:      Power of :    POLST:      Counseling/Coordination of Care: Total floor / unit time spent today 30 minutes  Greater than 50% of total time was spent with the patient and / or family counseling and / or coordination of care  A description of the counseling / coordination of care: outpatient followup to be arranged

## 2022-02-16 NOTE — PHYSICAL THERAPY NOTE
PT EVALUATION    Pt  Name: Lily Joyce  Pt  Age: 81 y o  MRN: [id number]  LENGTH OF STAY: 1      Admitting Diagnoses:   Syncope [R55]  Vertigo [R42]  Chest pain [R07 9]  Abdominal pain [R10 9]  Nausea and vomiting [R11 2]    Past Medical History:   Diagnosis Date    Cancer ([facility] )     NSTEMI (non-ST elevated myocardial infarction) ([facility] ) 2014    Pneumonia     Last Assessed:  10/7/14    Polymyalgia rheumatica ([facility] )     Last Assessed:  11/23/12    Polymyalgia rheumatica ([facility] ) 2011       Past Surgical History:   Procedure Laterality Date    APPENDECTOMY      CORONARY ANGIOPLASTY WITH STENT PLACEMENT      HERNIA REPAIR  2000       Imaging Studies:  CTA head and neck w wo contrast   Final Result by Ken Dias DO (02/15 1552)      CT brain: Stable examination with no mass, hemorrhage or acute territorial infarction identified  CT angiography: Minor atherosclerotic change of the left bifurcation  No occlusion, thrombosis or vascular abnormality identified otherwise  Workstation performed: [id number]         MRI brain wo contrast   Final Result by Dennis Judge DO (02/15 1537)      Punctate focus of diffusion abnormality in the cerebellar vermis may represent punctate recent lacunar infarct or may be artifactual as there is no corresponding T2 or FLAIR abnormality  Correlate with clinical symptoms  Mild scattered chronic microangiopathic changes are noted  Workstation performed: [id number]         CTA dissection protocol chest/abdomen/pelvis   ED Interpretation by Herminia Monday,  (02/14 1132)   IMPRESSION:     No thoracic or abdominal aortic intramural hematoma, dissection, aneurysm or penetrating ulcer      No acute findings in the chest, abdomen or pelvis  Final Result by George Fabian MD (02/14 1130)      No thoracic or abdominal aortic intramural hematoma, dissection, aneurysm or penetrating ulcer        No acute findings in the chest, abdomen or pelvis  Workstation performed: [id number]         CT head without contrast   ED Interpretation by Cristóbal Remy DO (02/14 1121)   FINDINGS:     PARENCHYMA:  No intracranial mass, mass effect or midline shift  No CT signs of acute infarction  No acute parenchymal hemorrhage      VENTRICLES AND EXTRA-AXIAL SPACES:  Mildly dilated compatible with mild atrophy      VISUALIZED ORBITS AND PARANASAL SINUSES:  Mild mucosal thickening of the ethmoid sinuses bilaterally      CALVARIUM AND EXTRACRANIAL SOFT TISSUES:  Normal      IMPRESSION:     No acute intracranial abnormality  Final Result by Pedro Rosado MD (02/14 1112)      No acute intracranial abnormality  Workstation performed: [id number]              02/16/22 1038   PT Last Visit   PT Visit Date 02/16/22   Note Type   Note type Evaluation   Pain Assessment   Pain Score No Pain   Restrictions/Precautions   Weight Bearing Precautions Per Order No   Other Precautions Telemetry; Fall Risk   Home Living   Type of [address] [place] [address] Two level;1/2 bath on main level;Bed/bath upstairs; Laundry in basement; Other (Comment); Stairs to enter with rails  (1+1STE; FOS to 2nd flr bed/bath)   Bathroom Shower/Tub Tub/shower unit   Bathroom Toilet Raised   Bathroom Equipment Grab bars in shower;Commode   [address] [facility]; Other (Comment)  (walking sticks)   Prior Function   Level of [place] Independent with ADLs and functional mobility  (w/o AD)   Lives With Spouse   Receives Help From Family   ADL Assistance Independent   Falls in the last 6 months 1 to 4  (2x)   Vocational Retired   Comments (+) ; active lifestyle   General   Additional Pertinent History (+) cardiac hx   Family/Caregiver Present Yes  (wife)   Cognition   Overall Cognitive Status WFL   Arousal/Participation Alert   Attention Within functional limits   Orientation Level Oriented X4   Following Commands Follows all commands and directions without difficulty   Comments pt pleasant & cooperative   Subjective   Subjective Pt agreeable to PT eval  Wife in room  RUE Assessment   RUE Assessment   (refer to OT)   LUE Assessment   LUE Assessment   (refer to OT)   RLE Assessment   RLE Assessment WFL  (4/5 grossly)   LLE Assessment   LLE Assessment WFL  (4/5 grossly)   Vestibular   Spontaneous Nystagmus (-) no evidence of nystagmus at rest in room light;(-) no evidence of nystagmus at rest fixation blocked   Gaze Holding Nystagmus (-) no evidence of nystagmus   Gaze Holding Nystagmus / Lateral Gaze (-) Evidence of nystagmus   Vestibular Comments No dizziness & nystagmus noted during positional changes, eye & head movements  (+) dizziness during amb turns but relieved w/ rest w/ negative nystagmus noted  Pt also describes dizziness as lightheaded & not room spinning sensation  Coordination   Movements are Fluid and Coordinated 1   Sensation WFL   Bed Mobility   Supine to Sit 6  Modified independent   Additional items HOB elevated; Bedrails; Increased time required;Verbal cues   Additional Comments cues for techniques & safety   Transfers   Sit to Stand 5  Supervision   Additional items Increased time required;Verbal cues   Stand to Sit 5  Supervision   Additional items Increased time required;Verbal cues   Additional Comments cues for techniques & safety   Ambulation/Elevation   Gait pattern Wide ELADIA; Decreased foot clearance; Excessively slow   Gait Assistance 4  Minimal assist   Additional items Assist x 1; Tactile cues; Verbal cues   Assistive Device None   Distance 20'x1   Ambulation/Elevation Additional Comments guarded gait but no gross LOB noted; (+) lightheadedness during turns but immediately resolved at rest   Balance   Static Sitting Normal   Dynamic Sitting Good   Static Standing Fair   Dynamic Standing Fair -   Ambulatory Poor +   Activity Tolerance   Activity Tolerance Patient tolerated treatment well   Medical Staff Made Aware [address] ANNIE Noreen   Assessment   Prognosis Good   Problem List Decreased endurance; Impaired balance;Decreased mobility   Assessment Pt  86 y  o male presented w/ c/o dizziness, near syncope, chest discomfort & heart palpitations & stroke-like symptoms w/ reports of slurring of speech x 2days ago prior to admission  Pt admitted to r/o stroke, vertigo & for cardiac work-up  MRI: Punctate focus of diffusion abnormality in the cerebellar vermis may represent punctate recent lacunar infarct or may be artifactual as there is no corresponding T2 or FLAIR abnormality  Correlate with clinical symptoms  Pt referred to PT for mobility assessment & D/C planning w/ orders to ambulate pt  Please see flowsheet above for information re: home set-up & PLOF as well as objective findings during PT assessment  PTA, pt reports being I w/o AD  On eval, pt functioning minimally below baseline hence will continue skilled PT to improve function & safety  NO dizziness & nystagmus noted during change in positions as well as w/ eye & head movements  (+) slight dizziness reported w/ turns during amb but immediately resolved w/ rest  Pt reports NO ear pain, ringing in the ear & recent ear/ sinus infection  Pt also describe dizziness as lightheadedness & NOT room spinning sensation  Pt also reports one episode of dizziness associated w/ low BP of 95 systolic while here in hospital  Orthostatic BP order in placed, Nsg staff to follow  [place]-Hallpike manuever not indicated at this time as symptoms less likely BPPV  More likely neurologic w/ (+) MRI finding  The patient's AM-PAC Basic Mobility Inpatient Short Form Raw Score is 20  A Raw score of greater than 16 suggests the patient may benefit from discharge to home  Please also refer to the recommendation of the Physical Therapist for safe discharge planning  From PT standpoint, will anticipate good progress in PT for safe D/C to home  Will recommend OPPT & family support at D/C   No SOB & dizziness reported t/o session  Nsg staff most recent vital signs as follows: /86 (BP Location: Right arm)   Pulse 73   Temp (!) 97 4 °F (36 3 °C) (Temporal)   Resp 18   Ht 5' 10" (1 778 m)   Wt 82 5 kg (181 lb 14 1 oz)   SpO2 93%   BMI 26 10 kg/m²   Fall precautions reinforced w/ good understanding  CM to follow  Nsg staff to continue to mobilized pt (OOB in chair for all meals & ambulate in room/unit) as tolerated to prevent further decline in function  Nsg notified  Co-eval was necessary to complete this PT eval for the pt's best interest given pt's medical acuity & complexity  Barriers to Discharge None   Goals   Patient Goals to know what's causing his symptoms   STG Expiration Date 02/23/22   Short Term Goal #1 Goals to be met in 7 days; pt will be able to: 1) inc strength & balance by 1/2 grade to improve overall functional mobility & dec fall risk; 2) inc bed mobility to I for pt to be able to get in/OOB safely w/ proper techniques 100% of the time, to dec caregiver burden & safely function at home; 3) inc transfers to modified I for pt to transition safely from one surface to another w/o % of the time, to dec caregiver burden & safely function at home; 4) inc amb w/ appropriate AD approx  >80' w/ S for pt to ambulate household distances w/o any % of the time, to dec caregiver burden & safely function at home; 5) negotiate stairs w/ S for inc safety during stair mgt inside/outside of home & dec caregiver burden; 6) pt/caregiver ed   PT Treatment Day 0   Plan   Treatment/Interventions Functional transfer training;LE strengthening/ROM; Elevations; Therapeutic exercise; Endurance training;Patient/family training;Bed mobility;Gait training;Spoke to nursing;OT;Family;Spoke to MD   PT Frequency 3-5x/wk   Recommendation   PT Discharge Recommendation Home with outpatient rehabilitation  (OPPT pending progress)   Equipment Recommended Walker  (trial RW next tx session)   Sanjana Rivera walker   AM-PAC Basic Mobility Inpatient   Turning in Bed Without Bedrails 4   Lying on Back to Sitting on Edge of Flat Bed 4   Moving Bed to Chair 3   Standing Up From Chair 3   Walk in Room 3   Climb 3-5 Stairs 3   Basic Mobility Inpatient Raw Score 20   Basic Mobility Standardized Score 43 99   Highest Level Of Mobility   JH-HLM Goal 6: Walk 10 steps or more   JH-HLM Highest Level of Mobility 6: Walk 10 steps or more   JH-HLM Goal Achieved Yes   End of Consult   Patient Position at End of Consult Seated edge of bed; All needs within reach   End of Consult Comments Pt in stable condition at end of session      Hx/personal factors: co-morbidities, inaccessible home, advanced age, telemetry, h/o of falls and fall risk  Examination: dec mobility, dec balance, dec endurance, dec amb, risk for falls  Clinical: unpredictable (ongoing medical status, abnormal lab values and risk for falls)  Complexity: high     Tatum Serna, PT

## 2022-02-16 NOTE — ASSESSMENT & PLAN NOTE
Patient presenting with stroke-like symptoms, - ruled in for small cerebellar stroke  Appreciate ENT recommendations, will plan to discharge with Zofran, meclizine and rest  Alternative etiologies include viral labyrinthitis versus Meniere's versus benign paradoxical positional vertigo, but given evidence of cerebellar stroke this is most likely diagnosis  Symptoms appear to reproduce with head movement- will have OT attempt Don-Hallpike maneuver

## 2022-02-16 NOTE — PROGRESS NOTES
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[facility])  Assessment & Plan  Patient presenting with cerebellar stroke likely subacute    Only involving 1 territory of the brain, with no evidence of thromboembolic phenomena  Holter monitor are currently pending, given lack of evidence of stroke - favors ischemic stroke   For monitor has been sent way and will be evaluated with plans to discuss full anticoagulation should atrial fibrillation be present    Will place on dual anti-platelet therapy with plans to transition to monotherapy Plavix all  Patient remains significantly symptomatic at this time and remain in the hospital for continued therapy evaluations    Dizziness and giddiness  Assessment & Plan  Patient presenting with stroke-like symptoms, - ruled in for small cerebellar stroke  Appreciate ENT recommendations, will plan to discharge with Zofran, meclizine and rest  Alternative etiologies include viral labyrinthitis versus Meniere's versus benign paradoxical positional vertigo, but given evidence of cerebellar stroke this is most likely diagnosis  Symptoms appear to reproduce with head movement- will have OT attempt Don-Hallpike maneuver      Heart palpitations  Assessment & Plan  Patient undergoing workup for the syncopal episode  Zio Patch in place      Hyperlipidemia  Assessment & Plan  Resume Lipitor    Benign essential hypertension  Assessment & Plan  Resume with hold parameters      [facility] Progress Note  Patient: Neil Gallo 81 y o  male   MRN: [id number]  PCP: Isamar Haider DO  Unit/Bed#: E4 -01 Encounter: [id number]  Date Of Visit: 02/16/22    Assessment:    Principal Problem:    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[facility])  Active Problems:    Dizziness and giddiness    Benign essential hypertension    Hyperlipidemia    Heart palpitations      Plan:    · Continue current stroke workup  · Appreciate Neurology, ENT and Cardiology recommendation  · Continue PT OT       VTE Pharmacologic Prophylaxis:   Pharmacologic: Enoxaparin (Lovenox)  Mechanical VTE Prophylaxis in Place: Yes    Patient Centered Rounds: I have performed bedside rounds with nursing staff today  Discussions with Specialists or Other Care Team Provider:  Case management    Education and Discussions with Family / Patient:  Patient wife at bedside    Time Spent for Care: 45 minutes  More than 50% of total time spent on counseling and coordination of care as described above  Current Length of Stay: 1 day(s)    Current Patient Status: Inpatient   Certification Statement: The patient will continue to require additional inpatient hospital stay due to Stroke eval    Discharge Plan / Estimated Discharge Date:  Tomorrow    Code Status: Level 1 - Full Code      Subjective:   Seen and examined, late finding on MRI demonstrates cerebellar stroke which was visualized  The patient today reported feeling well with the developed weakness as well as a sense of disequilibrium  He denies any vision changes, difficulty speaking or swallowing, no weakness in the arms and legs  A complete and comprehensive 14 point organ system review has been performed and all other systems are negative other than stated above  Objective:     Vitals:   Temp (24hrs), Av 2 °F (36 2 °C), Min:96 8 °F (36 °C), Max:97 5 °F (36 4 °C)    Temp:  [96 8 °F (36 °C)-97 5 °F (36 4 °C)] 97 5 °F (36 4 °C)  HR:  [56-73] 61  Resp:  [16-18] 18  BP: ()/(53-86) 120/75  SpO2:  [92 %-96 %] 96 %  Body mass index is 26 1 kg/m²  Input and Output Summary (last 24 hours):        Intake/Output Summary (Last 24 hours) at 2022 1718  Last data filed at 2022 0340  Gross per 24 hour   Intake --   Output 1790 ml   Net -1790 ml Physical Exam:     General: well appearing, no acute distress  HEENT: atraumatic, PERRLA, moist mucosa, normal pharynx, normal tonsils and adenoids, normal tongue, no fluid in sinuses  Neck: Trachea midline, no carotid bruit, no masses  Respiratory: normal chest wall expansion, CTA B, no r/r/w, no rubs  Cardiovascular: RRR, no m/r/g, Normal S1 and S2  Abdomen: Soft, non-tender, non-distended, normal bowel sounds in all quadrants, no hepatosplenomegaly, no tympany  Rectal: deferred  Musculoskeletal: normal ROM in upper and lower extremities  Integumentary: warm, dry, and pink, with no rash, purpura, or petechia  Heme/Lymph: no lymphadenopathy, no bruises  Neurological: Cranial Nerves II-XII grossly intact, no tics, normal sensation to pressure and light touch  Psychiatric: cooperative with normal mood, affect, and cognition      Additional Data:     Labs:    Results from last 7 days   Lab Units 02/15/22  0444   WBC Thousand/uL 7 37   HEMOGLOBIN g/dL 12 6   HEMATOCRIT % 39 2   PLATELETS Thousands/uL 262   NEUTROS PCT % 78*   LYMPHS PCT % 12*   MONOS PCT % 10   EOS PCT % 0     Results from last 7 days   Lab Units 02/15/22  0444 02/14/22  1031 02/14/22  1031   POTASSIUM mmol/L 4 3   < > 4 3   CHLORIDE mmol/L 105   < > 104   CO2 mmol/L 24   < > 20*   BUN mg/dL 25   < > 34*   CREATININE mg/dL 0 92   < > 1 05   CALCIUM mg/dL 8 4   < > 8 6   ALK PHOS U/L  --   --  68   ALT U/L  --   --  23   AST U/L  --   --  22    < > = values in this interval not displayed  Results from last 7 days   Lab Units 02/14/22  1031   INR  0 98       * I Have Reviewed All Lab Data Listed Above  * Additional Pertinent Lab Tests Reviewed:  Damien 67 Admission Reviewed    Imaging:    Imaging Reports Reviewed Today Include:  No new imaging  Imaging Personally Reviewed by Myself Includes:  No new imaging    Recent Cultures (last 7 days):           Last 24 Hours Medication List:   Current Facility-Administered Medications Medication Dose Route Frequency Provider Last Rate    aluminum-magnesium hydroxide-simethicone  30 mL Oral Q6H PRN Maryann Chaney,       aspirin  81 mg Oral Daily Dipesh Heard, DO      atorvastatin  40 mg Oral QPM Dipesh Heard, DO      bimatoprost  1 drop Both Eyes Daily Dipesh Heard, DO      calcium carbonate  1,000 mg Oral Daily PRN Maryann Chaney, DO      clopidogrel  75 mg Oral Daily Clementina Rhodes PA-C      enoxaparin  40 mg Subcutaneous Daily Dipesh Heard, DO      ketorolac  10 mg Oral Once Dipesh Heard, DO      meclizine  25 mg Oral Q8H PRN Maryann Chaney,       metoprolol succinate  12 5 mg Oral Daily Dipesh Heard, DO      ondansetron  4 mg Intravenous Q6H PRN Dipesh Tran, DO      sodium chloride  1 spray Each Nare Q1H PRN Maryann Chaney, DO          Today, Patient Was Seen By: Rochelle Fitzgerald DO    ** Please Note: This note was completed in part utilizing M[employer]Modal [employer] Direct Software  Grammatical errors, random word insertions, spelling mistakes, and incomplete sentences may be an occasional consequence of this system secondary to software limitations, ambient noise, and hardware issues  If you have any questions or concerns about the content, text, or information contained within the body of this dictation, please contact the provider for clarification   **

## 2022-02-17 VITALS
HEART RATE: 63 BPM | BODY MASS INDEX: 26.04 KG/M2 | TEMPERATURE: 97.8 F | WEIGHT: 181.88 LBS | OXYGEN SATURATION: 95 % | HEIGHT: 70 IN | SYSTOLIC BLOOD PRESSURE: 116 MMHG | RESPIRATION RATE: 18 BRPM | DIASTOLIC BLOOD PRESSURE: 72 MMHG

## 2022-02-17 LAB
ANION GAP SERPL CALCULATED.3IONS-SCNC: 8 MMOL/L (ref 4–13)
BASOPHILS # BLD AUTO: 0.06 THOUSANDS/ΜL (ref 0–0.1)
BASOPHILS NFR BLD AUTO: 1 % (ref 0–1)
BUN SERPL-MCNC: 21 MG/DL (ref 5–25)
CALCIUM SERPL-MCNC: 8.4 MG/DL (ref 8.3–10.1)
CHLORIDE SERPL-SCNC: 105 MMOL/L (ref 100–108)
CO2 SERPL-SCNC: 27 MMOL/L (ref 21–32)
CREAT SERPL-MCNC: 0.85 MG/DL (ref 0.6–1.3)
EOSINOPHIL # BLD AUTO: 0.23 THOUSAND/ΜL (ref 0–0.61)
EOSINOPHIL NFR BLD AUTO: 3 % (ref 0–6)
ERYTHROCYTE [DISTWIDTH] IN BLOOD BY AUTOMATED COUNT: 14.6 % (ref 11.6–15.1)
GFR SERPL CREATININE-BSD FRML MDRD: 79 ML/MIN/1.73SQ M
GLUCOSE SERPL-MCNC: 86 MG/DL (ref 65–140)
HCT VFR BLD AUTO: 40.9 % (ref 36.5–49.3)
HGB BLD-MCNC: 13.6 G/DL (ref 12–17)
IMM GRANULOCYTES # BLD AUTO: 0.02 THOUSAND/UL (ref 0–0.2)
IMM GRANULOCYTES NFR BLD AUTO: 0 % (ref 0–2)
LYMPHOCYTES # BLD AUTO: 1.46 THOUSANDS/ΜL (ref 0.6–4.47)
LYMPHOCYTES NFR BLD AUTO: 19 % (ref 14–44)
MCH RBC QN AUTO: 28 PG (ref 26.8–34.3)
MCHC RBC AUTO-ENTMCNC: 33.3 G/DL (ref 31.4–37.4)
MCV RBC AUTO: 84 FL (ref 82–98)
MONOCYTES # BLD AUTO: 1.14 THOUSAND/ΜL (ref 0.17–1.22)
MONOCYTES NFR BLD AUTO: 15 % (ref 4–12)
NEUTROPHILS # BLD AUTO: 4.79 THOUSANDS/ΜL (ref 1.85–7.62)
NEUTS SEG NFR BLD AUTO: 62 % (ref 43–75)
NRBC BLD AUTO-RTO: 0 /100 WBCS
PLATELET # BLD AUTO: 245 THOUSANDS/UL (ref 149–390)
PMV BLD AUTO: 10.3 FL (ref 8.9–12.7)
POTASSIUM SERPL-SCNC: 4.2 MMOL/L (ref 3.5–5.3)
RBC # BLD AUTO: 4.86 MILLION/UL (ref 3.88–5.62)
SODIUM SERPL-SCNC: 140 MMOL/L (ref 136–145)
WBC # BLD AUTO: 7.7 THOUSAND/UL (ref 4.31–10.16)

## 2022-02-17 PROCEDURE — 85025 COMPLETE CBC W/AUTO DIFF WBC: CPT | Performed by: INTERNAL MEDICINE

## 2022-02-17 PROCEDURE — 99239 HOSP IP/OBS DSCHRG MGMT >30: CPT | Performed by: INTERNAL MEDICINE

## 2022-02-17 PROCEDURE — 80048 BASIC METABOLIC PNL TOTAL CA: CPT | Performed by: INTERNAL MEDICINE

## 2022-02-17 RX ORDER — CLOPIDOGREL BISULFATE 75 MG/1
75 TABLET ORAL DAILY
Qty: 30 TABLET | Refills: 0 | Status: SHIPPED | OUTPATIENT
Start: 2022-02-18 | End: 2022-03-19 | Stop reason: SDUPTHER

## 2022-02-17 RX ORDER — MECLIZINE HYDROCHLORIDE 25 MG/1
25 TABLET ORAL EVERY 8 HOURS PRN
Qty: 30 TABLET | Refills: 0 | Status: SHIPPED | OUTPATIENT
Start: 2022-02-17 | End: 2022-03-29 | Stop reason: ALTCHOICE

## 2022-02-17 RX ORDER — ROSUVASTATIN CALCIUM 10 MG/1
10 TABLET, COATED ORAL DAILY
Qty: 90 TABLET | Refills: 0 | Status: SHIPPED | OUTPATIENT
Start: 2022-02-17 | End: 2022-07-18 | Stop reason: SDUPTHER

## 2022-02-17 RX ORDER — ONDANSETRON 4 MG/1
4 TABLET, ORALLY DISINTEGRATING ORAL EVERY 6 HOURS PRN
Qty: 20 TABLET | Refills: 0 | Status: SHIPPED | OUTPATIENT
Start: 2022-02-17 | End: 2022-03-19 | Stop reason: ALTCHOICE

## 2022-02-17 RX ADMIN — ASPIRIN 81 MG: 81 TABLET, CHEWABLE ORAL at 09:32

## 2022-02-17 RX ADMIN — METOPROLOL SUCCINATE 12.5 MG: 25 TABLET, EXTENDED RELEASE ORAL at 09:31

## 2022-02-17 RX ADMIN — CLOPIDOGREL BISULFATE 75 MG: 75 TABLET ORAL at 09:31

## 2022-02-17 RX ADMIN — BIMATOPROST 1 DROP: 0.1 SOLUTION/ DROPS OPHTHALMIC at 09:32

## 2022-02-17 RX ADMIN — ENOXAPARIN SODIUM 40 MG: 40 INJECTION SUBCUTANEOUS at 09:31

## 2022-02-17 NOTE — CASE MANAGEMENT
Case Management Discharge Planning Note    Patient name Meli Taylor  Location [facility] /[facility]4 [id number] MRN [id number]  : 1935 Date 2022       Current Admission Date: 2022  Current Admission Diagnosis: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[facility])   Patient Active Problem List    Diagnosis Date Noted    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([facility] [address] ) 2022    Dizziness and giddiness 2022    Heart palpitations 2022    Syncope 2022    Closed nondisplaced fracture of proximal phalanx of right little finger 2018    History of heart artery stent 2018    Primary osteoarthritis of both hands 2018    Basal cell carcinoma of skin 2015    BPH with obstruction/lower urinary tract symptoms 2014    Benign essential hypertension 2013    Actinic keratosis 2013    Arteriosclerotic coronary artery disease 2013    Hyperlipidemia 2012      LOS (days): 2  Geometric Mean LOS (GMLOS) (days): 2 00  Days to GMLOS:0 2     OBJECTIVE:  Risk of Unplanned Readmission Score: 8         Current admission status: Inpatient   Preferred Pharmacy:   [place] [address]  Phone: [phone] Fax: [phone]    Primary Care Provider: Ansley Rascon DO    Primary Insurance: MEDICARE  Secondary Insurance: BLUE CROSS    DISCHARGE DETAILS:    Discharge planning discussed with[de-identified] spouse  Freedom of Choice: Yes  Comments - Freedom of Choice: MD has discharged pt home today  Pt is a 30 days readmission back with vertigo and chest discomfort  Spouse stated she call EMS and pt had taked his meds, but did not make it to PCP appointment as he came back to the hospital  Spouse is aware PT is recommending OP Therapy and gave spouse [facility] Therapy list  Spouse stated she goes to [facility] and would like pt to go there too  MD aware pt will need a script for PT    CM contacted family/caregiver?: Yes  Were Treatment Team discharge recommendations reviewed with patient/caregiver?: Yes  Did patient/caregiver verbalize understanding of patient care needs?: Yes  Were patient/caregiver advised of the risks associated with not following Treatment Team discharge recommendations?: Yes    IMM Given (Date):: 02/17/22  IMM Given to[de-identified] Family (Read to spouse, copy given and copy put in bin to be scan )

## 2022-02-17 NOTE — ASSESSMENT & PLAN NOTE
Patient presenting with cerebellar stroke likely subacute  Only involving 1 territory of the brain, with no evidence of thromboembolic phenomena  Holter monitor are currently pending, given lack of evidence of stroke - favors ischemic stroke   For monitor has been sent way and will be evaluated with plans to discuss full anticoagulation should atrial fibrillation be present    Will place on dual anti-platelet therapy with plans to transition to monotherapy Plavix all    Per neurology  Continue aspirin 81mg and Plavix 75mg for 3 weeks; on or around 3/9/22, stop the aspirin but continue taking Plavix 75mg daily

## 2022-02-17 NOTE — ASSESSMENT & PLAN NOTE
Patient presenting with stroke-like symptoms, - ruled in for small cerebellar stroke  Appreciate ENT recommendations, will plan to discharge with Zofran, meclizine and rest    Outpatient [facility] with ENT

## 2022-02-17 NOTE — DISCHARGE INSTRUCTIONS
Stroke   WHAT YOU NEED TO KNOW:   A stroke happens when blood flow to part of the brain is interrupted  This can cause serious brain damage from a lack of oxygen  Your healthcare providers will help you create goals for your recovery  They will help you and your family or care providers make a plan for care at home to help you reach your goals  The plan will include lifestyle changes you can make to help you manage your health and prevent another stroke  Your discharge instructions will include information on services and equipment you or your family may need  DISCHARGE INSTRUCTIONS:   Call your local emergency number (911 in the [facility]) for any of the following:   · You have any of the following signs of a stroke:      ? Numbness or drooping on one side of your face     ? Weakness in an arm or leg    ? Confusion or difficulty speaking    ? Dizziness, a severe headache, or vision loss       · You have a seizure  · You have chest pain or shortness of breath  Seek care immediately if:   · Your arm or leg feels warm, tender, and painful  It may look swollen and red  · You have loss of balance or coordination  · You have double vision or vision loss  · You have unusual or heavy bleeding  Call your doctor or neurologist if:   · Your blood sugar level or blood pressure is higher or lower than usual     · You have trouble swallowing  · You have trouble having a bowel movement or urinating  · You have questions or concerns about your condition or care  Medicines:  Medicines may be needed to treat high cholesterol, high blood pressure, or diabetes  You may also need any of the following, depending on the kind of stroke you had:  · Antiplatelets , such as aspirin, help prevent blood clots  Take your antiplatelet medicine exactly as directed  These medicines make it more likely for you to bleed or bruise  If you are told to take aspirin, do not take acetaminophen or ibuprofen instead  · Blood thinners  help prevent blood clots  Clots can cause strokes, heart attacks, and death  The following are general safety guidelines to follow while you are taking a blood thinner:    ? Watch for bleeding and bruising while you take blood thinners  Watch for bleeding from your gums or nose  Watch for blood in your urine and bowel movements  Use a soft washcloth on your skin, and a soft toothbrush to brush your teeth  This can keep your skin and gums from bleeding  If you shave, use an electric shaver  Do not play contact sports  ? Tell your dentist and other healthcare providers that you take a blood thinner  Wear a bracelet or necklace that says you take this medicine  ? Do not start or stop any other medicines unless your healthcare provider tells you to  Many medicines cannot be used with blood thinners  ? Take your blood thinner exactly as prescribed by your healthcare provider  Do not skip does or take less than prescribed  Tell your provider right away if you forget to take your blood thinner, or if you take too much  ? Warfarin  is a blood thinner that you may need to take  The following are things you should be aware of if you take warfarin:     § Foods and medicines can affect the amount of warfarin in your blood  Do not make major changes to your diet while you take warfarin  Warfarin works best when you eat about the same amount of vitamin K every day  Vitamin K is found in green leafy vegetables and certain other foods  Ask for more information about what to eat when you are taking warfarin  § You will need to see your healthcare provider for follow-up visits when you are on warfarin  You will need regular blood tests  These tests are used to decide how much medicine you need  · Take your medicine as directed  Contact your healthcare provider if you think your medicine is not helping or if you have side effects  Tell him or her if you are allergic to any medicine   Keep a list of the medicines, vitamins, and herbs you take  Include the amounts, and when and why you take them  Bring the list or the pill bottles to follow-up visits  Carry your medicine list with you in case of an emergency  Know the warning signs of a stroke: The words BE FAST can help you remember and recognize warning signs of a stroke:  · B = Balance:  Sudden loss of balance    · E = Eyes:  Loss of vision in one or both eyes    · F = Face:  Face droops on one side    · A = Arms:  Arm drops when both arms are raised    · S = Speech:  Speech is slurred or sounds different    · T = Time:  Time to get help immediately       Recovery testing: Your healthcare provider will test your recovery 90 days (3 months) after your stroke  This may be done over the phone or in person  Your provider will ask how well you can do the activities you did before the stroke  He or she will also ask how well you can do your daily activities without help  Your provider may make recommendations for you based on your test  For example, you may need someone to help you walk safely  You may also need help with daily activities, such as getting dressed  Based on your answers, your provider may do this test again over time  Manage the effects of a stroke:   · Go to stroke rehabilitation (rehab) if directed  Rehab is a program run by specialists who will help you recover abilities you may have lost  Specialists include physical, occupational, and speech therapists  Physical therapists help you gain strength or keep your balance  Occupational therapists teach you new ways to do daily activities  Your therapy may include movements for everyday activities  An example is being able to raise yourself from a chair  A speech therapist helps you improve your ability to talk and swallow  · Make your home safe  Remove anything you might trip over  Tape electrical cords down  Keep paths clear throughout your home  Make sure your home is well lit  Put nonslip materials on surfaces that might be slippery  An example is your bathtub or shower floor  A cane or walker may help you keep your balance as you walk  Prevent another stroke:   · Manage health conditions  A condition such as diabetes can increase your risk for a stroke  Control your blood sugar level if you have hyperglycemia or diabetes  Take your prescribed medicines and check your blood sugar level as directed  · Check your blood pressure as directed  High blood pressure can increase your risk for a stroke  Follow your healthcare provider's directions for controlling your blood pressure  · Do not use nicotine products or illegal drugs  Nicotine and other chemicals in cigarettes and cigars can cause blood vessel damage  Nicotine and illegal drugs both increase your risk for a stroke  Ask your healthcare provider for information if you currently smoke or use drugs and need help to quit  E- cigarettes or smokeless tobacco still contain nicotine  Talk to your healthcare provider before you use these products  · Talk to your healthcare provider about alcohol  Alcohol can raise your blood pressure  The recommended limit is 2 drinks in a day for men and 1 drink in a day for women  Do not binge drink or save a week's worth of alcohol to drink in 1 or 2 days  Limit weekly amounts as directed by your provider  · Eat a variety of healthy foods  Healthy foods include whole-grain breads, low-fat dairy products, beans, lean meats, and fish  Eat at least 5 servings of fruits and vegetables each day  Choose foods that are low in fat, cholesterol, salt, and sugar  Eat foods that are high in potassium, such as potatoes and bananas  A dietitian can help you create healthy meal plans  · Maintain a healthy weight  Ask your healthcare provider how much you should weigh  Ask him or her to help you create a weight loss plan if you are overweight   He or she can help you create small goals if you have a lot of weight to lose  · Exercise as directed  Exercise can lower your blood pressure, cholesterol, weight, and blood sugar levels  Healthcare providers will help you create exercise goals  They can also help you make a plan to reach your goals  For example, you can break exercise into 10 minute periods, 3 times in the day  Find an exercise that you enjoy  This will make it easier for you to reach your exercise goals  · Manage stress  Stress can raise your blood pressure  Find new ways to relax, such as deep breathing or listening to music  What you need to know about depression after a stroke:  Talk to your healthcare provider if you have depression that continues or is getting worse  Your provider may be able to help treat your depression  Your provider can also recommend support groups for you to join  A support group is a place to talk with others who have had a stroke  It may also help to talk to friends and family members about how you are feeling  Tell your family and friends to let your healthcare provider know if they see any signs of depression:  · Extreme sadness    · Avoiding social interaction with family or friends    · A lack of interest in things you once enjoyed    · Irritability    · Trouble sleeping    · Low energy levels    · A change in eating habits or sudden weight gain or loss    Follow up with your doctor or neurologist as directed: You may need to come in for regular tests of your brain function  Your provider may refer you to a specialist, or to other kinds of care  An example is palliative (comfort) care  Your provider can also give information about respite care to anyone who helps care for you  Respite care is a service to help caregivers take a break or get more rest  Write down your questions so you remember to ask them during your visits  For support and more information:   · American Heart Association and American Stroke Association  [address] [facility] , [address] [facility]   Phone: 3- 658 - [phone]  Web Address: https://www strong [employer]/  [address] 2021 Information is for End User's use only and may not be sold, redistributed or otherwise used for commercial purposes  All illustrations and images included in CareNotes® are the copyrighted property of [employer]  or [address] [place] [address]  The above information is an  only  It is not intended as medical advice for individual conditions or treatments  Talk to your doctor, nurse or pharmacist before following any medical regimen to see if it is safe and effective for you

## 2022-02-17 NOTE — DISCHARGE INSTR - AVS FIRST PAGE
Dear Myriam Morocho,     It was our pleasure to care for you here at [facility], [facility]  It is our hope that we were always able to exceed the expected standards for your care during your stay  You were hospitalized due to dizziness which is likely multifactorial, potentially cerebellar stroke as well as vertigo  You were cared for on the 4th floor by Daniel Chaudhary DO with the [facility] who covers for your primary care physician (PCP), Misty Kanner, DO, while you were hospitalized  If you have any questions or concerns related to this hospitalization, you may contact us at [phone]  For follow up as well as any medication refills, we recommend that you follow up with your primary care physician  A registered nurse will reach out to you by phone within a few days after your discharge to answer any additional questions that you may have after going home  However, at this time we provide for you here, the most important instructions / recommendations at discharge:     Notable Medication Adjustments -   Continue aspirin 81mg and Plavix 75mg for 3 weeks; on or around 3/9/22, stop the aspirin but continue taking Plavix 75mg daily  Testing Required after Discharge -   Please follow-up with your results of your Zio patch with Cardiology discuss anticoagulation  Important follow up information -   I provided you meclizine should you have any significant dizziness, please take this and lay down if you have symptoms  Additionally a referral to ENT has been placed for formal testing for your dizziness  Other Instructions -   Continue heart healthy diet  Please review this entire after visit summary as additional general instructions including medication list, appointments, activity, diet, any pertinent wound care, and other additional recommendations from your care team that may be provided for you        Sincerely,     Daniel Chaudhary DO and Nurse Dionne Lemons

## 2022-02-18 ENCOUNTER — TRANSITIONAL CARE MANAGEMENT (OUTPATIENT)
Dept: FAMILY MEDICINE CLINIC | Facility: CLINIC | Age: 87
End: 2022-02-18

## 2022-02-21 ENCOUNTER — OFFICE VISIT (OUTPATIENT)
Dept: FAMILY MEDICINE CLINIC | Facility: CLINIC | Age: 87
End: 2022-02-21
Payer: MEDICARE

## 2022-02-21 ENCOUNTER — EPISODE CHANGES (OUTPATIENT)
Dept: CASE MANAGEMENT | Facility: OTHER | Age: 87
End: 2022-02-21

## 2022-02-21 VITALS
TEMPERATURE: 97.9 F | HEART RATE: 86 BPM | BODY MASS INDEX: 27.43 KG/M2 | DIASTOLIC BLOOD PRESSURE: 76 MMHG | OXYGEN SATURATION: 96 % | SYSTOLIC BLOOD PRESSURE: 120 MMHG | HEIGHT: 68 IN | WEIGHT: 181 LBS

## 2022-02-21 DIAGNOSIS — I25.10 ARTERIOSCLEROTIC CORONARY ARTERY DISEASE: ICD-10-CM

## 2022-02-21 DIAGNOSIS — R00.2 HEART PALPITATIONS: ICD-10-CM

## 2022-02-21 DIAGNOSIS — I10 BENIGN ESSENTIAL HYPERTENSION: ICD-10-CM

## 2022-02-21 DIAGNOSIS — R21 RASH: ICD-10-CM

## 2022-02-21 DIAGNOSIS — I63.9 CEREBELLAR STROKE (HCC): ICD-10-CM

## 2022-02-21 DIAGNOSIS — R29.90 STROKE-LIKE SYMPTOMS: ICD-10-CM

## 2022-02-21 DIAGNOSIS — R42 DIZZINESS AND GIDDINESS: Primary | ICD-10-CM

## 2022-02-21 PROCEDURE — 99495 TRANSJ CARE MGMT MOD F2F 14D: CPT | Performed by: FAMILY MEDICINE

## 2022-02-21 RX ORDER — CETIRIZINE HYDROCHLORIDE 10 MG/1
10 TABLET ORAL DAILY PRN
Start: 2022-02-21 | End: 2022-03-29 | Stop reason: ALTCHOICE

## 2022-02-21 RX ORDER — PREDNISONE 10 MG/1
TABLET ORAL
Qty: 20 TABLET | Refills: 0 | Status: SHIPPED | OUTPATIENT
Start: 2022-02-21 | End: 2022-03-05 | Stop reason: ALTCHOICE

## 2022-02-21 NOTE — PROGRESS NOTES
FAMILY PRACTICE OFFICE VISIT  Cali HENRIQUEZ O  [facility]  [address]  [address], [place], [address]      NAME: Kamilah Gillespie  AGE: 81 y o  SEX: male  : 1935   MRN: [id number]    DATE: 2022  TIME: 4:15 PM    Assessment and Plan     Problem List Items Addressed This Visit     Arteriosclerotic coronary artery disease    Benign essential hypertension    Dizziness and giddiness - Primary    Heart palpitations    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([facility] [address] )      Other Visit Diagnoses     Stroke-like symptoms ( dizziness, slurred speech, weakness ) -  resolved        Rash on torso - prob delayed rxtn to IV Dye  -  vs Lovenox vs Plavix ( but only rcvd 1 or 2 doses)        Relevant Medications    cetirizine (ZyrTEC) 10 mg tablet    predniSONE 10 mg tablet          Patient Instructions   Reviewed hospital stay  through  at [facility], MRI of brain did show possibility of subacute cerebellar stroke:  "Punctate focus of diffusion abnormality in the cerebellar vermis may represent punctate recent lacunar infarct or may be artifactual as there is no corresponding T2 or FLAIR abnormality  Correlate with clinical symptoms  Mild scattered chronic microangiopathic changes are noted"  Had CTA head neck, CT of cervical spine, chest x-ray without acute findings  Episode vertigo verses cerebellar stroke    He was discharged to use aspirin 81 mg for 3 weeks, he will stop that around   He rcvd one or two doses Plavix inpt, he will use 75 mg daily long-term, he had not yet started Plavix outpatient  With history dizziness, presyncope, decision was made to not fully anticoagulate unless atrial fibrillation can be documented  He did start with significant dermatitis on torso  ( see photo ), very pruritic, appears related as delayed rxtn to IV dye  - or Lovenox which would be less likely    As he only received 1 or 2 doses of Plavix I do not believe it is related to that  His renal function on blood testing was okay  As he does find this very bothersome he will use OTC Cetirizine/Zyrtec 10 mg at night as needed for rash/itching, we did discuss watching for tiredness  Note he has not used meclizine since home  He also will use short course of Prednisone  I would like him to update me in 3 to 4 days regarding how he is doing, call me sooner if needed    He will be seeing Cardiology, did have Zio patch, partial recording which did capture one episode, was removed after only few days for MRI  Results unavailable yet  He will be seeing ENT tomorrow, had seen them inpatient  Inpatient labs were stable, most recent CBC, BMP unremarkable with A1c 6 2, cholesterol 126 with HDL 60, LDL 53  Chief Complaint     Chief Complaint   Patient presents with    Transition of Care Management     admited 2/14/22-2/17/22 , feel 2/14/22    Rash     itching rash on trunk start on 2/19/22     TCM Call (since 1/21/2022)     Date and time call was made  2/18/2022 11:47 AM    Hospital care reviewed  Records reviewed        Patient was hospitialized at  [facility] - [facility]        Date of Admission  02/15/22    Date of discharge  02/17/22    Diagnosis  vertigo    Disposition  Home    Were the patients medications reviewed and updated  Yes    Current Symptoms  None      TCM Call (since 1/21/2022)     Post hospital issues  None    Scheduled for follow up?   Yes    Did you obtain your prescribed medications  Yes    Do you need help managing your prescriptions or medications  No    Is transportation to your appointment needed  No    I have advised the patient to call PCP with any new or worsening symptoms  ETELVINA Webber    Living Arrangements  Spouse or Significiant other    Support System  Spouse    The type of support provided  Emotional; Financial; Physical          History of Present Illness   Jeane Cai is a 81y o -year-old male who is in today for a post hospital evaluation, he was admitted February 14th through 17th  He had  2 separate episodes dizziness, the 2nd episode occurred on day of admission, he relates he had bent over to put on his shirt, became very dizzy, lost his balance, fell into a chair, felt presyncopal but never lost consciousness, had nausea with vomiting  Episode was witnessed by his wife, he was wearing a Zio patch during episode  Inpatient he saw Cardiology along with Neurology, CTA head neck looked okay, MRI had question of cerebellar stroke versus artifact  He did receive IV dye with studies inpatient, no prior reaction to dye although it appears he may not have ever required IV dye before  He did receive Lovenox inpatient along with 1 or 2 doses of Plavix, no other new medications  He stabilized, was discharged home  He relates February 19th he started with a significantly pruritic rash across his torso, bilateral   Has found this to be very bothersome, has been showering multiple times daily, some relief with hot water for short period  No rash on arms, legs, face  No fevers, no chills  Otherwise has felt okay at home other than some issues with balance with had which have been ongoing for months  No vertigo, dizziness since home      Review of Systems   Review of Systems   Constitutional: Positive for fatigue  Negative for appetite change, fever and unexpected weight change  HENT: Negative for sore throat and trouble swallowing  Eyes: Positive for visual disturbance (Has noted some issues with focus)  Respiratory: Negative for cough, chest tightness, shortness of breath and wheezing  Cardiovascular: Positive for palpitations  Negative for chest pain and leg swelling  Gastrointestinal: Negative for blood in stool  See HPI, he has had no nausea or vomiting since home, appetite has been okay, no abdominal pain, no change in bowel   Genitourinary: Negative for dysuria and hematuria     Neurological: Negative for seizures, syncope, weakness (He had no focal weakness of arm, leg, did have some slurred speech with another episode the lasted about 10 minutes) and headaches (Mild headache with episode, none current)  Psychiatric/Behavioral: Negative for behavioral problems  Active Problem List     Patient Active Problem List   Diagnosis    Actinic keratosis    Arteriosclerotic coronary artery disease    Basal cell carcinoma of skin    BPH with obstruction/lower urinary tract symptoms    Benign essential hypertension    Hyperlipidemia    History of heart artery stent    Primary osteoarthritis of both hands    Closed nondisplaced fracture of proximal phalanx of right little finger    Syncope    Dizziness and giddiness    Heart palpitations    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[facility])       Past Medical History:  Reviewed    Past Surgical History:  Reviewed    Family History:  Reviewed    Social History:  Reviewed    Objective     Vitals:    02/21/22 1504   BP: 120/76   BP Location: Left arm   Patient Position: Sitting   Cuff Size: Large   Pulse: 86   Temp: 97 9 °F (36 6 °C)   SpO2: 96%   Weight: 82 1 kg (181 lb)   Height: 5' 8" (1 727 m)     Body mass index is 27 52 kg/m²  BP Readings from Last 3 Encounters:   02/21/22 120/76   02/17/22 116/72   01/21/22 101/65       Wt Readings from Last 3 Encounters:   02/21/22 82 1 kg (181 lb)   02/14/22 82 5 kg (181 lb 14 1 oz)   01/20/22 82 1 kg (181 lb)       Physical Exam  Constitutional:       Comments: 59-year-old male seated in chair, he arises on his own to place self on table without significant issue, has no reproducible vertigo/dizziness here in office with position changes, head movement  He does have significant maculopapular rash across torso, see photo of his back  No significant rash on face, limbs  HENT:      Ears:      Comments: Has no redness of tympanic membranes  Eyes:      General: No scleral icterus       Extraocular Movements: Extraocular movements intact  Conjunctiva/sclera: Conjunctivae normal       Pupils: Pupils are equal, round, and reactive to light  Cardiovascular:      Rate and Rhythm: Normal rate and regular rhythm  Heart sounds: Normal heart sounds  Pulmonary:      Effort: Pulmonary effort is normal  No respiratory distress  Breath sounds: Normal breath sounds  No wheezing, rhonchi or rales  Abdominal:      Palpations: Abdomen is soft  Tenderness: There is no abdominal tenderness  There is no guarding  Musculoskeletal:      Comments: Trace sock line bilateral ankles otherwise no swelling  Lymphadenopathy:      Cervical: No cervical adenopathy  Neurological:      Comments: He is alert, oriented x3, appears to be neurologically at baseline  No focal weakness of arms, legs  Unable to reproduce dizziness here in office     Psychiatric:         Behavior: Behavior normal                 ALLERGIES:  Allergies   Allergen Reactions    Iv Dye [Iodinated Diagnostic Agents] Rash     Possible delayed rash February 2022       Current Medications     Current Outpatient Medications   Medication Sig Dispense Refill    aspirin (ECOTRIN LOW STRENGTH) 81 mg EC tablet Take 1 tablet (81 mg total) by mouth daily 90 tablet 1    Calcium Citrate-Vitamin D (CALCIUM CITRATE + D) 315-250 MG-UNIT TABS Take 1 tablet by mouth daily      carboxymethylcellulose (REFRESH TEARS) 0 5 % SOLN 1 drop 2 (two) times a day as needed for dry eyes      clopidogrel (PLAVIX) 75 mg tablet Take 1 tablet (75 mg total) by mouth daily 30 tablet 0    Coenzyme Q10 100 MG TABS Take 1 tablet by mouth daily      metoprolol succinate (TOPROL-XL) 25 mg 24 hr tablet Take 0 5 tablets (12 5 mg total) by mouth daily (Patient taking differently: Take 12 5 mg by mouth daily Takes at night ) 45 tablet 3    Misc Natural Products (OSTEO BI-FLEX ADV TRIPLE ST PO) Take by mouth      OMEGA-3 FATTY ACIDS-VITAMIN E PO Take 1 capsule by mouth daily      rosuvastatin (CRESTOR) 10 MG tablet Take 1 tablet (10 mg total) by mouth daily 90 tablet 0    cetirizine (ZyrTEC) 10 mg tablet Take 1 tablet (10 mg total) by mouth daily as needed (itch /rash)      meclizine (ANTIVERT) 25 mg tablet Take 1 tablet (25 mg total) by mouth every 8 (eight) hours as needed for dizziness 30 tablet 0    ondansetron (Zofran ODT) 4 mg disintegrating tablet Take 1 tablet (4 mg total) by mouth every 6 (six) hours as needed for nausea or vomiting 20 tablet 0    predniSONE 10 mg tablet Use 40mg x 2 days, 30 mg x 2 days, 20 mg x 2 days, 10 mg x 2 days then stop 20 tablet 0     No current facility-administered medications for this visit  No orders of the defined types were placed in this encounter          Michelle Espinal DO

## 2022-02-21 NOTE — PATIENT INSTRUCTIONS
Reviewed hospital stay February 14th through 17th at [facility], MRI of brain did show possibility of subacute cerebellar stroke:  "Punctate focus of diffusion abnormality in the cerebellar vermis may represent punctate recent lacunar infarct or may be artifactual as there is no corresponding T2 or FLAIR abnormality  Correlate with clinical symptoms  Mild scattered chronic microangiopathic changes are noted"  Had CTA head neck, CT of cervical spine, chest x-ray without acute findings  Episode vertigo verses cerebellar stroke    He was discharged to use aspirin 81 mg for 3 weeks, he will stop that around March 9th  He rcvd one or two doses Plavix inpt, he will use 75 mg daily long-term, he had not yet started Plavix outpatient  With history dizziness, presyncope, decision was made to not fully anticoagulate unless atrial fibrillation can be documented  He did start with significant dermatitis on torso February 19th ( see photo ), very pruritic, appears related as delayed rxtn to IV dye  - or Lovenox which would be less likely  As he only received 1 or 2 doses of Plavix I do not believe it is related to that  His renal function on blood testing was okay  As he does find this very bothersome he will use OTC Cetirizine/Zyrtec 10 mg at night as needed for rash/itching, we did discuss watching for tiredness  Note he has not used meclizine since home  He also will use short course of Prednisone  I would like him to update me in 3 to 4 days regarding how he is doing, call me sooner if needed    He will be seeing Cardiology, did have Zio patch, partial recording which did capture one episode, was removed after only few days for MRI  Results unavailable yet  He will be seeing ENT tomorrow, had seen them inpatient  Inpatient labs were stable, most recent CBC, BMP unremarkable with A1c 6 2, cholesterol 126 with HDL 60, LDL 53

## 2022-02-22 ENCOUNTER — PATIENT OUTREACH (OUTPATIENT)
Dept: FAMILY MEDICINE CLINIC | Facility: CLINIC | Age: 87
End: 2022-02-22

## 2022-02-22 NOTE — PROGRESS NOTES
Contacted patient for f/u  Maren Neumann is managing well at home and denies needing additional assistance  He has already seen PCP for f/u and has scheduled appointment with ENT and cardiology  He states his cardiology appointment may be rescheduled as he is going for f/u post zio patch and the report may not be back  No transportation issues to appointments  He denies vertigo or dizziness, has not used meclizine  Ambulatory without assistive device  We reviewed medications, he is taking prednisone and zyrtec for rash to back  He states he had trouble sleeping last night but it's not as bothersome this morning  Taking all medications as prescribed  Nutritional intake adequate  I explained the bundle program and he was unsure if he wanted to participate  Provided my contact information and he will call me back if he chooses to participate

## 2022-02-23 ENCOUNTER — TELEPHONE (OUTPATIENT)
Dept: NEUROLOGY | Facility: CLINIC | Age: 87
End: 2022-02-23

## 2022-02-23 NOTE — TELEPHONE ENCOUNTER
Post CVA Discharge Follow Up    Hospitalization: 2/14/2022 - 2/17/2022   The purpose of this phone call is to assess patient's general wellbeing or for any assistance needed with follow-up care  Called, reached wife Aubree Hilliard (she is indeed on the consent form), I introduced myself and explained neurovascular nurse navigator role  Since discharge, she denies experiencing any new or worsening stroke-like symptoms  Reports he has a rash at this time that they are indeed addressing with PCP  Otherwise, he is doing well  Wife assisting with ADLs as needed  She also manages mediations and appts  Offered to schedule stroke hospital follow up appointment, she is agreeable to this  I scheduled appointment 3/11, verified mailing address, then mailed appt card per her request      Wife verbalizes understanding of stroke type, symptoms, personal risk factors and management, medications, and resources  I addressed all questions  At the conclusion of the conversation, wife denies having any further questions or concerns

## 2022-02-23 NOTE — TELEPHONE ENCOUNTER
Per last inpatient neuro note:    Meredeth Runner will need follow up in in 6 weeks with neurovascular attending  He will not require outpatient neurological testing    -continue aspirin 81 mg and will add Plavix 75mg daily; continue DAPT x3 weeks and then d/c ASA and continue Plavix monotherapy

## 2022-02-24 ENCOUNTER — CLINICAL SUPPORT (OUTPATIENT)
Dept: CARDIOLOGY CLINIC | Facility: CLINIC | Age: 87
End: 2022-02-24
Payer: MEDICARE

## 2022-02-24 DIAGNOSIS — R55 NEAR SYNCOPE: ICD-10-CM

## 2022-02-24 PROCEDURE — 93244 EXT ECG>48HR<7D REV&INTERPJ: CPT | Performed by: INTERNAL MEDICINE

## 2022-03-10 ENCOUNTER — OFFICE VISIT (OUTPATIENT)
Dept: CARDIOLOGY CLINIC | Facility: CLINIC | Age: 87
End: 2022-03-10
Payer: MEDICARE

## 2022-03-10 ENCOUNTER — PREP FOR PROCEDURE (OUTPATIENT)
Dept: CARDIOLOGY CLINIC | Facility: CLINIC | Age: 87
End: 2022-03-10

## 2022-03-10 VITALS
HEIGHT: 68 IN | WEIGHT: 184.8 LBS | HEART RATE: 86 BPM | SYSTOLIC BLOOD PRESSURE: 118 MMHG | BODY MASS INDEX: 28.01 KG/M2 | DIASTOLIC BLOOD PRESSURE: 64 MMHG

## 2022-03-10 DIAGNOSIS — R55 NEAR SYNCOPE: Primary | ICD-10-CM

## 2022-03-10 DIAGNOSIS — E78.5 DYSLIPIDEMIA: ICD-10-CM

## 2022-03-10 DIAGNOSIS — E11.59 TYPE 2 DIABETES MELLITUS WITH OTHER CIRCULATORY COMPLICATION, WITHOUT LONG-TERM CURRENT USE OF INSULIN (HCC): ICD-10-CM

## 2022-03-10 DIAGNOSIS — I25.10 CORONARY ARTERY DISEASE INVOLVING NATIVE CORONARY ARTERY OF NATIVE HEART WITHOUT ANGINA PECTORIS: ICD-10-CM

## 2022-03-10 DIAGNOSIS — I10 ESSENTIAL HYPERTENSION: ICD-10-CM

## 2022-03-10 PROCEDURE — 99214 OFFICE O/P EST MOD 30 MIN: CPT | Performed by: INTERNAL MEDICINE

## 2022-03-10 NOTE — PROGRESS NOTES
Cardiology Office Note  MD Rashmi Hamilton MD Georgi Melia, DO, MD Wing Winters DO, Antony Zarate DO, [facility] [place]  ----------------------------------------------------------------  [address] [facility]  [address], [address] [facility]    Alan Goyal 81 y o  male MRN: [id number]  Unit/Bed#:  Encounter: [id number]      History of Present Illness: It was a pleasure to see Alan Goyal in the office today for follow-up CV evaluation  He has a past medical history of hypertension, dyslipidemia, type 2 diabetes mellitus, CAD with prior NSTEMI and NATALIA to the LAD x2 in 2014  He established care with us in January 2022  In January 2022, the patient was hospitalized for near syncope  No evidence of significant arrhythmia was noted and the patient was discharged home  In February 2022, the patient was found to be dry retching and had another episode of near syncope  Per the patient, he lost his footing, felt weak and lowered himself in a chair  There was no associated chest discomfort, palpitations or shortness of breath  As he was dry heaving and retching, he was placed on his side, but never reportedly lost consciousness  In early February he also reported to an episode of slurred speech  Telemetry showed no significant arrhythmia  The patient was discharged to home and was recommended to follow-up with event recorder  Since discharge, he has been feeling well  Denies any further episodes of weakness  Denies significant chest pain, pressure, tightness or squeezing  Denies lightheadedness, dizziness or palpitations  Denies lower extremity swelling, orthopnea or paroxysmal nocturnal dyspnea  Review of Systems:  Review of Systems   Constitutional: Negative for decreased appetite, fever, weight gain and weight loss  HENT: Negative for congestion and sore throat  Eyes: Negative for visual disturbance     Cardiovascular: Negative for chest pain, dyspnea on exertion, leg swelling, near-syncope and palpitations  Respiratory: Negative for cough and shortness of breath  Hematologic/Lymphatic: Negative for bleeding problem  Skin: Negative for rash  Musculoskeletal: Negative for myalgias and neck pain  Gastrointestinal: Negative for abdominal pain and nausea  Neurological: Negative for light-headedness and weakness  Psychiatric/Behavioral: Negative for depression         Past Medical History:   Diagnosis Date    Cancer [facility])     NSTEMI (non-ST elevated myocardial infarction) ([place] [address] ) 2014    Pneumonia     Last Assessed:  10/7/14    Polymyalgia rheumatica ([place] [address] )     Last Assessed:  11/23/12    Polymyalgia rheumatica ([place] [address] ) 2011       Past Surgical History:   Procedure Laterality Date    APPENDECTOMY      CORONARY ANGIOPLASTY WITH STENT PLACEMENT      HERNIA REPAIR  2000       Social History     Socioeconomic History    Marital status: /Civil Union     Spouse name: Not on file    Number of children: Not on file    Years of education: Not on file    Highest education level: Not on file   Occupational History    Not on file   Tobacco Use    Smoking status: Never Smoker    Smokeless tobacco: Never Used   Vaping Use    Vaping Use: Never used   Substance and Sexual Activity    Alcohol use: Yes     Comment: Social    Drug use: Never    Sexual activity: Yes   Other Topics Concern    Not on file   Social History Narrative    Exercises regularly        Consumes 2-3 cups of coffee per week and 1 glass of ice tea per day     Social Determinants of Health     Financial Resource Strain: Not on file   Food Insecurity: Not on file   Transportation Needs: Not on file   Physical Activity: Not on file   Stress: Not on file   Social Connections: Not on file   Intimate Partner Violence: Not on file   Housing Stability: Not on file       Family History   Problem Relation Age of Onset    Pulmonary embolism Father    Cheryl Cartagena Other Father Transurethral resection of prostate    Diabetes Sister        Allergies   Allergen Reactions    Iv Dye [Iodinated Diagnostic Agents] Rash     Possible delayed rash February 2022         Current Outpatient Medications:     aspirin (ECOTRIN LOW STRENGTH) 81 mg EC tablet, Take 1 tablet (81 mg total) by mouth daily, Disp: 90 tablet, Rfl: 1    Calcium Citrate-Vitamin D (CALCIUM CITRATE + D) 315-250 MG-UNIT TABS, Take 1 tablet by mouth daily, Disp: , Rfl:     carboxymethylcellulose (REFRESH TEARS) 0 5 % SOLN, 1 drop 2 (two) times a day as needed for dry eyes, Disp: , Rfl:     clopidogrel (PLAVIX) 75 mg tablet, Take 1 tablet (75 mg total) by mouth daily, Disp: 30 tablet, Rfl: 0    Coenzyme Q10 100 MG TABS, Take 1 tablet by mouth daily, Disp: , Rfl:     metoprolol succinate (TOPROL-XL) 25 mg 24 hr tablet, Take 0 5 tablets (12 5 mg total) by mouth daily (Patient taking differently: Take 12 5 mg by mouth daily Takes at night ), Disp: 45 tablet, Rfl: 3    Misc Natural Products (OSTEO BI-FLEX ADV TRIPLE ST PO), Take by mouth, Disp: , Rfl:     OMEGA-3 FATTY ACIDS-VITAMIN E PO, Take 1 capsule by mouth daily, Disp: , Rfl:     rosuvastatin (CRESTOR) 10 MG tablet, Take 1 tablet (10 mg total) by mouth daily, Disp: 90 tablet, Rfl: 0    cetirizine (ZyrTEC) 10 mg tablet, Take 1 tablet (10 mg total) by mouth daily as needed (itch /rash) (Patient not taking: Reported on 3/10/2022 ), Disp: , Rfl:     meclizine (ANTIVERT) 25 mg tablet, Take 1 tablet (25 mg total) by mouth every 8 (eight) hours as needed for dizziness (Patient not taking: Reported on 3/10/2022 ), Disp: 30 tablet, Rfl: 0    ondansetron (Zofran ODT) 4 mg disintegrating tablet, Take 1 tablet (4 mg total) by mouth every 6 (six) hours as needed for nausea or vomiting (Patient not taking: Reported on 3/10/2022 ), Disp: 20 tablet, Rfl: 0    predniSONE 10 mg tablet, Use 40mg x 2 days, 30 mg x 2 days, 20 mg x 2 days, 10 mg x 2 days then stop (Patient not taking: Reported on 3/10/2022 ), Disp: 20 tablet, Rfl: 0    Vitals:    03/10/22 0959   BP: 118/64   BP Location: Left arm   Patient Position: Sitting   Cuff Size: Large   Pulse: 86   Weight: 83 8 kg (184 lb 12 8 oz)   Height: 5' 8" (1 727 m)       PHYSICAL EXAMINATION:  Gen: Awake, Alert, NAD   Head/eyes: AT/NC, pupils equal and round, Anicteric  ENT: mmm  Neck: Supple, No elevated JVP, trachea midline  Resp: CTA bilaterally no w/r/r  CV: RRR +S1, S2, No m/r/g  Abd: Soft, NT/ND + BS  Ext: no LE edema bilaterally  Neuro: Follows commands, moves all extermities  Psych: Appropriate affect, normal mood, pleasant attitude, non-combative  Skin: warm; no rash, erythema or venous stasis changes on exposed skin    --------------------------------------------------------------------------------  TREADMILL STRESS  No results found for this or any previous visit      --------------------------------------------------------------------------------  NUCLEAR STRESS TEST: No results found for this or any previous visit  Results for orders placed during the hospital encounter of 01/19/22    NM myocardial perfusion spect (rx stress and/or rest)    Interpretation Summary    Pharmacologic nuclear stress test appears negative for myocardial ischemia    ECG portion of stress test is negative for myocardial ischemia    Stress ECG: The patient after exercising for 3 min and 4 sec and had a maximal HR of 115 bpm (85 % of MPHR) and 1 0 METS  The patient experienced no angina during the test  Blood pressure demonstrated a normal response and heart rate demonstrated a normal response to stress    Stress Function: Left ventricular function post-stress is normal  Post-stress ejection fraction is 72 %      Rare PVCs       --------------------------------------------------------------------------------  CATH:  No results found for this or any previous visit     --------------------------------------------------------------------------------  ECHO:   No results found for this or any previous visit  No results found for this or any previous visit     --------------------------------------------------------------------------------  HOLTER  No results found for this or any previous visit  No results found for this or any previous visit     --------------------------------------------------------------------------------  CAROTIDS  No results found for this or any previous visit      --------------------------------------------------------------------------------  ECGs:  No results found for this visit on 03/10/22  Lab Results   Component Value Date    WBC 7 70 02/17/2022    HGB 13 6 02/17/2022    HCT 40 9 02/17/2022    MCV 84 02/17/2022     02/17/2022      Lab Results   Component Value Date    SODIUM 140 02/17/2022    K 4 2 02/17/2022     02/17/2022    CO2 27 02/17/2022    BUN 21 02/17/2022    CREATININE 0 85 02/17/2022    GLUC 86 02/17/2022    CALCIUM 8 4 02/17/2022      Lab Results   Component Value Date    HGBA1C 6 2 (H) 02/15/2022      No results found for: CHOL  Lab Results   Component Value Date    HDL 60 02/15/2022    HDL 63 01/20/2022    HDL 65 11/01/2021     Lab Results   Component Value Date    LDLCALC 53 02/15/2022    LDLCALC 61 01/20/2022    LDLCALC 72 11/01/2021     Lab Results   Component Value Date    TRIG 65 02/15/2022    TRIG 50 01/20/2022    TRIG 83 11/01/2021     No results found for: CHOLHDL   Lab Results   Component Value Date    INR 0 98 02/14/2022    PROTIME 12 8 02/14/2022        1  Near syncope    2  Coronary artery disease involving native coronary artery of native heart without angina pectoris    3  Essential hypertension    4  Dyslipidemia    5   Type 2 diabetes mellitus with other circulatory complication, without long-term current use of insulin ([facility])        IMPRESSION:  · Near-syncope/syncope x2 - patient feels strongly that he would not fully lose consciousness  · CAD/NSTEMI s/p NATALIA-LAD x2, 2014  · Hypertension  · Dyslipidemia  · Type 2 diabetes mellitus  · Possible recent lacunar infarct, February 2022  · Pharmacologic nuclear stress test appears negative for myocardial ischemia, gated EF 72%, January 2022  · LVEF 08%, normal diastolic function, top-normal RV size, mild LA and moderate RA dilatation, mild MR, mild MAC, trace MR/TR w/ PASP 25-30 mmHg, January 2022  · Event recorder w/ SR avg HR 69 bpm, rare APCs, atrial couplets/triplets, rare VPCs, ventricular couplets, nonsustained PSVT (x25) longest 16 beats at 115 bpm, fastest 167 bpm for 5 beats, rare Mobitz 1, triggered events correlated with sinus rhythm, rarely isolated VPCs and runs of nonsustained PSVT, February 2022    PLAN:  It was a pleasure to see her hurt in the office today for follow-up CV evaluation  He is here today due to his episodes of weakness which have been classified as possible near syncope, although he feels strongly that he never lost consciousness  He has no symptoms concerning for angina and no signs or symptoms of heart failure  He examines to be euvolemic in the office today  Recent nuclear stress test was negative for myocardial ischemia and echocardiogram demonstrated normal left ventricular function without significant valvular disease  He underwent event recorder without evidence of atrial fibrillation or significant dysrhythmia  He did have some runs of nonsustained SVT and he did have a single run of Mobitz 1 while he was sleeping  He has plan to see the neurologist tomorrow  Based on his clinical presentation, I have the following recommendations:    1  Recommend placement of implantable loop recorder  Would assess for any evidence of atrial fibrillation given his recent infarct  Will send notification have device placed  2  Continue aspirin, Plavix  3  Continue statin  Goal LDL is less than 70 mg/dL    Repeat lipid panel in 3-6 months  4  He is otherwise up-to-date on CV testing  5  Should he have any further episodes where he nearly loses consciousness or feels extremely weak again, and especially if he was to fully lose consciousness, I recommend seeking immediate medical attention/dial 911  6  While he is undergoing workup with Neurology and placement of loop recorder, would continue to hold off on driving for now  7  We will have him follow-up with Dr Garrison Silveira to further evaluate these findings and discussed the possibility of resuming driving  As always, please do not hesitate to call with any questions  Portions of the record may have been created with voice recognition software  Occasional wrong word or "sound a like" substitutions may have occurred due to the inherent limitations of voice recognition software  Read the chart carefully and recognize, using context, where substitutions have occurred        Signed: Heron Ramon DO, [facility] [facility]ms

## 2022-03-10 NOTE — H&P
Cardiology Office Note  MD Luigi Harris MD Delman Perkins, DO, MD Nguyễn Garay DO, Fan Butler DO, [facility] - [place]  ----------------------------------------------------------------  [address] [facility]  [address] [place] [address], [address] [facility]    Win Roy 81 y o  male MRN: [id number]  Unit/Bed#:  Encounter: [id number]      History of Present Illness: It was a pleasure to see Win Roy in the office today for follow-up CV evaluation  He has a past medical history of hypertension, dyslipidemia, type 2 diabetes mellitus, CAD with prior NSTEMI and NATALIA to the LAD x2 in 2014  He established care with us in January 2022  In January 2022, the patient was hospitalized for near syncope  No evidence of significant arrhythmia was noted and the patient was discharged home  In February 2022, the patient was found to be dry retching and had another episode of near syncope  Per the patient, he lost his footing, felt weak and lowered himself in a chair  There was no associated chest discomfort, palpitations or shortness of breath  As he was dry heaving and retching, he was placed on his side, but never reportedly lost consciousness  In early February he also reported to an episode of slurred speech  Telemetry showed no significant arrhythmia  The patient was discharged to home and was recommended to follow-up with event recorder  Since discharge, he has been feeling well  Denies any further episodes of weakness  Denies significant chest pain, pressure, tightness or squeezing  Denies lightheadedness, dizziness or palpitations  Denies lower extremity swelling, orthopnea or paroxysmal nocturnal dyspnea  Review of Systems:  Review of Systems   Constitutional: Negative for decreased appetite, fever, weight gain and weight loss  HENT: Negative for congestion and sore throat  Eyes: Negative for visual disturbance     Cardiovascular: Negative for chest pain, dyspnea on exertion, leg swelling, near-syncope and palpitations  Respiratory: Negative for cough and shortness of breath  Hematologic/Lymphatic: Negative for bleeding problem  Skin: Negative for rash  Musculoskeletal: Negative for myalgias and neck pain  Gastrointestinal: Negative for abdominal pain and nausea  Neurological: Negative for light-headedness and weakness  Psychiatric/Behavioral: Negative for depression         Past Medical History:   Diagnosis Date    Cancer [facility])     NSTEMI (non-ST elevated myocardial infarction) ([place] [address] ) 2014    Pneumonia     Last Assessed:  10/7/14    Polymyalgia rheumatica ([place] [address] )     Last Assessed:  11/23/12    Polymyalgia rheumatica ([place] [address] ) 2011       Past Surgical History:   Procedure Laterality Date    APPENDECTOMY      CORONARY ANGIOPLASTY WITH STENT PLACEMENT      HERNIA REPAIR  2000       Social History     Socioeconomic History    Marital status: /Civil Union     Spouse name: Not on file    Number of children: Not on file    Years of education: Not on file    Highest education level: Not on file   Occupational History    Not on file   Tobacco Use    Smoking status: Never Smoker    Smokeless tobacco: Never Used   Vaping Use    Vaping Use: Never used   Substance and Sexual Activity    Alcohol use: Yes     Comment: Social    Drug use: Never    Sexual activity: Yes   Other Topics Concern    Not on file   Social History Narrative    Exercises regularly        Consumes 2-3 cups of coffee per week and 1 glass of ice tea per day     Social Determinants of Health     Financial Resource Strain: Not on file   Food Insecurity: Not on file   Transportation Needs: Not on file   Physical Activity: Not on file   Stress: Not on file   Social Connections: Not on file   Intimate Partner Violence: Not on file   Housing Stability: Not on file       Family History   Problem Relation Age of Onset    Pulmonary embolism Father    Cheryl Cartagena Other Father Transurethral resection of prostate    Diabetes Sister        Allergies   Allergen Reactions    Iv Dye [Iodinated Diagnostic Agents] Rash     Possible delayed rash February 2022         Current Outpatient Medications:     aspirin (ECOTRIN LOW STRENGTH) 81 mg EC tablet, Take 1 tablet (81 mg total) by mouth daily, Disp: 90 tablet, Rfl: 1    Calcium Citrate-Vitamin D (CALCIUM CITRATE + D) 315-250 MG-UNIT TABS, Take 1 tablet by mouth daily, Disp: , Rfl:     carboxymethylcellulose (REFRESH TEARS) 0 5 % SOLN, 1 drop 2 (two) times a day as needed for dry eyes, Disp: , Rfl:     cetirizine (ZyrTEC) 10 mg tablet, Take 1 tablet (10 mg total) by mouth daily as needed (itch /rash) (Patient not taking: Reported on 3/10/2022 ), Disp: , Rfl:     clopidogrel (PLAVIX) 75 mg tablet, Take 1 tablet (75 mg total) by mouth daily, Disp: 30 tablet, Rfl: 0    Coenzyme Q10 100 MG TABS, Take 1 tablet by mouth daily, Disp: , Rfl:     meclizine (ANTIVERT) 25 mg tablet, Take 1 tablet (25 mg total) by mouth every 8 (eight) hours as needed for dizziness (Patient not taking: Reported on 3/10/2022 ), Disp: 30 tablet, Rfl: 0    metoprolol succinate (TOPROL-XL) 25 mg 24 hr tablet, Take 0 5 tablets (12 5 mg total) by mouth daily (Patient taking differently: Take 12 5 mg by mouth daily Takes at night ), Disp: 45 tablet, Rfl: 3    Misc Natural Products (OSTEO BI-FLEX ADV TRIPLE ST PO), Take by mouth, Disp: , Rfl:     OMEGA-3 FATTY ACIDS-VITAMIN E PO, Take 1 capsule by mouth daily, Disp: , Rfl:     ondansetron (Zofran ODT) 4 mg disintegrating tablet, Take 1 tablet (4 mg total) by mouth every 6 (six) hours as needed for nausea or vomiting (Patient not taking: Reported on 3/10/2022 ), Disp: 20 tablet, Rfl: 0    predniSONE 10 mg tablet, Use 40mg x 2 days, 30 mg x 2 days, 20 mg x 2 days, 10 mg x 2 days then stop (Patient not taking: Reported on 3/10/2022 ), Disp: 20 tablet, Rfl: 0    rosuvastatin (CRESTOR) 10 MG tablet, Take 1 tablet (10 mg total) by mouth daily, Disp: 90 tablet, Rfl: 0    There were no vitals filed for this visit  PHYSICAL EXAMINATION:  Gen: Awake, Alert, NAD   Head/eyes: AT/NC, pupils equal and round, Anicteric  ENT: mmm  Neck: Supple, No elevated JVP, trachea midline  Resp: CTA bilaterally no w/r/r  CV: RRR +S1, S2, No m/r/g  Abd: Soft, NT/ND + BS  Ext: no LE edema bilaterally  Neuro: Follows commands, moves all extermities  Psych: Appropriate affect, normal mood, pleasant attitude, non-combative  Skin: warm; no rash, erythema or venous stasis changes on exposed skin    --------------------------------------------------------------------------------  TREADMILL STRESS  No results found for this or any previous visit      --------------------------------------------------------------------------------  NUCLEAR STRESS TEST: No results found for this or any previous visit  Results for orders placed during the hospital encounter of 01/19/22    NM myocardial perfusion spect (rx stress and/or rest)    Interpretation Summary    Pharmacologic nuclear stress test appears negative for myocardial ischemia    ECG portion of stress test is negative for myocardial ischemia    Stress ECG: The patient after exercising for 3 min and 4 sec and had a maximal HR of 115 bpm (85 % of MPHR) and 1 0 METS  The patient experienced no angina during the test  Blood pressure demonstrated a normal response and heart rate demonstrated a normal response to stress    Stress Function: Left ventricular function post-stress is normal  Post-stress ejection fraction is 72 %    Rare PVCs       --------------------------------------------------------------------------------  CATH:  No results found for this or any previous visit     --------------------------------------------------------------------------------  ECHO:   No results found for this or any previous visit      No results found for this or any previous visit     --------------------------------------------------------------------------------  HOLTER  No results found for this or any previous visit  No results found for this or any previous visit     --------------------------------------------------------------------------------  CAROTIDS  No results found for this or any previous visit      --------------------------------------------------------------------------------  ECGs:  No results found for this visit on 03/10/22  Lab Results   Component Value Date    WBC 7 70 02/17/2022    HGB 13 6 02/17/2022    HCT 40 9 02/17/2022    MCV 84 02/17/2022     02/17/2022      Lab Results   Component Value Date    SODIUM 140 02/17/2022    K 4 2 02/17/2022     02/17/2022    CO2 27 02/17/2022    BUN 21 02/17/2022    CREATININE 0 85 02/17/2022    GLUC 86 02/17/2022    CALCIUM 8 4 02/17/2022      Lab Results   Component Value Date    HGBA1C 6 2 (H) 02/15/2022      No results found for: CHOL  Lab Results   Component Value Date    HDL 60 02/15/2022    HDL 63 01/20/2022    HDL 65 11/01/2021     Lab Results   Component Value Date    LDLCALC 53 02/15/2022    LDLCALC 61 01/20/2022    LDLCALC 72 11/01/2021     Lab Results   Component Value Date    TRIG 65 02/15/2022    TRIG 50 01/20/2022    TRIG 83 11/01/2021     No results found for: CHOLHDL   Lab Results   Component Value Date    INR 0 98 02/14/2022    PROTIME 12 8 02/14/2022        There are no diagnoses linked to this encounter      IMPRESSION:  · Near-syncope/syncope x2 - patient feels strongly that he would not fully lose consciousness  · CAD/NSTEMI s/p NATALIA-LAD x2, 2014  · Hypertension  · Dyslipidemia  · Type 2 diabetes mellitus  · Possible recent lacunar infarct, February 2022  · Pharmacologic nuclear stress test appears negative for myocardial ischemia, gated EF 72%, January 2022  · LVEF 85%, normal diastolic function, top-normal RV size, mild LA and moderate RA dilatation, mild MR, mild MAC, trace MR/TR w/ PASP 25-30 mmHg, January 2022  · Event recorder w/ SR avg HR 69 bpm, rare APCs, atrial couplets/triplets, rare VPCs, ventricular couplets, nonsustained PSVT (x25) longest 16 beats at 115 bpm, fastest 167 bpm for 5 beats, rare [place] 1, triggered events correlated with sinus rhythm, rarely isolated VPCs and runs of nonsustained PSVT, February 2022    PLAN:  It was a pleasure to see her hurt in the office today for follow-up CV evaluation  He is here today due to his episodes of weakness which have been classified as possible near syncope, although he feels strongly that he never lost consciousness  He has no symptoms concerning for angina and no signs or symptoms of heart failure  He examines to be euvolemic in the office today  Recent nuclear stress test was negative for myocardial ischemia and echocardiogram demonstrated normal left ventricular function without significant valvular disease  He underwent event recorder without evidence of atrial fibrillation or significant dysrhythmia  He did have some runs of nonsustained SVT and he did have a single run of [place] 1 while he was sleeping  He has plan to see the neurologist tomorrow  Based on his clinical presentation, I have the following recommendations:    1  Recommend placement of implantable loop recorder  Would assess for any evidence of atrial fibrillation given his recent infarct  Will send notification have device placed  2  Continue aspirin, Plavix  3  Continue statin  Goal LDL is less than 70 mg/dL  Repeat lipid panel in 3-6 months  4  He is otherwise up-to-date on CV testing  5  Should he have any further episodes where he nearly loses consciousness or feels extremely weak again, and especially if he was to fully lose consciousness, I recommend seeking immediate medical attention/dial 911  6  While he is undergoing workup with Neurology and placement of loop recorder, would continue to hold off on driving for now  7   We will have him follow-up with Dr Marissa Teixeira to further evaluate these findings and discussed the possibility of resuming driving  As always, please do not hesitate to call with any questions  Portions of the record may have been created with voice recognition software  Occasional wrong word or "sound a like" substitutions may have occurred due to the inherent limitations of voice recognition software  Read the chart carefully and recognize, using context, where substitutions have occurred        Signed: Jack Alfaro DO, Eileen Bair

## 2022-03-11 ENCOUNTER — DOCUMENTATION (OUTPATIENT)
Dept: FAMILY MEDICINE CLINIC | Facility: CLINIC | Age: 87
End: 2022-03-11

## 2022-03-11 ENCOUNTER — OFFICE VISIT (OUTPATIENT)
Dept: NEUROLOGY | Facility: CLINIC | Age: 87
End: 2022-03-11
Payer: MEDICARE

## 2022-03-11 VITALS
WEIGHT: 185 LBS | SYSTOLIC BLOOD PRESSURE: 140 MMHG | HEIGHT: 68 IN | OXYGEN SATURATION: 97 % | TEMPERATURE: 96.5 F | DIASTOLIC BLOOD PRESSURE: 100 MMHG | BODY MASS INDEX: 28.04 KG/M2 | HEART RATE: 88 BPM

## 2022-03-11 DIAGNOSIS — E11.9 DIABETES (HCC): Primary | ICD-10-CM

## 2022-03-11 PROCEDURE — 99213 OFFICE O/P EST LOW 20 MIN: CPT | Performed by: PSYCHIATRY & NEUROLOGY

## 2022-03-11 NOTE — PROGRESS NOTES
Patient ID: Mami Moctezuma is a 81 y o  male  Assessment/Plan:    No problem-specific Assessment & Plan notes found for this encounter  {Assess/PlanSmartLinks:61306}       Subjective:    HPI    {[facility] HPI texts:66747}    {Common ambulatory SmartLinks:22489}         Objective: There were no vitals taken for this visit  Physical Exam    Neurological Exam      ROS:    Review of Systems   Constitutional: Negative  Negative for appetite change and fever  HENT: Negative  Negative for hearing loss, tinnitus, trouble swallowing and voice change  Eyes: Negative  Negative for photophobia and pain  Respiratory: Negative  Negative for shortness of breath  Cardiovascular: Negative  Negative for palpitations  Gastrointestinal: Negative  Negative for nausea and vomiting  Endocrine: Negative  Negative for cold intolerance  Genitourinary: Negative  Negative for dysuria, frequency and urgency  Musculoskeletal: Negative  Negative for myalgias and neck pain  Skin: Negative  Negative for rash  Neurological: Negative  Negative for dizziness, tremors, seizures, syncope, facial asymmetry, speech difficulty, weakness, light-headedness, numbness and headaches  Hematological: Negative  Does not bruise/bleed easily  Psychiatric/Behavioral: Negative  Negative for confusion, hallucinations and sleep disturbance

## 2022-03-11 NOTE — PROGRESS NOTES
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    Stroke risk factors include hyperlipidemia, hypertension and borderline diabetes mellitus  The patient is now had full recovery  There was no loss of consciousness  I fully reviewed with the patient and his wife the evaluation his imaging and medical treatment  At this point he is on maximal medical management save for his borderline diabetes  At this time I feel he should see a dietitian and also have very close surveillance for worsening glycemic control with his primary physician  22 minutes was spent with the patient and his wife more than half of which was spent in care coordination, counseling and education  Subjective: The patient returns and has absolutely no symptoms  His symptoms initially were of a 5 dropping down with incoordination in his legs followed by severe vomiting  At no time did he have any other focal cranial nerve abnormalities  He has not had no further symptoms  HPI    Patient presents to the office today following recent hospitalization for stroke  Wife Jennyfer is present for the visit today  Since discharge, he denies experiencing any new or worsening stroke-like symptoms  Patient denies the presence of any residual symptoms following hospitalization and claims he has returned to baseline functioning  Patient successfully followed up with PCP and Cardiology  I reviewed medications with him  Patient reports having no difficulties obtaining medications  Reports he is taking all as prescribed with no missed doses, medication side effects, or signs of bleeding  he also verbalizes understanding of DAPT instructions/plan  Aspirin has been completed at this time as of yesterday  We reviewed stroke type, symptoms, personal risk factors and management, medications, and resources  Patient and wife verbalize understanding of teaching  Offered stroke education binder and my card to patient  I addressed all questions  At the conclusion of the conversation, patient and wife deny having any further questions or concerns  Objective: There were no vitals taken for this visit  Physical Exam    Neurological Exam station gait are normal including tandem walking  Finger-nose-finger and cerebellar function is normal   Extraocular movements are completely normal       ROS:    Review of Systems   Constitutional: Negative  Negative for appetite change and fever  HENT: Negative  Negative for hearing loss, tinnitus, trouble swallowing and voice change  Eyes: Negative  Negative for photophobia and pain  Respiratory: Negative  Negative for shortness of breath  Cardiovascular: Negative  Negative for palpitations  Gastrointestinal: Negative  Negative for nausea and vomiting  Endocrine: Negative  Negative for cold intolerance  Genitourinary: Negative  Negative for dysuria, frequency and urgency  Musculoskeletal: Negative  Negative for myalgias and neck pain  Skin: Negative  Negative for rash  Neurological: Negative  Negative for dizziness, tremors, seizures, syncope, facial asymmetry, speech difficulty, weakness, light-headedness, numbness and headaches  Hematological: Negative  Does not bruise/bleed easily  Psychiatric/Behavioral: Negative  Negative for confusion, hallucinations and sleep disturbance

## 2022-03-11 NOTE — PROGRESS NOTES
See message I received from patient    I did fill out forms to send in that he may continue driving, he has no medical condition which would preclude him from continuing to drive

## 2022-03-15 ENCOUNTER — TELEPHONE (OUTPATIENT)
Dept: NEUROLOGY | Facility: CLINIC | Age: 87
End: 2022-03-15

## 2022-03-15 NOTE — TELEPHONE ENCOUNTER
Received VM from patient requesting call back on [employer] forms  Called patient - states that his PCP office is having difficulty with such forms  He asked to employ my help to reach out to PCP and find out the update on the forms  Called PCP office at [phone] did speak with Luli - Forms completed - states she was awaiting patient's call back [facility]  I provided this to her and then she confirmed that she will fax the forms now  Called patient and updated him

## 2022-03-29 ENCOUNTER — TELEPHONE (OUTPATIENT)
Dept: CARDIOLOGY CLINIC | Facility: CLINIC | Age: 87
End: 2022-03-29

## 2022-03-29 ENCOUNTER — OFFICE VISIT (OUTPATIENT)
Dept: FAMILY MEDICINE CLINIC | Facility: CLINIC | Age: 87
End: 2022-03-29
Payer: MEDICARE

## 2022-03-29 VITALS
HEIGHT: 68 IN | SYSTOLIC BLOOD PRESSURE: 124 MMHG | TEMPERATURE: 98 F | HEART RATE: 73 BPM | WEIGHT: 180 LBS | DIASTOLIC BLOOD PRESSURE: 78 MMHG | OXYGEN SATURATION: 96 % | BODY MASS INDEX: 27.28 KG/M2

## 2022-03-29 DIAGNOSIS — R00.2 HEART PALPITATIONS: ICD-10-CM

## 2022-03-29 DIAGNOSIS — R42 DIZZINESS AND GIDDINESS: Primary | ICD-10-CM

## 2022-03-29 DIAGNOSIS — I25.10 ARTERIOSCLEROTIC CORONARY ARTERY DISEASE: ICD-10-CM

## 2022-03-29 PROBLEM — R55 SYNCOPE: Status: RESOLVED | Noted: 2022-01-19 | Resolved: 2022-03-29

## 2022-03-29 PROCEDURE — 99213 OFFICE O/P EST LOW 20 MIN: CPT | Performed by: FAMILY MEDICINE

## 2022-03-29 NOTE — PROGRESS NOTES
FAMILY PRACTICE OFFICE VISIT  [facility]  [address]  [address] [place], [place], [address]      NAME: Eligio Riddle  AGE: 81 y o  SEX: male  : 1935   MRN: [id number]    DATE: 3/29/2022  TIME: 3:01 PM    Assessment and Plan     Problem List Items Addressed This Visit     Arteriosclerotic coronary artery disease    Relevant Orders    Ambulatory Referral to Cardiology    Dizziness and giddiness - Primary    Heart palpitations    Relevant Orders    Ambulatory Referral to Cardiology          Patient Instructions   He has VNG tomorrow, he has no significant wax in either ear canal     We did review his visit with Cardiology, loop recorder was advised, he requests another opinion, referral placed  Relates he is off aspirin, he can discuss restarting aspirin 81 mg daily regarding heart disease with them, he does continue on Plavix 75 mg daily  He did see Neurology back on , has had no new issues, looks well today, is now walking 20 miles a week  He can continue to drive  He does have a routine follow-up with us   Chief Complaint     Chief Complaint   Patient presents with    Ear check       History of Present Illness   Eligio Riddle is a 81y o -year-old male who is in today to check his ears, he was told to have them checked for wax as he has VNG tomorrow  He is requesting a 2nd opinion regarding need for loop recorder  He has had no palpitations, feels well, has been walking 20 miles a week  Has not required meclizine, is off antihistamine, previous rash resolved  He has had no recent dizziness or lightheadedness      Review of Systems   Review of Systems   Constitutional: Negative for appetite change, fatigue, fever and unexpected weight change  HENT: Negative for sore throat and trouble swallowing  Respiratory: Negative for cough, chest tightness, shortness of breath and wheezing  Cardiovascular: Negative for chest pain, palpitations (History of, none current) and leg swelling  Gastrointestinal: Negative for abdominal pain, blood in stool, nausea and vomiting  No acid reflux     No change in bowel   Genitourinary: Negative for dysuria and hematuria  Neurological: Negative for syncope and headaches  See HPI along with recent neurology consult   Psychiatric/Behavioral: Negative for behavioral problems and confusion  Active Problem List     Patient Active Problem List   Diagnosis    Actinic keratosis    Arteriosclerotic coronary artery disease    Basal cell carcinoma of skin    BPH with obstruction/lower urinary tract symptoms    Benign essential hypertension    Hyperlipidemia    History of heart artery stent    Primary osteoarthritis of both hands    Closed nondisplaced fracture of proximal phalanx of right little finger    Dizziness and giddiness    Heart palpitations    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[facility])       Past Medical History:  Reviewed    Past Surgical History:  Reviewed    Family History:  Reviewed    Social History:  Reviewed    Objective     Vitals:    03/29/22 1259   BP: 124/78   BP Location: Left arm   Patient Position: Sitting   Cuff Size: Standard   Pulse: 73   Temp: 98 °F (36 7 °C)   SpO2: 96%   Weight: 81 6 kg (180 lb)   Height: 5' 8" (1 727 m)     Body mass index is 27 37 kg/m²  BP Readings from Last 3 Encounters:   03/29/22 124/78   03/11/22 140/100   03/10/22 118/64       Wt Readings from Last 3 Encounters:   03/29/22 81 6 kg (180 lb)   03/11/22 83 9 kg (185 lb)   03/10/22 83 8 kg (184 lb 12 8 oz)       Physical Exam  Constitutional:       General: He is not in acute distress  Appearance: Normal appearance  He is well-developed  He is not ill-appearing  HENT:      Right Ear: Tympanic membrane and ear canal normal       Left Ear: Tympanic membrane and ear canal normal    Eyes:      General: No scleral icterus    Cardiovascular:      Rate and Rhythm: Normal rate and regular rhythm  Heart sounds: Normal heart sounds  Pulmonary:      Effort: Pulmonary effort is normal  No respiratory distress  Breath sounds: Normal breath sounds  Musculoskeletal:      Right lower leg: No edema  Left lower leg: No edema  Neurological:      General: No focal deficit present  Mental Status: He is alert  Mental status is at baseline  Psychiatric:         Mood and Affect: Mood normal          Behavior: Behavior normal          ALLERGIES:  Allergies   Allergen Reactions    Iv Dye [Iodinated Diagnostic Agents] Rash     Possible delayed rash February 2022       Current Medications     Current Outpatient Medications   Medication Sig Dispense Refill    Calcium Citrate-Vitamin D (CALCIUM CITRATE + D) 315-250 MG-UNIT TABS Take 1 tablet by mouth daily      carboxymethylcellulose (REFRESH TEARS) 0 5 % SOLN 1 drop 2 (two) times a day as needed for dry eyes      clopidogrel (PLAVIX) 75 mg tablet Take 1 tablet (75 mg total) by mouth daily 90 tablet 3    Coenzyme Q10 100 MG TABS Take 1 tablet by mouth daily      metoprolol succinate (TOPROL-XL) 25 mg 24 hr tablet Take 0 5 tablets (12 5 mg total) by mouth daily (Patient taking differently: Take 12 5 mg by mouth daily Takes at night ) 45 tablet 3    Misc Natural Products (OSTEO BI-FLEX ADV TRIPLE ST PO) Take by mouth      OMEGA-3 FATTY ACIDS-VITAMIN E PO Take 1 capsule by mouth daily      rosuvastatin (CRESTOR) 10 MG tablet Take 1 tablet (10 mg total) by mouth daily 90 tablet 0     No current facility-administered medications for this visit              Orders Placed This Encounter   Procedures    Ambulatory Referral to Cardiology         Luz Maria Stuart DO

## 2022-03-29 NOTE — PATIENT INSTRUCTIONS
He has VNG tomorrow, he has no significant wax in either ear canal     We did review his visit with Cardiology, loop recorder was advised, he requests another opinion, referral placed  Relates he is off aspirin, he can discuss restarting aspirin 81 mg daily regarding heart disease with them, he does continue on Plavix 75 mg daily  He did see Neurology back on March 11th, has had no new issues, looks well today, is now walking 20 miles a week  He can continue to drive  He does have a routine follow-up with us April 19th

## 2022-03-29 NOTE — TELEPHONE ENCOUNTER
Called patient in regard loop recorder referred by Dr Jenkins Generous  Patient said he will go for a second opinion  I provide my number in case he decide to have it done

## 2022-03-31 ENCOUNTER — OFFICE VISIT (OUTPATIENT)
Dept: AUDIOLOGY | Age: 87
End: 2022-03-31

## 2022-03-31 DIAGNOSIS — R42 DIZZINESS AND GIDDINESS: Primary | ICD-10-CM

## 2022-03-31 NOTE — PROGRESS NOTES
Progress Note    Name:  Prince Cronin  :  1935  Age:  81 y o  Date of Evaluation: 22     Oswaldo Durbin was scheduled today for VNG testing due to sudden loss of balance in 2022  He reported that since seeing ENT, it was determined that he had a mini stroke and that he is not experiencing any dizziness and never has  He is not experiencing a loss of balance anymore as well  ENT notes indicate that the VNG test is not necessary if a diagnosis is determined or if Oswaldo Durbin is asymptomatic, therefore testing was not completed today      Emily Layton   Clinical Audiologist

## 2022-04-14 ENCOUNTER — TELEPHONE (OUTPATIENT)
Dept: CARDIOLOGY CLINIC | Facility: CLINIC | Age: 87
End: 2022-04-14

## 2022-04-14 NOTE — TELEPHONE ENCOUNTER
Called pt in regards to his loop implant procedure referred by Diana Hernandez  Pt stated that he is not going forward with it due to a second opinion he got at Neuro  He said he will call Hong Rodriguez sometime today to let him know

## 2022-04-18 ENCOUNTER — TELEPHONE (OUTPATIENT)
Dept: CARDIOLOGY CLINIC | Facility: CLINIC | Age: 87
End: 2022-04-18

## 2022-04-18 NOTE — TELEPHONE ENCOUNTER
Spoke with pt in regards to scheduling a f/u appt per Dr Ferdinand Jung with his reg cardiologist Dr Deion Alonzo  Patient did have an appt scheduled for 04/20 but canceled because he is traveling  Informed pt we will place him on the call list for a June appt since we don't have any available appts for the April or May that work around his travel plans  Patient agreed & stated he understood

## 2022-04-19 ENCOUNTER — OFFICE VISIT (OUTPATIENT)
Dept: FAMILY MEDICINE CLINIC | Facility: CLINIC | Age: 87
End: 2022-04-19
Payer: MEDICARE

## 2022-04-19 VITALS
TEMPERATURE: 97.5 F | HEART RATE: 61 BPM | HEIGHT: 68 IN | WEIGHT: 179 LBS | BODY MASS INDEX: 27.13 KG/M2 | SYSTOLIC BLOOD PRESSURE: 116 MMHG | OXYGEN SATURATION: 98 % | DIASTOLIC BLOOD PRESSURE: 68 MMHG

## 2022-04-19 DIAGNOSIS — I63.9 CEREBELLAR STROKE (HCC): ICD-10-CM

## 2022-04-19 DIAGNOSIS — C44.41 BASAL CELL CARCINOMA (BCC) OF SCALP: ICD-10-CM

## 2022-04-19 DIAGNOSIS — I10 BENIGN ESSENTIAL HYPERTENSION: ICD-10-CM

## 2022-04-19 DIAGNOSIS — I25.10 ARTERIOSCLEROTIC CORONARY ARTERY DISEASE: Primary | ICD-10-CM

## 2022-04-19 PROBLEM — S62.646A CLOSED NONDISPLACED FRACTURE OF PROXIMAL PHALANX OF RIGHT LITTLE FINGER: Status: RESOLVED | Noted: 2018-11-27 | Resolved: 2022-04-19

## 2022-04-19 PROCEDURE — 99213 OFFICE O/P EST LOW 20 MIN: CPT | Performed by: FAMILY MEDICINE

## 2022-04-19 NOTE — PATIENT INSTRUCTIONS
Doing well here today, he has been walking, has been watching his diet, dropped about 3 lb verses his baseline last year  He did see Neurology March 11th, plans to see them again only as needed  Has had no further dizziness or neurological deficit  He does continue Plavix 75 mg daily, aspirin was stopped 3 weeks after hospital stay  He also does have heart disease, I do feel he would benefit from restarting aspirin 81 mg daily in addition to the Plavix, has no GI issues, we would need to watch for stomach upset/bleeding  He wishes to discuss this with family member who is a physician, also does plan to see Cardiology in the future, as in prior notes was going for 2nd opinion  Continue with current medication is is otherwise, we will see him again in 6 months with AWV, call us sooner if needed  Redo CBC/CMP/ A1C at f-up    As in prior notes February blood work showed BMP, CBC to be okay, A1c 6 2, cholesterol 126 with HDL 60, LDL 53       Also, had seen Dr Esparza Living years ago, scalp lesions/ hx BCC

## 2022-04-19 NOTE — PROGRESS NOTES
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[facility])          Patient Instructions   Doing well here today, he has been walking, has been watching his diet, dropped about 3 lb verses his baseline last year  He did see Neurology , plans to see them again only as needed  Has had no further dizziness or neurological deficit  He does continue Plavix 75 mg daily, aspirin was stopped 3 weeks after hospital stay  He also does have heart disease, I do feel he would benefit from restarting aspirin 81 mg daily in addition to the Plavix, has no GI issues, we would need to watch for stomach upset/bleeding  He wishes to discuss this with family member who is a physician, also does plan to see Cardiology in the future, as in prior notes was going for 2nd opinion  Continue with current medication is is otherwise, we will see him again in 6 months with AWV, call us sooner if needed  Redo CBC/CMP/ A1C at f-up    As in prior notes February blood work showed BMP, CBC to be okay, A1c 6 2, cholesterol 126 with HDL 60, LDL 53  Also, had seen Dr Anastacia Montesinos years ago, scalp lesions/ hx [facility]      Chief Complaint     Chief Complaint   Patient presents with    Follow-up     6 months fallow up       History of Present Illness   Alissa Brush is a 81y o -year-old male who is in today accompanied by his wife for a routine follow-up, he relates overall he has been feeling well, has continued to walk    Has had no recent dizziness, is using medication as directed  Remains off aspirin, on Plavix  No new palpitations, chest pain or increased shortness of breath      Review of Systems   Review of Systems   Constitutional: Negative for appetite change, fatigue, fever and unexpected weight change  HENT: Negative for sore throat and trouble swallowing  Respiratory: Negative for cough, chest tightness, shortness of breath and wheezing  Cardiovascular: Negative for chest pain, palpitations (No worse than baseline) and leg swelling  Gastrointestinal: Negative for abdominal pain, blood in stool, nausea and vomiting  No acid reflux     No change in bowel   Genitourinary: Negative for dysuria and hematuria  Skin:        Previous rash related to IV dye resolved, see previous visits   Neurological: Negative for dizziness, syncope, light-headedness and headaches  Psychiatric/Behavioral: Negative for behavioral problems and confusion  Active Problem List     Patient Active Problem List   Diagnosis    Actinic keratosis    Arteriosclerotic coronary artery disease    Basal cell carcinoma of skin    BPH with obstruction/lower urinary tract symptoms    Benign essential hypertension    Hyperlipidemia    History of heart artery stent    Primary osteoarthritis of both hands    Dizziness and giddiness    Heart palpitations    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[facility])       Past Medical History:  Reviewed    Past Surgical History:  Reviewed    Family History:  Reviewed    Social History:  Reviewed    Objective     Vitals:    04/19/22 0931   BP: 116/68   BP Location: Left arm   Patient Position: Sitting   Cuff Size: Large   Pulse: 61   Temp: 97 5 °F (36 4 °C)   SpO2: 98%   Weight: 81 2 kg (179 lb)   Height: 5' 8" (1 727 m)     Body mass index is 27 22 kg/m²      BP Readings from Last 3 Encounters:   04/19/22 116/68   03/29/22 124/78   03/11/22 140/100       Wt Readings from Last 3 Encounters:   04/19/22 81 2 kg (179 lb)   03/29/22 81 6 kg (180 lb)   03/11/22 83 9 kg (185 lb)       Physical Exam  Constitutional:       General: He is not in acute distress  Appearance: Normal appearance  He is well-developed  He is not ill-appearing  Eyes:      General: No scleral icterus  Cardiovascular:      Comments: Regular rate and rhythm  Pulmonary:      Effort: Pulmonary effort is normal  No respiratory distress  Breath sounds: Normal breath sounds  No wheezing, rhonchi or rales  Abdominal:      Palpations: Abdomen is soft  Tenderness: There is no abdominal tenderness  Musculoskeletal:      Right lower leg: No edema  Left lower leg: No edema  Skin:     Coloration: Skin is not jaundiced  Neurological:      Mental Status: He is alert  Mental status is at baseline  Psychiatric:         Mood and Affect: Mood normal          Behavior: Behavior normal          ALLERGIES:  Allergies   Allergen Reactions    Iv Dye [Iodinated Diagnostic Agents] Rash     Possible delayed rash February 2022       Current Medications     Current Outpatient Medications   Medication Sig Dispense Refill    Calcium Citrate-Vitamin D (CALCIUM CITRATE + D) 315-250 MG-UNIT TABS Take 1 tablet by mouth daily      carboxymethylcellulose (REFRESH TEARS) 0 5 % SOLN 1 drop 2 (two) times a day as needed for dry eyes      clopidogrel (PLAVIX) 75 mg tablet Take 1 tablet (75 mg total) by mouth daily 90 tablet 3    Coenzyme Q10 100 MG TABS Take 1 tablet by mouth daily      metoprolol succinate (TOPROL-XL) 25 mg 24 hr tablet Take 0 5 tablets (12 5 mg total) by mouth daily (Patient taking differently: Take 12 5 mg by mouth daily Takes at night ) 45 tablet 3    Misc Natural Products (OSTEO BI-FLEX ADV TRIPLE ST PO) Take by mouth      OMEGA-3 FATTY ACIDS-VITAMIN E PO Take 1 capsule by mouth daily      rosuvastatin (CRESTOR) 10 MG tablet Take 1 tablet (10 mg total) by mouth daily 90 tablet 0     No current facility-administered medications for this visit              Orders Placed This Encounter   Procedures    Ambulatory Referral to Dermatology         Mariah Teixeira DO

## 2022-04-25 ENCOUNTER — TELEPHONE (OUTPATIENT)
Dept: OTHER | Facility: OTHER | Age: 87
End: 2022-04-25

## 2022-04-25 NOTE — TELEPHONE ENCOUNTER
Patient called in with referral from PCP for Dr Braulio Do for Basal cell carcinoma on scalp  Please follow up with patient for an appointment for [facility]

## 2022-05-17 ENCOUNTER — PATIENT OUTREACH (OUTPATIENT)
Dept: FAMILY MEDICINE CLINIC | Facility: CLINIC | Age: 87
End: 2022-05-17

## 2022-05-24 ENCOUNTER — CONSULT (OUTPATIENT)
Dept: DERMATOLOGY | Facility: CLINIC | Age: 87
End: 2022-05-24
Payer: MEDICARE

## 2022-05-24 ENCOUNTER — TELEPHONE (OUTPATIENT)
Dept: FAMILY MEDICINE CLINIC | Facility: CLINIC | Age: 87
End: 2022-05-24

## 2022-05-24 VITALS — WEIGHT: 180.78 LBS | HEIGHT: 68 IN | BODY MASS INDEX: 27.4 KG/M2 | TEMPERATURE: 97.8 F

## 2022-05-24 DIAGNOSIS — L57.0 ACTINIC KERATOSIS: Primary | ICD-10-CM

## 2022-05-24 DIAGNOSIS — Z85.828 HISTORY OF BASAL CELL CARCINOMA (BCC): ICD-10-CM

## 2022-05-24 DIAGNOSIS — D48.5 NEOPLASM OF UNCERTAIN BEHAVIOR OF SKIN: ICD-10-CM

## 2022-05-24 DIAGNOSIS — Z85.828 HISTORY OF BASAL CELL CARCINOMA OF SKIN: ICD-10-CM

## 2022-05-24 PROCEDURE — 11102 TANGNTL BX SKIN SINGLE LES: CPT | Performed by: DERMATOLOGY

## 2022-05-24 PROCEDURE — 99204 OFFICE O/P NEW MOD 45 MIN: CPT | Performed by: DERMATOLOGY

## 2022-05-24 PROCEDURE — 88305 TISSUE EXAM BY PATHOLOGIST: CPT | Performed by: STUDENT IN AN ORGANIZED HEALTH CARE EDUCATION/TRAINING PROGRAM

## 2022-05-24 PROCEDURE — 11103 TANGNTL BX SKIN EA SEP/ADDL: CPT | Performed by: DERMATOLOGY

## 2022-05-24 NOTE — PATIENT INSTRUCTIONS
1  HISTORY OF BASAL CELL CARCINOMA      Assessment and Plan:  Based on a thorough discussion of this condition and the management approach to it (including a comprehensive discussion of the known risks, side effects and potential benefits of treatment), the patient (family) agrees to implement the following specific plan:  Start using sun protective clothing and an SPF 50+ daily when outside in the sun  Follow up yearly for a full body skin check  How can basal cell carcinoma be prevented? The most important way to prevent BCC is to avoid sunburn  This is especially important in childhood and early life  Fair skinned individuals and those with a personal or family history of BCC should protect their skin from sun exposure daily, year-round and lifelong  Stay indoors or under the shade in the middle of the day   Wear covering clothing   Apply high protection factor SPF50+ broad-spectrum sunscreens generously to exposed skin if outdoors   Avoid indoor tanning (sun beds, solaria)  Oral nicotinamide (vitamin B3) in a dose of 500 mg twice daily may reduce the number and severity of BCCs  What is the outlook for basal cell carcinoma? Most BCCs are cured by treatment  Cure is most likely if treatment is undertaken when the lesion is small  About 50% of people with BCC develop a second one within 3 years of the first  They are also at increased risk of other skin cancers, especially melanoma  Regular self-skin examinations and long-term annual skin checks by an experienced health professional are recommended  2  NEOPLASM OF UNCERTAIN BEHAVIOR OF SKIN        Assessment and Plan:  I have discussed with the patient that a sample of skin via a "skin biopsy would be potentially helpful to further make a specific diagnosis under the microscope    Based on a thorough discussion of this condition and the management approach to it (including a comprehensive discussion of the known risks, side effects and potential benefits of treatment), the patient (family) agrees to implement the following specific plan:    Procedure:  Skin Biopsy  After a thorough discussion of treatment options and risk/benefits/alternatives (including but not limited to local pain, scarring, dyspigmentation, blistering, possible superinfection, and inability to confirm a diagnosis via histopathology), verbal and written consent were obtained and portion of the rash was biopsied for tissue sample  See below for consent that was obtained from patient and subsequent Procedure Note  Obtained consent from patient to do biopsy  Will call patient with results in 1-2 weeks   Will schedule MOHS procedure once we get final results  This will be done in [place] [address], [address] in the cancer center building  PROCEDURE TANGENTIAL (SHAVE) BIOPSY NOTE:          After obtaining informed consent  at which time there was a discussion about the purpose of biopsy  and low risks of infection and bleeding  The area was prepped and draped in the usual fashion  Anesthesia was obtained with 1% lidocaine with epinephrine  A shave biopsy to an appropriate sampling depth was obtained by Shave (Dermablade or 15 blade) The resulting wound was covered with surgical ointment and bandaged appropriately  The patient tolerated the procedure well without complications and was without signs of functional compromise  Specimen has been sent for review by Dermatopathology  Standard post-procedure care has been explained and has been included in written form within the patient's copy of Informed Consent  INFORMED CONSENT DISCUSSION AND POST-OPERATIVE INSTRUCTIONS FOR PATIENT    I   RATIONALE FOR PROCEDURE  I understand that a skin biopsy allows the Dermatologist to test a lesion or rash under the microscope to obtain a diagnosis    It usually involves numbing the area with numbing medication and removing a small piece of skin; sometimes the area will be closed with sutures  In this specific procedure, sutures are not usually needed  If any sutures are placed, then they are usually need to be removed in 2 weeks or less  I understand that my Dermatologist recommends that a skin "shave" biopsy be performed today  A local anesthetic, similar to the kind that a dentist uses when filling a cavity, will be injected with a very small needle into the skin area to be sampled  The injected skin and tissue underneath "will go to sleep and become numb so no pain should be felt afterwards  An instrument shaped like a tiny "razor blade" (shave biopsy instrument) will be used to cut a small piece of tissue and skin from the area so that a sample of tissue can be taken and examined more closely under the microscope  A slight amount of bleeding will occur, but it will be stopped with direct pressure and a pressure bandage and any other appropriate methods  I understands that a scar will form where the wound was created  Surgical ointment will be applied to help protect the wound  Sutures are not usually needed  II   RISKS AND POTENTIAL COMPLICATIONS   I understand the risks and potential complications of a skin biopsy include but are not limited to the following:  Bleeding  Infection  Pain  Scar/keloid  Skin discoloration  Incomplete Removal  Recurrence  Nerve Damage/Numbness/Loss of Function  Allergic Reaction to Anesthesia  Biopsies are diagnostic procedures and based on findings additional treatment or evaluation may be required  Loss or destruction of specimen resulting in no additional findings    My Dermatologist has explained to me the nature of the condition, the nature of the procedure, and the benefits to be reasonably expected compared with alternative approaches    My Dermatologist has discussed the likelihood of major risks or complications of this procedure including the specific risks listed above, such as bleeding, infection, and scarring/keloid  I understand that a scar is expected after this procedure  I understand that my physician cannot predict if the scar will form a "keloid," which extends beyond the borders of the wound that is created  A keloid is a thick, painful, and bumpy scar  A keloid can be difficult to treat, as it does not always respond well to therapy, which includes injecting cortisone directly into the keloid every few weeks  While this usually reduces the pain and size of the scar, it does not eliminate it  I understand that photographs may be taken before and after the procedure  These will be maintained as part of the medical providers confidential records and may not be made available to me  I further authorize the medical provider to use the photographs for teaching purposes or to illustrate scientific papers, books, or lectures if in his/her judgment, medical research, education, or science may benefit from its use  I have had an opportunity to fully inquire about the risks and benefits of this procedure and its alternatives  I have been given ample time and opportunity to ask questions and to seek a second opinion if I wished to do so  I acknowledge that there have specifically been no guarantees as to the cosmetic results from the procedure  I am aware that with any procedure there is always the possibility of an unexpected complication  III  POST-PROCEDURAL CARE (WHAT YOU WILL NEED TO DO "AFTER THE BIOPSY" TO OPTIMIZE HEALING)    Keep the area clean and dry  Try NOT to remove the bandage or get it wet for the first 24 hours  Gently clean the area and apply surgical ointment (such as Vaseline petrolatum ointment, which is available "over the counter" and not a prescription) to the biopsy site for up to 2 weeks straight  This acts to protect the wound from the outside world  Sutures are not usually placed in this procedure    If any sutures were placed, return for suture removal as instructed (generally 1 week for the face, 2 weeks for the body)  Take Acetaminophen (Tylenol) for discomfort, if no contraindications  Ibuprofen or aspirin could make bleeding worse  Call our office immediately for signs of infection: fever, chills, increased redness, warmth, tenderness, discomfort/pain, or pus or foul smell coming from the wound  WHAT TO DO IF THERE IS ANY BLEEDING? If a small amount of bleeding is noticed, place a clean cloth over the area and apply firm pressure for ten minutes  Check the wound after 10 minutes of direct pressure  If bleeding persists, try one more time for an additional 10 minutes of direct pressure on the area  If the bleeding becomes heavier or does not stop after the second attempt, or if you have any other questions about this procedure, then please call your [address] [place] [address]'s Dermatologist by calling [phone] (SKIN)  I hereby acknowledge that I have reviewed and verified the site with my Dermatologist and have requested and authorized my Dermatologist to proceed with the procedure  3  ACTINIC KERATOSIS        Assessment and Plan:  Based on a thorough discussion of this condition and the management approach to it (including a comprehensive discussion of the known risks, side effects and potential benefits of treatment), the patient (family) agrees to implement the following specific plan:    Discussed with patient that once MOHS procedure is done on the 2 sites that were biopsied we can start using a chemotherapy cream to treat scalp and affected areas that show pre cancerous spots  Will prescribe this after procedure is done  Actinic keratoses are very common on sites repeatedly exposed to the sun, especially the backs of the hands and the face, most often affecting the ears, nose, cheeks, upper lip, vermilion of the lower lip, temples, forehead and balding scalp   In severely chronically sun-damaged individuals, they may also be found on the upper trunk, upper and lower limbs, and dorsum of feet  We discussed the theoretical premalignant (pre-cancerous) nature and etiology of these growths  We discussed the prevailing notion that actinic keratoses are a reflection of abnormal skin cell development due to DNA damage by short wavelength UVB  They are more likely to appear if the immune function is poor, due to aging, recent sun exposure, predisposing disease or certain drugs  We discussed that the main concern is that actinic keratoses may predispose to squamous cell carcinoma  It is rare for a solitary actinic keratosis to evolve to squamous cell carcinoma (SCC), but the risk of SCC occurring at some stage in a patient with more than 10 actinic keratoses is thought to be about 10 to 15%  A tender, thickened, ulcerated or enlarging actinic keratosis is suspicious of SCC  Actinic keratoses may be prevented by strict sun protection  If already present, keratoses may improve with a very high sun protection factor (50+) broad-spectrum sunscreen applied at least daily to affected areas, year-round  We recommend that UPF-rated clothing and hats and sunglasses be worn whenever possible and that a sunscreen-moisturizer combination product such as Neutrogena Daily Defense be applied at least three times a day  We performed a thorough discussion of treatment options and specific risk/benefits/alternatives including but not limited to medical field treatment with medications such as the following:    Topical field area medications such as 5-fluorouracil or Aldara (specifically, the trouble with long-term compliance, blistering and local skin reaction versus the convenience of at-home therapy and that field therapy gets what is not yet seen)  Cryotherapy (specifically, local pain, scarring, dyspigmentation, blistering, possible superinfection, and treats only what we see versus directed treatment today)

## 2022-05-24 NOTE — TELEPHONE ENCOUNTER
Thomas Mclain to verify if we received fax sent on 05/17  Medication hold form was not received  It was sent to wrong fax number       Advised provider to resend fax to [phone]

## 2022-05-24 NOTE — PROGRESS NOTES
[facility] Note     Patient Name: Dorota Don  Encounter Date: 5/24/2022     Have you been cared for by a [facility] in the last 3 years and, if so, which one? No    · Have you traveled outside of the [address] [place] [address] in the past 3 months or outside of the [facility] in the last 2 weeks? No     May we call your Preferred Phone number to discuss your specific medical information? Yes     May we leave a detailed message that includes your specific medical information? Yes      Today's Chief Concerns:   Concern #1:  Skin lesions     Past Medical History:  Have you personally ever had or currently have any of the following? · Skin cancer (such as Melanoma, Basal Cell Carcinoma, Squamous Cell Carcinoma? (If Yes, please provide more detail)- YES, history of basal cell carcinoma - 2018 and 2015 by Dr Jose Duran   · Eczema: No  · Psoriasis: No  · HIV/AIDS: No  · Hepatitis B or C: No  · Tuberculosis: No  · Systemic Immunosuppression such as Diabetes, Biologic or Immunotherapy, Chemotherapy, Organ Transplantation, Bone Marrow Transplantation (If YES, please provide more detail): No  · Radiation Treatment (If YES, please provide more detail): No  · Any other major medical conditions/concerns? (If Yes, which types)- YES, history of a stroke     Social History:     What is/was your primary occupation? Retired      What are your hobbies/past-times? N/a     Family History:  Have any of your "first degree relatives" (parent, brother, sister, or child) had any of the following       · Skin cancer such as Melanoma or Merkel Cell Carcinoma or Pancreatic Cancer? No  · Eczema, Asthma, Hay Fever or Seasonal Allergies: No  · Psoriasis or Psoriatic Arthritis: No  · Do any other medical conditions seem to run in your family? If Yes, what condition and which relatives?   YES, blood clotting     Current Medications:   (please update all dermatological medications before printing patient's AVS!)      Current Outpatient Medications:     Calcium Citrate-Vitamin D 315-250 MG-UNIT TABS, Take 1 tablet by mouth daily, Disp: , Rfl:     carboxymethylcellulose (REFRESH PLUS) 0 5 % SOLN, 1 drop 2 (two) times a day as needed for dry eyes, Disp: , Rfl:     clopidogrel (PLAVIX) 75 mg tablet, Take 1 tablet (75 mg total) by mouth daily, Disp: 90 tablet, Rfl: 3    Coenzyme Q10 100 MG TABS, Take 1 tablet by mouth daily, Disp: , Rfl:     metoprolol succinate (TOPROL-XL) 25 mg 24 hr tablet, Take 0 5 tablets (12 5 mg total) by mouth daily (Patient taking differently: Take 12 5 mg by mouth in the morning  Takes at night ), Disp: 45 tablet, Rfl: 3    Misc Natural Products (OSTEO BI-FLEX ADV TRIPLE ST PO), Take by mouth, Disp: , Rfl:     OMEGA-3 FATTY ACIDS-VITAMIN E PO, Take 1 capsule by mouth daily, Disp: , Rfl:     rosuvastatin (CRESTOR) 10 MG tablet, Take 1 tablet (10 mg total) by mouth daily, Disp: 90 tablet, Rfl: 0      Review of Systems:  Have you recently had or currently have any of the following? If YES, what are you doing for the problem? · Fever, chills or unintended weight loss: No  · Sudden loss or change in your vision: No  · Nausea, vomiting or blood in your stool: No  · Painful or swollen joints: No  · Wheezing or cough: No  · Changing mole or non-healing wound: No  · Nosebleeds: No  · Excessive sweating: No  · Easy or prolonged bleeding? No  · Over the last 2 weeks, how often have you been bothered by the following problems? · Taking little interest or pleasure in doing things: 1 - Not at All  · Feeling down, depressed, or hopeless: 1 - Not at All  · Rapid heartbeat with epinephrine:  No    · FEMALES ONLY:    · Are you pregnant or planning to become pregnant? No  · Are you currently or planning to be nursing or breast feeding? No    · Any known allergies?       Allergies   Allergen Reactions    Iv Dye [Iodinated Diagnostic Agents] Rash     Possible delayed rash February 2022         Physical Exam:     Was a chaperone (Derm Clinical Assistant) present throughout the entire Physical Exam? Yes     Did the Dermatology Team specifically  the patient on the importance of a Full Skin Exam to be sure that nothing is missed clinically? Yes}  o Did the patient ultimately request or accept a Full Skin Exam?  NO  o Did the patient specifically refuse to have the areas "under-the-bra" examined by the Dermatologist? Effie Matthews  o Did the patient specifically refuse to have the areas "under-the-underwear" examined by the Dermatologist? YES    CONSTITUTIONAL:   Vitals:    05/24/22 0932   Temp: 97 8 °F (36 6 °C)   TempSrc: Temporal   Weight: 82 kg (180 lb 12 4 oz)   Height: 5' 8" (1 727 m)       PSYCH: Normal mood and affect  EYES: Normal conjunctiva  ENT: Normal lips and oral mucosa  CARDIOVASCULAR: No edema  RESPIRATORY: Normal respirations  HEME/LYMPH/IMMUNO:  No regional lymphadenopathy except as noted below in "ASSESSMENT AND PLAN BY DIAGNOSIS"    SKIN:  FULL ORGAN SYSTEM EXAM  Hair, Scalp, Ears, Face Normal except as noted below in Assessment   Neck, Cervical Chain Nodes Normal except as noted below in Assessment   Right Arm/Hand/Fingers Normal except as noted below in Assessment   Left Arm/Hand/Fingers Normal except as noted below in Assessment   Chest/Breasts/Axillae Viewed areas Normal except as noted below in Assessment   Abdomen, Umbilicus Normal except as noted below in Assessment   Back/Spine Normal except as noted below in Assessment        Assessment and Plan by Diagnosis:    History of Present Condition:     Duration:  How long has this been an issue for you? o  6 weeks or longer    Location Affected:  Where on the body is this affecting you? o  scalp    Quality:  Is there any bleeding, pain, itch, burning/irritation, or redness associated with the skin lesion?     o  painful to the touch    Severity:  Describe any bleeding, pain, itch, burning/irritation, or Statement Selected redness on a scale of 1 to 10 (with 10 being the worst)  o  3   Timing:  Does this condition seem to be there pretty constantly or do you notice it more at specific times throughout the day? o  comes and goes    Context:  Have you ever noticed that this condition seems to be associated with specific activities you do?    o  laying on lesion    Modifying Factors:    o Anything that seems to make the condition worse?    -  laying on lesion   o What have you tried to do to make the condition better?    -  not touching area    Associated Signs and Symptoms:  Does this skin lesion seem to be associated with any of the following:  o  Painful to the touch  1  HISTORY OF BASAL CELL CARCINOMA    Physical Exam:   Anatomic Location Affected:  Right upper back area    Morphological Description of scar:  Well healed scar    Suspected Recurrence: No   Pertinent Positives:   Pertinent Negatives: Additional History of Present Condition:  History of basal cell carcinoma with no sign of recurrence    Assessment and Plan:  Based on a thorough discussion of this condition and the management approach to it (including a comprehensive discussion of the known risks, side effects and potential benefits of treatment), the patient (family) agrees to implement the following specific plan:   Start using sun protective clothing and an SPF 50+ daily when outside in the sun   Follow up yearly for a full body skin check  How can basal cell carcinoma be prevented? The most important way to prevent BCC is to avoid sunburn  This is especially important in childhood and early life  Fair skinned individuals and those with a personal or family history of BCC should protect their skin from sun exposure daily, year-round and lifelong     Stay indoors or under the shade in the middle of the day    Wear covering clothing    Apply high protection factor SPF50+ broad-spectrum sunscreens generously to exposed skin if outdoors  Avoid indoor tanning (sun beds, solaria)   Oral nicotinamide (vitamin B3) in a dose of 500 mg twice daily may reduce the number and severity of BCCs  What is the outlook for basal cell carcinoma? Most BCCs are cured by treatment  Cure is most likely if treatment is undertaken when the lesion is small  About 50% of people with BCC develop a second one within 3 years of the first  They are also at increased risk of other skin cancers, especially melanoma  Regular self-skin examinations and long-term annual skin checks by an experienced health professional are recommended  2  NEOPLASM OF UNCERTAIN BEHAVIOR OF SKIN    Physical Exam:   (Anatomic Location); (Size and Morphological Description); (Differential Diagnosis):  o A; front left scalp (Anterior); 2 0cm scaly crusted plaque; Differential Diagnosis : Squamous Cell Carcinoma     o B; Scalp vertex (posterior); 1 2cm indurated nodule; Differential Diagnosis : Squamous cell carcinoma   Pertinent Positives:   Pertinent Negatives: Additional History of Present Condition:  Present for a while  Assessment and Plan:   I have discussed with the patient that a sample of skin via a "skin biopsy would be potentially helpful to further make a specific diagnosis under the microscope   Based on a thorough discussion of this condition and the management approach to it (including a comprehensive discussion of the known risks, side effects and potential benefits of treatment), the patient (family) agrees to implement the following specific plan:    o Procedure:  Skin Biopsy  After a thorough discussion of treatment options and risk/benefits/alternatives (including but not limited to local pain, scarring, dyspigmentation, blistering, possible superinfection, and inability to confirm a diagnosis via histopathology), verbal and written consent were obtained and portion of the rash was biopsied for tissue sample    See below for consent that was obtained from patient and subsequent Procedure Note     o Obtained consent from patient to do biopsy  o Will call patient with results in 1-2 weeks   o Will schedule MOHS procedure once we get final results  This will be done in [address] [place] [address] location [address] [place] [address], [address] in the cancer center building  PROCEDURE TANGENTIAL (SHAVE) BIOPSY NOTE:     Performing Physician: Dr Hendrickson   Anatomic Location; Clinical Description with size (cm); Pre-Op Diagnosis:   o A; front left scalp (Anterior); 2 0cm scaly crusted plaque; Differential Diagnosis : Squamous Cell Carcinoma     o B; Scalp vertex (posterior); 1 2cm indurated nodule; Differential Diagnosis : Squamous cell carcinoma   Post-op diagnosis: Same      Local anesthesia: 1% Lidocaine HCL      Topical anesthesia: None     Hemostasis: Aluminum chloride       After obtaining informed consent  at which time there was a discussion about the purpose of biopsy  and low risks of infection and bleeding  The area was prepped and draped in the usual fashion  Anesthesia was obtained with 1% lidocaine with epinephrine  A shave biopsy to an appropriate sampling depth was obtained by Shave (Dermablade or 15 blade) The resulting wound was covered with surgical ointment and bandaged appropriately  The patient tolerated the procedure well without complications and was without signs of functional compromise  Specimen has been sent for review by Dermatopathology  Standard post-procedure care has been explained and has been included in written form within the patient's copy of Informed Consent  INFORMED CONSENT DISCUSSION AND POST-OPERATIVE INSTRUCTIONS FOR PATIENT    I   RATIONALE FOR PROCEDURE  I understand that a skin biopsy allows the Dermatologist to test a lesion or rash under the microscope to obtain a diagnosis    It usually involves numbing the area with numbing medication and removing a small piece of skin; sometimes the area will be closed with sutures  In this specific procedure, sutures are not usually needed  If any sutures are placed, then they are usually need to be removed in 2 weeks or less  I understand that my Dermatologist recommends that a skin "shave" biopsy be performed today  A local anesthetic, similar to the kind that a dentist uses when filling a cavity, will be injected with a very small needle into the skin area to be sampled  The injected skin and tissue underneath "will go to sleep and become numb so no pain should be felt afterwards  An instrument shaped like a tiny "razor blade" (shave biopsy instrument) will be used to cut a small piece of tissue and skin from the area so that a sample of tissue can be taken and examined more closely under the microscope  A slight amount of bleeding will occur, but it will be stopped with direct pressure and a pressure bandage and any other appropriate methods  I understands that a scar will form where the wound was created  Surgical ointment will be applied to help protect the wound  Sutures are not usually needed  II   RISKS AND POTENTIAL COMPLICATIONS   I understand the risks and potential complications of a skin biopsy include but are not limited to the following:   Bleeding   Infection   Pain   Scar/keloid   Skin discoloration   Incomplete Removal   Recurrence   Nerve Damage/Numbness/Loss of Function   Allergic Reaction to Anesthesia   Biopsies are diagnostic procedures and based on findings additional treatment or evaluation may be required   Loss or destruction of specimen resulting in no additional findings    My Dermatologist has explained to me the nature of the condition, the nature of the procedure, and the benefits to be reasonably expected compared with alternative approaches    My Dermatologist has discussed the likelihood of major risks or complications of this procedure including the specific risks listed above, such as bleeding, infection, and scarring/keloid  I understand that a scar is expected after this procedure  I understand that my physician cannot predict if the scar will form a "keloid," which extends beyond the borders of the wound that is created  A keloid is a thick, painful, and bumpy scar  A keloid can be difficult to treat, as it does not always respond well to therapy, which includes injecting cortisone directly into the keloid every few weeks  While this usually reduces the pain and size of the scar, it does not eliminate it  I understand that photographs may be taken before and after the procedure  These will be maintained as part of the medical providers confidential records and may not be made available to me  I further authorize the medical provider to use the photographs for teaching purposes or to illustrate scientific papers, books, or lectures if in his/her judgment, medical research, education, or science may benefit from its use  I have had an opportunity to fully inquire about the risks and benefits of this procedure and its alternatives  I have been given ample time and opportunity to ask questions and to seek a second opinion if I wished to do so  I acknowledge that there have specifically been no guarantees as to the cosmetic results from the procedure  I am aware that with any procedure there is always the possibility of an unexpected complication  III  POST-PROCEDURAL CARE (WHAT YOU WILL NEED TO DO "AFTER THE BIOPSY" TO OPTIMIZE HEALING)     Keep the area clean and dry  Try NOT to remove the bandage or get it wet for the first 24 hours   Gently clean the area and apply surgical ointment (such as Vaseline petrolatum ointment, which is available "over the counter" and not a prescription) to the biopsy site for up to 2 weeks straight  This acts to protect the wound from the outside world   Sutures are not usually placed in this procedure    If any sutures were placed, return for suture removal as instructed (generally 1 week for the face, 2 weeks for the body)   Take Acetaminophen (Tylenol) for discomfort, if no contraindications  Ibuprofen or aspirin could make bleeding worse   Call our office immediately for signs of infection: fever, chills, increased redness, warmth, tenderness, discomfort/pain, or pus or foul smell coming from the wound  WHAT TO DO IF THERE IS ANY BLEEDING? If a small amount of bleeding is noticed, place a clean cloth over the area and apply firm pressure for ten minutes  Check the wound after 10 minutes of direct pressure  If bleeding persists, try one more time for an additional 10 minutes of direct pressure on the area  If the bleeding becomes heavier or does not stop after the second attempt, or if you have any other questions about this procedure, then please call your [facility] Dermatologist by calling [phone] (SKIN)  I hereby acknowledge that I have reviewed and verified the site with my Dermatologist and have requested and authorized my Dermatologist to proceed with the procedure  3  ACTINIC KERATOSIS    Physical Exam:   Anatomic Location Affected:  Scalp    Morphological Description:  Scaly red plaques    Additional History of Present Condition:  Present for while  Assessment and Plan:  Based on a thorough discussion of this condition and the management approach to it (including a comprehensive discussion of the known risks, side effects and potential benefits of treatment), the patient (family) agrees to implement the following specific plan:     Discussed with patient that once MOHS procedure is done on the 2 sites that were biopsied we can start using a chemotherapy cream to treat scalp and affected areas that show pre cancerous spots   Will prescribe this after procedure is done      Actinic keratoses are very common on sites repeatedly exposed to the sun, especially the backs of the hands and the face, most often affecting the ears, nose, cheeks, upper lip, vermilion of the lower lip, temples, forehead and balding scalp  In severely chronically sun-damaged individuals, they may also be found on the upper trunk, upper and lower limbs, and dorsum of feet  We discussed the theoretical premalignant (pre-cancerous) nature and etiology of these growths  We discussed the prevailing notion that actinic keratoses are a reflection of abnormal skin cell development due to DNA damage by short wavelength UVB  They are more likely to appear if the immune function is poor, due to aging, recent sun exposure, predisposing disease or certain drugs  We discussed that the main concern is that actinic keratoses may predispose to squamous cell carcinoma  It is rare for a solitary actinic keratosis to evolve to squamous cell carcinoma (SCC), but the risk of SCC occurring at some stage in a patient with more than 10 actinic keratoses is thought to be about 10 to 15%  A tender, thickened, ulcerated or enlarging actinic keratosis is suspicious of SCC  Actinic keratoses may be prevented by strict sun protection  If already present, keratoses may improve with a very high sun protection factor (50+) broad-spectrum sunscreen applied at least daily to affected areas, year-round  We recommend that UPF-rated clothing and hats and sunglasses be worn whenever possible and that a sunscreen-moisturizer combination product such as Neutrogena Daily Defense be applied at least three times a day  We performed a thorough discussion of treatment options and specific risk/benefits/alternatives including but not limited to medical field treatment with medications such as the following:     Topical field area medications such as 5-fluorouracil or Aldara (specifically, the trouble with long-term compliance, blistering and local skin reaction versus the convenience of at-home therapy and that field therapy gets what is not yet seen)          Scribe Attestation    I,:  Lani Fan MA am acting as a scribe while in the presence of the attending physician :       I,:  Vero Renee MD personally performed the services described in this documentation    as scribed in my presence :

## 2022-06-02 NOTE — RESULT ENCOUNTER NOTE
DERMATOPATHOLOGY RESULT NOTE    Results reviewed by ordering physician  Called patient to personally discuss results  No answer, left voicemail with result  Instructions for Clinical Derm Team:   (remember to route Result Note to appropriate staff):    Call patient and schedule for MOHS  2 lesions    Result & Plan by Specimen:    Specimen A: malignant  Plan: [facility]OHs      Specimen B: malignant  Plan: [facility]    Final Diagnosis  A  Skin, left frontal scalp (anterior), shave biopsy:     At least [address]; transected             B  Skin, vertex of scalp (posterior), shave biopsy:     SQUAMOUS CELL CARCINOMA, 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

## 2022-06-06 ENCOUNTER — TELEPHONE (OUTPATIENT)
Dept: DERMATOLOGY | Facility: CLINIC | Age: 87
End: 2022-06-06

## 2022-06-06 NOTE — RESULT ENCOUNTER NOTE
Telephone call to patient  Spoke to patient in regards scheduling MOHS   Patient scheduled 07/07/2022 with Dr Manoj Brar For information on Fall & Injury Prevention, visit: [email or web address] OR  [email or web address] OR  https://www.cdc.gov/steadi/patient.html

## 2022-06-06 NOTE — TELEPHONE ENCOUNTER
I can do same day  They are 2 distinct spots   So if same day they must count as 2 separtate MOHS spots    Thank you

## 2022-06-06 NOTE — TELEPHONE ENCOUNTER
Pre- operative Mohs Telephone Scheduling Note    Do you have a pacemaker or defibrillator? no    Do you take antibiotics before skin or dental procedures? no  If yes, will likely require pre-operative antibiotics  Ask  the patient why they take the antibiotics (usually because of joint replacement)  Do you have a history of a joint replacements within the past 2 years? no   If yes, will likely require pre-operative antibiotics  Ask if orthopaedic surgeon has prescribed pre-operative antibiotics to take before procedures/dental work? Do you take any OTC medications that thin your blood (Aspirin, Aleve, Ibuprofen) or supplements that thin your blood (fish oil, garlic, vitamin E, Ginko Biloba)? no    Do you take any prescribed medications that thin your blood (Coumadin, Plavix, Xarelto, Eliquis or another prescribed blood thinner)? yes: Plavix     Do you have an allergy to lidocaine or epinephrine? no    Do you have an allergy to shellfish? no    Do you smoke? no      If yes,  patient to try and stop 2 days before surgery and 7 days after the surgery  Minimizing smoking as much as possible during this time will improve healing and the cosmetic result after surgery  Date scheduled: 07/072022 with Dr Edmundo Hinojosa @ 9:00 am      Coordination of Care with other provider (Oculoplastics, Plastics, ENT) required? no   IF YES, PLEASE FORWARD TO APPROPRIATE PERSONNEL TO HELP COORDINATE  Are there remaining tumors to be scheduled?  no

## 2022-06-24 ENCOUNTER — CLINICAL SUPPORT (OUTPATIENT)
Dept: NUTRITION | Facility: HOSPITAL | Age: 87
End: 2022-06-24
Attending: PSYCHIATRY & NEUROLOGY
Payer: MEDICARE

## 2022-06-24 DIAGNOSIS — E11.9 DIABETES (HCC): ICD-10-CM

## 2022-06-24 DIAGNOSIS — E11.9 TYPE 2 DIABETES MELLITUS WITHOUT COMPLICATION, WITHOUT LONG-TERM CURRENT USE OF INSULIN (HCC): ICD-10-CM

## 2022-06-24 PROCEDURE — 97802 MEDICAL NUTRITION INDIV IN: CPT

## 2022-06-24 NOTE — PROGRESS NOTES
Initial Nutrition Assessment Form    Patient Name: Julisa Mckenzie    YOB: 1935    Sex: Male     Assessment Date: 6/24/2022  Start Time: 830 Stop Time: 930 Total Minutes: 61     Data:  Present at session: self   Parent/Patient Concerns: "I don;t know why i'm here, camilo had 2 small strokes but feel fine other than my balance is slightly off"   Medical Dx/Reason for Referral: DM   Past Medical History:   Diagnosis Date    Basal cell carcinoma     Cancer ([facility] )     NSTEMI (non-ST elevated myocardial infarction) ([place] [address] ) 2014    Pneumonia     Last Assessed:  10/7/14    Polymyalgia rheumatica ([place] [address] )     Last Assessed:  11/23/12    Polymyalgia rheumatica ([place] [address] ) 2011       Current Outpatient Medications   Medication Sig Dispense Refill    Calcium Citrate-Vitamin D 315-250 MG-UNIT TABS Take 1 tablet by mouth daily      carboxymethylcellulose (REFRESH PLUS) 0 5 % SOLN 1 drop 2 (two) times a day as needed for dry eyes      clopidogrel (PLAVIX) 75 mg tablet Take 1 tablet (75 mg total) by mouth daily 90 tablet 3    Coenzyme Q10 100 MG TABS Take 1 tablet by mouth daily      metoprolol succinate (TOPROL-XL) 25 mg 24 hr tablet Take 0 5 tablets (12 5 mg total) by mouth daily (Patient taking differently: Take 12 5 mg by mouth in the morning  Takes at night ) 45 tablet 3    Misc Natural Products (OSTEO BI-FLEX ADV TRIPLE ST PO) Take by mouth      OMEGA-3 FATTY ACIDS-VITAMIN E PO Take 1 capsule by mouth daily      rosuvastatin (CRESTOR) 10 MG tablet Take 1 tablet (10 mg total) by mouth daily 90 tablet 0     No current facility-administered medications for this visit          Additional Meds/Supplements: Ensure some mornings   Special Learning Needs: n/a   Height: HC Readings from Last 5 Encounters:   No data found for [facility], [facility]       Weight: Wt Readings from Last 10 Encounters:   05/24/22 82 kg (180 lb 12 4 oz)   04/19/22 81 2 kg (179 lb)   03/29/22 81 6 kg (180 lb)   03/11/22 83 9 kg (185 lb)   03/10/22 83 8 kg (184 lb 12 8 oz)   02/22/22 83 kg (183 lb)   02/21/22 82 1 kg (181 lb)   02/14/22 82 5 kg (181 lb 14 1 oz)   01/20/22 82 1 kg (181 lb)   10/27/21 82 3 kg (181 lb 6 4 oz)     Estimated body mass index is 27 49 kg/m² as calculated from the following:    Height as of 5/24/22: 5' 8" (1 727 m)  Weight as of 5/24/22: 82 kg (180 lb 12 4 oz)  Recent Weight Change: []Yes     [x]No  Amount:       Energy Needs: No calculations performed for this visit   Allergies   Allergen Reactions    Iv Dye [Iodinated Diagnostic Agents] Rash     Possible delayed rash February 2022    Celery and coconut   Social History     Substance and Sexual Activity   Alcohol Use Not Currently    Comment: Social       Social History     Tobacco Use   Smoking Status Never Smoker   Smokeless Tobacco Never Used       Who shops? spouse   Who cooks? spouse   Exercise: Walking 2-5 miles daily; weights daily and total gym at home also daily   Prior Counseling?  []Yes     [x]No  When:      Why:         Diet Hx:typically eats 2 meals daily a brunch but also has an evening snack of fruit; drinking 4-5 12oz c of iced tea-diet 1/4 tea 3/4 water and 1 full glass of water daily  Breakfast:  Typically has 3 ensure a week Diet:  10-12  Ensure and banana or granola bar ensure and ice tea (12oz)  Or greek yogurt with banana- or oatmeal with craisins   Lunch: Salad with only vegetables          Dinner: Seafood french fries (a few) salad , hamburger every once in a while or meatloaf or chicken; some rice corn on the cob sweet potatoes acorn squash         Snacks: AM -   PM -   HS - snacks on fruit and ice cream        Nutrition Diagnosis:   Altered Nutrition-Related Laboratory values  related to Kidney, liver, cardiac, endocrine, neurologic, and/or pulmonary dysfunction as evidenced by  Abnormal plasma glucose and/or HgbA1c levels       Medical Nutrition Therapy Intervention:  []Individualized Meal Plan []Understanding Lab Values   []Basic Pathophysiology of Disease []Food/Medication Interactions   []Food Diary []Exercise   [x]Lifestyle/Behavior Modification Techniques-continue to get adequate sleep daily []Medication, Mechanism of Action   []Label Reading [x]Self Blood Glucose Monitoring-does not check BG at home; cardiologist says he has Dm he feels he does not   [x]Weight/BMI Goals-maintain stable wt []Other -    Other Notes/Assessment:  Kareen Jeter is here today with his wife, he is saying he does not have DM, though his A1C was elevated in Feb which was after 2 strokes (Jan and Feb)  He is very active, goes to bed at MN and wakes up at 9am, he does nap and feels well rested daily  His energy is good all day long  He does not have any stress in  His life and has 2 grown daughters  Comprehension: []Excellent  [x]Very Good  []Good  []Fair   []Poor    Receptivity: []Excellent  [x]Very Good  []Good  []Fair   []Poor    Expected Compliance: []Excellent  [x]Very Good  []Good  []Fair   []Poor        Goals:  1   3 meals daily no skipping   2   30 gms fiber daily   3  No follow-ups on file    Labs:  CMP  Lab Results   Component Value Date     06/30/2015    K 4 2 02/17/2022     02/17/2022    CO2 27 02/17/2022    ANIONGAP 6 06/30/2015    BUN 21 02/17/2022    CREATININE 0 85 02/17/2022    GLUCOSE 99 06/30/2015    GLUF 99 11/01/2021    CALCIUM 8 4 02/17/2022    CORRECTEDCA 8 6 01/19/2022    AST 22 02/14/2022    ALT 23 02/14/2022    ALKPHOS 68 02/14/2022    EGFR 79 02/17/2022       BMP  Lab Results   Component Value Date    GLUCOSE 99 06/30/2015    CALCIUM 8 4 02/17/2022     06/30/2015    K 4 2 02/17/2022    CO2 27 02/17/2022     02/17/2022    BUN 21 02/17/2022    CREATININE 0 85 02/17/2022       Lipids  No results found for: CHOL  Lab Results   Component Value Date    HDL 60 02/15/2022    HDL 63 01/20/2022    HDL 65 11/01/2021     Lab Results   Component Value Date    LDLCALC 53 02/15/2022    LDLCALC 61 01/20/2022    [address] 72 11/01/2021     Lab Results Component Value Date    TRIG 65 02/15/2022    TRIG 50 01/20/2022    TRIG 83 11/01/2021     No results found for: CHOLHDL    Hemoglobin A1C  Lab Results   Component Value Date    HGBA1C 6 2 (H) 02/15/2022       Fasting Glucose  Lab Results   Component Value Date    GLUF 99 11/01/2021       Insulin     Thyroid  No results found for: TSH, H1ENYQT, T2MSIPY, THYROIDAB    Hepatic Function Panel  Lab Results   Component Value Date    ALT 23 02/14/2022    AST 22 02/14/2022    ALKPHOS 68 02/14/2022       Celiac Disease Antibody Panel  No results found for: ENDOMYSIAL IGA, GLIADIN IGA, GLIADIN IGG, IGA, TISSUE TRANSGLUT AB, TTG IGA   Iron  No results found for: IRON, TIBC, FERRITIN         DSalmon MS RD LDN  [address] [id number]

## 2022-06-27 NOTE — TELEPHONE ENCOUNTER
Spoke with pt  He said based off his 2nd opinion at [facility]  He does not need a loop implant       Advised him that I will remove him from the scheduling list  He said ok

## 2022-07-18 DIAGNOSIS — I25.10 ARTERIOSCLEROTIC CORONARY ARTERY DISEASE: ICD-10-CM

## 2022-07-18 DIAGNOSIS — E78.5 HYPERLIPIDEMIA, UNSPECIFIED HYPERLIPIDEMIA TYPE: ICD-10-CM

## 2022-07-18 RX ORDER — ROSUVASTATIN CALCIUM 10 MG/1
10 TABLET, COATED ORAL DAILY
Qty: 90 TABLET | Refills: 0 | Status: SHIPPED | OUTPATIENT
Start: 2022-07-18 | End: 2022-10-14

## 2022-07-27 ENCOUNTER — PROCEDURE VISIT (OUTPATIENT)
Dept: DERMATOLOGY | Facility: CLINIC | Age: 87
End: 2022-07-27
Payer: MEDICARE

## 2022-07-27 VITALS
OXYGEN SATURATION: 96 % | BODY MASS INDEX: 27.37 KG/M2 | TEMPERATURE: 97.8 F | DIASTOLIC BLOOD PRESSURE: 80 MMHG | HEART RATE: 73 BPM | WEIGHT: 180 LBS | SYSTOLIC BLOOD PRESSURE: 130 MMHG

## 2022-07-27 DIAGNOSIS — D48.5 NEOPLASM OF UNCERTAIN BEHAVIOR OF SKIN: Primary | ICD-10-CM

## 2022-07-27 PROCEDURE — 11102 TANGNTL BX SKIN SINGLE LES: CPT | Performed by: DERMATOLOGY

## 2022-07-27 PROCEDURE — 88305 TISSUE EXAM BY PATHOLOGIST: CPT | Performed by: STUDENT IN AN ORGANIZED HEALTH CARE EDUCATION/TRAINING PROGRAM

## 2022-07-27 PROCEDURE — 88342 IMHCHEM/IMCYTCHM 1ST ANTB: CPT | Performed by: STUDENT IN AN ORGANIZED HEALTH CARE EDUCATION/TRAINING PROGRAM

## 2022-07-27 PROCEDURE — 17311 MOHS 1 STAGE H/N/HF/G: CPT | Performed by: DERMATOLOGY

## 2022-07-27 PROCEDURE — 12032 INTMD RPR S/A/T/EXT 2.6-7.5: CPT | Performed by: DERMATOLOGY

## 2022-07-27 PROCEDURE — 17312 MOHS ADDL STAGE: CPT | Performed by: DERMATOLOGY

## 2022-07-27 PROCEDURE — 11103 TANGNTL BX SKIN EA SEP/ADDL: CPT | Performed by: DERMATOLOGY

## 2022-07-27 NOTE — PATIENT INSTRUCTIONS
Mohs Microscopic Surgery After Care    What You Will Need to Do After the Procedure  Keep the area clean and dry the first day  Try NOT to remove the bandage for the first day  Gently clean the area with soap and water and apply Mupirocin ointment (this is over the counter and not a prescription) to the excision site for up to 2 weeks  Apply a clean appropriately sized bandage to area  Gauze and paper tape are recommended for sensitive skin  Return for suture removal as instructed (generally 1 week for the face, 2 weeks for the body)  Take Acetaminophen (Tylenol) for discomfort, if no contraindications  Do NOT take Ibuprofen or aspirin unless specifically told to do so by your Dermatologist because these medications can make bleeding worse  Call our office immediately for signs of infection: fever, chills, increased redness, warmth, tenderness, discomfort/pain, or pus or foul smell coming from the wound  If bleeding is noticed, place a clean cloth over the area and apply firm pressure for thirty minutes  Check the wound ONLY after 30 minutes of direct pressure; do not cheat and sneak a peak, as that does not count  If bleeding persists after 30 minutes of legitimate direct pressure, then try one more round of direct pressure for an additional 10 minutes to the area  Should the bleeding become heavier or not stop after the second attempt, call [facility] directly at [phone] ([phone]) or, if after hours, go to your nearest Emergency Room or Urgent Care

## 2022-07-27 NOTE — LETTER
2022     Abilio Hunter DO  [address]  [address] [facility] [address]    Patient: Shana Stoddard   YOB: 1935   Date of Visit: 2022       Dear Dr Jeny Boswell: Thank you for referring Veda Canada to me for evaluation  Below are my notes for this consultation  If you have questions, please do not hesitate to call me  I look forward to following your patient along with you  Sincerely,        Christoph Abreu MD        CC: No Recipients  Christoph Abreu MD  2022  3:57 PM  Signed  MOHS Procedure Note    Patient: Shana Stoddard  : 1935  MRN: [id number]  Date: 2022    History of Present Illness: The patient is a 81 y o  male who presents with complaints of Left frontal Scalp Anterior      Past Medical History:   Diagnosis Date    Basal cell carcinoma     [facility]     NSTEMI (non-ST elevated myocardial infarction) ([facility] )     Pneumonia     Last Assessed:  10/7/14    Polymyalgia rheumatica ([facility] )     Last Assessed:  12    Polymyalgia rheumatica ([facility]     Squamous cell skin cancer 2022    left frontal scalp anterior    Squamous cell skin cancer 2022    Vertex of scalp       Past Surgical History:   Procedure Laterality Date    APPENDECTOMY      CORONARY ANGIOPLASTY WITH STENT PLACEMENT      HERNIA REPAIR      MOHS SURGERY  2022    left frontal scalp and vertex of scalp         Current Outpatient Medications:     Calcium Citrate-Vitamin D 315-250 MG-UNIT TABS, Take 1 tablet by mouth daily, Disp: , Rfl:     carboxymethylcellulose (REFRESH PLUS) 0 5 % SOLN, 1 drop 2 (two) times a day as needed for dry eyes, Disp: , Rfl:     clopidogrel (PLAVIX) 75 mg tablet, Take 1 tablet (75 mg total) by mouth daily, Disp: 90 tablet, Rfl: 3    Coenzyme Q10 100 MG TABS, Take 1 tablet by mouth daily, Disp: , Rfl:     metoprolol succinate (TOPROL-XL) 25 mg 24 hr tablet, Take 0 5 tablets (12 5 mg total) by mouth daily (Patient taking differently: Take 12 5 mg by mouth daily Takes at night), Disp: 45 tablet, Rfl: 3    Misc Natural Products (OSTEO BI-FLEX ADV TRIPLE ST PO), Take by mouth, Disp: , Rfl:     OMEGA-3 FATTY ACIDS-VITAMIN E PO, Take 1 capsule by mouth daily, Disp: , Rfl:     rosuvastatin (CRESTOR) 10 MG tablet, Take 1 tablet (10 mg total) by mouth daily, Disp: 90 tablet, Rfl: 0    Allergies   Allergen Reactions    Iv Dye [Iodinated Diagnostic Agents] Rash     Possible delayed rash February 2022       Physical Exam:   Vitals:    07/27/22 1439   BP: 130/80   Pulse: 73   Temp: 97 8 °F (36 6 °C)   SpO2: 96%     General: Awake, Alert, Oriented x 3, Mood and affect appropriate  Respiratory: Respirations even and unlabored  Cardiovascular: Peripheral pulses intact; no edema  Musculoskeletal Exam: n/a    Assessment: 2 cm x 2 cm crusted plaque, 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    Plan: MOHS today    MOHS Procedure Timeout    Flowsheet Row Most Recent Value   Timeout: 1100   Patient Identity Verified: Yes   Correct Site Verified: Yes   Correct Procedure Verified: Yes          MOHS Diagnosis/Indication/Location/ID    Flowsheet Row Most Recent Value   Pathology Type Squamous cell carcinoma   Anatomic Site --  [Left Frontal Scalp Anterior]   Indications for MOHS tumor location   MOHS ID [id number]          MOHS Site/Accession/Pre-Post    Flowsheet Row Most Recent Value   Original Site Identified (as submitted by referring clinician) Photo   Biopsy Accession/Specimen # (as submitted by referring clincian) [id number]   Pre-MOHS Size Length (cm) 2   Pre-MOHS Size Width (cm) 2   Post-MOHS Size-Length (cm) 1 3   Post MOHS Size-Width (cm) 1 3   Repair Type Intermediate layered closure   Suture Type Vicryl, Ethilon   Ethilon Suture Size 3   Vicryl Suture Size 3   Anesthetic Used 1% Lidocaine with epinephrine  [with bicarb]          MOHS Tumor Stage 1 Information    Flowsheet Row Most Recent Value   Tissue Sections (blocks) 2 Microscopic Exam Section 1: No tumor identified in section  [in setions examined]   Microscopic Exam Section 2: Within the epidermis is a full-thickness proliferation of atypical keratinocytes with mitoses consistent with squamous cell carcinoma in situ  The overlying stratum corneum demonstrates parakeratosis  No invasion is noted  [8:00 margin positive ]   Tumor Clear After Stage I? No          MOHS Tumor Stage 2 Information    Flowsheet Row Most Recent Value   Tissue Sections (blocks) 1   Microscopic Exam Section 1: No tumor identified in section  Tumor Clear After Stage II? Yes          ?            ?          ?    ?    ?    ?    ? Patient identified, procedure verified, site identified and verified  Time out completed  Surgical removal of the lesion discussed with the patient (risks and benefits, including possibility of scarring, infection, recurrence or potential for further treatment)  I have specifically identified the site with the patient  I have discussed the fact that the patient will have a scar after the procedure regardless of granulation or repair with sutures  I have discussed that the repair options can range from granulation in some cases to linear or curvilinear closures to larger flaps or grafts  There are sometimes flaps or grafts used that require multiples stages of surgery and will not be completed today, rather be completed over a series of appointments  I have discussed that occasionally due to location, size or depth of the lesion I may recommend consultation with and transfer of care for further removal or the reconstruction to another provider such as ophthalmology surgery, plastic surgery, ENT surgery, or surgical oncology   There are cases in which other testing such as imaging with MRI or CT scan or testing of lymph nodes is recommended because of the nature/depth/location of tumor seen during the removal  There is a risk of injury to nerves causing temporary or permanent numbness or the inability to move muscles full such as the inability to lift eyebrows  Questions answered and verbal and written consent was obtained  With the patient in the supine position and under adequate local anesthesia with 1% lidocaine with epinephrine 1:100,000, the defect was scrubbed with Hibiclens  Sterile drapes were placed from the sterile tray  Because of the location of the surgical defect, an intermediate closure was judged to give the best possible cosmetic and functional result  The edges of the defect were carefully debrided removing any dead or coagulated tissue  Repair was with both superficial and deep with underming  Hemostasis was obtained by pinpoint electrocoagulation  Careful planning of removal of redundant tissue at either end of the defect was drawn out so that the suture lines would fall in the optimal orientation with regard to the relaxed skin tension lines  These were then removed with a #15 blade scalpel  The wound was then approximated by a deep layer of buried vertical mattress sutures and the cutaneous margins were approximated and closed by superficial sutures as noted above  Estimated blood loss was less than 5 mL  The patient tolerated the procedure well  The wound was dressed with petrolatum, a non-stick pad, and a compression dressing  Omega Lefort, MD served as the surgeon and pathologist during the procedure  Postoperative care: Wound care discussed at length  I urged the patient to call us if any problems or question should arise  Complications: none  Post-op medications: none  Patient condition after procedure: stable  Discharge plans: Plan for suture removal 10 days, at next scheduled appointment  Reyes kern qualifies for Mohs based on AUC criteria  Dr Roma Stephen served as the surgeon and pathologist during the procedure  Postoperative care:  No would care should be necessary prior to the next visit but I urged the patient to call us if any problems or question should arise prior to that time  If circumstances should change, we will contact the patient to make other arrangements  MOHS Procedure 2 Note    Patient: Jaye Heredia  : 1935  MRN: [id number]  Date: 22    History of Present Illness:  The patient is a 81 y o  male who presents with complaints of skin cancer    Past Medical History:   Diagnosis Date    Basal cell carcinoma     [facility] ([facility] )     NSTEMI (non-ST elevated myocardial infarction) ([place] [address] )     Pneumonia     Last Assessed:  10/7/14    Polymyalgia rheumatica ([facility] )     Last Assessed:  12    Polymyalgia rheumatica ([place] [address] )     Squamous cell skin cancer 2022    left frontal scalp anterior    Squamous cell skin cancer 2022    Vertex of scalp       Past Surgical History:   Procedure Laterality Date    APPENDECTOMY      CORONARY ANGIOPLASTY WITH STENT PLACEMENT      HERNIA REPAIR      MOHS SURGERY  2022    left frontal scalp and vertex of scalp         Current Outpatient Medications:     Calcium Citrate-Vitamin D 315-250 MG-UNIT TABS, Take 1 tablet by mouth daily, Disp: , Rfl:     carboxymethylcellulose (REFRESH PLUS) 0 5 % SOLN, 1 drop 2 (two) times a day as needed for dry eyes, Disp: , Rfl:     clopidogrel (PLAVIX) 75 mg tablet, Take 1 tablet (75 mg total) by mouth daily, Disp: 90 tablet, Rfl: 3    Coenzyme Q10 100 MG TABS, Take 1 tablet by mouth daily, Disp: , Rfl:     metoprolol succinate (TOPROL-XL) 25 mg 24 hr tablet, Take 0 5 tablets (12 5 mg total) by mouth daily (Patient taking differently: Take 12 5 mg by mouth daily Takes at night), Disp: 45 tablet, Rfl: 3    Misc Natural Products (OSTEO BI-FLEX ADV TRIPLE ST PO), Take by mouth, Disp: , Rfl:     OMEGA-3 FATTY ACIDS-VITAMIN E PO, Take 1 capsule by mouth daily, Disp: , Rfl:     rosuvastatin (CRESTOR) 10 MG tablet, Take 1 tablet (10 mg total) by mouth daily, Disp: 90 tablet, Rfl: 0    Allergies   Allergen Reactions  Iv Dye [Iodinated Diagnostic Agents] Rash     Possible delayed rash February 2022       Physical Exam:   Vitals:    07/27/22 1439   BP: 130/80   Pulse: 73   Temp: 97 8 °F (36 6 °C)   SpO2: 96%     General: Awake, Alert, Oriented x 3, Mood and affect appropriate  Respiratory: Respirations even and unlabored  Cardiovascular: Peripheral pulses intact; no edema  Musculoskeletal Exam: n/a    Assessment: 1 2 cm x 1 2 cm crusted plaque, biopsy confirmed squamous cell carcinoma well differentiated, invasive     Plan: MOHS today    MOHS Procedure Timeout    Flowsheet Row Most Recent Value   Timeout: 1100   Patient Identity Verified: Yes   Correct Site Verified: Yes   Correct Procedure Verified: Yes          MOHS Diagnosis/Indication/Location/ID    Flowsheet Row Most Recent Value   Pathology Type Squamous cell carcinoma   Anatomic Site --  [Vertex scalp posterior]   Indications for MOHS tumor location   MOHS ID [id number]          MOHS Site/Accession/Pre-Post    Flowsheet Row Most Recent Value   Original Site Identified (as submitted by referring clinician) Photo   Biopsy Accession/Specimen # (as submitted by referring clincian) [id number]   Pre-MOHS Size Length (cm) 1 2   Pre-MOHS Size Width (cm) 1 2   Suture Type Ethilon, Vicryl   Final repair length (cm): 3   Flap/Graft area (cm2) 3   Anesthetic Used 1% Lidocaine with epinephrine  [with bicarb]          MOHS Tumor Stage 1 Information    Flowsheet Row Most Recent Value   Tissue Sections (blocks) 1   Microscopic Exam Section 1: No tumor identified in section  [scar tissue and follicular disruption centrally]   Tumor Clear After Stage I? Yes          ?           ? With the patient in the supine position and under adequate local anesthesia with 1% lidocaine with epinephrine 1:100,000, the defect was scrubbed with Hibiclens  Sterile drapes were placed from the sterile tray    Because of the location of the surgical defect, an intermediate closure was judged to give the best possible cosmetic and functional result  The edges of the defect were carefully debrided removing any dead or coagulated tissue  Closure included both deep and epidermal sutere with wide underming in gatea plane  Hemostasis was obtained by pinpoint electrocoagulation  Careful planning of removal of redundant tissue at either end of the defect was drawn out so that the suture lines would fall in the optimal orientation with regard to the relaxed skin tension lines  These were then removed with a #15 blade scalpel  The wound was then approximated by a deep layer of buried vertical mattress sutures and the cutaneous margins were approximated and closed by superficial sutures as noted above  Estimated blood loss was less than 5 mL  The patient tolerated the procedure well  The wound was dressed with petrolatum, a non-stick pad, and a compression dressing  Alberto Lozada MD served as the surgeon and pathologist during the procedure  Postoperative care: Wound care discussed at length  I urged the patient to call us if any problems or question should arise  Complications: none  Post-op medications: none  Patient condition after procedure: stable  Discharge plans: Plan for suture removal 12 days, at next scheduled appointment  ?  ?    ?  ?    ?    NEOPLASM OF UNCERTAIN BEHAVIOR OF SKIN    Physical Exam:   (Anatomic Location); (Size and Morphological Description); (Differential Diagnosis):  o A:Specimen A:Mid frontal scalp; 1 2 cm crusted plaque; probable squamous cell carcinoma in situ  o Specimen B:right frontal scalp; 1 2 cm crusted plaque; probable squamous cell carcinoma in situ  o Specimen C:right parietal scalp; 1 2 cm crusted plaque; probable sqamous cell carcinoma in situ               Pertinent Positives:   Pertinent Negatives: Additional History of Present Condition:  Patient states these lesion appeared in the last few months and are some times painful  Assessment and Plan:   I have discussed with the patient that a sample of skin via a "skin biopsy would be potentially helpful to further make a specific diagnosis under the microscope   Based on a thorough discussion of this condition and the management approach to it (including a comprehensive discussion of the known risks, side effects and potential benefits of treatment), the patient (family) agrees to implement the following specific plan:    o Procedure:  Skin Biopsy  After a thorough discussion of treatment options and risk/benefits/alternatives (including but not limited to local pain, scarring, dyspigmentation, blistering, possible superinfection, and inability to confirm a diagnosis via histopathology), verbal and written consent were obtained and portion of the rash was biopsied for tissue sample  See below for consent that was obtained from patient and subsequent Procedure Note      PROCEDURE TANGENTIAL (SHAVE) BIOPSY NOTE:A     Performing Physician: Dr Hendrickson   Anatomic Location; Clinical Description with size (cm); Pre-Op Diagnosis:   o A:Specimen A:Mid frontal scalp; 1 2 cm crusted plaque; probable squamous cell carcinoma in situ     Post-op diagnosis: Same      Local anesthesia: 1% xylocaine with epi  With bicarb     Topical anesthesia: None     Hemostasis: Electrocautery     PROCEDURE TANGENTIAL (SHAVE) BIOPSY NOTE:B     Performing Physician: Dr Hendrickson   Anatomic Location; Clinical Description with size (cm); Pre-Op Diagnosis:     o Specimen B:right frontal scalp; 1 2 cm crusted plaque; probable squamous cell carcinoma in situ     Post-op diagnosis: Same      Local anesthesia: 1% xylocaine with epi  With bicarb     Topical anesthesia: None     Hemostasis: Electrocautery     PROCEDURE TANGENTIAL (SHAVE) BIOPSY NOTE:C     Performing Physician: Dr Hendrickson   Anatomic Location; Clinical Description with size (cm); Pre-Op Diagnosis:     o Specimen C:right parietal scalp; 1 2 cm crusted plaque; probable sqamous cell carcinoma in situ   Post-op diagnosis: Same      Local anesthesia: 1% xylocaine with epi  With bicarb     Topical anesthesia: None     Hemostasis: Electrocautery     After obtaining informed consent  at which time there was a discussion about the purpose of biopsy  and low risks of infection and bleeding  The area was prepped and draped in the usual fashion  Anesthesia was obtained with 1% lidocaine with epinephrine  A shave biopsy to an appropriate sampling depth was obtained by Shave (Dermablade or 15 blade) The resulting wound was covered with surgical ointment and bandaged appropriately  The patient tolerated the procedure well without complications and was without signs of functional compromise  Specimen has been sent for review by Dermatopathology  Standard post-procedure care has been explained and has been included in written form within the patient's copy of Informed Consent  INFORMED CONSENT DISCUSSION AND POST-OPERATIVE INSTRUCTIONS FOR PATIENT    I   RATIONALE FOR PROCEDURE  I understand that a skin biopsy allows the Dermatologist to test a lesion or rash under the microscope to obtain a diagnosis  It usually involves numbing the area with numbing medication and removing a small piece of skin; sometimes the area will be closed with sutures  In this specific procedure, sutures are not usually needed  If any sutures are placed, then they are usually need to be removed in 2 weeks or less  I understand that my Dermatologist recommends that a skin "shave" biopsy be performed today  A local anesthetic, similar to the kind that a dentist uses when filling a cavity, will be injected with a very small needle into the skin area to be sampled  The injected skin and tissue underneath "will go to sleep and become numb so no pain should be felt afterwards    An instrument shaped like a tiny "razor blade" (shave biopsy instrument) will be used to cut a small piece of tissue and skin from the area so that a sample of tissue can be taken and examined more closely under the microscope  A slight amount of bleeding will occur, but it will be stopped with direct pressure and a pressure bandage and any other appropriate methods  I understands that a scar will form where the wound was created  Surgical ointment will be applied to help protect the wound  Sutures are not usually needed  II   RISKS AND POTENTIAL COMPLICATIONS   I understand the risks and potential complications of a skin biopsy include but are not limited to the following:   Bleeding   Infection   Pain   Scar/keloid   Skin discoloration   Incomplete Removal   Recurrence   Nerve Damage/Numbness/Loss of Function   Allergic Reaction to Anesthesia   Biopsies are diagnostic procedures and based on findings additional treatment or evaluation may be required   Loss or destruction of specimen resulting in no additional findings    My Dermatologist has explained to me the nature of the condition, the nature of the procedure, and the benefits to be reasonably expected compared with alternative approaches  My Dermatologist has discussed the likelihood of major risks or complications of this procedure including the specific risks listed above, such as bleeding, infection, and scarring/keloid  I understand that a scar is expected after this procedure  I understand that my physician cannot predict if the scar will form a "keloid," which extends beyond the borders of the wound that is created  A keloid is a thick, painful, and bumpy scar  A keloid can be difficult to treat, as it does not always respond well to therapy, which includes injecting cortisone directly into the keloid every few weeks  While this usually reduces the pain and size of the scar, it does not eliminate it  I understand that photographs may be taken before and after the procedure    These will be maintained as part of the medical providers confidential records and may not be made available to me  I further authorize the medical provider to use the photographs for teaching purposes or to illustrate scientific papers, books, or lectures if in his/her judgment, medical research, education, or science may benefit from its use  I have had an opportunity to fully inquire about the risks and benefits of this procedure and its alternatives  I have been given ample time and opportunity to ask questions and to seek a second opinion if I wished to do so  I acknowledge that there have specifically been no guarantees as to the cosmetic results from the procedure  I am aware that with any procedure there is always the possibility of an unexpected complication  III  POST-PROCEDURAL CARE (WHAT YOU WILL NEED TO DO "AFTER THE BIOPSY" TO OPTIMIZE HEALING)     Keep the area clean and dry  Try NOT to remove the bandage or get it wet for the first 24 hours   Gently clean the area and apply surgical ointment (such as Vaseline petrolatum ointment, which is available "over the counter" and not a prescription) to the biopsy site for up to 2 weeks straight  This acts to protect the wound from the outside world   Sutures are not usually placed in this procedure  If any sutures were placed, return for suture removal as instructed (generally 1 week for the face, 2 weeks for the body)   Take Acetaminophen (Tylenol) for discomfort, if no contraindications  Ibuprofen or aspirin could make bleeding worse   Call our office immediately for signs of infection: fever, chills, increased redness, warmth, tenderness, discomfort/pain, or pus or foul smell coming from the wound  WHAT TO DO IF THERE IS ANY BLEEDING? If a small amount of bleeding is noticed, place a clean cloth over the area and apply firm pressure for ten minutes  Check the wound after 10 minutes of direct pressure    If bleeding persists, try one more time for an additional 10 minutes of direct pressure on the area  If the bleeding becomes heavier or does not stop after the second attempt, or if you have any other questions about this procedure, then please call your [facility]'s Dermatologist by calling [phone] (SKIN)  I hereby acknowledge that I have reviewed and verified the site with my Dermatologist and have requested and authorized my Dermatologist to proceed with the procedure        Scribe Attestation    I,:  Chencho Rust am acting as a scribe while in the presence of the attending physician :       I,:  Christoph Abreu MD personally performed the services described in this documentation    as scribed in my presence :

## 2022-07-27 NOTE — PROGRESS NOTES
MOHS Procedure Note    Patient: Tarik Brewer  : 1935  MRN: [id number]  Date: 2022    History of Present Illness: The patient is a 81 y o  male who presents with complaints of Left frontal Scalp Anterior      Past Medical History:   Diagnosis Date    Basal cell carcinoma     Cancer ([facility] )     NSTEMI (non-ST elevated myocardial infarction) ([place] [address] )     Pneumonia     Last Assessed:  10/7/14    Polymyalgia rheumatica ([place] [address] )     Last Assessed:  12    Polym[facility]algia rheumatica ([facility] )     Squamous cell skin cancer 2022    left frontal scalp anterior    Squamous cell skin cancer 2022    Vertex of scalp       Past Surgical History:   Procedure Laterality Date    APPENDECTOMY      CORONARY ANGIOPLASTY WITH STENT PLACEMENT      HERNIA REPAIR      MOHS SURGERY  2022    left frontal scalp and vertex of scalp         Current Outpatient Medications:     Calcium Citrate-Vitamin D 315-250 MG-UNIT TABS, Take 1 tablet by mouth daily, Disp: , Rfl:     carboxymethylcellulose (REFRESH PLUS) 0 5 % SOLN, 1 drop 2 (two) times a day as needed for dry eyes, Disp: , Rfl:     clopidogrel (PLAVIX) 75 mg tablet, Take 1 tablet (75 mg total) by mouth daily, Disp: 90 tablet, Rfl: 3    Coenzyme Q10 100 MG TABS, Take 1 tablet by mouth daily, Disp: , Rfl:     metoprolol succinate (TOPROL-XL) 25 mg 24 hr tablet, Take 0 5 tablets (12 5 mg total) by mouth daily (Patient taking differently: Take 12 5 mg by mouth daily Takes at night), Disp: 45 tablet, Rfl: 3    Misc Natural Products (OSTEO BI-FLEX ADV TRIPLE ST PO), Take by mouth, Disp: , Rfl:     OMEGA-3 FATTY ACIDS-VITAMIN E PO, Take 1 capsule by mouth daily, Disp: , Rfl:     rosuvastatin (CRESTOR) 10 MG tablet, Take 1 tablet (10 mg total) by mouth daily, Disp: 90 tablet, Rfl: 0    Allergies   Allergen Reactions    Iv Dye [Iodinated Diagnostic Agents] Rash     Possible delayed rash 2022       Physical Exam:   Vitals:    22 1439 BP: 130/80   Pulse: 73   Temp: 97 8 °F (36 6 °C)   SpO2: 96%     General: Awake, Alert, Oriented x 3, Mood and affect appropriate  Respiratory: Respirations even and unlabored  Cardiovascular: Peripheral pulses intact; no edema  Musculoskeletal Exam: n/a    Assessment: 2 cm x 2 cm crusted plaque, 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    Plan: MOHS today    MOHS Procedure Timeout    Flowsheet Row Most Recent Value   Timeout: 1100   Patient Identity Verified: Yes   Correct Site Verified: Yes   Correct Procedure Verified: Yes          MOHS Diagnosis/Indication/Location/ID    Flowsheet Row Most Recent Value   Pathology Type Squamous cell carcinoma   Anatomic Site --  [Left Frontal Scalp Anterior]   Indications for MOHS tumor location   MOHS ID [id number]          MOHS Site/Accession/Pre-Post    Flowsheet Row Most Recent Value   Original Site Identified (as submitted by referring clinician) Photo   Biopsy Accession/Specimen # (as submitted by referring clincian) [id number]   Pre-MOHS Size Length (cm) 2   Pre-MOHS Size Width (cm) 2   Post-MOHS Size-Length (cm) 1 3   Post MOHS Size-Width (cm) 1 3   Repair Type Intermediate layered closure   Suture Type Vicryl, Ethilon   Ethilon Suture Size 3   Vicryl Suture Size 3   Anesthetic Used 1% Lidocaine with epinephrine  [with bicarb]          MOHS Tumor Stage 1 Information    Flowsheet Row Most Recent Value   Tissue Sections (blocks) 2   Microscopic Exam Section 1: No tumor identified in section  [in setions examined]   Microscopic Exam Section 2: Within the epidermis is a full-thickness proliferation of atypical keratinocytes with mitoses consistent with squamous cell carcinoma in situ  The overlying stratum corneum demonstrates parakeratosis  No invasion is noted  [8:00 margin positive ]   Tumor Clear After Stage I?  No          MOHS Tumor Stage 2 Information    Flowsheet Row Most Recent Value   Tissue Sections (blocks) 1   Microscopic Exam Section 1: No tumor identified in section  Tumor Clear After Stage II? Yes          ?            ?          ?    ?    ?    ?    ? Patient identified, procedure verified, site identified and verified  Time out completed  Surgical removal of the lesion discussed with the patient (risks and benefits, including possibility of scarring, infection, recurrence or potential for further treatment)  I have specifically identified the site with the patient  I have discussed the fact that the patient will have a scar after the procedure regardless of granulation or repair with sutures  I have discussed that the repair options can range from granulation in some cases to linear or curvilinear closures to larger flaps or grafts  There are sometimes flaps or grafts used that require multiples stages of surgery and will not be completed today, rather be completed over a series of appointments  I have discussed that occasionally due to location, size or depth of the lesion I may recommend consultation with and transfer of care for further removal or the reconstruction to another provider such as ophthalmology surgery, plastic surgery, ENT surgery, or surgical oncology  There are cases in which other testing such as imaging with MRI or CT scan or testing of lymph nodes is recommended because of the nature/depth/location of tumor seen during the removal  There is a risk of injury to nerves causing temporary or permanent numbness or the inability to move muscles full such as the inability to lift eyebrows  Questions answered and verbal and written consent was obtained  With the patient in the supine position and under adequate local anesthesia with 1% lidocaine with epinephrine 1:100,000, the defect was scrubbed with Hibiclens  Sterile drapes were placed from the sterile tray  Because of the location of the surgical defect, an intermediate closure was judged to give the best possible cosmetic and functional result    The edges of the defect were carefully debrided removing any dead or coagulated tissue  Repair was with both superficial and deep with underming  Hemostasis was obtained by pinpoint electrocoagulation  Careful planning of removal of redundant tissue at either end of the defect was drawn out so that the suture lines would fall in the optimal orientation with regard to the relaxed skin tension lines  These were then removed with a #15 blade scalpel  The wound was then approximated by a deep layer of buried vertical mattress sutures and the cutaneous margins were approximated and closed by superficial sutures as noted above  Estimated blood loss was less than 5 mL  The patient tolerated the procedure well  The wound was dressed with petrolatum, a non-stick pad, and a compression dressing  Westley Woo MD served as the surgeon and pathologist during the procedure  Postoperative care: Wound care discussed at length  I urged the patient to call us if any problems or question should arise  Complications: none  Post-op medications: none  Patient condition after procedure: stable  Discharge plans: Plan for suture removal 10 days, at next scheduled appointment  [place] derm qualifies for Mohs based on AUC criteria  Dr Elier Pollack served as the surgeon and pathologist during the procedure  Postoperative care: No would care should be necessary prior to the next visit but I urged the patient to call us if any problems or question should arise prior to that time  If circumstances should change, we will contact the patient to make other arrangements  MOHS Procedure 2 Note    Patient: Eamon Georges  : 1935  MRN: [id number]  Date: 22    History of Present Illness:  The patient is a 81 y o  male who presents with complaints of skin cancer    Past Medical History:   Diagnosis Date    Basal cell carcinoma     [facility]     [facility]STEMI [facility]non-ST elevated myocardial infarction) ([facility] )     Pneumonia     Last Assessed: 10/7/14    [facility]a [facility] )     Last Assessed:  11/23/12    Polymyalgia rheumatica ([facility] ) 2011    Squamous cell skin cancer 05/24/2022    left frontal scalp anterior    Squamous cell skin cancer 05/24/2022    Vertex of scalp       Past Surgical History:   Procedure Laterality Date    APPENDECTOMY      CORONARY ANGIOPLASTY WITH STENT PLACEMENT      HERNIA REPAIR  2000    MOHS SURGERY  07/27/2022    left frontal scalp and vertex of scalp         Current Outpatient Medications:     Calcium Citrate-Vitamin D 315-250 MG-UNIT TABS, Take 1 tablet by mouth daily, Disp: , Rfl:     carboxymethylcellulose (REFRESH PLUS) 0 5 % SOLN, 1 drop 2 (two) times a day as needed for dry eyes, Disp: , Rfl:     clopidogrel (PLAVIX) 75 mg tablet, Take 1 tablet (75 mg total) by mouth daily, Disp: 90 tablet, Rfl: 3    Coenzyme Q10 100 MG TABS, Take 1 tablet by mouth daily, Disp: , Rfl:     metoprolol succinate (TOPROL-XL) 25 mg 24 hr tablet, Take 0 5 tablets (12 5 mg total) by mouth daily (Patient taking differently: Take 12 5 mg by mouth daily Takes at night), Disp: 45 tablet, Rfl: 3    Misc Natural Products (OSTEO BI-FLEX ADV TRIPLE ST PO), Take by mouth, Disp: , Rfl:     OMEGA-3 FATTY ACIDS-VITAMIN E PO, Take 1 capsule by mouth daily, Disp: , Rfl:     rosuvastatin (CRESTOR) 10 MG tablet, Take 1 tablet (10 mg total) by mouth daily, Disp: 90 tablet, Rfl: 0    Allergies   Allergen Reactions    Iv Dye [Iodinated Diagnostic Agents] Rash     Possible delayed rash February 2022       Physical Exam:   Vitals:    07/27/22 1439   BP: 130/80   Pulse: 73   Temp: 97 8 °F (36 6 °C)   SpO2: 96%     General: Awake, Alert, Oriented x 3, Mood and affect appropriate  Respiratory: Respirations even and unlabored  Cardiovascular: Peripheral pulses intact; no edema  Musculoskeletal Exam: n/a    Assessment: 1 2 cm x 1 2 cm crusted plaque, biopsy confirmed squamous cell carcinoma well differentiated, invasive     Plan: MOHS today    MOHS Procedure Timeout    Flowsheet Row Most Recent Value   Timeout: 1100   Patient Identity Verified: Yes   Correct Site Verified: Yes   Correct Procedure Verified: Yes          MOHS Diagnosis/Indication/Location/ID    Flowsheet Row Most Recent Value   Pathology Type Squamous cell carcinoma   Anatomic Site --  [Vertex scalp posterior]   Indications for MOHS tumor location   MOHS ID [id number]          MOHS Site/Accession/Pre-Post    Flowsheet Row Most Recent Value   Original Site Identified (as submitted by referring clinician) Photo   Biopsy Accession/Specimen # (as submitted by referring clincian) [id number]   Pre-MOHS Size Length (cm) 1 2   Pre-MOHS Size Width (cm) 1 2   Suture Type Ethilon, Vicryl   Final repair length (cm): 3   Flap/Graft area (cm2) 3   Anesthetic Used 1% Lidocaine with epinephrine  [with bicarb]          MOHS Tumor Stage 1 Information    Flowsheet Row Most Recent Value   Tissue Sections (blocks) 1   Microscopic Exam Section 1: No tumor identified in section  [scar tissue and follicular disruption centrally]   Tumor Clear After Stage I? Yes          ?           ? With the patient in the supine position and under adequate local anesthesia with 1% lidocaine with epinephrine 1:100,000, the defect was scrubbed with Hibiclens  Sterile drapes were placed from the sterile tray  Because of the location of the surgical defect, an intermediate closure was judged to give the best possible cosmetic and functional result  The edges of the defect were carefully debrided removing any dead or coagulated tissue  Closure included both deep and epidermal sutere with wide underming in gatea plane  Hemostasis was obtained by pinpoint electrocoagulation  Careful planning of removal of redundant tissue at either end of the defect was drawn out so that the suture lines would fall in the optimal orientation with regard to the relaxed skin tension lines    These were then removed with a #15 blade scalpel  The wound was then approximated by a deep layer of buried vertical mattress sutures and the cutaneous margins were approximated and closed by superficial sutures as noted above  Estimated blood loss was less than 5 mL  The patient tolerated the procedure well  The wound was dressed with petrolatum, a non-stick pad, and a compression dressing  Marylu Mix MD served as the surgeon and pathologist during the procedure  Postoperative care: Wound care discussed at length  I urged the patient to call us if any problems or question should arise  Complications: none  Post-op medications: none  Patient condition after procedure: stable  Discharge plans: Plan for suture removal 12 days, at next scheduled appointment  ?  ?    ?  ?    ?    NEOPLASM OF UNCERTAIN BEHAVIOR OF SKIN    Physical Exam:  (Anatomic Location); (Size and Morphological Description); (Differential Diagnosis): A:Specimen A:Mid frontal scalp; 1 2 cm crusted plaque; probable squamous cell carcinoma in situ  Specimen B:right frontal scalp; 1 2 cm crusted plaque; probable squamous cell carcinoma in situ  Specimen C:right parietal scalp; 1 2 cm crusted plaque; probable sqamous cell carcinoma in situ              Pertinent Positives:  Pertinent Negatives: Additional History of Present Condition:  Patient states these lesion appeared in the last few months and are some times painful  Assessment and Plan:  I have discussed with the patient that a sample of skin via a "skin biopsy would be potentially helpful to further make a specific diagnosis under the microscope  Based on a thorough discussion of this condition and the management approach to it (including a comprehensive discussion of the known risks, side effects and potential benefits of treatment), the patient (family) agrees to implement the following specific plan:    Procedure:  Skin Biopsy    After a thorough discussion of treatment options and risk/benefits/alternatives (including but not limited to local pain, scarring, dyspigmentation, blistering, possible superinfection, and inability to confirm a diagnosis via histopathology), verbal and written consent were obtained and portion of the rash was biopsied for tissue sample  See below for consent that was obtained from patient and subsequent Procedure Note  PROCEDURE TANGENTIAL (SHAVE) BIOPSY NOTE:A    Performing Physician: Dr Hendrickson  Anatomic Location; Clinical Description with size (cm); Pre-Op Diagnosis:   A:Specimen A:Mid frontal scalp; 1 2 cm crusted plaque; probable squamous cell carcinoma in situ    Post-op diagnosis: Same     Local anesthesia: 1% xylocaine with epi  With bicarb    Topical anesthesia: None    Hemostasis: Electrocautery     PROCEDURE TANGENTIAL (SHAVE) BIOPSY NOTE:B    Performing Physician: Dr Hendrickson  Anatomic Location; Clinical Description with size (cm); Pre-Op Diagnosis:     Specimen B:right frontal scalp; 1 2 cm crusted plaque; probable squamous cell carcinoma in situ    Post-op diagnosis: Same     Local anesthesia: 1% xylocaine with epi  With bicarb    Topical anesthesia: None    Hemostasis: Electrocautery     PROCEDURE TANGENTIAL (SHAVE) BIOPSY NOTE:C    Performing Physician: Dr Hendrickson  Anatomic Location; Clinical Description with size (cm); Pre-Op Diagnosis:     Specimen C:right parietal scalp; 1 2 cm crusted plaque; probable sqamous cell carcinoma in situ  Post-op diagnosis: Same     Local anesthesia: 1% xylocaine with epi  With bicarb    Topical anesthesia: None    Hemostasis: Electrocautery     After obtaining informed consent  at which time there was a discussion about the purpose of biopsy  and low risks of infection and bleeding  The area was prepped and draped in the usual fashion  Anesthesia was obtained with 1% lidocaine with epinephrine   A shave biopsy to an appropriate sampling depth was obtained by Shave (Dermablade or 15 blade) The resulting wound was covered with surgical ointment and bandaged appropriately  The patient tolerated the procedure well without complications and was without signs of functional compromise  Specimen has been sent for review by Dermatopathology  Standard post-procedure care has been explained and has been included in written form within the patient's copy of Informed Consent  INFORMED CONSENT DISCUSSION AND POST-OPERATIVE INSTRUCTIONS FOR PATIENT    I   RATIONALE FOR PROCEDURE  I understand that a skin biopsy allows the Dermatologist to test a lesion or rash under the microscope to obtain a diagnosis  It usually involves numbing the area with numbing medication and removing a small piece of skin; sometimes the area will be closed with sutures  In this specific procedure, sutures are not usually needed  If any sutures are placed, then they are usually need to be removed in 2 weeks or less  I understand that my Dermatologist recommends that a skin "shave" biopsy be performed today  A local anesthetic, similar to the kind that a dentist uses when filling a cavity, will be injected with a very small needle into the skin area to be sampled  The injected skin and tissue underneath "will go to sleep and become numb so no pain should be felt afterwards  An instrument shaped like a tiny "razor blade" (shave biopsy instrument) will be used to cut a small piece of tissue and skin from the area so that a sample of tissue can be taken and examined more closely under the microscope  A slight amount of bleeding will occur, but it will be stopped with direct pressure and a pressure bandage and any other appropriate methods  I understands that a scar will form where the wound was created  Surgical ointment will be applied to help protect the wound  Sutures are not usually needed      II   RISKS AND POTENTIAL COMPLICATIONS   I understand the risks and potential complications of a skin biopsy include but are not limited to the following:  Bleeding  Infection  Pain  Scar/keloid  Skin discoloration  Incomplete Removal  Recurrence  Nerve Damage/Numbness/Loss of Function  Allergic Reaction to Anesthesia  Biopsies are diagnostic procedures and based on findings additional treatment or evaluation may be required  Loss or destruction of specimen resulting in no additional findings    My Dermatologist has explained to me the nature of the condition, the nature of the procedure, and the benefits to be reasonably expected compared with alternative approaches  My Dermatologist has discussed the likelihood of major risks or complications of this procedure including the specific risks listed above, such as bleeding, infection, and scarring/keloid  I understand that a scar is expected after this procedure  I understand that my physician cannot predict if the scar will form a "keloid," which extends beyond the borders of the wound that is created  A keloid is a thick, painful, and bumpy scar  A keloid can be difficult to treat, as it does not always respond well to therapy, which includes injecting cortisone directly into the keloid every few weeks  While this usually reduces the pain and size of the scar, it does not eliminate it  I understand that photographs may be taken before and after the procedure  These will be maintained as part of the medical providers confidential records and may not be made available to me  I further authorize the medical provider to use the photographs for teaching purposes or to illustrate scientific papers, books, or lectures if in his/her judgment, medical research, education, or science may benefit from its use  I have had an opportunity to fully inquire about the risks and benefits of this procedure and its alternatives  I have been given ample time and opportunity to ask questions and to seek a second opinion if I wished to do so    I acknowledge that there have specifically been no guarantees as to the cosmetic results from the procedure  I am aware that with any procedure there is always the possibility of an unexpected complication  III  POST-PROCEDURAL CARE (WHAT YOU WILL NEED TO DO "AFTER THE BIOPSY" TO OPTIMIZE HEALING)    Keep the area clean and dry  Try NOT to remove the bandage or get it wet for the first 24 hours  Gently clean the area and apply surgical ointment (such as Vaseline petrolatum ointment, which is available "over the counter" and not a prescription) to the biopsy site for up to 2 weeks straight  This acts to protect the wound from the outside world  Sutures are not usually placed in this procedure  If any sutures were placed, return for suture removal as instructed (generally 1 week for the face, 2 weeks for the body)  Take Acetaminophen (Tylenol) for discomfort, if no contraindications  Ibuprofen or aspirin could make bleeding worse  Call our office immediately for signs of infection: fever, chills, increased redness, warmth, tenderness, discomfort/pain, or pus or foul smell coming from the wound  WHAT TO DO IF THERE IS ANY BLEEDING? If a small amount of bleeding is noticed, place a clean cloth over the area and apply firm pressure for ten minutes  Check the wound after 10 minutes of direct pressure  If bleeding persists, try one more time for an additional 10 minutes of direct pressure on the area  If the bleeding becomes heavier or does not stop after the second attempt, or if you have any other questions about this procedure, then please call your [address] [place] [address]'s Dermatologist by calling [phone] (SKIN)  I hereby acknowledge that I have reviewed and verified the site with my Dermatologist and have requested and authorized my Dermatologist to proceed with the procedure        Scribe Attestation    I,:  Chencho Rust am acting as a scribe while in the presence of the attending physician :       I,:  Melissa Ty MD personally performed the services described in this documentation    as scribed in my presence :

## 2022-08-04 ENCOUNTER — OFFICE VISIT (OUTPATIENT)
Dept: DERMATOLOGY | Facility: CLINIC | Age: 87
End: 2022-08-04
Payer: MEDICARE

## 2022-08-04 ENCOUNTER — TELEPHONE (OUTPATIENT)
Dept: DERMATOLOGY | Age: 87
End: 2022-08-04

## 2022-08-04 DIAGNOSIS — Z51.89 ENCOUNTER FOR WOUND RE-CHECK: Primary | ICD-10-CM

## 2022-08-04 PROCEDURE — 99024 POSTOP FOLLOW-UP VISIT: CPT | Performed by: DERMATOLOGY

## 2022-08-04 PROCEDURE — 87070 CULTURE OTHR SPECIMN AEROBIC: CPT | Performed by: DERMATOLOGY

## 2022-08-04 PROCEDURE — 87186 SC STD MICRODIL/AGAR DIL: CPT | Performed by: DERMATOLOGY

## 2022-08-04 PROCEDURE — 87205 SMEAR GRAM STAIN: CPT | Performed by: DERMATOLOGY

## 2022-08-04 RX ORDER — DOXYCYCLINE HYCLATE 100 MG/1
100 CAPSULE ORAL 2 TIMES DAILY
Qty: 10 CAPSULE | Refills: 0 | Status: SHIPPED | OUTPATIENT
Start: 2022-08-04 | End: 2022-08-09

## 2022-08-04 NOTE — TELEPHONE ENCOUNTER
Patient wife called stating on Sunday his left eye was swollen shut with fluid  She states that has cleared up  Since the procedure there has been some leakage in the area  No blood more pus  She states there is no foul odor and is not warm to the touch  She has been applying neosporin as told and covering it with gauze and paper tape   Would like to know the step   pls call to advise [phone] ok to leave a VM

## 2022-08-04 NOTE — PROGRESS NOTES
WOUND CHECK    Physical Exam:  Anatomic Location Affected:  Left frontal scalp   Description of wound: Mild redness surrounding crusting  Closure Type: Intermediate    Additional History of Present Condition:  S/ P Mohs 7/07/2022 Deneis any pain or discomfort report oozing     Assessment and Plan:  Based on a thorough discussion of this condition and the management approach to it (including a comprehensive discussion of the known risks, side effects and potential benefits of treatment), the patient (family) agrees to implement the following specific plan:  Culture obtain, continue with daily Vaseline and bandage once daily   Start  Doxycyline 100 mg twice a day with foods for 5 days straight    Follow Up as scheduled suture removal    Mild serous  crusting    No gross infection     Scribe Attestation    I,:  Aida Austin MA am acting as a scribe while in the presence of the attending physician :       I,:  Alberto Lozada MD personally performed the services described in this documentation    as scribed in my presence :

## 2022-08-05 ENCOUNTER — TELEPHONE (OUTPATIENT)
Dept: DERMATOLOGY | Facility: CLINIC | Age: 87
End: 2022-08-05

## 2022-08-05 NOTE — TELEPHONE ENCOUNTER
Lvm to pt in regards of rescheduling his appt from 8/9 to 8/8, stating that Dr Sree Ac will not be in office on Tuesday  Provided pt our contact number as well

## 2022-08-06 LAB
BACTERIA WND AEROBE CULT: ABNORMAL
BACTERIA WND AEROBE CULT: ABNORMAL
GRAM STN SPEC: ABNORMAL

## 2022-08-08 ENCOUNTER — OFFICE VISIT (OUTPATIENT)
Dept: DERMATOLOGY | Facility: CLINIC | Age: 87
End: 2022-08-08

## 2022-08-08 VITALS — TEMPERATURE: 98.3 F | HEIGHT: 68 IN | BODY MASS INDEX: 26.67 KG/M2 | WEIGHT: 176 LBS

## 2022-08-08 DIAGNOSIS — Z48.02 VISIT FOR SUTURE REMOVAL: Primary | ICD-10-CM

## 2022-08-08 PROCEDURE — RECHECK: Performed by: DERMATOLOGY

## 2022-08-08 NOTE — RESULT ENCOUNTER NOTE
DERMATOPATHOLOGY RESULT NOTE    Results reviewed by ordering physician  Called patient to personally discuss results  Discussed results with patient  Instructions for Clinical Derm Team:   (remember to route Result Note to appropriate staff):    None    Result & Plan by Specimen:    Specimen A: malignant  Plan: treated with dessication and curettage      Specimen B: malignant  Plan: treated with dessication and curettage        Specimen C: malignant  Plan: treated with dessication and curettage            Final Diagnosis   A  Skin, mid frontal scalp, shave biopsy:     SQUAMOUS CELL CARCINOMA IN SITU; 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

## 2022-08-08 NOTE — PROGRESS NOTES
Suture removal    Date/Time: 8/8/2022 2:31 PM  Performed by: Desiree Oconnor  Authorized by: Angie Gallwoay MD   Universal Protocol:  Consent: Verbal consent obtained  Risks and benefits: risks, benefits and alternatives were discussed  Consent given by: patient  Timeout called at: 8/8/2022 2:32 PM   Patient understanding: patient states understanding of the procedure being performed  Patient consent: the patient's understanding of the procedure matches consent given  Procedure consent: procedure consent matches procedure scheduled  Patient identity confirmed: verbally with patient        Patient location:  Other (comment) ([address] )  Location:     Laterality:  Left    Location:  Head/neck (front scalp anterior )    Head/neck location:  Scalp  Procedure details: Tools used:  Suture removal kit    Wound appearance:  Tender, pink and moist    Number of sutures removed:  10  Post-procedure details:     Post-removal:  No dressing applied    Patient tolerance of procedure:   Tolerated well, no immediate complications  Comments:      Vaseline

## 2022-10-14 DIAGNOSIS — I25.10 ARTERIOSCLEROTIC CORONARY ARTERY DISEASE: ICD-10-CM

## 2022-10-14 DIAGNOSIS — E78.5 HYPERLIPIDEMIA, UNSPECIFIED HYPERLIPIDEMIA TYPE: ICD-10-CM

## 2022-10-14 RX ORDER — ROSUVASTATIN CALCIUM 10 MG/1
TABLET, COATED ORAL
Qty: 90 TABLET | Refills: 0 | Status: SHIPPED | OUTPATIENT
Start: 2022-10-14 | End: 2022-10-20 | Stop reason: SDUPTHER

## 2022-10-19 NOTE — PATIENT INSTRUCTIONS
Reviewed health history along with medications  He has been doing well other than recent cold symptoms, family members had been sick on recent vacation, COVID testing is negative here today  Watch for increased wheezing, shortness of breath, fever  Can use Robitussin    He has been walking, has been watching his diet  He did see Neurology March 2021, plans to see them again only as needed  Has had no further sig dizziness or neurological deficit, does have issues with balance, fortunately has not fallen  Continue with balance exercises  He does continue Plavix 75 mg daily, aspirin was stopped 3 weeks after hospital stay early this year  He also does have heart disease, at last visit we discussed restarting aspirin 81 mg daily in addition to the Plavix, has no GI issues, we would need to watch for stomach upset/bleeding  He wanted to discuss this with family member who is a physician, has not restarted - also does plan to see Cardiology in the future, as in prior notes was going for 2nd opinion  *Continue with current medication is is otherwise, we will see him again in 6 months with AWV, call us sooner if needed  Redo CBC/CMP/ A1C   Is going to [facility] in Dec     As in prior notes February blood work showed BMP, CBC to be okay, A1c 6 2, cholesterol 126 with HDL 60, LDL 53  Also, had seen Dr Tara Uribe years ago, scalp lesions/ hx BCC - currently seeing Dermatology  We did review previous blood work, cholesterol back in February was excellent 126 with HDL 60, LDL 53  TSH was fine 1 year ago at 2 34, vitamin-D was 29       Immunization History   Administered Date(s) Administered    COVID-19 MODERNA VACC 0 5 ML IM 01/21/2021, 02/18/2021    COVID-19 PFIZER VACCINE 0 3 ML IM 11/03/2021    COVID-19 Pfizer Vac BIVALENT Omar-sucrose 12 Yr+ IM (BOOSTER ONLY) 09/12/2022    COVID-19 Pfizer vac (Omar-sucrose, gray cap) 12 yr+ IM 04/10/2022    INFLUENZA 10/09/2017, 10/23/2019, 09/28/2020, 10/06/2021    Influenza Split High Dose Preservative Free IM 10/27/2016, 09/29/2018, 10/23/2019    Influenza, seasonal, injectable 10/29/2007, 01/12/2009    Pneumococcal Conjugate 13-Valent 11/22/2019    Pneumococcal Conjugate PCV 7 09/16/2014    Pneumococcal Polysaccharide PPV23 09/01/2006, 09/16/2014    TD (adult) Preservative Free 09/01/2006    Tdap 09/01/2006    Zoster 01/01/2012    Zoster Vaccine Recombinant 01/08/2013, 12/10/2021, 03/19/2022    influenza, trivalent, adjuvanted 09/28/2020       Discussed Vaccines,    Pneumococcal vax is up to date  He does do yearly Flu shot  He will do that at pharmacy in roughly 1 week, held off today as he does have cold symptoms  Tdap/tetanus shot will be done at a future date  -is due, he plans to do at pharmacy (done every 10 yrs for superficial cuts, every 5 yrs for deep wounds)  Received Shingrix  COVID vaccine up-to-date      Was never a smoker    He does have a  "LIVING WILL"     Glaucoma screening is up-to-date

## 2022-10-20 ENCOUNTER — OFFICE VISIT (OUTPATIENT)
Dept: FAMILY MEDICINE CLINIC | Facility: CLINIC | Age: 87
End: 2022-10-20
Payer: MEDICARE

## 2022-10-20 VITALS
DIASTOLIC BLOOD PRESSURE: 60 MMHG | HEART RATE: 82 BPM | SYSTOLIC BLOOD PRESSURE: 108 MMHG | TEMPERATURE: 97.7 F | BODY MASS INDEX: 26.98 KG/M2 | HEIGHT: 68 IN | WEIGHT: 178 LBS | OXYGEN SATURATION: 94 %

## 2022-10-20 DIAGNOSIS — I63.9 CEREBELLAR STROKE (HCC): ICD-10-CM

## 2022-10-20 DIAGNOSIS — I10 BENIGN ESSENTIAL HYPERTENSION: ICD-10-CM

## 2022-10-20 DIAGNOSIS — R73.03 PREDIABETES: ICD-10-CM

## 2022-10-20 DIAGNOSIS — E78.5 HYPERLIPIDEMIA, UNSPECIFIED HYPERLIPIDEMIA TYPE: ICD-10-CM

## 2022-10-20 DIAGNOSIS — Z00.00 MEDICARE ANNUAL WELLNESS VISIT, SUBSEQUENT: Primary | ICD-10-CM

## 2022-10-20 DIAGNOSIS — Z95.5 HISTORY OF HEART ARTERY STENT: ICD-10-CM

## 2022-10-20 DIAGNOSIS — I25.10 ARTERIOSCLEROTIC CORONARY ARTERY DISEASE: ICD-10-CM

## 2022-10-20 DIAGNOSIS — J06.9 URI WITH COUGH AND CONGESTION: ICD-10-CM

## 2022-10-20 LAB
SARS-COV-2 AG UPPER RESP QL IA: NEGATIVE
VALID CONTROL: NORMAL

## 2022-10-20 PROCEDURE — 87811 SARS-COV-2 COVID19 W/OPTIC: CPT | Performed by: FAMILY MEDICINE

## 2022-10-20 PROCEDURE — G0439 PPPS, SUBSEQ VISIT: HCPCS | Performed by: FAMILY MEDICINE

## 2022-10-20 PROCEDURE — 99213 OFFICE O/P EST LOW 20 MIN: CPT | Performed by: FAMILY MEDICINE

## 2022-10-20 RX ORDER — METOPROLOL SUCCINATE 25 MG/1
12.5 TABLET, EXTENDED RELEASE ORAL DAILY
Qty: 45 TABLET | Refills: 3 | Status: SHIPPED | OUTPATIENT
Start: 2022-10-20

## 2022-10-20 RX ORDER — ROSUVASTATIN CALCIUM 10 MG/1
10 TABLET, COATED ORAL DAILY
Qty: 90 TABLET | Refills: 3 | Status: SHIPPED | OUTPATIENT
Start: 2022-10-20

## 2022-10-20 NOTE — PROGRESS NOTES
Assessment and Plan:     Problem List Items Addressed This Visit     History of heart artery stent    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([address] )    Relevant Orders    CBC    Comprehensive metabolic panel    Arteriosclerotic coronary artery disease    Relevant Medications    metoprolol succinate (TOPROL-XL) 25 mg 24 hr tablet    rosuvastatin (CRESTOR) 10 MG tablet    Other Relevant Orders    CBC    Comprehensive metabolic panel    Benign essential hypertension    Relevant Medications    metoprolol succinate (TOPROL-XL) 25 mg 24 hr tablet    Other Relevant Orders    Comprehensive metabolic panel    Hyperlipidemia    Relevant Medications    rosuvastatin (CRESTOR) 10 MG tablet    Other Relevant Orders    Comprehensive metabolic panel      Other Visit Diagnoses     Medicare annual wellness visit, subsequent    -  Primary    URI with cough and congestion        Relevant Orders    POCT Rapid Covid Ag    Prediabetes        Relevant Orders    Comprehensive metabolic panel    Hemoglobin A1C           Preventive health issues were discussed with patient, and age appropriate screening tests were ordered as noted in patient's After Visit Summary  Personalized health advice and appropriate referrals for health education or preventive services given if needed, as noted in patient's After Visit Summary      See other note today regarding provider information       History of Present Illness:     Patient presents for a Medicare Wellness Visit      Patient Care Team:  Mary Kay Galloway DO as PCP - General     Problem List:     Patient Active Problem List   Diagnosis   • Actinic keratosis   • Arteriosclerotic coronary artery disease   • Basal cell carcinoma of skin   • BPH with obstruction/lower urinary tract symptoms   • Benign essential hypertension   • Hyperlipidemia   • History of heart artery stent   • Primary osteoarthritis of both hands   • Dizziness and giddiness   • Heart palpitations   • 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[facility])      Past Medical and Surgical History:     Past Medical History:   Diagnosis Date   • Basal cell carcinoma    • Cancer ([facility] )    • NSTEMI (non-ST elevated myocardial infarction) ([facility] ) 2014   • Pneumonia     Last Assessed:  10/7/14   • Polymyalgia rheumatica ([facility] )     Last Assessed:  11/23/12   • Polymyalgia rheumatica ([facility] ) 2011   • Squamous cell skin cancer 05/24/2022    left frontal scalp anterior   • Squamous cell skin cancer 05/24/2022    Vertex of scalp     Past Surgical History:   Procedure Laterality Date   • APPENDECTOMY     • CORONARY ANGIOPLASTY WITH STENT PLACEMENT     • HERNIA REPAIR  2000   • MOHS SURGERY  07/27/2022    left frontal scalp and vertex of scalp      Family History:     Family History   Problem Relation Age of Onset   • Pulmonary embolism Father    • Other Father         Transurethral resection of prostate   • Stroke Sister       Social History:     Social History     Socioeconomic History   • Marital status: /Civil Union     Spouse name: None   • Number of children: None   • Years of education: None   • Highest education level: None   Occupational History   • None   Tobacco Use   • Smoking status: Never Smoker   • Smokeless tobacco: Never Used   Vaping Use   • Vaping Use: Never used   Substance and Sexual Activity   • Alcohol use: Not Currently     Comment: Social   • Drug use: Never   • Sexual activity: Yes   Other Topics Concern   • None   Social History Narrative    Exercises regularly        Consumes 2-3 cups of coffee per week and 1 glass of ice tea per day     Social Determinants of Health     Financial Resource Strain: Not on file   Food Insecurity: Not on file   Transportation Needs: Not on file   Physical Activity: Not on file   Stress: Not on file   Social Connections: Not on file   Intimate Partner Violence: Not on file   Housing Stability: Not on file      Medications and Allergies:     Current Outpatient Medications   Medication Sig Dispense Refill   • Calcium Citrate-Vitamin D 315-250 MG-UNIT TABS Take 1 tablet by mouth daily     • carboxymethylcellulose (REFRESH PLUS) 0 5 % SOLN 1 drop 2 (two) times a day as needed for dry eyes     • clopidogrel (PLAVIX) 75 mg tablet Take 1 tablet (75 mg total) by mouth daily 90 tablet 3   • Coenzyme Q10 100 MG TABS Take 1 tablet by mouth daily     • metoprolol succinate (TOPROL-XL) 25 mg 24 hr tablet Take 0 5 tablets (12 5 mg total) by mouth daily 45 tablet 3   • Misc Natural Products (OSTEO BI-FLEX ADV TRIPLE ST PO) Take by mouth     • OMEGA-3 FATTY ACIDS-VITAMIN E PO Take 1 capsule by mouth daily     • rosuvastatin (CRESTOR) 10 MG tablet Take 1 tablet (10 mg total) by mouth daily 90 tablet 3     No current facility-administered medications for this visit  Allergies   Allergen Reactions   • Iv Dye [Iodinated Diagnostic Agents] Rash     Possible delayed rash February 2022      Immunizations:     Immunization History   Administered Date(s) Administered   • COVID-19 MODERNA VACC 0 5 ML IM 01/21/2021, 02/18/2021   • COVID-19 PFIZER VACCINE 0 3 ML IM 11/03/2021   • COVID-19 Pfizer Vac BIVALENT Omar-sucrose 12 Yr+ IM (BOOSTER ONLY) 09/12/2022   • COVID-19 Pfizer vac (Omar-sucrose, gray cap) 12 yr+ IM 04/10/2022   • INFLUENZA 10/09/2017, 10/23/2019, 09/28/2020, 10/06/2021   • Influenza Split High Dose Preservative Free IM 10/27/2016, 09/29/2018, 10/23/2019   • Influenza, seasonal, injectable 10/29/2007, 01/12/2009   • Pneumococcal Conjugate 13-Valent 11/22/2019   • Pneumococcal Conjugate PCV 7 09/16/2014   • Pneumococcal Polysaccharide PPV23 09/01/2006, 09/16/2014   • TD (adult) Preservative Free 09/01/2006   • Tdap 09/01/2006   • Zoster 01/01/2012   • Zoster Vaccine Recombinant 01/08/2013, 12/10/2021, 03/19/2022   • influenza, trivalent, adjuvanted 09/28/2020      Health Maintenance: There are no preventive care reminders to display for this patient        Topic Date Due   • Influenza Vaccine (1) 09/01/2022      Medicare Screening Tests and Risk Assessments:     Rudy Do is here for his Subsequent Wellness visit  Health Risk Assessment:   Patient rates overall health as good  Patient feels that their physical health rating is slightly worse  Patient is satisfied with their life  Eyesight was rated as same  Hearing was rated as slightly worse  Patient feels that their emotional and mental health rating is same  Patients states they are never, rarely angry  Patient states they are never, rarely unusually tired/fatigued  Pain experienced in the last 7 days has been none  Patient states that he has experienced no weight loss or gain in last 6 months  Fall Risk Screening: In the past year, patient has experienced: no history of falling in past year      Home Safety:  Patient does not have trouble with stairs inside or outside of their home  Patient has working smoke alarms and has working carbon monoxide detector  Home safety hazards include: none  Nutrition:   Current diet is Regular and Limited junk food  Medications:   Patient is currently taking over-the-counter supplements  OTC medications include: see medication list  Patient is not able to manage medications  Activities of Daily Living (ADLs)/Instrumental Activities of Daily Living (IADLs):   Walk and transfer into and out of bed and chair?: Yes  Dress and groom yourself?: Yes    Bathe or shower yourself?: Yes    Feed yourself? Yes  Do your laundry/housekeeping?: Yes  Manage your money, pay your bills and track your expenses?: Yes  Make your own meals?: Yes    Do your own shopping?: Yes    Previous Hospitalizations:   Any hospitalizations or ED visits within the last 12 months?: Yes      Hospitalization Comments: 1/20/22 3days  2/14/22 3 days mini stroke    Advance Care Planning:   Living will: Yes    Durable POA for healthcare:  Yes    Advanced directive: Yes      PREVENTIVE SCREENINGS      Cardiovascular Screening:    General: Screening Not Indicated and History Lipid Disorder      Diabetes Screening: General: Screening Not Indicated and History Diabetes      Colorectal Cancer Screening:     General: Screening Not Indicated      Prostate Cancer Screening:    General: Screening Not Indicated      Lung Cancer Screening:     General: Screening Not Indicated    Screening, Brief Intervention, and Referral to Treatment (SBIRT)    Screening  Typical number of drinks in a day: 0  Typical number of drinks in a week: 0  Interpretation: Low risk drinking behavior      Single Item Drug Screening:  How often have you used an illegal drug (including marijuana) or a prescription medication for non-medical reasons in the past year? never    Single Item Drug Screen Score: 0  Interpretation: Negative screen for possible drug use disorder    No exam data present     Physical Exam:     /60 (BP Location: Left arm, Patient Position: Sitting, Cuff Size: Large)   Pulse 82   Temp 97 7 °F (36 5 °C)   Ht 5' 8" (1 727 m)   Wt 80 7 kg (178 lb)   SpO2 94%   BMI 27 06 kg/m²     Physical Exam     Lulu Rubi, DO

## 2022-10-20 NOTE — PROGRESS NOTES
BMI Counseling: Body mass index is 27 06 kg/m²  The BMI is above normal  Nutrition recommendations include encouraging healthy choices of fruits and vegetables  Exercise recommendations include exercising 3-5 times per week  Rationale for BMI follow-up plan is due to patient being overweight or obese  Depression Screening and Follow-up Plan: Patient was screened for depression during today's encounter  They screened negative with a PHQ-2 score of 0  [facility]  [facility]  [address] [place] [address], [address] MEDICARE SUBSEQUENT VISIT NOTE ( part 1 )      NAME: Jeane Cai  AGE: 81 y o  SEX: male  : 1935   MRN: [id number]    DATE: 10/20/2022  TIME: 10:28 AM    Assessment and Plan     Problem List Items Addressed This Visit     History of heart artery stent    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([facility] [facility] )    Relevant Orders    CBC    Comprehensive metabolic panel    Arteriosclerotic coronary artery disease    Relevant Medications    metoprolol succinate (TOPROL-XL) 25 mg 24 hr tablet    rosuvastatin (CRESTOR) 10 MG tablet    Other Relevant Orders    CBC    Comprehensive metabolic panel    Benign essential hypertension    Relevant Medications    metoprolol succinate (TOPROL-XL) 25 mg 24 hr tablet    Other Relevant Orders    Comprehensive metabolic panel    Hyperlipidemia    Relevant Medications    rosuvastatin (CRESTOR) 10 MG tablet    Other Relevant Orders    Comprehensive metabolic panel      Other Visit Diagnoses     Medicare annual wellness visit, subsequent    -  Primary    URI with cough and congestion        Relevant Orders    POCT Rapid Covid Ag    Prediabetes        Relevant Orders    Comprehensive metabolic panel    Hemoglobin A1C          Medicare Wellness Counseling/ Discussion    Patient Instructions     Reviewed health history along with medications    He has been doing well other than recent cold symptoms, family members had been sick on recent vacation, COVID testing is negative here today  Watch for increased wheezing, shortness of breath, fever  Can use Robitussin    He has been walking, has been watching his diet  He did see Neurology March 2021, plans to see them again only as needed  Has had no further sig dizziness or neurological deficit, does have issues with balance, fortunately has not fallen  Continue with balance exercises  He does continue Plavix 75 mg daily, aspirin was stopped 3 weeks after hospital stay early this year  He also does have heart disease, at last visit we discussed restarting aspirin 81 mg daily in addition to the Plavix, has no GI issues, we would need to watch for stomach upset/bleeding  He wanted to discuss this with family member who is a physician, has not restarted - also does plan to see Cardiology in the future, as in prior notes was going for 2nd opinion  *Continue with current medication is is otherwise, we will see him again in 6 months with AWV, call us sooner if needed  Redo CBC/CMP/ A1C   Is going to [facility] in Dec     As in prior notes February blood work showed BMP, CBC to be okay, A1c 6 2, cholesterol 126 with HDL 60, LDL 53  Also, had seen Dr Mikhail Markham years ago, scalp lesions/ hx BCC - currently seeing Dermatology  We did review previous blood work, cholesterol back in February was excellent 126 with HDL 60, LDL 53  TSH was fine 1 year ago at 2 34, vitamin-D was 29       Immunization History   Administered Date(s) Administered   • COVID-19 MODERNA VACC 0 5 ML IM 01/21/2021, 02/18/2021   • COVID-19 PFIZER VACCINE 0 3 ML IM 11/03/2021   • COVID-19 Pfizer Vac BIVALENT Omar-sucrose 12 Yr+ IM (BOOSTER ONLY) 09/12/2022   • COVID-19 Pfizer vac (Omar-sucrose, gray cap) 12 yr+ IM 04/10/2022   • INFLUENZA 10/09/2017, 10/23/2019, 09/28/2020, 10/06/2021   • Influenza Split High Dose Preservative Free IM 10/27/2016, 09/29/2018, 10/23/2019   • Influenza, seasonal, injectable 10/29/2007, 01/12/2009   • Pneumococcal Conjugate 13-Valent 11/22/2019   • Pneumococcal Conjugate PCV 7 09/16/2014   • Pneumococcal Polysaccharide PPV23 09/01/2006, 09/16/2014   • TD (adult) Preservative Free 09/01/2006   • Tdap 09/01/2006   • Zoster 01/01/2012   • Zoster Vaccine Recombinant 01/08/2013, 12/10/2021, 03/19/2022   • influenza, trivalent, adjuvanted 09/28/2020       Discussed Vaccines,    Pneumococcal vax is up to date  He does do yearly Flu shot  He will do that at pharmacy in roughly 1 week, held off today as he does have cold symptoms  Tdap/tetanus shot will be done at a future date  -is due, he plans to do at pharmacy (done every 10 yrs for superficial cuts, every 5 yrs for deep wounds)  Received Shingrix  COVID vaccine up-to-date      Was never a smoker    He does have a  "LIVING WILL"     Glaucoma screening is up-to-date  Chief Complaint     Chief Complaint   Patient presents with   • Medicare Wellness Visit    f/up visit plus cold sx x 48 hrs    History of Present Illness     Marlena Parada is a 81y o -year-old male who in for a routine follow-up, since I saw him 6 months ago he has been doing well, walks routinely anywhere from 2000 to 43419 steps  Has some imbalance but has not fallen  Just return from trip with family to [facility] - other family members had respiratory infections, he started 48 hours ago with congestion, no fever, minimal cough  No shortness of breath  He has had no chest pain, no increased palpitations, he has not seen Cardiology recently, previously was planning on going for 2nd opinion  Held off on loop recorder as his symptoms had improved  Decided not to restart aspirin, is on Plavix, Crestor, also uses his metoprolol succinate 12 5 mg in the morning  Review of Systems     Review of Systems   Constitutional: Negative for activity change (walks 2 to 12k steps daily), appetite change, fatigue, fever and unexpected weight change  HENT: Positive for congestion  Negative for ear pain, sinus pressure, sore throat and trouble swallowing  Respiratory: Positive for cough (minimal)  Negative for chest tightness, shortness of breath and wheezing  Cardiovascular: Negative for chest pain, palpitations and leg swelling  Gastrointestinal: Negative for abdominal pain, blood in stool, nausea and vomiting  No acid reflux     No change in bowel   Genitourinary: Negative for dysuria and hematuria  Neurological: Negative for dizziness (none recently-  still issues w balance but no falls), syncope and headaches  Psychiatric/Behavioral: Negative for behavioral problems and confusion         Active Problem List     Patient Active Problem List   Diagnosis   • Actinic keratosis   • Arteriosclerotic coronary artery disease   • Basal cell carcinoma of skin   • BPH with obstruction/lower urinary tract symptoms   • Benign essential hypertension   • Hyperlipidemia   • History of heart artery stent   • Primary osteoarthritis of both hands   • Dizziness and giddiness   • Heart palpitations   • 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[facility])       Past Medical History:  Past Medical History:   Diagnosis Date   • Basal cell carcinoma    • Cancer ([facility] )    • NSTEMI (non-ST elevated myocardial infarction) ([place] [address] ) 2014   • Pneumonia     Last Assessed:  10/7/14   • Polymyalgia rheumatica ([facility] )     Last Assessed:  11/23/12   • Polymyalgia rheumatica ([facility] ) 2011   • Squamous cell skin cancer 05/24/2022    left frontal scalp anterior   • Squamous cell skin cancer 05/24/2022    Vertex of scalp       Past Surgical History:  Past Surgical History:   Procedure Laterality Date   • APPENDECTOMY     • CORONARY ANGIOPLASTY WITH STENT PLACEMENT     • HERNIA REPAIR  2000   • MOHS SURGERY  07/27/2022    left frontal scalp and vertex of scalp       Family History:  Family History   Problem Relation Age of Onset   • Pulmonary embolism Father    • Other Father         Transurethral resection of prostate   • Stroke Sister        Social History:  Social History     Tobacco Use   • Smoking status: Never Smoker   • Smokeless tobacco: Never Used   Substance Use Topics   • Alcohol use: Not Currently     Comment: Social       Objective     Vitals:    10/20/22 0933   BP: 108/60   BP Location: Left arm   Patient Position: Sitting   Cuff Size: Large   Pulse: 82   Temp: 97 7 °F (36 5 °C)   SpO2: 94%   Weight: 80 7 kg (178 lb)   Height: 5' 8" (1 727 m)     Body mass index is 27 06 kg/m²  BP Readings from Last 3 Encounters:   10/20/22 108/60   07/27/22 130/80   04/19/22 116/68       Wt Readings from Last 3 Encounters:   10/20/22 80 7 kg (178 lb)   08/08/22 79 8 kg (176 lb)   07/27/22 81 6 kg (180 lb)       Physical Exam  Constitutional:       General: He is not in acute distress  Appearance: Normal appearance  He is well-developed  He is not ill-appearing  Comments: He looks well other than he had sneezing after COVID test   Sounds a bit congested but has no respiratory distress, oxygenating at 94% immediately after walking in   HENT:      Right Ear: Tympanic membrane normal       Left Ear: Tympanic membrane normal       Mouth/Throat:      Pharynx: Oropharynx is clear  Eyes:      General: No scleral icterus  Cardiovascular:      Rate and Rhythm: Normal rate and regular rhythm  Heart sounds: Normal heart sounds  Pulmonary:      Effort: Pulmonary effort is normal  No respiratory distress  Breath sounds: Normal breath sounds  No wheezing, rhonchi or rales  Abdominal:      Palpations: Abdomen is soft  Tenderness: There is no abdominal tenderness  Musculoskeletal:      Comments: Only trace pitting edema at ankles   Lymphadenopathy:      Cervical: No cervical adenopathy  Skin:     Coloration: Skin is not jaundiced  Comments: Multiple scars on scalp from Dermatology treatments   Neurological:      Mental Status: He is alert  Mental status is at baseline     Psychiatric:         Mood and Affect: Mood normal          Behavior: Behavior normal          ALLERGIES:  Allergies   Allergen Reactions   • Iv Dye [Iodinated Diagnostic Agents] Rash     Possible delayed rash February 2022       Current Medications     Current Outpatient Medications   Medication Sig Dispense Refill   • Calcium Citrate-Vitamin D 315-250 MG-UNIT TABS Take 1 tablet by mouth daily     • carboxymethylcellulose (REFRESH PLUS) 0 5 % SOLN 1 drop 2 (two) times a day as needed for dry eyes     • clopidogrel (PLAVIX) 75 mg tablet Take 1 tablet (75 mg total) by mouth daily 90 tablet 3   • Coenzyme Q10 100 MG TABS Take 1 tablet by mouth daily     • metoprolol succinate (TOPROL-XL) 25 mg 24 hr tablet Take 0 5 tablets (12 5 mg total) by mouth daily 45 tablet 3   • Misc Natural Products (OSTEO BI-FLEX ADV TRIPLE ST PO) Take by mouth     • OMEGA-3 FATTY ACIDS-VITAMIN E PO Take 1 capsule by mouth daily     • rosuvastatin (CRESTOR) 10 MG tablet Take 1 tablet (10 mg total) by mouth daily 90 tablet 3     No current facility-administered medications for this visit           Health Maintenance     See other note today re clinical AWV info             Most recent labs available from [address] [place] [address]   ( others may be available in Care Everywhere / Media sections)  Lab Results   Component Value Date    WBC 7 70 02/17/2022    HGB 13 6 02/17/2022    HCT 40 9 02/17/2022     02/17/2022    TRIG 65 02/15/2022    HDL 60 02/15/2022    ALT 23 02/14/2022    AST 22 02/14/2022     06/30/2015    K 4 2 02/17/2022     02/17/2022    CREATININE 0 85 02/17/2022    BUN 21 02/17/2022    CO2 27 02/17/2022    INR 0 98 02/14/2022    GLUF 99 11/01/2021    HGBA1C 6 2 (H) 02/15/2022       Orders Placed This Encounter   Procedures   • CBC   • Comprehensive metabolic panel   • Hemoglobin A1C   • POCT Rapid Covid Ag             Gerilyn Hammans

## 2022-11-02 ENCOUNTER — APPOINTMENT (OUTPATIENT)
Dept: LAB | Facility: MEDICAL CENTER | Age: 87
End: 2022-11-02

## 2022-11-02 LAB
ALBUMIN SERPL BCP-MCNC: 3.4 G/DL (ref 3.5–5)
ALP SERPL-CCNC: 65 U/L (ref 46–116)
ALT SERPL W P-5'-P-CCNC: 29 U/L (ref 12–78)
ANION GAP SERPL CALCULATED.3IONS-SCNC: 2 MMOL/L (ref 4–13)
AST SERPL W P-5'-P-CCNC: 18 U/L (ref 5–45)
BILIRUB SERPL-MCNC: 0.51 MG/DL (ref 0.2–1)
BUN SERPL-MCNC: 22 MG/DL (ref 5–25)
CALCIUM ALBUM COR SERPL-MCNC: 9.5 MG/DL (ref 8.3–10.1)
CALCIUM SERPL-MCNC: 9 MG/DL (ref 8.3–10.1)
CHLORIDE SERPL-SCNC: 108 MMOL/L (ref 96–108)
CO2 SERPL-SCNC: 27 MMOL/L (ref 21–32)
CREAT SERPL-MCNC: 0.98 MG/DL (ref 0.6–1.3)
ERYTHROCYTE [DISTWIDTH] IN BLOOD BY AUTOMATED COUNT: 14.7 % (ref 11.6–15.1)
EST. AVERAGE GLUCOSE BLD GHB EST-MCNC: 126 MG/DL
GFR SERPL CREATININE-BSD FRML MDRD: 69 ML/MIN/1.73SQ M
GLUCOSE P FAST SERPL-MCNC: 104 MG/DL (ref 65–99)
HBA1C MFR BLD: 6 %
HCT VFR BLD AUTO: 42.7 % (ref 36.5–49.3)
HGB BLD-MCNC: 13.6 G/DL (ref 12–17)
MCH RBC QN AUTO: 27.4 PG (ref 26.8–34.3)
MCHC RBC AUTO-ENTMCNC: 31.9 G/DL (ref 31.4–37.4)
MCV RBC AUTO: 86 FL (ref 82–98)
PLATELET # BLD AUTO: 386 THOUSANDS/UL (ref 149–390)
PMV BLD AUTO: 10.1 FL (ref 8.9–12.7)
POTASSIUM SERPL-SCNC: 5 MMOL/L (ref 3.5–5.3)
PROT SERPL-MCNC: 7.4 G/DL (ref 6.4–8.4)
RBC # BLD AUTO: 4.97 MILLION/UL (ref 3.88–5.62)
SODIUM SERPL-SCNC: 137 MMOL/L (ref 135–147)
WBC # BLD AUTO: 6.17 THOUSAND/UL (ref 4.31–10.16)

## 2022-11-03 ENCOUNTER — OFFICE VISIT (OUTPATIENT)
Dept: FAMILY MEDICINE CLINIC | Facility: CLINIC | Age: 87
End: 2022-11-03

## 2022-11-03 VITALS
DIASTOLIC BLOOD PRESSURE: 76 MMHG | SYSTOLIC BLOOD PRESSURE: 110 MMHG | HEART RATE: 66 BPM | BODY MASS INDEX: 26.37 KG/M2 | HEIGHT: 68 IN | WEIGHT: 174 LBS | OXYGEN SATURATION: 95 % | TEMPERATURE: 97.8 F

## 2022-11-03 DIAGNOSIS — Z98.890 HISTORY OF INGUINAL HERNIA REPAIR, BILATERAL: ICD-10-CM

## 2022-11-03 DIAGNOSIS — Z87.19 HISTORY OF INGUINAL HERNIA REPAIR, BILATERAL: ICD-10-CM

## 2022-11-03 DIAGNOSIS — R10.31 INGUINAL PAIN, RIGHT: Primary | ICD-10-CM

## 2022-11-03 NOTE — PATIENT INSTRUCTIONS
Reviewed blood work done this week, CBC was fine with hemoglobin 13 6, WBC 6 17  CMP was unremarkable other than borderline glucose at 104 which is fine  A1c was acceptable at 6  He has noted right inguinal pain with lifting recently, past history inguinal repair on right around 2000, left around 2004 outside of area  No hernia palpable on exam, has some tenderness right indirect area with very slight prominence  Abdominal exam is totally benign  He will observe, appears to have inguinal strain, avoid lifting  Call if worsens  Otherwise treatment as discussed at recent annual wellness

## 2022-11-03 NOTE — PROGRESS NOTES
FAMILY PRACTICE OFFICE VISIT  [facility] [address] [facility]  [address]  [address] [place], [place], [address]      NAME: Giovani Candelario  AGE: 81 y o  SEX: male  : 1935   MRN: [id number]    DATE: 11/3/2022  TIME: 12:17 PM    Assessment and Plan     Problem List Items Addressed This Visit    None     Visit Diagnoses     Inguinal pain, right    -  Primary    History of inguinal hernia repair, bilateral              Patient Instructions   Reviewed blood work done this week, CBC was fine with hemoglobin 13 6, WBC 6 17  CMP was unremarkable other than borderline glucose at 104 which is fine  A1c was acceptable at 6  He has noted right inguinal pain with lifting recently, past history inguinal repair on right around , left around  outside of area  No hernia palpable on exam, has some tenderness right indirect area with very slight prominence  Abdominal exam is totally benign  He will observe, appears to have inguinal strain, avoid lifting  Call if worsens  Otherwise treatment as discussed at recent annual wellness  Chief Complaint     Chief Complaint   Patient presents with   • Groin Pain     Right groin pain for over 1 week         History of Present Illness   Giovani Candelario is a 81y o -year-old male who is in to check right inguinal pain, has noted it especially with lifting cases of water, is concerned he had bilateral hernia repair many years ago  Does not note a lump, has no change in bowel or bladder  Otherwise is feeling okay      Review of Systems   Review of Systems   Constitutional:        No fevers or chills, no new chest pain, increased shortness of breath, previous respiratory illness resolved  No change in bowel, bladder         Active Problem List     Patient Active Problem List   Diagnosis   • Actinic keratosis   • Arteriosclerotic coronary artery disease   • Basal cell carcinoma of skin   • BPH with obstruction/lower urinary tract symptoms   • Benign essential hypertension   • Hyperlipidemia   • History of heart artery stent   • Primary osteoarthritis of both hands   • Dizziness and giddiness   • Heart palpitations   • 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[facility])       Past Medical History:  Reviewed    Past Surgical History:  Reviewed    Family History:  Reviewed    Social History:  Reviewed    Objective     Vitals:    11/03/22 0802   BP: 110/76   BP Location: Left arm   Patient Position: Sitting   Cuff Size: Large   Pulse: 66   Temp: 97 8 °F (36 6 °C)   SpO2: 95%   Weight: 78 9 kg (174 lb)   Height: 5' 8" (1 727 m)     Body mass index is 26 46 kg/m²  BP Readings from Last 3 Encounters:   11/03/22 110/76   10/20/22 108/60   07/27/22 130/80       Wt Readings from Last 3 Encounters:   11/03/22 78 9 kg (174 lb)   10/20/22 80 7 kg (178 lb)   08/08/22 79 8 kg (176 lb)       Physical Exam  Constitutional:       Comments: He looks well    Abdomen is soft, nontender, does have mild discomfort right inguinal without palpable hernia, no testicular tenderness, no inguinal swollen glands,         ALLERGIES:  Allergies   Allergen Reactions   • Iv Dye [Iodinated Diagnostic Agents] Rash     Possible delayed rash February 2022       Current Medications     Current Outpatient Medications   Medication Sig Dispense Refill   • Calcium Citrate-Vitamin D 315-250 MG-UNIT TABS Take 1 tablet by mouth daily     • carboxymethylcellulose (REFRESH PLUS) 0 5 % SOLN 1 drop 2 (two) times a day as needed for dry eyes     • clopidogrel (PLAVIX) 75 mg tablet Take 1 tablet (75 mg total) by mouth daily 90 tablet 3   • Coenzyme Q10 100 MG TABS Take 1 tablet by mouth daily     • metoprolol succinate (TOPROL-XL) 25 mg 24 hr tablet Take 0 5 tablets (12 5 mg total) by mouth daily 45 tablet 3   • Misc Natural Products (OSTEO BI-FLEX ADV TRIPLE ST PO) Take by mouth     • OMEGA-3 FATTY ACIDS-VITAMIN E PO Take 1 capsule by mouth daily     • rosuvastatin (CRESTOR) 10 MG tablet Take 1 tablet (10 mg total) by mouth daily 90 tablet 3     No current facility-administered medications for this visit  No orders of the defined types were placed in this encounter          Bhupendra iff

## 2022-11-23 ENCOUNTER — NURSE TRIAGE (OUTPATIENT)
Dept: OTHER | Facility: OTHER | Age: 87
End: 2022-11-23

## 2022-11-23 DIAGNOSIS — U07.1 COVID: Primary | ICD-10-CM

## 2022-11-24 ENCOUNTER — NURSE TRIAGE (OUTPATIENT)
Dept: OTHER | Facility: OTHER | Age: 87
End: 2022-11-24

## 2022-11-24 DIAGNOSIS — U07.1 COVID: ICD-10-CM

## 2022-11-24 NOTE — TELEPHONE ENCOUNTER
Patient called back has tested positve for COVID, will start on molnupravir for COVID (drug interactions with Paxlovid advised going to the ED if worsening, shortness of breath or chest pains

## 2022-11-24 NOTE — TELEPHONE ENCOUNTER
Communication consent verified   Patient tested positive for covid  Temp is 102 7   SOB on exertion only  TT sent to on call  On call will reach out to patient directly  Reason for Disposition  • HIGH RISK for severe COVID complications (e g , weak immune system, age > 59 years, obesity with BMI > 22, pregnant, chronic lung disease or other chronic medical condition)  (Exception: Already seen by PCP and no new or worsening symptoms )    Answer Assessment - Initial Assessment Questions  1  COVID-19 DIAGNOSIS: "Who made your COVID-19 diagnosis?" "Was it confirmed by a positive lab test or self-test?" If not diagnosed by a doctor (or NP/PA), ask "Are there lots of cases (community spread) where you live?" Note: See public health department website, if unsure  Covid positive fever 102 7   SOB when goes up steps denies chest pain  Patient calls in for antiviral treatment   On BP meds for hx of stroke  Reaching out for  Plavix 75 mg OD     2  COVID-19 EXPOSURE: "Was there any known exposure to COVID before the symptoms began?" CDC Definition of close contact: within 6 feet (2 meters) for a total of 15 minutes or more over a 24-hour period  Yes covid positive     3  ONSET: "When did the COVID-19 symptoms start?"        Yesterday 11/22/2022    4  WORST SYMPTOM: "What is your worst symptom?" (e g , cough, fever, shortness of breath, muscle aches)        Fever body aches     5  COUGH: "Do you have a cough?" If Yes, ask: "How bad is the cough?"          Yes     6  FEVER: "Do you have a fever?" If Yes, ask: "What is your temperature, how was it measured, and when did it start?"     Yes     7  RESPIRATORY STATUS: "Describe your breathing?" (e g , shortness of breath, wheezing, unable to speak)         SOB on exertion    8  BETTER-SAME-WORSE: "Are you getting better, staying the same or getting worse compared to yesterday?"  If getting worse, ask, "In what way?"     Worse    9   HIGH RISK DISEASE: "Do you have any chronic medical problems?" (e g , asthma, heart or lung disease, weak immune system, obesity, etc )       Stent for heart hx of stroke     10  VACCINE: "Have you had the COVID-19 vaccine?" If Yes, ask: "Which one, how many shots, when did you get it?"       Yes x 2    11  BOOSTER: "Have you received your COVID-19 booster?" If Yes, ask: "Which one and when did you get it?"        Yes x 2     13   OTHER SYMPTOMS: "Do you have any other symptoms?"  (e g , chills, fatigue, headache, loss of smell or taste, muscle pain, sore throat)          Chills fatigue muscle pain    Protocols used: CORONAVIRUS (COVID-19) DIAGNOSED OR SUSPECTED-ADULT-

## 2022-11-24 NOTE — TELEPHONE ENCOUNTER
Regarding:  tested positive for covid fever of 100 6  ----- Message from Mayra Moon sent at 11/23/2022  6:51 PM EST -----  '' My  tested positive for covid he have a fever of 100 6 looking for medical advice  ''

## 2022-11-24 NOTE — TELEPHONE ENCOUNTER
Regarding: covid positive/ medication problem  ----- Message from Henri Flores sent at 11/24/2022  1:18 PM EST -----  " I called yesterday about my  having covid and something was supposed to be sent to the pharmacy for him but they are telling me they don't have anything and they close in an hour "

## 2022-11-25 NOTE — TELEPHONE ENCOUNTER
Call attempt x1 to pharmacy to confirm if Rx was rec'd  Closed for lunch  Staff to continue to monitor task

## 2023-03-02 DIAGNOSIS — I63.9 CEREBELLAR STROKE (HCC): ICD-10-CM

## 2023-03-02 RX ORDER — CLOPIDOGREL BISULFATE 75 MG/1
TABLET ORAL
Qty: 90 TABLET | Refills: 3 | Status: SHIPPED | OUTPATIENT
Start: 2023-03-02

## 2023-04-19 PROBLEM — I67.2 CEREBRAL ARTERIOSCLEROSIS WITH HISTORY OF PREVIOUS STROKE: Status: ACTIVE | Noted: 2022-02-16

## 2023-04-19 PROBLEM — Z86.73 CEREBRAL ARTERIOSCLEROSIS WITH HISTORY OF PREVIOUS STROKE: Status: ACTIVE | Noted: 2022-02-16

## 2023-04-20 PROBLEM — R26.89 BALANCE PROBLEMS: Status: ACTIVE | Noted: 2022-02-14

## 2023-06-02 ENCOUNTER — APPOINTMENT (OUTPATIENT)
Dept: LAB | Facility: MEDICAL CENTER | Age: 88
End: 2023-06-02
Payer: MEDICARE

## 2023-08-07 ENCOUNTER — TELEPHONE (OUTPATIENT)
Age: 88
End: 2023-08-07

## 2023-10-17 ENCOUNTER — RA CDI HCC (OUTPATIENT)
Dept: OTHER | Facility: HOSPITAL | Age: 88
End: 2023-10-17

## 2023-10-22 PROBLEM — H02.413 MECHANICAL PTOSIS OF EYELID OF BOTH EYES: Status: ACTIVE | Noted: 2023-06-01

## 2023-10-22 PROBLEM — H40.032 NARROW ANGLE OF ANTERIOR CHAMBER OF LEFT EYE: Status: ACTIVE | Noted: 2023-06-01

## 2023-10-22 NOTE — PATIENT INSTRUCTIONS
Reviewed health history along with medications. Overall medically stable here today, blood pressure is fine, saw cardiology back in March, sees them yearly, continue with medication as is. We did review previous blood work, back in June CBC, CMP were fine, BUNs/creatinine 28/0.95 with potassium 4.4, cholesterol was 127 with HDL 61, LDL 50. Glucose was fine at 93. Plan redo blood work at follow-up  Last TSH 2021 was 2.34. A1c last year was 6.0.    2022 had CTA head neck, MRI, echocardiogram along with stress test.    He does plan to call [facility] to set up reevaluation regarding knee pain, plans to do therapy which can also help with balance, remains active at home, does try to exercise, has not fallen. Immunization History   Administered Date(s) Administered    COVID-19 MODERNA VACC 0.5 ML IM 01/21/2021, 02/18/2021    COVID-19 PFIZER VACCINE 0.3 ML IM 11/03/2021    COVID-19 Pfizer Vac BIVALENT Omar-sucrose 12 Yr+ IM 09/12/2022    COVID-19 Pfizer vac (Omar-sucrose, gray cap) 12 yr+ IM 04/10/2022    INFLUENZA 10/27/2016, 10/09/2017, 10/23/2019, 09/28/2020, 10/06/2021, 11/02/2022    Influenza Split High Dose Preservative Free IM 10/27/2016, 09/29/2018, 10/23/2019    Influenza, seasonal, injectable 10/29/2007, 01/12/2009    Pneumococcal Conjugate 13-Valent 11/22/2019    Pneumococcal Conjugate PCV 7 09/16/2014    Pneumococcal Polysaccharide PPV23 09/01/2006, 09/16/2014    TD (adult) Preservative Free 09/01/2006    Tdap 09/01/2006    Zoster 01/01/2012    Zoster Vaccine Recombinant 01/08/2013, 12/10/2021, 03/19/2022    influenza, trivalent, adjuvanted 09/28/2020     Discussed Vaccines,    Pneumococcal vax is up to date  He does do yearly Flu shot. Did this month  Tdap/tetanus shot will be done at a future date / can do at pharmacy (done every 10 yrs for superficial cuts, every 5 yrs for deep wounds)  Prev rcvd 'shingles' shot, Shingrix,  at pharmacy.   Covid vaccine booster was just done    Nonsmoker     Regarding Colon Cancer screening,    Not indicated     PSA blood test not indicated    We discussed end of life planning, does have a  "LIVING WILL"     Glaucoma screening is up-to-date    Call Derm re lesion left neck, actinic keratosis hx - sees prn    Continue to try to walk for exercise, watch healthy diet. We will see him again in 6 months, sooner as needed. Advance Directives   What are advance directives? Advance directives are legal documents that state your wishes and plans for medical care. These plans are made ahead of time in case you lose your ability to make decisions for yourself. Advance directives can apply to any medical decision, such as the treatments you want, and if you want to donate organs. What are the types of advance directives? There are many types of advance directives, and each state has rules about how to use them. You may choose a combination of any of the following:  Living will: This is a written record of the treatment you want. You can also choose which treatments you do not want, which to limit, and which to stop at a certain time. This includes surgery, medicine, IV fluid, and tube feedings. Durable power of  for healthcare [facility]): This is a written record that states who you want to make healthcare choices for you when you are unable to make them for yourself. This person, called a proxy, is usually a family member or a friend. You may choose more than 1 proxy. Do not resuscitate (DNR) order:  A DNR order is used in case your heart stops beating or you stop breathing. It is a request not to have certain forms of treatment, such as CPR. A DNR order may be included in other types of advance directives. Medical directive: This covers the care that you want if you are in a coma, near death, or unable to make decisions for yourself. You can list the treatments you want for each condition.  Treatment may include pain medicine, surgery, blood transfusions, dialysis, IV or tube feedings, and a ventilator (breathing machine). Values history: This document has questions about your views, beliefs, and how you feel and think about life. This information can help others choose the care that you would choose. Why are advance directives important? An advance directive helps you control your care. Although spoken wishes may be used, it is better to have your wishes written down. Spoken wishes can be misunderstood, or not followed. Treatments may be given even if you do not want them. An advance directive may make it easier for your family to make difficult choices about your care.

## 2023-10-23 ENCOUNTER — OFFICE VISIT (OUTPATIENT)
Dept: FAMILY MEDICINE CLINIC | Facility: CLINIC | Age: 88
End: 2023-10-23
Payer: MEDICARE

## 2023-10-23 VITALS
DIASTOLIC BLOOD PRESSURE: 72 MMHG | BODY MASS INDEX: 27.34 KG/M2 | HEIGHT: 68 IN | OXYGEN SATURATION: 97 % | WEIGHT: 180.4 LBS | SYSTOLIC BLOOD PRESSURE: 110 MMHG | HEART RATE: 67 BPM | TEMPERATURE: 97.4 F

## 2023-10-23 DIAGNOSIS — I10 BENIGN ESSENTIAL HYPERTENSION: ICD-10-CM

## 2023-10-23 DIAGNOSIS — I67.2 CEREBRAL ARTERIOSCLEROSIS WITH HISTORY OF PREVIOUS STROKE: ICD-10-CM

## 2023-10-23 DIAGNOSIS — N13.8 BPH WITH OBSTRUCTION/LOWER URINARY TRACT SYMPTOMS: ICD-10-CM

## 2023-10-23 DIAGNOSIS — Z86.73 CEREBRAL ARTERIOSCLEROSIS WITH HISTORY OF PREVIOUS STROKE: ICD-10-CM

## 2023-10-23 DIAGNOSIS — N40.1 BPH WITH OBSTRUCTION/LOWER URINARY TRACT SYMPTOMS: ICD-10-CM

## 2023-10-23 DIAGNOSIS — I25.10 ARTERIOSCLEROTIC CORONARY ARTERY DISEASE: ICD-10-CM

## 2023-10-23 DIAGNOSIS — M19.90 ARTHRITIS: ICD-10-CM

## 2023-10-23 DIAGNOSIS — R26.89 BALANCE PROBLEMS: ICD-10-CM

## 2023-10-23 DIAGNOSIS — E78.5 HYPERLIPIDEMIA, UNSPECIFIED HYPERLIPIDEMIA TYPE: ICD-10-CM

## 2023-10-23 DIAGNOSIS — Z00.00 MEDICARE ANNUAL WELLNESS VISIT, SUBSEQUENT: Primary | ICD-10-CM

## 2023-10-23 PROCEDURE — 99213 OFFICE O/P EST LOW 20 MIN: CPT | Performed by: FAMILY MEDICINE

## 2023-10-23 PROCEDURE — G0439 PPPS, SUBSEQ VISIT: HCPCS | Performed by: FAMILY MEDICINE

## 2023-10-23 RX ORDER — ACETAMINOPHEN 325 MG/1
650 TABLET ORAL
COMMUNITY

## 2023-10-23 NOTE — PROGRESS NOTES
Depression Screening and Follow-up Plan: Patient was screened for depression during today's encounter. They screened negative with a PHQ-2 score of 0. Rob CELINA Selam [facility]  [address]  [address]  [place], [place], [address] MEDICARE SUBSEQUENT VISIT NOTE ( part 1 )      NAME: Ankur Pedraza  AGE: 81 y.o. SEX: male  : 1935   MRN: [id number]    DATE: 10/23/2023  TIME: 12:13 PM    Assessment and Plan     Problem List Items Addressed This Visit       Arteriosclerotic coronary artery disease    BPH with obstruction/lower urinary tract symptoms    Benign essential hypertension    Hyperlipidemia    Balance problems     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     Other Visit Diagnoses       Medicare annual wellness visit, subsequent    -  Primary    Arthritis                Medicare Wellness Counseling/ Discussion    Patient Instructions   Reviewed health history along with medications. Overall medically stable here today, blood pressure is fine, saw cardiology back in March, sees them yearly, continue with medication as is. We did review previous blood work, back in  CBC, CMP were fine, BUNs/creatinine 28/0.95 with potassium 4.4, cholesterol was 127 with HDL 61, LDL 50. Glucose was fine at 93. Plan redo blood work at follow-up  Last TSH  was 2.34. A1c last year was 6.0.     had CTA head neck, MRI, echocardiogram along with stress test.    He does plan to call [facility] to set up reevaluation regarding knee pain, plans to do therapy which can also help with balance, remains active at home, does try to exercise, has not fallen.     Immunization History   Administered Date(s) Administered    COVID-19 MODERNA VACC 0.5 ML IM 2021, 2021    COVID-19 PFIZER VACCINE 0.3 ML IM 2021    COVID-19 Pfizer Vac BIVALENT Omar-sucrose 12 Yr+ IM 2022    COVID-19 Pfizer vac (Omar-sucrose, gray cap) 12 yr+ IM 04/10/2022    INFLUENZA 10/27/2016, 10/09/2017, 10/23/2019, 09/28/2020, 10/06/2021, 11/02/2022    Influenza Split High Dose Preservative Free IM 10/27/2016, 09/29/2018, 10/23/2019    Influenza, seasonal, injectable 10/29/2007, 01/12/2009    Pneumococcal Conjugate 13-Valent 11/22/2019    Pneumococcal Conjugate PCV 7 09/16/2014    Pneumococcal Polysaccharide PPV23 09/01/2006, 09/16/2014    TD (adult) Preservative Free 09/01/2006    Tdap 09/01/2006    Zoster 01/01/2012    Zoster Vaccine Recombinant 01/08/2013, 12/10/2021, 03/19/2022    influenza, trivalent, adjuvanted 09/28/2020     Discussed Vaccines,    Pneumococcal vax is up to date  He does do yearly Flu shot. Did this month  Tdap/tetanus shot will be done at a future date / can do at pharmacy (done every 10 yrs for superficial cuts, every 5 yrs for deep wounds)  Prev rcvd 'shingles' shot, Shingrix,  at pharmacy. Covid vaccine booster was just done    Nonsmoker     Regarding Colon Cancer screening,    Not indicated     PSA blood test not indicated    We discussed end of life planning, does have a  "LIVING WILL"     Glaucoma screening is up-to-date    Call Derm re lesion left neck, actinic keratosis hx - sees prn    Continue to try to walk for exercise, watch healthy diet. We will see him again in 6 months, sooner as needed. Advance Directives   What are advance directives? Advance directives are legal documents that state your wishes and plans for medical care. These plans are made ahead of time in case you lose your ability to make decisions for yourself. Advance directives can apply to any medical decision, such as the treatments you want, and if you want to donate organs. What are the types of advance directives? There are many types of advance directives, and each state has rules about how to use them. You may choose a combination of any of the following:  Living will: This is a written record of the treatment you want.  You can also choose which treatments you do not want, which to limit, and which to stop at a certain time. This includes surgery, medicine, IV fluid, and tube feedings. Durable power of  for [facility]): This is a written record that states who you want to make healthcare choices for you when you are unable to make them for yourself. This person, called a proxy, is usually a family member or a friend. You may choose more than 1 proxy. Do not resuscitate (DNR) order:  A DNR order is used in case your heart stops beating or you stop breathing. It is a request not to have certain forms of treatment, such as CPR. A DNR order may be included in other types of advance directives. Medical directive: This covers the care that you want if you are in a coma, near death, or unable to make decisions for yourself. You can list the treatments you want for each condition. Treatment may include pain medicine, surgery, blood transfusions, dialysis, IV or tube feedings, and a ventilator (breathing machine). Values history: This document has questions about your views, beliefs, and how you feel and think about life. This information can help others choose the care that you would choose. Why are advance directives important? An advance directive helps you control your care. Although spoken wishes may be used, it is better to have your wishes written down. Spoken wishes can be misunderstood, or not followed. Treatments may be given even if you do not want them. An advance directive may make it easier for your family to make difficult choices about your care. Chief Complaint     Chief Complaint   Patient presents with    Follow-up    Medicare Wellness Visit       History of Present Illness     Ankur Pedraza is a 81y.o.-year-old male who is in today accompanied by his wife for a routine follow-up.   Overall he has been doing okay other than ongoing pains in knees, does remain active, tries to exercise at home, last saw orthopedist earlier this year, considering seeing them again to do therapy, possible further injections. Has had no falls. Using medication as directed, no new stroke symptoms, does have some forgetfulness. Review of Systems     Review of Systems   Constitutional:  Negative for appetite change, fatigue (No worse than baseline, appropriate for age), fever and unexpected weight change. HENT:  Negative for sore throat and trouble swallowing. Respiratory:  Negative for cough, chest tightness, shortness of breath and wheezing. Cardiovascular:  Negative for chest pain, palpitations and leg swelling. Gastrointestinal:  Negative for abdominal pain, blood in stool, nausea and vomiting. No acid reflux     No change in bowel   Genitourinary:  Negative for dysuria and hematuria. Musculoskeletal:  Positive for arthralgias. Neurological:  Negative for dizziness, syncope, light-headedness and headaches. Hematological:  Bruises/bleeds easily. Psychiatric/Behavioral:  Negative for behavioral problems.         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       Past Medical History:  Past Medical History:   Diagnosis Date    Basal cell carcinoma     Cancer ([address])     NSTEMI (non-ST elevated myocardial infarction) ([address]) 2014    Pneumonia     Last Assessed:  10/7/14    Polymyalgia rheumatica ([address])     Last Assessed:  11/23/12    Polymyalgia rheumatica ([address]) 2011    Squamous cell skin cancer 05/24/2022    left frontal scalp anterior    Squamous cell skin cancer 05/24/2022    Vertex of scalp       Past Surgical History:  Past Surgical History:   Procedure Laterality Date    APPENDECTOMY      CORONARY ANGIOPLASTY WITH STENT PLACEMENT      HERNIA REPAIR  2000    MOHS SURGERY  07/27/2022    left frontal scalp and vertex of scalp       Family History:  Family History   Problem Relation Age of Onset    Pulmonary embolism Father     Other Father         Transurethral resection of prostate    Stroke Sister        Social History:  Social History     Tobacco Use    Smoking status: Never    Smokeless tobacco: Never   Substance Use Topics    Alcohol use: Not Currently     Comment: Social       Objective     Vitals:    10/23/23 1043   BP: 110/72   BP Location: Left arm   Patient Position: Sitting   Cuff Size: Large   Pulse: 67   Temp: (!) 97.4 °F (36.3 °C)   TempSrc: Tympanic   SpO2: 97%   Weight: 81.8 kg (180 lb 6.4 oz)   Height: 5' 8" (1.727 m)     Body mass index is 27.43 kg/m². BP Readings from Last 3 Encounters:   10/23/23 110/72   04/20/23 108/60   11/03/22 110/76       Wt Readings from Last 3 Encounters:   10/23/23 81.8 kg (180 lb 6.4 oz)   04/20/23 81.2 kg (179 lb)   11/03/22 78.9 kg (174 lb)       Physical Exam  Constitutional:       General: He is not in acute distress. Appearance: Normal appearance. He is well-developed. He is not ill-appearing. Eyes:      General: No scleral icterus. Cardiovascular:      Rate and Rhythm: Normal rate and regular rhythm. Heart sounds: Normal heart sounds. Pulmonary:      Effort: Pulmonary effort is normal. No respiratory distress. Breath sounds: Normal breath sounds. No wheezing, rhonchi or rales. Abdominal:      Palpations: Abdomen is soft. Tenderness: There is no abdominal tenderness. Musculoskeletal:      Right lower leg: No edema. Left lower leg: No edema. Lymphadenopathy:      Cervical: No cervical adenopathy. Skin:     Coloration: Skin is not jaundiced. Comments: Keratotic raised lesion left lateral neck with slight ulceration. Multiple actinic keratoses on scalp   Neurological:      Mental Status: He is alert and oriented to person, place, and time. Mental status is at baseline. Comments: Alert, oriented x3. Was able to spell the word world quickly backwards. Serial sevens okay x2. Psychiatric:         Mood and Affect: Mood normal.         Behavior: Behavior normal.         ALLERGIES:  Allergies   Allergen Reactions    Iv Dye [Iodinated Contrast Media] Rash     Possible delayed rash February 2022       Current Medications     Current Outpatient Medications   Medication Sig Dispense Refill    acetaminophen (TYLENOL) 325 mg tablet Take 650 mg by mouth Uses 3 x a day      carboxymethylcellulose (REFRESH PLUS) 0.5 % SOLN 1 drop 2 (two) times a day as needed for dry eyes      clopidogrel (PLAVIX) 75 mg tablet TAKE 1 TABLET BY MOUTH EVERY DAY 90 tablet 3    Coenzyme Q10 100 MG TABS Take 1 tablet by mouth daily      COLLAGEN PO Take 1 Scoop by mouth in the morning      Glucosamine-Chondroitin 250-200 MG TABS daily [facility]      metoprolol succinate (TOPROL-XL) 25 mg 24 hr tablet Take 0.5 tablets (12.5 mg total) by mouth daily 45 tablet 3    OMEGA-3 FATTY ACIDS-VITAMIN E PO Take 1 capsule by mouth daily      rosuvastatin (CRESTOR) 10 MG tablet Take 1 tablet (10 mg total) by mouth daily 90 tablet 3    Turmeric 400 MG CAPS Take 1,500 mg by mouth daily      Calcium Citrate-Vitamin D 315-250 MG-UNIT TABS Take 1 tablet by mouth daily (Patient not taking: Reported on 10/23/2023)       No current facility-administered medications for this visit.          Health Maintenance     See other note today re clinical AWV info             Most recent labs available from [address]   ( others may be available in Care Everywhere / Media sections)  Lab Results   Component Value Date    WBC 5.33 06/02/2023    HGB 13.5 06/02/2023    HCT 40.0 06/02/2023  06/02/2023    TRIG 79 06/02/2023    HDL 61 06/02/2023    ALT 23 06/02/2023    AST 23 06/02/2023     06/30/2015    K 4.4 06/02/2023     06/02/2023    CREATININE 0.95 06/02/2023    BUN 28 (H) 06/02/2023    CO2 26 06/02/2023    INR 0.98 02/14/2022    GLUF 93 06/02/2023    HGBA1C 6.0 (H) 11/02/2022       No orders of the defined types were placed in this encounter.             Eyad Contreras DO

## 2023-10-26 ENCOUNTER — APPOINTMENT (EMERGENCY)
Dept: CT IMAGING | Facility: HOSPITAL | Age: 88
DRG: 853 | End: 2023-10-26
Payer: MEDICARE

## 2023-10-26 ENCOUNTER — HOSPITAL ENCOUNTER (INPATIENT)
Facility: HOSPITAL | Age: 88
LOS: 6 days | Discharge: HOME HEALTH | DRG: 853 | End: 2023-11-01
Attending: EMERGENCY MEDICINE | Admitting: INTERNAL MEDICINE
Payer: MEDICARE

## 2023-10-26 DIAGNOSIS — R78.81 POSITIVE BLOOD CULTURE: ICD-10-CM

## 2023-10-26 DIAGNOSIS — R65.21 SEPTIC SHOCK (HCC): ICD-10-CM

## 2023-10-26 DIAGNOSIS — A41.9 SEPTIC SHOCK (HCC): ICD-10-CM

## 2023-10-26 DIAGNOSIS — R31.9 HEMATURIA: ICD-10-CM

## 2023-10-26 DIAGNOSIS — N39.0 SEPSIS DUE TO URINARY TRACT INFECTION: ICD-10-CM

## 2023-10-26 DIAGNOSIS — N20.0 LEFT NEPHROLITHIASIS: ICD-10-CM

## 2023-10-26 DIAGNOSIS — A41.9 SEPSIS DUE TO URINARY TRACT INFECTION: ICD-10-CM

## 2023-10-26 DIAGNOSIS — R42 VERTIGO: Primary | ICD-10-CM

## 2023-10-26 LAB
ALBUMIN SERPL BCP-MCNC: 3.9 G/DL (ref 3.5–5)
ALP SERPL-CCNC: 61 U/L (ref 34–104)
ALT SERPL W P-5'-P-CCNC: 17 U/L (ref 7–52)
ANION GAP SERPL CALCULATED.3IONS-SCNC: 12 MMOL/L
APTT PPP: 24 SECONDS (ref 23–37)
AST SERPL W P-5'-P-CCNC: 20 U/L (ref 13–39)
ATRIAL RATE: 65 BPM
ATRIAL RATE: 80 BPM
BASOPHILS # BLD AUTO: 0.04 THOUSANDS/ÂΜL (ref 0–0.1)
BASOPHILS NFR BLD AUTO: 1 % (ref 0–1)
BILIRUB SERPL-MCNC: 0.89 MG/DL (ref 0.2–1)
BILIRUB UR QL STRIP: NEGATIVE
BUN SERPL-MCNC: 31 MG/DL (ref 5–25)
CALCIUM SERPL-MCNC: 8.9 MG/DL (ref 8.4–10.2)
CARDIAC TROPONIN I PNL SERPL HS: 7 NG/L
CHLORIDE SERPL-SCNC: 105 MMOL/L (ref 96–108)
CLARITY UR: ABNORMAL
CO2 SERPL-SCNC: 19 MMOL/L (ref 21–32)
COLOR UR: ABNORMAL
CREAT SERPL-MCNC: 0.85 MG/DL (ref 0.6–1.3)
EOSINOPHIL # BLD AUTO: 0.08 THOUSAND/ÂΜL (ref 0–0.61)
EOSINOPHIL NFR BLD AUTO: 1 % (ref 0–6)
ERYTHROCYTE [DISTWIDTH] IN BLOOD BY AUTOMATED COUNT: 14.3 % (ref 11.6–15.1)
GFR SERPL CREATININE-BSD FRML MDRD: 78 ML/MIN/1.73SQ M
GLUCOSE SERPL-MCNC: 157 MG/DL (ref 65–140)
GLUCOSE SERPL-MCNC: 176 MG/DL (ref 65–140)
GLUCOSE UR STRIP-MCNC: NEGATIVE MG/DL
HCT VFR BLD AUTO: 41.8 % (ref 36.5–49.3)
HGB BLD-MCNC: 13.7 G/DL (ref 12–17)
HGB UR QL STRIP.AUTO: ABNORMAL
IMM GRANULOCYTES # BLD AUTO: 0.02 THOUSAND/UL (ref 0–0.2)
IMM GRANULOCYTES NFR BLD AUTO: 0 % (ref 0–2)
INR PPP: 0.96 (ref 0.84–1.19)
KETONES UR STRIP-MCNC: ABNORMAL MG/DL
LEUKOCYTE ESTERASE UR QL STRIP: ABNORMAL
LYMPHOCYTES # BLD AUTO: 1.71 THOUSANDS/ÂΜL (ref 0.6–4.47)
LYMPHOCYTES NFR BLD AUTO: 26 % (ref 14–44)
MCH RBC QN AUTO: 27 PG (ref 26.8–34.3)
MCHC RBC AUTO-ENTMCNC: 32.8 G/DL (ref 31.4–37.4)
MCV RBC AUTO: 82 FL (ref 82–98)
MONOCYTES # BLD AUTO: 0.59 THOUSAND/ÂΜL (ref 0.17–1.22)
MONOCYTES NFR BLD AUTO: 9 % (ref 4–12)
NEUTROPHILS # BLD AUTO: 4.14 THOUSANDS/ÂΜL (ref 1.85–7.62)
NEUTS SEG NFR BLD AUTO: 63 % (ref 43–75)
NITRITE UR QL STRIP: POSITIVE
NRBC BLD AUTO-RTO: 0 /100 WBCS
P AXIS: 38 DEGREES
P AXIS: 64 DEGREES
PH UR STRIP.AUTO: 6.5 [PH]
PLATELET # BLD AUTO: 278 THOUSANDS/UL (ref 149–390)
PMV BLD AUTO: 9.9 FL (ref 8.9–12.7)
POTASSIUM SERPL-SCNC: 3.9 MMOL/L (ref 3.5–5.3)
PR INTERVAL: 166 MS
PR INTERVAL: 200 MS
PROT SERPL-MCNC: 6.9 G/DL (ref 6.4–8.4)
PROT UR STRIP-MCNC: ABNORMAL MG/DL
PROTHROMBIN TIME: 12.8 SECONDS (ref 11.6–14.5)
QRS AXIS: -22 DEGREES
QRS AXIS: 60 DEGREES
QRSD INTERVAL: 100 MS
QRSD INTERVAL: 114 MS
QT INTERVAL: 440 MS
QT INTERVAL: 476 MS
QTC INTERVAL: 495 MS
QTC INTERVAL: 507 MS
RBC # BLD AUTO: 5.08 MILLION/UL (ref 3.88–5.62)
SODIUM SERPL-SCNC: 136 MMOL/L (ref 135–147)
SP GR UR STRIP.AUTO: 1.03 (ref 1–1.03)
T WAVE AXIS: -55 DEGREES
T WAVE AXIS: 24 DEGREES
UROBILINOGEN UR STRIP-ACNC: <2 MG/DL
VENTRICULAR RATE: 65 BPM
VENTRICULAR RATE: 80 BPM
WBC # BLD AUTO: 6.58 THOUSAND/UL (ref 4.31–10.16)

## 2023-10-26 PROCEDURE — 99285 EMERGENCY DEPT VISIT HI MDM: CPT

## 2023-10-26 PROCEDURE — 85610 PROTHROMBIN TIME: CPT | Performed by: EMERGENCY MEDICINE

## 2023-10-26 PROCEDURE — G1004 CDSM NDSC: HCPCS

## 2023-10-26 PROCEDURE — 70496 CT ANGIOGRAPHY HEAD: CPT

## 2023-10-26 PROCEDURE — 96375 TX/PRO/DX INJ NEW DRUG ADDON: CPT

## 2023-10-26 PROCEDURE — 93010 ELECTROCARDIOGRAM REPORT: CPT | Performed by: INTERNAL MEDICINE

## 2023-10-26 PROCEDURE — 99223 1ST HOSP IP/OBS HIGH 75: CPT | Performed by: INTERNAL MEDICINE

## 2023-10-26 PROCEDURE — 85730 THROMBOPLASTIN TIME PARTIAL: CPT | Performed by: EMERGENCY MEDICINE

## 2023-10-26 PROCEDURE — 81001 URINALYSIS AUTO W/SCOPE: CPT | Performed by: NURSE PRACTITIONER

## 2023-10-26 PROCEDURE — 87186 SC STD MICRODIL/AGAR DIL: CPT | Performed by: NURSE PRACTITIONER

## 2023-10-26 PROCEDURE — 84484 ASSAY OF TROPONIN QUANT: CPT | Performed by: EMERGENCY MEDICINE

## 2023-10-26 PROCEDURE — 80053 COMPREHEN METABOLIC PANEL: CPT | Performed by: EMERGENCY MEDICINE

## 2023-10-26 PROCEDURE — 99215 OFFICE O/P EST HI 40 MIN: CPT | Performed by: PSYCHIATRY & NEUROLOGY

## 2023-10-26 PROCEDURE — 99285 EMERGENCY DEPT VISIT HI MDM: CPT | Performed by: EMERGENCY MEDICINE

## 2023-10-26 PROCEDURE — 82948 REAGENT STRIP/BLOOD GLUCOSE: CPT

## 2023-10-26 PROCEDURE — 36415 COLL VENOUS BLD VENIPUNCTURE: CPT | Performed by: EMERGENCY MEDICINE

## 2023-10-26 PROCEDURE — 87086 URINE CULTURE/COLONY COUNT: CPT | Performed by: NURSE PRACTITIONER

## 2023-10-26 PROCEDURE — 93005 ELECTROCARDIOGRAM TRACING: CPT

## 2023-10-26 PROCEDURE — 85025 COMPLETE CBC W/AUTO DIFF WBC: CPT | Performed by: EMERGENCY MEDICINE

## 2023-10-26 PROCEDURE — 96374 THER/PROPH/DIAG INJ IV PUSH: CPT

## 2023-10-26 PROCEDURE — 70498 CT ANGIOGRAPHY NECK: CPT

## 2023-10-26 RX ORDER — MECLIZINE HCL 12.5 MG/1
25 TABLET ORAL ONCE
Status: DISCONTINUED | OUTPATIENT
Start: 2023-10-26 | End: 2023-10-27

## 2023-10-26 RX ORDER — ATORVASTATIN CALCIUM 40 MG/1
40 TABLET, FILM COATED ORAL EVERY EVENING
Status: DISCONTINUED | OUTPATIENT
Start: 2023-10-26 | End: 2023-11-01 | Stop reason: HOSPADM

## 2023-10-26 RX ORDER — MECLIZINE HCL 12.5 MG/1
25 TABLET ORAL EVERY 8 HOURS SCHEDULED
Status: DISCONTINUED | OUTPATIENT
Start: 2023-10-26 | End: 2023-10-27

## 2023-10-26 RX ORDER — ENOXAPARIN SODIUM 100 MG/ML
40 INJECTION SUBCUTANEOUS DAILY
Status: DISCONTINUED | OUTPATIENT
Start: 2023-10-27 | End: 2023-11-01 | Stop reason: HOSPADM

## 2023-10-26 RX ORDER — DIAZEPAM 5 MG/ML
2.5 INJECTION, SOLUTION INTRAMUSCULAR; INTRAVENOUS ONCE
Status: COMPLETED | OUTPATIENT
Start: 2023-10-26 | End: 2023-10-26

## 2023-10-26 RX ORDER — CLOPIDOGREL BISULFATE 75 MG/1
75 TABLET ORAL DAILY
Status: DISCONTINUED | OUTPATIENT
Start: 2023-10-27 | End: 2023-11-01 | Stop reason: HOSPADM

## 2023-10-26 RX ORDER — CLOPIDOGREL BISULFATE 75 MG/1
75 TABLET ORAL ONCE
Status: DISCONTINUED | OUTPATIENT
Start: 2023-10-26 | End: 2023-10-27

## 2023-10-26 RX ORDER — ONDANSETRON 2 MG/ML
1 INJECTION INTRAMUSCULAR; INTRAVENOUS ONCE
Status: COMPLETED | OUTPATIENT
Start: 2023-10-26 | End: 2023-10-26

## 2023-10-26 RX ORDER — ACETAMINOPHEN 325 MG/1
650 TABLET ORAL EVERY 6 HOURS PRN
Status: DISCONTINUED | OUTPATIENT
Start: 2023-10-26 | End: 2023-10-27

## 2023-10-26 RX ORDER — ONDANSETRON 2 MG/ML
4 INJECTION INTRAMUSCULAR; INTRAVENOUS EVERY 6 HOURS PRN
Status: DISCONTINUED | OUTPATIENT
Start: 2023-10-26 | End: 2023-11-01 | Stop reason: HOSPADM

## 2023-10-26 RX ORDER — MAGNESIUM HYDROXIDE/ALUMINUM HYDROXICE/SIMETHICONE 120; 1200; 1200 MG/30ML; MG/30ML; MG/30ML
30 SUSPENSION ORAL EVERY 6 HOURS PRN
Status: DISCONTINUED | OUTPATIENT
Start: 2023-10-26 | End: 2023-11-01 | Stop reason: HOSPADM

## 2023-10-26 RX ORDER — METOPROLOL SUCCINATE 25 MG/1
12.5 TABLET, EXTENDED RELEASE ORAL DAILY
Status: DISCONTINUED | OUTPATIENT
Start: 2023-10-27 | End: 2023-10-27

## 2023-10-26 RX ORDER — DIPHENHYDRAMINE HYDROCHLORIDE 50 MG/ML
25 INJECTION INTRAMUSCULAR; INTRAVENOUS ONCE
Status: COMPLETED | OUTPATIENT
Start: 2023-10-26 | End: 2023-10-26

## 2023-10-26 RX ADMIN — DIAZEPAM 2.5 MG: 5 INJECTION INTRAMUSCULAR; INTRAVENOUS at 12:51

## 2023-10-26 RX ADMIN — DIPHENHYDRAMINE HYDROCHLORIDE 25 MG: 50 INJECTION, SOLUTION INTRAMUSCULAR; INTRAVENOUS at 11:01

## 2023-10-26 RX ADMIN — IOHEXOL 100 ML: 350 INJECTION, SOLUTION INTRAVENOUS at 11:15

## 2023-10-26 RX ADMIN — ATORVASTATIN CALCIUM 40 MG: 40 TABLET, FILM COATED ORAL at 18:03

## 2023-10-26 RX ADMIN — MECLIZINE 25 MG: 12.5 TABLET ORAL at 21:25

## 2023-10-26 RX ADMIN — GLYCERIN 2 DROP: .002; .002; .01 SOLUTION/ DROPS OPHTHALMIC at 20:34

## 2023-10-26 NOTE — ASSESSMENT & PLAN NOTE
Patient presents with acute onset vertigo  MRI in 2022 discerning for possible stroke versus artifact in the cerebellum. CTA unremarkable at that time. CTA and admission without significant stenosis  NIH stroke scale of 0 on admission  Admit via stroke pathway  Appreciate neurology recommendations-MRI brain with out contrast, MRI cervical spine without contrast, echocardiogram, telemetry  Continue Plavix and atorvastatin  Symptomatic management with scheduled meclizine.   Will avoid Valium as patient was sedated following dose in ED.  PT/OT

## 2023-10-26 NOTE — H&P
[facility]  H&P  Name: Augustina Valladares 81 y.o. male I MRN: [id number]  Unit/Bed#: ED-10 I Date of Admission: 10/26/2023   Date of Service: 10/26/2023 I Hospital Day: 0      Assessment/Plan   * Vertigo  Assessment & Plan  Patient presents with acute onset vertigo  MRI in 2022 discerning for possible stroke versus artifact in the cerebellum. CTA unremarkable at that time. CTA and admission without significant stenosis  NIH stroke scale of 0 on admission  Admit via stroke pathway  Appreciate neurology recommendations-MRI brain with out contrast, MRI cervical spine without contrast, echocardiogram, telemetry  Continue Plavix and atorvastatin  Symptomatic management with scheduled meclizine. Will avoid Valium as patient was sedated following dose in ED.  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 Justification for Hospital Stay: Stroke workup, ambulatory dysfunction     Total Time for Visit, including Counseling / Coordination of Care: I have spent a total time of 60 minutes on 10/26/23 in caring for this patient including Diagnostic results, Prognosis, Risks and benefits of tx options, Instructions for management, Patient and family education, Importance of tx compliance, Impressions, Documenting in the medical record, Reviewing / ordering tests, medicine, procedures  , and Communicating with other healthcare professionals .     Chief Complaint:   Vertigo     History of Present Illness:    Sourav Corado is a 81 y.o. male with a PMH of HLD, vertigo, and cerebral arteriosclerosis with a cerebellar stroke history, presenting to the ED via ambulance with altered mental status since this morning. The patient states that this morning he woke up to use the bathroom around 6:30 and felt unwell. He went back to bed and woke up 2 hours later nauseous and could not stop dry heaving. Alicia Swanson notes that it is abnormal for him to sleep in that long, but denies any abnormal awakenings throughout the night. He states that he is feeling confused and dizzy. The patient's wife notes that he was feeling well yesterday, but did a lot more labor around the house and was likely dehydrated. He reports having cold intolerance and a loss in appetite. He denies any shortness of breath, chest pain, palpitations, fever, chills, and numbness. He denies any myalgias, headaches, loss of consciousness, changes in his vision and changes in his hearing. He denies any sick contacts, recent travel, or recent illnesses. He has been taking his medications as prescribed and denies any recent trauma. He denies any tobacco or alcohol use. He is allergic to iodinated contrast dye which gives him nausea. Review of Systems:    Review of Systems   Constitutional:  Negative for chills and fever. HENT:  Negative for sore throat and trouble swallowing. Eyes:  Negative for photophobia and visual disturbance. Respiratory:  Negative for shortness of breath and wheezing. Cardiovascular:  Negative for chest pain and palpitations. Gastrointestinal:  Positive for nausea. Negative for constipation, diarrhea and vomiting. Genitourinary:  Negative for difficulty urinating and dysuria. Musculoskeletal:  Negative for arthralgias and myalgias. Skin:  Negative for rash and wound. Neurological:  Positive for dizziness. Negative for light-headedness and headaches.        Past Medical and Surgical History:     Past Medical History:   Diagnosis Date    Basal cell carcinoma     Cancer ([address] [facility])     NSTEMI (non-ST elevated myocardial infarction) ([address]) 2014    Pneumonia     Last Assessed:  10/7/14    Polymyalgia rheumatica ([address])     Last Assessed:  11/23/12    Polymyalgia rheumatica ([address]) 2011    Squamous cell skin cancer 05/24/2022    left frontal scalp anterior    Squamous cell skin cancer 05/24/2022    Vertex of scalp       Past Surgical History:   Procedure Laterality Date    APPENDECTOMY      CORONARY ANGIOPLASTY WITH [address] [facility] [address] [facility] [address]    MOHS SURGERY  07/27/2022    left frontal scalp and vertex of scalp       Meds/Allergies:    Prior to Admission medications    Medication Sig Start Date End Date Taking? Authorizing Provider   acetaminophen (TYLENOL) 325 mg tablet Take 650 mg by mouth Uses 3 x a day    Historical Provider, MD   Calcium Citrate-Vitamin D 315-250 MG-UNIT TABS Take 1 tablet by mouth daily  Patient not taking: Reported on 10/23/2023    Historical Provider, MD   carboxymethylcellulose (REFRESH PLUS) 0.5 % SOLN 1 drop 2 (two) times a day as needed for dry eyes    Historical Provider, MD   clopidogrel (PLAVIX) 75 mg tablet TAKE 1 TABLET BY MOUTH EVERY DAY 3/2/23   Kelechi Pizano DO   Coenzyme Q10 100 MG TABS Take 1 tablet by mouth daily    Historical Provider, MD   COLLAGEN PO Take 1 Scoop by mouth in the morning    Historical Provider, MD   Glucosamine-Chondroitin 250-200 MG TABS daily Cosamine joint health    Historical Provider, MD   metoprolol succinate (TOPROL-XL) 25 mg 24 hr tablet Take 0.5 tablets (12.5 mg total) by mouth daily 10/20/22   Kelechi Pizano DO   OMEGA-3 FATTY ACIDS-VITAMIN E PO Take 1 capsule by mouth daily    Historical Provider, MD   rosuvastatin (CRESTOR) 10 MG tablet Take 1 tablet (10 mg total) by mouth daily 10/20/22   Kelechi Pizano DO   Turmeric 400 MG CAPS Take 1,500 mg by mouth daily    Historical Provider, MD       Allergies:    Allergies   Allergen Reactions    Iv Dye [Iodinated Contrast Media] Rash     Possible delayed rash February 2022       Social History:     Marital Status: /Civil Union   Substance Use History:   Social History     Substance and Sexual Activity   Alcohol Use Not Currently    Comment: Social     Social History     Tobacco Use   Smoking Status Never   Smokeless Tobacco Never     Social History     Substance and Sexual Activity   Drug Use Never       Family History:    Pertinent family history reviewed    Physical Exam:     Vitals:   Blood Pressure: 132/75 (10/26/23 1330)  Pulse: 62 (10/26/23 1330)  Temperature: (!) 97 °F (36.1 °C) (10/26/23 1045)  Temp Source: Axillary (10/26/23 1045)  Respirations: 22 (10/26/23 1330)  SpO2: 94 % (10/26/23 1330)    Physical Exam  Vitals reviewed. Constitutional:       General: He is not in acute distress. Appearance: He is well-developed. He is not ill-appearing, toxic-appearing or diaphoretic. HENT:      Head: Normocephalic and atraumatic. Mouth/Throat:      Mouth: Mucous membranes are moist.   Eyes:      General: No scleral icterus. Extraocular Movements: Extraocular movements intact. Cardiovascular:      Rate and Rhythm: Normal rate and regular rhythm. Heart sounds: Normal heart sounds. Pulmonary:      Effort: Pulmonary effort is normal. No respiratory distress. Breath sounds: Normal breath sounds. No wheezing or rales. Abdominal:      General: There is no distension. Palpations: Abdomen is soft. Tenderness: There is no abdominal tenderness. There is no guarding or rebound. Musculoskeletal:         General: No swelling, tenderness or deformity. Skin:     General: Skin is warm and dry. Neurological:      General: No focal deficit present. Mental Status: He is alert. Mental status is at baseline. Psychiatric:         Mood and Affect: Mood normal.         Behavior: Behavior normal.         Thought Content:  Thought content normal.         Judgment: Judgment normal. Additional Data:     Lab Results: I have reviewed pertinent results     Results from last 7 days   Lab Units 10/26/23  1040   WBC Thousand/uL 6.58   HEMOGLOBIN g/dL 13.7   HEMATOCRIT % 41.8   PLATELETS Thousands/uL 278   NEUTROS PCT % 63   LYMPHS PCT % 26   MONOS PCT % 9   EOS PCT % 1     Results from last 7 days   Lab Units 10/26/23  1040   SODIUM mmol/L 136   POTASSIUM mmol/L 3.9   CHLORIDE mmol/L 105   CO2 mmol/L 19*   BUN mg/dL 31*   CREATININE mg/dL 0.85   ANION GAP mmol/L 12   CALCIUM mg/dL 8.9   ALBUMIN g/dL 3.9   TOTAL BILIRUBIN mg/dL 0.89   ALK PHOS U/L 61   ALT U/L 17   AST U/L 20   GLUCOSE RANDOM mg/dL 176*     Results from last 7 days   Lab Units 10/26/23  1040   INR  0.96     Results from last 7 days   Lab Units 10/26/23  1034   POC GLUCOSE mg/dl 157*               Imaging: I have reviewed pertinent imaging     CTA head and neck with and without contrast   Final Result by Ariel Arriola MD (10/26 1255)      No acute intracranial abnormality. Mild cerebral chronic microangiopathic changes. No cervical or intracranial large vessel occlusion. No hemodynamically significant stenosis at the cervical carotid bifurcations. Advanced multifocal cervical spondylosis with severe C3-4 and moderate additional multilevel spinal stenosis. Multifocal severe foraminal stenosis. Follow-up MR imaging of the cervical spine is recommended to better assess the soft tissue contents of the    spinal canal.      The study was marked in EPIC for significant notification. Workstation performed: [id number]             EKG, Pathology, and Other Studies Reviewed on Admission:   EKG: NSR    Allscripts / Epic Records Reviewed    ** Please Note: This note has been constructed using a voice recognition system.  **

## 2023-10-26 NOTE — ED PROVIDER NOTES
History  Chief Complaint   Patient presents with    Altered Mental Status     Patient reports waking up and getting out of bed at 0730 this morning. Pt was with wife this morning and felt fine. Patient laid back down around 8 and got up at [phone]. Patient reports having problems with his memory at that time as well as feeling dizzy. Patient took a "white pill" for dizziness at this time. Zofran given by ems pta. 80 yo M, onset of vertigo 9:00am trying to get out of bed, normal at 7am when he got up to use the bathroom. Whenever he tries to sit up he becomes nauseated and starts to have wretching/ dry heaving, and he prefers to keep his eyes closed. When he did try to stand, he felt off balance. He denies headache, chest pain. 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 Prior to Admission Medications   Prescriptions Last Dose Informant Patient Reported? Taking?    COLLAGEN PO   Yes No   Sig: Take 1 Scoop by mouth in the morning   Calcium Citrate-Vitamin D 315-250 MG-UNIT TABS  Self Yes No   Sig: Take 1 tablet by mouth daily   Patient not taking: Reported on 10/23/2023   Coenzyme Q10 100 MG TABS  Self Yes No   Sig: Take 1 tablet by mouth daily   Glucosamine-Chondroitin 250-200 MG TABS  Self Yes No   Sig: daily Cosamine joint health   OMEGA-3 FATTY ACIDS-VITAMIN E PO  Self Yes No   Sig: Take 1 capsule by mouth daily   Turmeric 400 MG CAPS  Self Yes No   Sig: Take 1,500 mg by mouth daily   acetaminophen (TYLENOL) 325 mg tablet   Yes No   Sig: Take 650 mg by mouth Uses 3 x a day   carboxymethylcellulose (REFRESH PLUS) 0.5 % SOLN  Self Yes No   Si drop 2 (two) times a day as needed for dry eyes   clopidogrel (PLAVIX) 75 mg tablet  Self No No   Sig: TAKE 1 TABLET BY MOUTH EVERY DAY   metoprolol succinate (TOPROL-XL) 25 mg 24 hr tablet  Self No No   Sig: Take 0.5 tablets (12.5 mg total) by mouth daily   rosuvastatin (CRESTOR) 10 MG tablet  Self No No   Sig: Take 1 tablet (10 mg total) by mouth daily      Facility-Administered Medications: None       Past Medical History:   Diagnosis Date    Basal cell carcinoma     Cancer (HCC)     NSTEMI (non-ST elevated myocardial infarction) ([address]) 2014    Pneumonia     Last Assessed:  10/7/14    Polymyalgia rheumatica ([address])     Last Assessed:  11/23/12    Polymyalgia rheumatica ([address]) 2011    Squamous cell skin cancer 05/24/2022    left frontal scalp anterior    Squamous cell skin cancer 05/24/2022    Vertex of scalp       Past Surgical History:   Procedure Laterality Date    APPENDECTOMY      CORONARY ANGIOPLASTY WITH STENT PLACEMENT      HERNIA REPAIR  2000    MOHS SURGERY  07/27/2022    left frontal scalp and vertex of scalp       Family History   Problem Relation Age of Onset    Pulmonary embolism Father     Other Father         Transurethral resection of prostate    Stroke Sister      I have reviewed and agree with the history as documented. E-Cigarette/Vaping    E-Cigarette Use Never User      E-Cigarette/Vaping Substances    Nicotine No     THC No     CBD No     Flavoring No     Other No     Unknown No      Social History     Tobacco Use    Smoking status: Never    Smokeless tobacco: Never   Vaping Use    Vaping Use: Never used   Substance Use Topics    Alcohol use: Not Currently     Comment: Social    Drug use: Never       Review of Systems    Physical Exam  Physical Exam  Vitals and nursing note reviewed. Constitutional:       Appearance: He is well-developed. Comments: Elderly appearing c/w recorded age   HENT:      Head: Normocephalic and atraumatic. Right Ear: External ear normal.      Left Ear: External ear normal.      Nose: Nose normal.      Mouth/Throat:      Mouth: Mucous membranes are moist.   Eyes:      Conjunctiva/sclera: Conjunctivae normal.      Pupils: Pupils are equal, round, and reactive to light. Cardiovascular:      Rate and Rhythm: Normal rate.    Pulmonary: Effort: Pulmonary effort is normal.   Abdominal:      Tenderness: There is no abdominal tenderness. Musculoskeletal:         General: Normal range of motion. Cervical back: Normal range of motion and neck supple. Skin:     General: Skin is warm and dry. Capillary Refill: Capillary refill takes less than 2 seconds. Neurological:      Mental Status: He is alert and oriented to person, place, and time. Cranial Nerves: No cranial nerve deficit, dysarthria or facial asymmetry. Motor: No weakness, abnormal muscle tone or pronator drift. Coordination: Coordination normal.      Comments: Finger to nose intact, no lateralizing weakness upper nor lower extremity. I was not able to stand him up due to severe vertigo. Psychiatric:         Mood and Affect: Mood normal.         Speech: Speech normal.         Behavior: Behavior normal.         Thought Content: Thought content normal.         Cognition and Memory: Cognition is not impaired.          Judgment: Judgment normal.         Vital Signs  ED Triage Vitals   Temperature Pulse Respirations Blood Pressure SpO2   10/26/23 1045 10/26/23 1035 10/26/23 1035 10/26/23 1035 10/26/23 1035   (!) 97 °F (36.1 °C) 75 18 118/64 98 %      Temp Source Heart Rate Source Patient Position - Orthostatic VS BP Location FiO2 (%)   10/26/23 1045 10/26/23 1035 10/26/23 1035 10/26/23 1035 --   Axillary Monitor Lying Right arm       Pain Score       10/26/23 1559       No Pain           Vitals:    10/27/23 1545 10/27/23 1600 10/27/23 1615 10/27/23 1630   BP: (!) 77/53 102/90 (!) 84/54 93/55   Pulse: 72 70 72 64   Patient Position - Orthostatic VS:             Visual Acuity  Visual Acuity      Flowsheet Row Most Recent Value   L Pupil Size (mm) 3   R Pupil Size (mm) 3   L Pupil Shape Round   R Pupil Shape Round            ED Medications  Medications   glycerin-hypromellose- (ARTIFICIAL TEARS) ophthalmic solution 2 drop (2 drops Both Eyes Given 10/27/23 1273) ondansetron (ZOFRAN) injection 4 mg (has no administration in time range)   aluminum-magnesium hydroxide-simethicone (MAALOX) oral suspension 30 mL (has no administration in time range)   atorvastatin (LIPITOR) tablet 40 mg (40 mg Oral Given 10/26/23 1803)   clopidogrel (PLAVIX) tablet 75 mg (75 mg Oral Given 10/27/23 0927)   enoxaparin (LOVENOX) subcutaneous injection 40 mg (40 mg Subcutaneous Given 10/27/23 0927)   meclizine (ANTIVERT) tablet 25 mg ( Oral MAR Unhold 10/27/23 0631)   NOREPINEPHRINE 4 MG  ML NSS (CMPD ORDER) infusion (4 mcg/min Intravenous Rate/Dose Change 10/27/23 1558)   cefTRIAXone (ROCEPHIN) IVPB (premix in dextrose) 2,000 mg 50 mL (has no administration in time range)   chlorhexidine (PERIDEX) 0.12 % oral rinse 15 mL (15 mL Mouth/Throat Given 10/27/23 0927)   multi-electrolyte (PLASMALYTE-A/ISOLYTE-S PH 7.4) IV solution (100 mL/hr Intravenous New Bag 10/27/23 0859)   midodrine (PROAMATINE) tablet 5 mg (5 mg Oral Given 10/27/23 1614)   acetaminophen (TYLENOL) tablet 975 mg (has no administration in time range)   ondansetron (FOR EMS ONLY) (ZOFRAN) 4 mg/2 mL injection 4 mg (0 mg Does not apply Given to EMS 10/26/23 1040)   diphenhydrAMINE (BENADRYL) injection 25 mg (25 mg Intravenous Given 10/26/23 1101)   iohexol (OMNIPAQUE) 350 MG/ML injection (SINGLE-DOSE) 100 mL (100 mL Intravenous Given 10/26/23 1115)   diazepam (VALIUM) injection 2.5 mg (2.5 mg Intravenous Given 10/26/23 1251)   sodium chloride 0.9 % bolus 1,000 mL (0 mL Intravenous Stopped 10/27/23 0330)     Followed by   sodium chloride 0.9 % bolus 1,000 mL (0 mL Intravenous Stopped 10/27/23 0400)     Followed by   sodium chloride 0.9 % bolus 1,000 mL (0 mL Intravenous Stopped 10/27/23 0719)   cefTRIAXone (ROCEPHIN) IVPB (premix in dextrose) 2,000 mg 50 mL (0 mg Intravenous Stopped 10/27/23 0310)   ibuprofen (MOTRIN) tablet 400 mg (400 mg Oral Given 10/27/23 3268)   albumin human (FLEXBUMIN) 5 % injection 25 g (0 g Intravenous Stopped 10/27/23 0720)   multi-electrolyte (ISOLYTE-S PH 7.4) bolus 500 mL (0 mL Intravenous Stopped 10/27/23 1045)   multi-electrolyte (ISOLYTE-S PH 7.4) bolus 500 mL (0 mL Intravenous Stopped 10/27/23 1310)       Diagnostic Studies  Results Reviewed       Procedure Component Value Units Date/Time    HS Troponin 0hr (reflex protocol) [[id number]]  (Normal) Collected: 10/26/23 1040    Lab Status: Final result Specimen: Blood from Arm, Right Updated: 10/26/23 1134     hs TnI 0hr 7 ng/L     Comprehensive metabolic panel [[id number]]  (Abnormal) Collected: 10/26/23 1040    Lab Status: Final result Specimen: Blood from Arm, Right Updated: 10/26/23 1128     Sodium 136 mmol/L      Potassium 3.9 mmol/L      Chloride 105 mmol/L      CO2 19 mmol/L      ANION GAP 12 mmol/L      BUN 31 mg/dL      Creatinine 0.85 mg/dL      Glucose 176 mg/dL      Calcium 8.9 mg/dL      AST 20 U/L      ALT 17 U/L      Alkaline Phosphatase 61 U/L      Total Protein 6.9 g/dL      Albumin 3.9 g/dL      Total Bilirubin 0.89 mg/dL      eGFR 78 ml/min/1.73sq m     Narrative:      [place] guidelines for Chronic Kidney Disease (CKD):     Stage 1 with normal or high GFR (GFR > 90 mL/min/1.73 square meters)    Stage 2 Mild CKD (GFR = 60-89 mL/min/1.73 square meters)    Stage 3A Moderate CKD (GFR = 45-59 mL/min/1.73 square meters)    Stage 3B Moderate CKD (GFR = 30-44 mL/min/1.73 square meters)    Stage 4 Severe CKD (GFR = 15-29 mL/min/1.73 square meters)    Stage 5 End Stage CKD (GFR <15 mL/min/1.73 square meters)  Note: GFR calculation is accurate only with a steady state creatinine    Protime-INR [[id number]]  (Normal) Collected: 10/26/23 1040    Lab Status: Final result Specimen: Blood from Arm, Right Updated: 10/26/23 1122     Protime 12.8 seconds      INR 0.96    APTT [[id number]]  (Normal) Collected: 10/26/23 1040    Lab Status: Final result Specimen: Blood from Arm, Right Updated: 10/26/23 1122     PTT 24 seconds     CBC and differential [[id number]] Collected: 10/26/23 1040    Lab Status: Final result Specimen: Blood from Arm, Right Updated: 10/26/23 1111     WBC 6.58 Thousand/uL      RBC 5.08 Million/uL      Hemoglobin 13.7 g/dL      Hematocrit 41.8 %      MCV 82 fL      MCH 27.0 pg      MCHC 32.8 g/dL      RDW 14.3 %      MPV 9.9 fL      Platelets 854 Thousands/uL      nRBC 0 /100 WBCs      Neutrophils Relative 63 %      Immat GRANS % 0 %      Lymphocytes Relative 26 %      Monocytes Relative 9 %      Eosinophils Relative 1 %      Basophils Relative 1 %      Neutrophils Absolute 4.14 Thousands/µL      Immature Grans Absolute 0.02 Thousand/uL      Lymphocytes Absolute 1.71 Thousands/µL      Monocytes Absolute 0.59 Thousand/µL      Eosinophils Absolute 0.08 Thousand/µL      Basophils Absolute 0.04 Thousands/µL     Fingerstick Glucose (POCT) [[id number]]  (Abnormal) Collected: 10/26/23 1034    Lab Status: Final result Updated: 10/26/23 1043     POC Glucose 157 mg/dl                    XR chest portable   Final Result by Fabiana Merrill MD (10/27 1521)      Mild left basilar atelectasis. No focal consolidation, pleural effusion, or pneumothorax. Resident: Breana Hilton, the attending radiologist, have reviewed the images and agree with the final report above. Workstation performed: [id number]         CTA head and neck with and without contrast   Final Result by Jessie Grimes MD (10/26 9589)      No acute intracranial abnormality. Mild cerebral chronic microangiopathic changes. No cervical or intracranial large vessel occlusion. No hemodynamically significant stenosis at the cervical carotid bifurcations. Advanced multifocal cervical spondylosis with severe C3-4 and moderate additional multilevel spinal stenosis. Multifocal severe foraminal stenosis.  Follow-up MR imaging of the cervical spine is recommended to better assess the soft tissue contents of the    spinal canal.      The study was marked in EPIC for significant notification. Workstation performed: [id number]         MRI Inpatient Order    (Results Pending)   US kidney and bladder    (Results Pending)              Procedures  ECG 12 Lead Documentation Only    Date/Time: 10/26/2023 10:51 AM    Performed by: Kike Miller MD  Authorized by: Kike Miller MD    Rate:     ECG rate:  80  Rhythm:     Rhythm: sinus rhythm    Ectopy:     Ectopy: none    QRS:     QRS axis:  Normal    QRS intervals:  Normal  Conduction:     Conduction: normal    ST segments:     ST segments:  Normal  T waves:     T waves: normal             ED Course  ED Course as of 10/27/23 1647   Thu Oct 26, 2023   1136 By exam, he does not have a focal deficit, only reproducible vertigo. I have sent him for urgent CT/CTA, but have opted not to call stroke alert, Consult discussed with neuro to see him in the ED.     1241 hs TnI 0hr: 7  normal   1311 He had persistent N/V, vertigo, not responding to zofran, so I considered low dose diazepam, he developed some mild sedation and respiratory depression that is responding to oxygen, which I will continue to monitor   1335 D/W neruology, admit on stroke pathway, give plavis 75 mg                  Stroke Assessment       Row Name 10/26/23 1035             NIH Stroke Scale    Interval Baseline      Level of Consciousness (1a.) 0      LOC Questions (1b.) 0      LOC Commands (1c.) 0      Best Gaze (2.) 0      Visual (3.) 0      Facial Palsy (4.) 0      Motor Arm, Left (5a.) 0      Motor Arm, Right (5b.) 0      Motor Leg, Left (6a.) 0      Motor Leg, Right (6b.) 0      Limb Ataxia (7.) 0      Sensory (8.) 0      Best Language (9.) 0      Dysarthria (10.) 0      Extinction and Inattention (11.) (Formerly Neglect) 0      Total 0                    Flowsheet Row Most Recent Value   Thrombolytic Decision Options    Thrombolytic Decision Patient not a candidate.    Patient is not a candidate options Symptoms resolved/clearly non disabling. Initial Sepsis Screening       Row Name 10/27/23 0620                Is the patient's history suggestive of a new or worsening infection? Yes (Proceed)  -        Suspected source of infection urinary tract infection  -        Indicate SIRS criteria Hyperthemia > 38.3C (100.9F) OR Hypothermia <36C (96.8F); Tachypnea > 20 resp per min; Tachycardia > 90 bpm  -MH        Are two or more of the above signs & symptoms of infection both present and new to the patient? Yes (Proceed)  -        Assess for evidence of organ dysfunction: Are any of the below criteria present within 6 hours of suspected infection and SIRS criteria that are NOT considered to be chronic conditions? --                  User Key  (r) = Recorded By, (t) = Taken By, (c) = Cosigned By      [address] [place] [address] Provider Type    [address] [place] [address] Nurse Practitioner                  Default Flowsheet Data (last 720 hours)       Sepsis Reassess       [address] Name 10/27/23 3782                   Repeat Volume Status and Tissue Perfusion Assessment Performed    Date of Reassessment: 10/27/23  [facility]        Time of Reassessment: 0621  -        Sepsis Reassessment Note: Click "NEXT" below (NOT "close") to generate sepsis reassessment note. --        Repeat Volume Status and Tissue Perfusion Assessment Performed --                  User Key  (r) = Recorded By, (t) = Taken By, (c) = Cosigned By      [address] [place] [address] Provider Type    [address] [place] [address] Nurse Practitioner                  SBIRT 20yo+      Flowsheet Row Most Recent Value   Initial Alcohol Screen: US AUDIT-C     1. How often do you have a drink containing alcohol? 0 Filed at: 10/26/2023 1045   2. How many drinks containing alcohol do you have on a typical day you are drinking? 0 Filed at: 10/26/2023 1045   3a. Male UNDER 65: How often do you have five or more drinks on one occasion? 0 Filed at: 10/26/2023 1045   3b. FEMALE Any Age, or MALE 65+:  How often do you have 4 or more drinks on one occassion? 0 Filed at: 10/26/2023 1045   Audit-C Score 0 Filed at: 10/26/2023 1045   ZORAN: How many times in the past year have you. .. Used an illegal drug or used a prescription medication for non-medical reasons? Never Filed at: 10/26/2023 1045                      Medical Decision Making  Based on my history and physical examination, I considered the following life or limb threatening conditions in my differential diagnosis:  peripheral vertigo, central vertigo, vertebral artery occlusion, ICH, TIA/CVA. Based on my clinical acumen, I will order the appropriate diagnostic testing to narrow my differential diagnosis and arrive at a final diagnosis. Amount and/or Complexity of Data Reviewed  Labs: ordered. Decision-making details documented in ED Course. Radiology: ordered. Risk  Prescription drug management. Decision regarding hospitalization. Disposition  Final diagnoses:   Vertigo     Time reflects when diagnosis was documented in both MDM as applicable and the Disposition within this note       Time User Action Codes Description Comment    10/26/2023 11:37 AM Roly PERDOMO Add [R42] Vertigo     10/27/2023  1:46 AM Win Martinez Add [R31.9] Hematuria     10/27/2023  1:46 AM Win Chill Add [A41.9,  N39.0] Sepsis due to urinary tract infection      10/27/2023 12:53 PM Laurence Live Add [A41.9,  R65.21] Septic shock [facility])           ED Disposition       ED Disposition   Admit    Condition   Stable    Date/Time   u Oct 26, 2023 1405    Comment   Case was discussed with Dr. Dutch Alas and the patient's admission status was agreed to be Admission Status: inpatient status to the service of Dr. Dutch Alas .                Follow-up Information    None         Current Discharge Medication List        CONTINUE these medications which have NOT CHANGED    Details   acetaminophen (TYLENOL) 325 mg tablet Take 650 mg by mouth Uses 3 x a day Calcium Citrate-Vitamin D 315-250 MG-UNIT TABS Take 1 tablet by mouth daily      carboxymethylcellulose (REFRESH PLUS) 0.5 % SOLN 1 drop 2 (two) times a day as needed for dry eyes      clopidogrel (PLAVIX) 75 mg tablet TAKE 1 TABLET BY MOUTH EVERY DAY  Qty: 90 tablet, Refills: 3    Associated Diagnoses: 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(HCC)      Coenzyme Q10 100 MG TABS Take 1 tablet by mouth daily      COLLAGEN PO Take 1 Scoop by mouth in the morning      Glucosamine-Chondroitin 250-200 MG TABS daily [facility] joint health      metoprolol succinate (TOPROL-XL) 25 mg 24 hr tablet Take 0.5 tablets (12.5 mg total) by mouth daily  Qty: 45 tablet, Refills: 3    Associated Diagnoses: Arteriosclerotic coronary artery disease; Benign essential hypertension      OMEGA-3 FATTY ACIDS-VITAMIN E PO Take 1 capsule by mouth daily      rosuvastatin (CRESTOR) 10 MG tablet Take 1 tablet (10 mg total) by mouth daily  Qty: 90 tablet, Refills: 3    Associated Diagnoses: Arteriosclerotic coronary artery disease; Hyperlipidemia, unspecified hyperlipidemia type      Turmeric 400 MG CAPS Take 1,500 mg by mouth daily             No discharge procedures on file.     PDMP Review       None            ED Provider  Electronically Signed by             Mohinder Fuller MD  10/27/23 6316

## 2023-10-26 NOTE — CONSULTS
Consultation - Neurology   Kevan Rai 81 y.o. 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 The patient was seen by neurology in 2022 for dizziness causing him to fall to the ground, MRI was concerning for possible stroke versus artifact in the cerebellum. CTA of the head and neck at that time showed no significant atherosclerotic disease or large vessel occlusion. The patient presents to the ED at [facility] with symptom onset of 9 AM of memory difficulties and feeling of dizziness, NIH reported to be a 0, CTA of the head and neck was completed which showed no LVO or significant stenosis. NIH is a zero, non focal neurological examination. Suspect peripheral etiology for his symptoms, however, with the patients hx of stroke with similar symptoms in the past, as well as risk factors cannot exclude ischemic event (thought less likely). - Stroke pathway  MRI brain with out contrast  MRI of cervical spine with out contrast  Echo  Telemetry, was recommended in past by cardiology to have loop recorder placed/runs of ectopy seen on zio patch in past  Lipid Panel  Hemoglobin A1c  Continue Plavix for now  Atorvastatin 40 mg  Check orthostatic blood pressure  Continue telemetry  PT/OT/ST  Frequent neuro checks. Continue to monitor and notify neurology with any changes. If work up negative ENT as out patient  - Medical management and supportive care per primary team. Correction of any metabolic or infectious disturbances. Recommendations for outpatient neurological follow up have yet to be determined.     History of Present Illness     Reason for Consult / Principal Problem:   Vertigo, reported to be reproducible    HPI: Deedee Feliz is a 81 y.o. 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 etiology), BPH, and hx of balance problems. The patient was seen by neurology in 2022, for sensation of dizziness and weakness causing him to fall to the ground. He had severe nausea and retching, and associated vertiginous symptoms. MRI was completed at that time which did show a punctate focus of restricted diffusion in the left midline cerebellum which was though to an infarct vs artifact. The patient CTA at that time showed minor atherosclerotic changes of the left bifurcation, no occlusion or vascular abnormality noted. The patient was placed on DAPT and zio patch was ordered which showed no evidence of afib. The patient followed with neurology in the office with resolution of his symptoms. He did follow with cardiology whom recommended a loop recorder, but patient did not elect to have this completed. Per record review, the patient maintains on Plavix and Crestor at home. The patient presents to the ED at [facility] on 10/26/23 with symptoms of problems with his memory and feeling of dizziness, it was reported the patient did take medication for this at that time. It was reported by the ED the patients onset was at 9 a.m. when the patient was trying to get out of bed, woke up normal at 7 a.m. NIH was reported to be a zero, with no language deficits, CN findings or lateralizing weakness or sensory loss. Secondary to presenting symptoms and hx of prior stroke, CTA was ordered by the ED. The CTA with no significant stenosis or LVO, no bleed seen or acute intracranial pathology seen. It was reported by the ED that the patients vertigo symptoms were reproducible. Neurology was asked to see and evaluate. Per wife, the patient was not feeling right this a.m., he felt off balance, spinning sensation. He was also nauseous with this as well and took medication for this. No focal findings associated with this. He reports this has happened 3x before, similar to those episodes in the past.   The patient has no sick contacts, no recent illness. They did get his covid and flu shot recently. It was reported it started at 9 a.m. but resolved, but is intermittent. It appears to be worse with movement. He was given recently a valium but he had another episode of vertigo. No problems with speech, facial droop or one sided weakness or numbness. Inpatient consult to Neurology  Consult performed by: Fidelia Sharif PA-C  Consult ordered by: Alysa Silva MD      Review of Systems  12 point ROS as per HPI, otherwise negative.      Historical Information   Past Medical History:   Diagnosis Date    Basal cell carcinoma     Cancer ([address])     NSTEMI (non-ST elevated myocardial infarction) ([address]) 2014    Pneumonia     Last Assessed:  10/7/14    Polymyalgia rheumatica ([address])     Last Assessed:  11/23/12    Polymyalgia rheumatica ([address]) 2011    Squamous cell skin cancer 05/24/2022    left frontal scalp anterior    Squamous cell skin cancer 05/24/2022    Vertex of scalp     Past Surgical History:   Procedure Laterality Date    APPENDECTOMY      CORONARY ANGIOPLASTY WITH STENT PLACEMENT      HERNIA REPAIR  2000    MOHS SURGERY  07/27/2022    left frontal scalp and vertex of scalp     Social History   Social History     Substance and Sexual Activity   Alcohol Use Not Currently    Comment: Social     Social History     Substance and Sexual Activity   Drug Use Never     E-Cigarette/Vaping    E-Cigarette Use Never User      E-Cigarette/Vaping Substances    Nicotine No     THC No     CBD No     Flavoring No     Other No     Unknown No      Social History     Tobacco Use Smoking Status Never   Smokeless Tobacco Never     Family History:   Family History   Problem Relation Age of Onset    Pulmonary embolism Father     Other Father         Transurethral resection of prostate    Stroke Sister      Review of previous medical records was completed, please see HPI. Meds/Allergies   all current active meds have been reviewed, current meds:   No current facility-administered medications for this encounter. , and PTA meds:   Prior to Admission Medications   Prescriptions Last Dose Informant Patient Reported? Taking? COLLAGEN PO   Yes No   Sig: Take 1 Scoop by mouth in the morning   Calcium Citrate-Vitamin D 315-250 MG-UNIT TABS  Self Yes No   Sig: Take 1 tablet by mouth daily   Patient not taking: Reported on 10/23/2023   Coenzyme Q10 100 MG TABS  Self Yes No   Sig: Take 1 tablet by mouth daily   Glucosamine-Chondroitin 250-200 MG TABS  Self Yes No   Sig: daily Cosamine joint health   OMEGA-3 FATTY ACIDS-VITAMIN E PO  Self Yes No   Sig: Take 1 capsule by mouth daily   Turmeric 400 MG CAPS  Self Yes No   Sig: Take 1,500 mg by mouth daily   acetaminophen (TYLENOL) 325 mg tablet   Yes No   Sig: Take 650 mg by mouth Uses 3 x a day   carboxymethylcellulose (REFRESH PLUS) 0.5 % SOLN  Self Yes No   Si drop 2 (two) times a day as needed for dry eyes   clopidogrel (PLAVIX) 75 mg tablet  Self No No   Sig: TAKE 1 TABLET BY MOUTH EVERY DAY   metoprolol succinate (TOPROL-XL) 25 mg 24 hr tablet  Self No No   Sig: Take 0.5 tablets (12.5 mg total) by mouth daily   rosuvastatin (CRESTOR) 10 MG tablet  Self No No   Sig: Take 1 tablet (10 mg total) by mouth daily      Facility-Administered Medications: None     Allergies   Allergen Reactions    Iv Dye [Iodinated Contrast Media] Rash     Possible delayed rash 2022     Objective   Vitals:Blood pressure 118/64, pulse 75, resp. rate 18, SpO2 98 %. ,There is no height or weight on file to calculate BMI.   No intake or output data in the 24 hours ending 10/26/23 1139    Invasive Devices: Invasive Devices       Peripheral Intravenous Line  Duration             Peripheral IV 10/26/23 Distal;Right;Upper;Ventral (anterior) Arm <1 day    Peripheral IV 10/26/23 Left Antecubital <1 day                  Vital Sign reviewed  Constitutional - in NAD  HEENT - NC/AT, no icterus noted, conjunctiva clear, oral mucosa free of exudate  Cardiac - rate regular  Lungs - No respiratory distress noted. Abdomen - Soft and non tender, non distended  Extremities - No edema noted  Skin - no rashes noted    Neurological  Mental status - the patient is awake alert oriented x 3, with no evidence of aphasia or dysarthria, patient is able to follow simple commands, is able to follow complex commands. No para-phasic errors noted. He is able to repeat. Cranial nerves 2 through 12 are intact, no nystagmus, conjugate, VF intact, mild decreased in right nlf no weakness. Motor - 5/5 upper extremities and lower extremities, without drift, normal tone and bulk. No drift in the upper or lowers, non focal motor examination. No tremor or fixed deficit noted  Rapid movements are equal bilaterally  Sensation - non-focal to touch  Coordination -  no ataxia noted on finger-to-nose or heel-to-shin  Reflexes are equal are 1 in the uppers and lowers  Toes are down-going bilaterally  No evidence of clonus or myoclonus or tremor. No evidence of seizure activity, observed. (Examined alongside attending physician)    NIH is a zero. Lab Results: I have personally reviewed pertinent reports.   , CBC:   Results from last 7 days   Lab Units 10/26/23  1040   WBC Thousand/uL 6.58   RBC Million/uL 5.08   HEMOGLOBIN g/dL 13.7   HEMATOCRIT % 41.8   MCV fL 82   PLATELETS Thousands/uL 278   , BMP/CMP:   Results from last 7 days   Lab Units 10/26/23  1040   SODIUM mmol/L 136   POTASSIUM mmol/L 3.9   CHLORIDE mmol/L 105   CO2 mmol/L 19*   BUN mg/dL 31*   CREATININE mg/dL 0.85   CALCIUM mg/dL 8. 9   AST U/L 20   ALT U/L 17   ALK PHOS U/L 61   EGFR ml/min/1.73sq m 78   , Vitamin B12:   , HgBA1C:   , TSH:   , Coagulation:   Results from last 7 days   Lab Units 10/26/23  1040   INR  0.96   , Lipid Profile:   , Ammonia:   , Urinalysis:       Invalid input(s): "URIBILINOGEN", Drug Screen:   , Medication Drug Levels:       Invalid input(s): "CARBAMAZEPINE", "LACOSAMIDE", "OXCARBAZEPINE"    Procedure: CTA head and neck with and without contrast    Result Date: 10/26/2023  Narrative: CTA NECK AND BRAIN WITH AND WITHOUT CONTRAST INDICATION: Vertigo, evaluate for central cause. COMPARISON:   Head CT and CT angiography of the head and neck from February 15, 2022 TECHNIQUE:  Routine CT imaging of the Brain without contrast.  Post contrast imaging was performed after administration of iodinated contrast through the neck and brain. Post contrast axial 0.625 mm images timed to opacify the arterial system. 3D rendering was performed on an independent workstation. MIP reconstructions performed. Coronal reconstructions were performed of the noncontrast portion of the brain. Radiation dose length product (DLP) for this visit:  1532 mGy-cm . This examination, like all CT scans performed in the [facility], was performed utilizing techniques to minimize radiation dose exposure, including the use of iterative reconstruction and automated exposure control. IV Contrast:  100 mL of iohexol (OMNIPAQUE) IMAGE QUALITY:   Diagnostic FINDINGS: NONCONTRAST BRAIN PARENCHYMA: No intracranial hemorrhage or cortical infarction. Mild generalized age-appropriate parenchymal volume loss. Mild intracranial carotid artery atherosclerotic calcifications. VENTRICLES AND EXTRA-AXIAL SPACES:  Normal for the patient's age. VISUALIZED ORBITS: Evidence of prior bilateral lens surgery. PARANASAL SINUSES: Normal visualized paranasal sinuses.  CERVICAL VASCULATURE AORTIC ARCH AND GREAT VESSELS:  Normal aortic arch and great vessel origins. Normal visualized subclavian vessels. RIGHT VERTEBRAL ARTERY CERVICAL SEGMENT:  Normal origin. The vessel is normal in caliber throughout the neck. LEFT VERTEBRAL ARTERY CERVICAL SEGMENT:  Normal origin. The vessel is normal in caliber throughout the neck. Tortuous loop in the proximal V1 segment of the left vertebral artery. RIGHT EXTRACRANIAL CAROTID SEGMENT:  Normal caliber common carotid artery. Normal bifurcation and cervical internal carotid artery. No stenosis or dissection. Minimal atherosclerotic calcification at the right carotid bifurcation and ICA origin without  stenosis. LEFT EXTRACRANIAL CAROTID SEGMENT: Mild atherosclerotic disease of the distal common carotid artery and proximal cervical internal carotid artery without significant stenosis compared to the more distal ICA. NASCET criteria was used to determine the degree of internal carotid artery diameter stenosis. INTRACRANIAL VASCULATURE INTERNAL CAROTID ARTERIES: Mild atherosclerotic calcification in the bilateral cavernous and proximal supraclinoid ICA segments with no greater than mild stenosis noted on the right side. No significant stenosis identified in the left ICA. The carotid termini and ophthalmic artery origins are unremarkable in appearance. ANTERIOR CIRCULATION: Mild hypoplasia of the right A1 segment. The left A1 segment is unremarkable. Patent anterior communicating artery. Proximal A2 branches are normal. MIDDLE CEREBRAL ARTERY CIRCULATION:  M1 segment and middle cerebral artery branches demonstrate normal enhancement bilaterally. DISTAL VERTEBRAL ARTERIES: The right vertebral artery is slightly smaller in caliber than the left, developmental variation. The right posterior inferior cerebellar artery branch is not well visualized. The left posterior inferior cerebellar artery branch is patent. Vertebrobasilar junction is unremarkable. BASILAR ARTERY:  Basilar artery is normal in caliber.   Normal superior cerebellar arteries. POSTERIOR CEREBRAL ARTERIES: Both posterior cerebral arteries arises from the basilar tip. Both arteries demonstrate normal enhancement. Small patent posterior communicating arteries. VENOUS STRUCTURES:  Normal. NON VASCULAR ANATOMY BONY STRUCTURES:  No acute osseous abnormality. Multilevel cervical spondylosis with moderate right C2-3 and severe right greater than left foraminal stenosis. There is severe C3-4 spinal stenosis with moderate right and severe left foraminal narrowing. Moderate C4-5 and severe bilateral foraminal stenosis. Moderate C5-C6 spinal canal stenosis is noted with moderate to severe right foraminal narrowing. Severe left C6-7 foraminal stenosis is noted. SOFT TISSUES OF THE NECK:  Unremarkable. THORACIC INLET:  Normal.     Impression: No acute intracranial abnormality. Mild cerebral chronic microangiopathic changes. No cervical or intracranial large vessel occlusion. No hemodynamically significant stenosis at the cervical carotid bifurcations. Advanced multifocal cervical spondylosis with severe C3-4 and moderate additional multilevel spinal stenosis. Multifocal severe foraminal stenosis. Follow-up MR imaging of the cervical spine is recommended to better assess the soft tissue contents of the  spinal canal. The study was marked in EPIC for significant notification. Workstation performed: [id number]       Imaging Studies: I have personally reviewed pertinent reports. EKG, Pathology, and Other Studies: I have personally reviewed pertinent reports. Code Status: Prior  Counseling / Coordination of Care  Reviewed case with neurology attending, history and physical examination, labs and imaging completed, plan of care as per attending physician. Please see attestation for further details. Examined alongside attending physician.

## 2023-10-26 NOTE — APP STUDENT NOTE
Kim Portillo is an 80 yo male with a PMH of HLD, vertigo, and cerebral arteriosclerosis with a cerebellar stroke history, presenting to the ED via ambulance with altered mental status since this morning. The patient states that this morning he woke up to use the bathroom around 6:30 and felt unwell. He went back to bed and woke up 2 hours later nauseous and could not stop dry heaving. Kim Portillo notes that it is abnormal for him to sleep in that long, but denies any abnormal awakenings throughout the night. He states that he is feeling confused and dizzy. The patient's wife notes that he was feeling well yesterday, but did a lot more labor around the house and was likely dehydrated. He reports having cold intolerance and a loss in appetite. He denies any shortness of breath, chest pain, palpitations, fever, chills, and numbness. He denies any myalgias, headaches, loss of consciousness, changes in his vision and changes in his hearing. He denies any sick contacts, recent travel, or recent illnesses. He has been taking his medications as prescribed and denies any recent trauma. He denies any tobacco or alcohol use. He is allergic to iodinated contrast dye which gives him nausea.

## 2023-10-26 NOTE — PLAN OF CARE
Problem: MOBILITY - ADULT  Goal: Maintain or return to baseline ADL function  Description: INTERVENTIONS:  -  Assess patient's ability to carry out ADLs; assess patient's baseline for ADL function and identify physical deficits which impact ability to perform ADLs (bathing, care of mouth/teeth, toileting, grooming, dressing, etc.)  - Assess/evaluate cause of self-care deficits   - Assess range of motion  - Assess patient's mobility; develop plan if impaired  - Assess patient's need for assistive devices and provide as appropriate  - Encourage maximum independence but intervene and supervise when necessary  - Involve family in performance of ADLs  - Assess for home care needs following discharge   - Consider OT consult to assist with ADL evaluation and planning for discharge  - Provide patient education as appropriate  10/26/2023 1705 by Vadim Wilson RN  Outcome: Progressing  10/26/2023 1704 by Vadim Wilson RN  Outcome: Progressing  Goal: Maintains/Returns to pre admission functional level  Description: INTERVENTIONS:  - Perform BMAT or MOVE assessment daily.   - Set and communicate daily mobility goal to care team and patient/family/caregiver. - Collaborate with rehabilitation services on mobility goals if consulted  - Perform Range of Motion  times a day. - Reposition patient every  hours.   - Dangle patient  times a day  - Stand patient  times a day  - Ambulate patient  times a day  - Out of bed to chair  times a day   - Out of bed for meals  times a day  - Out of bed for toileting  - Record patient progress and toleration of activity level   10/26/2023 1705 by Vadim Wilson RN  Outcome: Progressing  10/26/2023 1704 by Vadim Wilson RN  Outcome: Progressing

## 2023-10-26 NOTE — ED NOTES
Patient became drowsy after administration of valium. Oxygen saturation decreased to 81%. Placed on 6L via nasal cannula at this time for oxygen saturation of 95%. Dr. Moustapha Alfredo made aware.       Lauren Strong RN  10/26/23 [id number]

## 2023-10-27 ENCOUNTER — APPOINTMENT (INPATIENT)
Dept: RADIOLOGY | Facility: HOSPITAL | Age: 88
DRG: 853 | End: 2023-10-27
Payer: MEDICARE

## 2023-10-27 ENCOUNTER — APPOINTMENT (INPATIENT)
Dept: NON INVASIVE DIAGNOSTICS | Facility: HOSPITAL | Age: 88
DRG: 853 | End: 2023-10-27
Payer: MEDICARE

## 2023-10-27 ENCOUNTER — APPOINTMENT (INPATIENT)
Dept: ULTRASOUND IMAGING | Facility: HOSPITAL | Age: 88
DRG: 853 | End: 2023-10-27
Payer: MEDICARE

## 2023-10-27 PROBLEM — G92.8 TOXIC METABOLIC ENCEPHALOPATHY: Status: ACTIVE | Noted: 2023-10-27

## 2023-10-27 PROBLEM — A41.9 SEPSIS DUE TO URINARY TRACT INFECTION: Status: ACTIVE | Noted: 2023-10-27

## 2023-10-27 PROBLEM — R31.9 HEMATURIA: Status: ACTIVE | Noted: 2023-10-27

## 2023-10-27 PROBLEM — R65.21 SEPTIC SHOCK (HCC): Status: ACTIVE | Noted: 2023-10-27

## 2023-10-27 PROBLEM — J96.01 ACUTE RESPIRATORY FAILURE WITH HYPOXIA (HCC): Status: ACTIVE | Noted: 2023-10-27

## 2023-10-27 PROBLEM — N39.0 SEPSIS DUE TO URINARY TRACT INFECTION: Status: ACTIVE | Noted: 2023-10-27

## 2023-10-27 PROBLEM — N30.01 ACUTE CYSTITIS WITH HEMATURIA: Status: ACTIVE | Noted: 2023-10-27

## 2023-10-27 LAB
ANION GAP SERPL CALCULATED.3IONS-SCNC: 16 MMOL/L
AORTIC ROOT: 3.5 CM
AORTIC VALVE MEAN VELOCITY: 9.4 M/S
APICAL FOUR CHAMBER EJECTION FRACTION: 52 %
ASCENDING AORTA: 3.4 CM
ATRIAL RATE: 77 BPM
AV AREA BY CONTINUOUS VTI: 3.1 CM2
AV AREA PEAK VELOCITY: 3.2 CM2
AV LVOT MEAN GRADIENT: 3 MMHG
AV LVOT PEAK GRADIENT: 6 MMHG
AV MEAN GRADIENT: 4 MMHG
AV PEAK GRADIENT: 7 MMHG
AV REGURGITATION PRESSURE HALF TIME: 536 MS
AV VALVE AREA: 3.05 CM2
AV VELOCITY RATIO: 0.92
BACTERIA UR QL AUTO: ABNORMAL /HPF
BASOPHILS # BLD MANUAL: 0 THOUSAND/UL (ref 0–0.1)
BASOPHILS NFR MAR MANUAL: 0 % (ref 0–1)
BUN SERPL-MCNC: 31 MG/DL (ref 5–25)
CALCIUM SERPL-MCNC: 9.2 MG/DL (ref 8.4–10.2)
CHLORIDE SERPL-SCNC: 106 MMOL/L (ref 96–108)
CHOLEST SERPL-MCNC: 133 MG/DL
CO2 SERPL-SCNC: 18 MMOL/L (ref 21–32)
CREAT SERPL-MCNC: 0.99 MG/DL (ref 0.6–1.3)
DOP CALC AO PEAK VEL: 1.33 M/S
DOP CALC AO VTI: 29.02 CM
DOP CALC LVOT AREA: 3.46 CM2
DOP CALC LVOT CARDIAC INDEX: 2.84 L/MIN/M2
DOP CALC LVOT CARDIAC OUTPUT: 5.53 L/MIN
DOP CALC LVOT DIAMETER: 2.1 CM
DOP CALC LVOT PEAK VEL VTI: 25.57 CM
DOP CALC LVOT PEAK VEL: 1.22 M/S
DOP CALC LVOT STROKE INDEX: 46.7 ML/M2
DOP CALC LVOT STROKE VOLUME: 88.52
E WAVE DECELERATION TIME: 171 MS
E/A RATIO: 1.16
EOSINOPHIL # BLD MANUAL: 0 THOUSAND/UL (ref 0–0.4)
EOSINOPHIL NFR BLD MANUAL: 0 % (ref 0–6)
ERYTHROCYTE [DISTWIDTH] IN BLOOD BY AUTOMATED COUNT: 14.8 % (ref 11.6–15.1)
EST. AVERAGE GLUCOSE BLD GHB EST-MCNC: 134 MG/DL
FRACTIONAL SHORTENING: 42 (ref 28–44)
GFR SERPL CREATININE-BSD FRML MDRD: 68 ML/MIN/1.73SQ M
GLUCOSE SERPL-MCNC: 91 MG/DL (ref 65–140)
HBA1C MFR BLD: 6.3 %
HCT VFR BLD AUTO: 35.7 % (ref 36.5–49.3)
HDLC SERPL-MCNC: 58 MG/DL
HGB BLD-MCNC: 11.2 G/DL (ref 12–17)
INTERVENTRICULAR SEPTUM IN DIASTOLE (PARASTERNAL SHORT AXIS VIEW): 1.2 CM
INTERVENTRICULAR SEPTUM: 1.2 CM (ref 0.6–1.1)
LAAS-AP2: 31 CM2
LAAS-AP4: 29.9 CM2
LACTATE SERPL-SCNC: 1.7 MMOL/L (ref 0.5–2)
LDLC SERPL CALC-MCNC: 61 MG/DL (ref 0–100)
LEFT ATRIUM SIZE: 3.6 CM
LEFT ATRIUM VOLUME (MOD BIPLANE): 110 ML
LEFT ATRIUM VOLUME INDEX (MOD BIPLANE): 56.4 ML/M2
LEFT INTERNAL DIMENSION IN SYSTOLE: 2.5 CM (ref 2.1–4)
LEFT VENTRICULAR INTERNAL DIMENSION IN DIASTOLE: 4.3 CM (ref 3.5–6)
LEFT VENTRICULAR POSTERIOR WALL IN END DIASTOLE: 1.2 CM
LEFT VENTRICULAR STROKE VOLUME: 60 ML
LVSV (TEICH): 60 ML
LYMPHOCYTES # BLD AUTO: 0.41 THOUSAND/UL (ref 0.6–4.47)
LYMPHOCYTES # BLD AUTO: 2 % (ref 14–44)
MCH RBC QN AUTO: 27.3 PG (ref 26.8–34.3)
MCHC RBC AUTO-ENTMCNC: 31.4 G/DL (ref 31.4–37.4)
MCV RBC AUTO: 87 FL (ref 82–98)
MONOCYTES # BLD AUTO: 0.41 THOUSAND/UL (ref 0–1.22)
MONOCYTES NFR BLD: 2 % (ref 4–12)
MV E'TISSUE VEL-LAT: 10 CM/S
MV E'TISSUE VEL-SEP: 10 CM/S
MV PEAK A VEL: 0.69 M/S
MV PEAK E VEL: 80 CM/S
NEUTROPHILS # BLD MANUAL: 19.69 THOUSAND/UL (ref 1.85–7.62)
NEUTS BAND NFR BLD MANUAL: 8 % (ref 0–8)
NEUTS SEG NFR BLD AUTO: 88 % (ref 43–75)
NON-SQ EPI CELLS URNS QL MICRO: ABNORMAL /HPF
P AXIS: 59 DEGREES
PLATELET # BLD AUTO: 187 THOUSANDS/UL (ref 149–390)
PLATELET BLD QL SMEAR: ADEQUATE
PMV BLD AUTO: 10.1 FL (ref 8.9–12.7)
POTASSIUM SERPL-SCNC: 4.4 MMOL/L (ref 3.5–5.3)
PR INTERVAL: 179 MS
PROCALCITONIN SERPL-MCNC: 0.21 NG/ML
QRS AXIS: 26 DEGREES
QRSD INTERVAL: 113 MS
QT INTERVAL: 371 MS
QTC INTERVAL: 420 MS
RBC # BLD AUTO: 4.11 MILLION/UL (ref 3.88–5.62)
RBC #/AREA URNS AUTO: ABNORMAL /HPF
RBC MORPH BLD: NORMAL
RIGHT ATRIUM AREA SYSTOLE A4C: 19.2 CM2
RIGHT VENTRICLE ID DIMENSION: 4.6 CM
SL CV AV DECELERATION TIME RETROGRADE: 1847 MS
SL CV AV PEAK GRADIENT RETROGRADE: 36 MMHG
SL CV LEFT ATRIUM LENGTH A2C: 6.7 CM
SL CV LV EF: 65
SL CV PED ECHO LEFT VENTRICLE DIASTOLIC VOLUME (MOD BIPLANE) 2D: 83 ML
SL CV PED ECHO LEFT VENTRICLE SYSTOLIC VOLUME (MOD BIPLANE) 2D: 23 ML
SODIUM SERPL-SCNC: 140 MMOL/L (ref 135–147)
T WAVE AXIS: 53 DEGREES
TRICUSPID ANNULAR PLANE SYSTOLIC EXCURSION: 2.9 CM
TRIGL SERPL-MCNC: 70 MG/DL
VENTRICULAR RATE: 77 BPM
WBC # BLD AUTO: 20.51 THOUSAND/UL (ref 4.31–10.16)
WBC #/AREA URNS AUTO: ABNORMAL /HPF

## 2023-10-27 PROCEDURE — 85027 COMPLETE CBC AUTOMATED: CPT | Performed by: INTERNAL MEDICINE

## 2023-10-27 PROCEDURE — 93010 ELECTROCARDIOGRAM REPORT: CPT | Performed by: INTERNAL MEDICINE

## 2023-10-27 PROCEDURE — 97163 PT EVAL HIGH COMPLEX 45 MIN: CPT

## 2023-10-27 PROCEDURE — 93306 TTE W/DOPPLER COMPLETE: CPT

## 2023-10-27 PROCEDURE — 76775 US EXAM ABDO BACK WALL LIM: CPT

## 2023-10-27 PROCEDURE — 85007 BL SMEAR W/DIFF WBC COUNT: CPT | Performed by: INTERNAL MEDICINE

## 2023-10-27 PROCEDURE — 36620 INSERTION CATHETER ARTERY: CPT | Performed by: INTERNAL MEDICINE

## 2023-10-27 PROCEDURE — 4A133B1 MONITORING OF ARTERIAL PRESSURE, PERIPHERAL, PERCUTANEOUS APPROACH: ICD-10-PCS | Performed by: INTERNAL MEDICINE

## 2023-10-27 PROCEDURE — 84145 PROCALCITONIN (PCT): CPT | Performed by: NURSE PRACTITIONER

## 2023-10-27 PROCEDURE — 80061 LIPID PANEL: CPT | Performed by: INTERNAL MEDICINE

## 2023-10-27 PROCEDURE — 87077 CULTURE AEROBIC IDENTIFY: CPT | Performed by: NURSE PRACTITIONER

## 2023-10-27 PROCEDURE — 83036 HEMOGLOBIN GLYCOSYLATED A1C: CPT | Performed by: INTERNAL MEDICINE

## 2023-10-27 PROCEDURE — 87186 SC STD MICRODIL/AGAR DIL: CPT | Performed by: NURSE PRACTITIONER

## 2023-10-27 PROCEDURE — 87147 CULTURE TYPE IMMUNOLOGIC: CPT | Performed by: NURSE PRACTITIONER

## 2023-10-27 PROCEDURE — 80048 BASIC METABOLIC PNL TOTAL CA: CPT | Performed by: INTERNAL MEDICINE

## 2023-10-27 PROCEDURE — 97166 OT EVAL MOD COMPLEX 45 MIN: CPT

## 2023-10-27 PROCEDURE — 99291 CRITICAL CARE FIRST HOUR: CPT | Performed by: INTERNAL MEDICINE

## 2023-10-27 PROCEDURE — 92610 EVALUATE SWALLOWING FUNCTION: CPT

## 2023-10-27 PROCEDURE — 93005 ELECTROCARDIOGRAM TRACING: CPT

## 2023-10-27 PROCEDURE — 87040 BLOOD CULTURE FOR BACTERIA: CPT | Performed by: NURSE PRACTITIONER

## 2023-10-27 PROCEDURE — NC001 PR NO CHARGE: Performed by: PSYCHIATRY & NEUROLOGY

## 2023-10-27 PROCEDURE — 83605 ASSAY OF LACTIC ACID: CPT | Performed by: NURSE PRACTITIONER

## 2023-10-27 PROCEDURE — 87154 CUL TYP ID BLD PTHGN 6+ TRGT: CPT | Performed by: NURSE PRACTITIONER

## 2023-10-27 PROCEDURE — 71045 X-RAY EXAM CHEST 1 VIEW: CPT

## 2023-10-27 PROCEDURE — NC001 PR NO CHARGE: Performed by: INTERNAL MEDICINE

## 2023-10-27 PROCEDURE — 4A133J1 MONITORING OF ARTERIAL PULSE, PERIPHERAL, PERCUTANEOUS APPROACH: ICD-10-PCS | Performed by: INTERNAL MEDICINE

## 2023-10-27 PROCEDURE — 03HY32Z INSERTION OF MONITORING DEVICE INTO UPPER ARTERY, PERCUTANEOUS APPROACH: ICD-10-PCS | Performed by: INTERNAL MEDICINE

## 2023-10-27 RX ORDER — IBUPROFEN 400 MG/1
400 TABLET ORAL ONCE
Status: COMPLETED | OUTPATIENT
Start: 2023-10-27 | End: 2023-10-27

## 2023-10-27 RX ORDER — CEFTRIAXONE 2 G/50ML
2000 INJECTION, SOLUTION INTRAVENOUS ONCE
Status: COMPLETED | OUTPATIENT
Start: 2023-10-27 | End: 2023-10-27

## 2023-10-27 RX ORDER — ACETAMINOPHEN 325 MG/1
650 TABLET ORAL EVERY 4 HOURS PRN
Status: DISCONTINUED | OUTPATIENT
Start: 2023-10-27 | End: 2023-10-27

## 2023-10-27 RX ORDER — ALBUMIN, HUMAN INJ 5% 5 %
25 SOLUTION INTRAVENOUS ONCE
Status: COMPLETED | OUTPATIENT
Start: 2023-10-27 | End: 2023-10-27

## 2023-10-27 RX ORDER — CHLORHEXIDINE GLUCONATE ORAL RINSE 1.2 MG/ML
15 SOLUTION DENTAL EVERY 12 HOURS SCHEDULED
Status: DISCONTINUED | OUTPATIENT
Start: 2023-10-27 | End: 2023-10-30

## 2023-10-27 RX ORDER — MIDODRINE HYDROCHLORIDE 5 MG/1
10 TABLET ORAL
Status: DISCONTINUED | OUTPATIENT
Start: 2023-10-27 | End: 2023-10-27

## 2023-10-27 RX ORDER — CEFTRIAXONE 2 G/50ML
2000 INJECTION, SOLUTION INTRAVENOUS EVERY 24 HOURS
Status: DISCONTINUED | OUTPATIENT
Start: 2023-10-28 | End: 2023-10-28

## 2023-10-27 RX ORDER — CEFTRIAXONE 1 G/50ML
1000 INJECTION, SOLUTION INTRAVENOUS EVERY 24 HOURS
Status: DISCONTINUED | OUTPATIENT
Start: 2023-10-28 | End: 2023-10-27

## 2023-10-27 RX ORDER — SODIUM CHLORIDE, SODIUM GLUCONATE, SODIUM ACETATE, POTASSIUM CHLORIDE, MAGNESIUM CHLORIDE, SODIUM PHOSPHATE, DIBASIC, AND POTASSIUM PHOSPHATE .53; .5; .37; .037; .03; .012; .00082 G/100ML; G/100ML; G/100ML; G/100ML; G/100ML; G/100ML; G/100ML
125 INJECTION, SOLUTION INTRAVENOUS CONTINUOUS
Status: DISCONTINUED | OUTPATIENT
Start: 2023-10-27 | End: 2023-10-27

## 2023-10-27 RX ORDER — MIDODRINE HYDROCHLORIDE 5 MG/1
5 TABLET ORAL
Status: DISCONTINUED | OUTPATIENT
Start: 2023-10-27 | End: 2023-10-28

## 2023-10-27 RX ORDER — MECLIZINE HYDROCHLORIDE 25 MG/1
25 TABLET ORAL EVERY 8 HOURS PRN
Status: DISCONTINUED | OUTPATIENT
Start: 2023-10-27 | End: 2023-11-01 | Stop reason: HOSPADM

## 2023-10-27 RX ORDER — ACETAMINOPHEN 325 MG/1
975 TABLET ORAL EVERY 8 HOURS PRN
Status: DISCONTINUED | OUTPATIENT
Start: 2023-10-27 | End: 2023-11-01 | Stop reason: HOSPADM

## 2023-10-27 RX ORDER — SODIUM CHLORIDE, SODIUM GLUCONATE, SODIUM ACETATE, POTASSIUM CHLORIDE, MAGNESIUM CHLORIDE, SODIUM PHOSPHATE, DIBASIC, AND POTASSIUM PHOSPHATE .53; .5; .37; .037; .03; .012; .00082 G/100ML; G/100ML; G/100ML; G/100ML; G/100ML; G/100ML; G/100ML
100 INJECTION, SOLUTION INTRAVENOUS CONTINUOUS
Status: DISCONTINUED | OUTPATIENT
Start: 2023-10-27 | End: 2023-10-28

## 2023-10-27 RX ORDER — SODIUM CHLORIDE, SODIUM GLUCONATE, SODIUM ACETATE, POTASSIUM CHLORIDE, MAGNESIUM CHLORIDE, SODIUM PHOSPHATE, DIBASIC, AND POTASSIUM PHOSPHATE .53; .5; .37; .037; .03; .012; .00082 G/100ML; G/100ML; G/100ML; G/100ML; G/100ML; G/100ML; G/100ML
500 INJECTION, SOLUTION INTRAVENOUS ONCE
Status: COMPLETED | OUTPATIENT
Start: 2023-10-27 | End: 2023-10-27

## 2023-10-27 RX ADMIN — SODIUM CHLORIDE, SODIUM GLUCONATE, SODIUM ACETATE, POTASSIUM CHLORIDE, MAGNESIUM CHLORIDE, SODIUM PHOSPHATE, DIBASIC, AND POTASSIUM PHOSPHATE 100 ML/HR: .53; .5; .37; .037; .03; .012; .00082 INJECTION, SOLUTION INTRAVENOUS at 19:57

## 2023-10-27 RX ADMIN — SODIUM CHLORIDE, SODIUM GLUCONATE, SODIUM ACETATE, POTASSIUM CHLORIDE, MAGNESIUM CHLORIDE, SODIUM PHOSPHATE, DIBASIC, AND POTASSIUM PHOSPHATE 500 ML: .53; .5; .37; .037; .03; .012; .00082 INJECTION, SOLUTION INTRAVENOUS at 12:09

## 2023-10-27 RX ADMIN — ACETAMINOPHEN 325MG 650 MG: 325 TABLET ORAL at 01:14

## 2023-10-27 RX ADMIN — NOREPINEPHRINE BITARTRATE 2 MCG/MIN: 1 INJECTION, SOLUTION, CONCENTRATE INTRAVENOUS at 06:35

## 2023-10-27 RX ADMIN — ACETAMINOPHEN 325MG 650 MG: 325 TABLET ORAL at 05:21

## 2023-10-27 RX ADMIN — CEFTRIAXONE 2000 MG: 2 INJECTION, SOLUTION INTRAVENOUS at 02:36

## 2023-10-27 RX ADMIN — ATORVASTATIN CALCIUM 40 MG: 40 TABLET, FILM COATED ORAL at 17:14

## 2023-10-27 RX ADMIN — SODIUM CHLORIDE, SODIUM GLUCONATE, SODIUM ACETATE, POTASSIUM CHLORIDE, MAGNESIUM CHLORIDE, SODIUM PHOSPHATE, DIBASIC, AND POTASSIUM PHOSPHATE 500 ML: .53; .5; .37; .037; .03; .012; .00082 INJECTION, SOLUTION INTRAVENOUS at 09:39

## 2023-10-27 RX ADMIN — MIDODRINE HYDROCHLORIDE 5 MG: 5 TABLET ORAL at 16:14

## 2023-10-27 RX ADMIN — GLYCERIN 2 DROP: .002; .002; .01 SOLUTION/ DROPS OPHTHALMIC at 22:00

## 2023-10-27 RX ADMIN — MIDODRINE HYDROCHLORIDE 5 MG: 5 TABLET ORAL at 11:48

## 2023-10-27 RX ADMIN — ALBUMIN (HUMAN) 25 G: 12.5 INJECTION, SOLUTION INTRAVENOUS at 05:34

## 2023-10-27 RX ADMIN — SODIUM CHLORIDE 1000 ML: 0.9 INJECTION, SOLUTION INTRAVENOUS at 03:30

## 2023-10-27 RX ADMIN — NOREPINEPHRINE BITARTRATE 4 MCG/MIN: 1 INJECTION, SOLUTION, CONCENTRATE INTRAVENOUS at 17:14

## 2023-10-27 RX ADMIN — SODIUM CHLORIDE, SODIUM GLUCONATE, SODIUM ACETATE, POTASSIUM CHLORIDE, MAGNESIUM CHLORIDE, SODIUM PHOSPHATE, DIBASIC, AND POTASSIUM PHOSPHATE 100 ML/HR: .53; .5; .37; .037; .03; .012; .00082 INJECTION, SOLUTION INTRAVENOUS at 08:59

## 2023-10-27 RX ADMIN — SODIUM CHLORIDE 1000 ML: 0.9 INJECTION, SOLUTION INTRAVENOUS at 04:00

## 2023-10-27 RX ADMIN — SODIUM CHLORIDE 1000 ML: 0.9 INJECTION, SOLUTION INTRAVENOUS at 02:36

## 2023-10-27 RX ADMIN — ACETAMINOPHEN 325MG 975 MG: 325 TABLET ORAL at 23:47

## 2023-10-27 RX ADMIN — CHLORHEXIDINE GLUCONATE 15 ML: 1.2 RINSE ORAL at 09:27

## 2023-10-27 RX ADMIN — GLYCERIN 2 DROP: .002; .002; .01 SOLUTION/ DROPS OPHTHALMIC at 09:27

## 2023-10-27 RX ADMIN — CEFTRIAXONE 2000 MG: 2 INJECTION, SOLUTION INTRAVENOUS at 23:47

## 2023-10-27 RX ADMIN — ACETAMINOPHEN 325MG 650 MG: 325 TABLET ORAL at 14:46

## 2023-10-27 RX ADMIN — ENOXAPARIN SODIUM 40 MG: 40 INJECTION SUBCUTANEOUS at 09:27

## 2023-10-27 RX ADMIN — CLOPIDOGREL BISULFATE 75 MG: 75 TABLET ORAL at 09:27

## 2023-10-27 RX ADMIN — IBUPROFEN 400 MG: 400 TABLET ORAL at 03:48

## 2023-10-27 RX ADMIN — CHLORHEXIDINE GLUCONATE 15 ML: 1.2 RINSE ORAL at 22:00

## 2023-10-27 NOTE — PHYSICAL THERAPY NOTE
Sagar Heart is a 81 y.o. male with a PMH of HLD, vertigo, and cerebral arteriosclerosis with a cerebellar stroke history, presenting to the ED via ambulance with altered mental status.  Septic shock 2* UTI.      119/67  96% on 2L

## 2023-10-27 NOTE — CONSULTS
[address] [facility]  Consult  Name: Rigo Sanchez 81 y.o. male I MRN: [id number]  Unit/Bed#: ICU 08 I Date of Admission: 10/26/2023   Date of Service: 10/27/2023 I Hospital Day: 1    Patient transferred to ICU overnight for septic shock    Assessment/Plan   * Septic shock [facility])  Assessment & Plan  Overnight 10/26-10/27 became febrile with Tmax 102.4 and hypotensive not responding to 30 cc/kg fluid bolus. Also with tachypnea and tachycardia. Lactic acid, procalcitonin normal.  UA with large leukocytes, positive nitrites, innumerable WBCs. Chest x-ray with what looks to be vascular congestion on my review    Plan  Maintain MAP greater than 65  Continue ceftriaxone 2 g daily  Wean vasopressor support as tolerated. Currently on 7 of norepinephrine. Follow blood and urine cultures    Acute cystitis with hematuria  Assessment & Plan  UA with nitrites, leukocytes. Follow cultures. Vertigo  Assessment & Plan  Unclear etiology, present suddenly when waking up on 10/26. Says he feels "off balance"    Plan  Continue stroke pathway started on med/surg  Follow MRI  Follow TTE results    Toxic metabolic encephalopathy  Assessment & Plan  Patient did not know what town he was in, but knew he was in the hospital. He knew the year and month and why he was here. Likely secondary to septic shock vs possible cva    Acute respiratory failure with hypoxia (HCC)  Assessment & Plan  Requiring 2 L nasal cannula to maintain saturations greater than 90%. Likely secondary to tachypnea and mild volume overload  Wean as tolerated    BPH with obstruction/lower urinary tract symptoms  Assessment & Plan  Not retaining urine at this time, continue to monitor           ----------------------------------------------------------------------------------------  HPI/24hr events: Patient admitted to [facility] on 10/26 for vertigo/stroke like symptoms. Overnight 10/26 having fevers and became hypotensive.  He received a 30cc/kg bolus for sepsis, but despite this was still hypotensive, so he was transferred to the ICU for vasopressor support. He reports feeling somewhat fine, not feeling dizzy or lightheaded, no SOB, no chest pain, no N/V/D. Patient appropriate for transfer out of the ICU today?: No  Disposition: Admit to Critical Care   Code Status: Level 1 - Full Code  ---------------------------------------------------------------------------------------      Review of Systems  Review of systems was reviewed and negative unless stated above in HPI/24-hour events   ---------------------------------------------------------------------------------------  OBJECTIVE    Vitals   Vitals:    10/27/23 0830 10/27/23 0845 10/27/23 0900 10/27/23 0915   BP: (!) 66/46 (!) 72/47 (!) 83/54    BP Location:       Pulse: 72 70 68 64   Resp: 17 (!) 28 18 18   Temp: 99.7 °F (37.6 °C) 99.7 °F (37.6 °C) 99.3 °F (37.4 °C) 99.3 °F (37.4 °C)   TempSrc:    Rectal   SpO2: 96% 96% 96% 97%   Weight:       Height:         Temp (24hrs), Av.8 °F (37.7 °C), Min:97 °F (36.1 °C), Max:102.4 °F (39.1 °C)  Current: Temperature: 99.3 °F (37.4 °C)  Arterial Line BP: 92/44  Arterial Line MAP (mmHg): (!) 60 mmHg    Respiratory:  SpO2: SpO2: 97 %  Nasal Cannula O2 Flow Rate (L/min): 2 L/min    Invasive/non-invasive ventilation settings   Respiratory      Lab Data (Last 4 hours)      None           O2/Vent Data (Last 4 hours)      None                    Physical Exam  Constitutional:       Appearance: He is normal weight. He is ill-appearing. HENT:      Mouth/Throat:      Pharynx: Oropharynx is clear. Cardiovascular:      Rate and Rhythm: Normal rate and regular rhythm. Heart sounds: No murmur heard. Pulmonary:      Effort: Pulmonary effort is normal. No respiratory distress. Breath sounds: Normal breath sounds. Abdominal:      General: Abdomen is flat. There is no distension. Palpations: Abdomen is soft. Musculoskeletal:      Right lower leg: No edema. Left lower leg: No edema. Neurological:      Mental Status: He is alert. He is disoriented. Laboratory and Diagnostics:  Results from last 7 days   Lab Units 10/26/23  1040   WBC Thousand/uL 6.58   HEMOGLOBIN g/dL 13.7   HEMATOCRIT % 41.8   PLATELETS Thousands/uL 278   NEUTROS PCT % 63   MONOS PCT % 9   EOS PCT % 1     Results from last 7 days   Lab Units 10/27/23  0528 10/26/23  1040   SODIUM mmol/L 140 136   POTASSIUM mmol/L 4.4 3.9   CHLORIDE mmol/L 106 105   CO2 mmol/L 18* 19*   ANION GAP mmol/L 16 12   BUN mg/dL 31* 31*   CREATININE mg/dL 0.99 0.85   CALCIUM mg/dL 9.2 8.9   GLUCOSE RANDOM mg/dL 91 176*   ALT U/L  --  17   AST U/L  --  20   ALK PHOS U/L  --  61   ALBUMIN g/dL  --  3.9   TOTAL BILIRUBIN mg/dL  --  0.89          Results from last 7 days   Lab Units 10/26/23  1040   INR  0.96   PTT seconds 24          Results from last 7 days   Lab Units 10/27/23  0246   LACTIC ACID mmol/L 1.7     ABG:    VBG:    Results from last 7 days   Lab Units 10/27/23  0256   PROCALCITONIN ng/ml 0.21       Micro  Results from last 7 days   Lab Units 10/27/23  0224   BLOOD CULTURE  Received in Microbiology Lab. Culture in Progress. Received in Microbiology Lab. Culture in Progress. Intake and Output  I/O         10/25 0701  10/26 0700 10/26 0701  10/27 0700 10/27 0701  10/28 0700    P. O.   120    I.V. (mL/kg)   42.6 (0.5)    IV Piggyback  2000     Total Intake(mL/kg)  2000 (24.8) 162.6 (2)    Urine (mL/kg/hr)  300     Stool  0     Total Output  300     Net  +1700 +162.6           Unmeasured Stool Occurrence  2 x             Height and Weights   Height: 5' 8" (172.7 cm)  IBW (Ideal Body Weight): 68.4 kg  Body mass index is 27.08 kg/m².   Weight (last 2 days)       Date/Time Weight    10/27/23 0700 80.8 (178.13)    10/26/23 1919 81.8 (180.34)              Nutrition       Diet Orders   (From admission, onward)                 Start     Ordered    10/27/23 0718  Diet Regular; Regular House  Diet effective now        References:    Adult Nutrition Support Algorithm    RD Therapeutic Diet Order Protocol   Question Answer Comment   Diet Type Regular    Regular Regular House    RD to adjust diet per protocol?  Yes        10/27/23 0718                      Active Medications  Scheduled Meds:  Current Facility-Administered Medications   Medication Dose Route Frequency Provider Last Rate    acetaminophen  650 mg Oral Q4H PRN Shermon Estrin, DO      aluminum-magnesium hydroxide-simethicone  30 mL Oral Q6H PRN Shermon Estrin, DO      atorvastatin  40 mg Oral QPM Shermon Estrin, DO      [START ON 10/28/2023] cefTRIAXone  2,000 mg Intravenous Q24H Shermon Estrin, DO      chlorhexidine  15 mL Mouth/Throat Q12H [facility] Shermon Estrin, DO      clopidogrel  75 mg Oral Daily Santos Panchal Bevak, DO      enoxaparin  40 mg Subcutaneous Daily Shermon Estrin, DO      glycerin-hypromellose-  2 drop Both Eyes BID Shermon Estrin, DO      meclizine  25 mg Oral Q8H PRN [facility], [facility]      multi-electrolyte  100 mL/hr Intravenous Continuous Santos Panchal Bevak,  mL/hr (10/27/23 0859)    norepinephrine  1-30 mcg/min Intravenous Titrated [facility] Estrin, DO 7 mcg/min (10/27/23 0849)    ondansetron  4 mg Intravenous Q6H PRN Shermon Estrin, DO       Continuous Infusions:  multi-electrolyte, 100 mL/hr, Last Rate: 100 mL/hr (10/27/23 0859)  norepinephrine, 1-30 mcg/min, Last Rate: 7 mcg/min (10/27/23 0849)      PRN Meds:   acetaminophen, 650 mg, Q4H PRN  aluminum-magnesium hydroxide-simethicone, 30 mL, Q6H PRN  meclizine, 25 mg, Q8H PRN  ondansetron, 4 mg, Q6H PRN        Invasive Devices Review  Invasive Devices       Peripheral Intravenous Line  Duration             Peripheral IV 10/26/23 Distal;Right;Upper;Ventral (anterior) Arm <1 day ---------------------------------------------------------------------------------------  Advance Directive and Living Will:      Power of :    POLST:    ---------------------------------------------------------------------------------------  Care Time Delivered:   Upon my evaluation, this patient had a high probability of imminent or life-threatening deterioration due to septic shock, which required my direct attention, intervention, and personal management. I have personally provided 45 minutes (0830 to 0915) of critical care time, exclusive of procedures, teaching, family meetings, and any prior time recorded by providers other than myself. Abhsihek Salguero DO      Portions of the record may have been created with voice recognition software. Occasional wrong word or "sound a like" substitutions may have occurred due to the inherent limitations of voice recognition software.   Read the chart carefully and recognize, using context, where substitutions have occurred

## 2023-10-27 NOTE — NURSING NOTE
QUICK NOTE - Stat RN  Fatimah Vargas 81 y.o. male MRN: [id number]  Unit/Bed#: E4 -01 Encounter: [id number]    Date Paged: 10/27/23  Time Paged: [phone]  Room #: 485  [id number] Time: 0345  Deterioration index score at time of page: 41.27  %  Spoke with Emily Calabrese RN from primary team  Need to escalate level of care: No     PROBLEMS resulting in high DI score: Increased temp decreased BP    PLAN:    Already seen by Harmony Hoffman from [facility], added fluid bolus and blood cultures. Pt on antibiotics, being treated for stroke workup    Please contact critical care via [employer] with any questions or concerns. Vitals:   Vitals:    10/27/23 0150 10/27/23 0235 10/27/23 0300 10/27/23 0315   BP:  (!) 88/56 (!) 89/50 (!) 80/50   BP Location:  Right arm     Pulse: 90 58 98 92   Resp: (!) 26 (!) 24 22 22   Temp:  (!) 101.7 °F (38.7 °C)     TempSrc:  Oral     SpO2:  91% (!) 89%    Weight:       Height:           Respiratory:  SpO2: SpO2: (!) 89 %, SpO2 Activity: SpO2 Activity: At Rest, SpO2 Device: O2 Device: None (Room air)  Nasal Cannula O2 Flow Rate (L/min): 6 L/min    Temperature: Temp (24hrs), Av.4 °F (37.4 °C), Min:97 °F (36.1 °C), Max:102.4 °F (39.1 °C)  Current: Temperature: (!) 101.7 °F (38.7 °C)    Code Status: Level 1 - Full Code    0630- assisted moving patient to icu for pressor use. Pt sleepy but easily aroused throughout transport. Pt started on levophed in ICU, rectal probe inserted for continuous temperature monitor. Pt is due to void since receiving 3 liters of ivf. Call sargent in reach. Ned Suleiman wife at bedside. [place] ICU RN received report from [facility] on [place] [facility] and updated on interventions while in my care.

## 2023-10-27 NOTE — CASE MANAGEMENT
Case Management Assessment & Discharge Planning Note    Patient name Jose Narayan  Location ICU 08/ICU 11 MRN [id number]  : 1935 Date 10/27/2023       Current Admission Date: 10/26/2023  Current Admission Diagnosis:Septic shock 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 2022    Balance problems  2022    Heart palpitations 2022    History of heart artery stent 2018    Primary osteoarthritis of both hands 2018    Basal cell carcinoma of skin 2015    BPH with obstruction/lower urinary tract symptoms 2014    Benign essential hypertension 2013    Actinic keratosis 2013    Arteriosclerotic coronary artery disease 2013    Hyperlipidemia 2012      LOS (days): 1  Geometric Mean LOS (GMLOS) (days): 1.90  Days to GMLOS:0.9     OBJECTIVE:    Risk of Unplanned Readmission Score: 8.5         Current admission status: Inpatient       Preferred Pharmacy:   [id number] [employer] [place] [place] - [address] [address]  Phone: [phone] Fax: [phone] [place], [place] - [address] [address]  Phone: [phone] Fax: [phone]    Primary Care Provider: Eloina Pratt DO    Primary Insurance: MEDICARE  Secondary Insurance: BLUE CROSS    ASSESSMENT:  [employer]       Shira Verde Representative - Spouse   Primary Phone: [phone] (Mobile)  Home Phone: [phone]                 Advance Directives  Does patient have a [address]?: Yes  Does patient have Advance Directives?: Yes  Advance Directives: Living will, Power of  for health care, Power of  for finance (wife is medical POA; grandson is financial)  Primary Contact: Jere Hood (spouse) [phone]              Patient Information  Admitted from[de-identified] Home  Mental Status: Other (Comment) (asleep)  During Assessment patient was accompanied by: Spouse, Daughter  Assessment information provided by[de-identified] Daughter  Primary Caregiver: Self  Support Systems: Spouse/significant other, Family members  [facility] Residence: [address] [place] [address] do you live in?: [employer] entry access options.  Select all that apply.: Stairs  Number of steps to enter home.: 2  Type of Current Residence: 2 story home  Upon entering residence, is there a bedroom on the main floor (no further steps)?: No  A bedroom is located on the following floor levels of residence (select all that apply):: 2nd Floor  Upon entering residence, is there a bathroom on the main floor (no further steps)?: Yes (1/2; full on 2nd)  Living Arrangements: Lives w/ Spouse/significant other    Activities of Daily Living Prior to Admission  Functional Status: Independent  Completes ADLs independently?: Yes  Ambulates independently?: Yes  Does patient use assisted devices?: No  Does patient currently own DME?: No  Does patient have a history of Outpatient Therapy (PT/OT)?: Yes  Does the patient have a history of Short-Term Rehab?: No  Does patient have a history of HHC?: No  Does patient currently have [address]?: No         Patient Information Continued  Income Source: SSI/SSD  Food insecurity resource given?: N/A  Does patient receive dialysis treatments?: No  Does patient have a history of substance abuse?: No  Does patient have a history of Mental Health Diagnosis?: No         Means of Transportation  Means of Transport to Hospitals in [place][de-identified] Drives Self  Was application for public transport provided?: N/A        DISCHARGE DETAILS:    Discharge planning discussed with[de-identified] Daughter and spouse        CM contacted family/caregiver?: Yes                            Other Referral/Resources/Interventions Provided:  Interventions: None Indicated

## 2023-10-27 NOTE — QUICK NOTE
Called to evaluate by [facility]. Patient has been hypotensive in the setting of urinary tract infection. He received 3 liters IVF and continues to have SBP in the 70s. On exam, he is alert and oriented but lethargic. His wife was at the bedside and concerned regarding him coming to the ICU for pressors and long term goals of care. [facility] provider at the bedside and will order albumin while the wife calls family for guidance. If patient continues to be hypotensive, would transfer to ICU for levophed infusion. If requirements of levophed were high, patient would require a central line. Will re-evaluate after albumin.

## 2023-10-27 NOTE — PHYSICAL THERAPY NOTE
PT EVALUATION    81 y.o.    [id number]    Vertigo [R42]  Altered mental status [R41.82]    Past Medical History:   Diagnosis Date    Basal cell carcinoma     Cancer ([address])     NSTEMI (non-ST elevated myocardial infarction) ([address]) 2014    Pneumonia     Last Assessed:  10/7/14    Polymyalgia rheumatica ([address])     Last Assessed:  11/23/12    Polymyalgia rheumatica ([address]) 2011    Squamous cell skin cancer 05/24/2022    left frontal scalp anterior    Squamous cell skin cancer 05/24/2022    Vertex of scalp         Past Surgical History:   Procedure Laterality Date    APPENDECTOMY      CORONARY ANGIOPLASTY WITH STENT PLACEMENT      HERNIA REPAIR  2000    MOHS SURGERY  07/27/2022    left frontal scalp and vertex of scalp        10/27/23 1435   PT Last Visit   PT Visit Date 10/27/23   Note Type   Note type Evaluation   Pain Assessment   Pain Score No Pain   Restrictions/Precautions   Weight Bearing Precautions Per Order No   Other Precautions Multiple lines;Telemetry; Fall Risk;O2;Chair Alarm; Bed Alarm; Impulsive   Home Living   Type of [address] [place] [address] Two level;1/2 bath on main level;Bed/bath upstairs; Laundry in basement;Stairs to enter without rails  (2 v 1 SAM depending on entrance.)   Bathroom Shower/Tub Tub/shower unit   Bathroom Toilet Raised   Bathroom Equipment Grab bars in [address] Walker;Cane  (not using PTA.)   Additional Comments resides with spouse in 2 story home.  + . Prior Function   Level of Veteran Independent with ADLs; Independent with functional mobility; Independent with IADLS   Lives With Spouse   Receives Help From Family   IADLs Independent with driving; Independent with meal prep; Independent with medication management   Falls in the last 6 months 0   Vocational Retired   Comments I PTA without AD use. Still doing yardwork, driving. No AD use. General   Additional Pertinent History Pt is admitted with dizziness, vertigo. R/O CVA. Noted to have septic shock 2* UTI. PT consulted. Family/Caregiver Present Yes   Cognition   Overall Cognitive Status WFL   Arousal/Participation Alert   Orientation Level Oriented X4   Following Commands Follows one step commands without difficulty   Comments Pleasant   Subjective   Subjective AGreeble to OOB although feels cold. RUE Assessment   RUE Assessment WFL  (as observed with functional reach and grasp)   LUE Assessment   LUE Assessment WFL  (as observed with functional reach and grasp.)   RLE Assessment   RLE Assessment WFL   Strength RLE   RLE Overall Strength 4-/5   LLE Assessment   LLE Assessment WFL   Strength LLE   LLE Overall Strength 4-/5   Light Touch   RLE Light Touch Grossly intact   LLE Light Touch Grossly intact   Bed Mobility   Supine to Sit 4  Minimal assistance   Additional items Assist x 1; Increased time required;Verbal cues;HOB elevated; Bedrails   Additional Comments Inreased time to complete. Cues for safety/line awareness with trnsition   Transfers   Sit to Stand 4  Minimal assistance   Additional items Assist x 1; Impulsive;Verbal cues   Stand to Sit 4  Minimal assistance   Additional items Assist x 1; Increased time required; Impulsive;Armrests   Additional Comments cues for hand placement/safety   Ambulation/Elevation   Gait pattern Improper Weight shift;Decreased foot clearance; Inconsistent marcus; Short stride   Gait Assistance 4  Minimal assist   Additional items Assist x 1;Verbal cues; Tactile cues   Assistive Device Rolling walker   Distance 4'x1 with RW. Cues for safety with RW management needed.    Balance   Static Sitting Fair +   Dynamic Sitting Fair -   Static Standing Fair -   Dynamic Standing Poor +   Ambulatory Poor +   Endurance Deficit   Endurance Deficit Yes   Endurance Deficit Description fatigue, weakness   Activity Tolerance   Activity Tolerance Patient tolerated treatment well;Patient limited by fatigue;Treatment limited secondary to medical complications (Comment) Medical Staff Made Aware Nurse, Jean Pierre Moran. OT-Roshan:Pt seen for co-evaluation/treatment with skilled Occupational Therapist 2* clinically unstable/unpredictable presentation, medical complexity, fall risk,functional/physical limitations, impaired functional balance, decreased safety awareness, limited activity tolerance which is decline from PLOF and may impact overall functional mobility/mobility safety. Nurse Made Aware yes   Assessment   Prognosis Good   Problem List Decreased strength;Decreased endurance; Impaired balance;Decreased mobility; Decreased cognition; Impaired judgement;Decreased safety awareness   Assessment Eduardo Trejo is a 81 y.o. male with a PMH of HLD, vertigo, and cerebral arteriosclerosis with a cerebellar stroke history, presenting to the ED via ambulance with altered mental status. Septic shock 2* UTI. PT consulted. Prior to admission resides with spouse in multistory home. Very independent at baseline. Ambulates without AD, still completes yardwork, drives and performs regular exercise. No hx of falls. Currently presents with functional limitations related to hospitalization with decreased general strength, balance, activity tolerance, functional mobility, safety awareness and locomotion requiring more restrictive AD than baseline and unsteady. No dizziness throughout session reported as was previous to admission. /67 and on 2L 96%. Given impairments with functional decline from baseline will benefit from skilled PT in order to facilitate return to PLOF. The patient's AM-PAC Basic Mobility Inpatient Short Form Raw Score is 18. A Raw score of greater than 16 suggests the patient may benefit from discharge to home. Please also refer to the recommendation of the Physical Therapist for safe discharge planning. Anticipate abiltiy to return home with OPPT pending achievement of IPPT mobility goals for safe transition to home. PT will follow and progress per POC 3-5x/wk.    Barriers to Discharge Inaccessible home environment   Barriers to Discharge Comments stairs   Goals   Patient Goals get better and go home   STG Expiration Date 11/06/23   Short Term Goal #1 10 days: 1). Pt will perform bed mobility with Eloy demonstrating appropriate technique 100% of the time in order to improve function. 2)  Perform all transfers with Eloy demonstrating safe and appropriate technique 100% of the time in order to improve ability to negotiate safely in home environment. 3) Amb with least restrictive AD prn > 150'x1 with mod I in order to demonstrate ability to negotiate in home environment. 4)  Improve overall strength and balance 1/2 grade in order to optimize ability to perform functional tasks and reduce fall risk. 5) Increase activity tolerance to 45 minutes in order to improve endurance to functional tasks. 6)  Negotiate stairs using most appropriate technique and S in order to be able to negotiate safely in home environment. 7) PT for ongoing patient and family/caregiver education, DME needs and d/c planning in order to promote highest level of function in least restrictive environment. Plan   Treatment/Interventions Functional transfer training;LE strengthening/ROM; Elevations; Therapeutic exercise; Endurance training;Patient/family training;Equipment eval/education; Bed mobility;Gait training; Compensatory technique education;Continued evaluation;Spoke to nursing;OT;Family   PT Frequency 3-5x/wk   Discharge Recommendation   PT Discharge Recommendation Home with outpatient rehabilitation  (pending progress and ability to acheive mobility goals as inpatient.)   Equipment Recommended Other (Comment)  (monitor)   AM-PAC Basic Mobility Inpatient   Turning in Flat Bed Without Bedrails 4   Lying on Back to Sitting on Edge of Flat Bed Without Bedrails 3   Moving Bed to Chair 3   Standing Up From Chair Using Arms 3   Walk in Room 3   Climb 3-5 Stairs With Railing 2   Basic Mobility Inpatient Raw Score 18   Basic Mobility Standardized Score 41.05   Highest Level Of Mobility   -[facility] Goal 6: Walk 10 steps or more   -HLM Achieved 5: Stand (1 or more minutes)   Modified Hinsdale Scale   Modified Hinsdale Scale 4   End of Consult   Patient Position at End of Consult Bedside chair;Bed/Chair alarm activated; All needs within reach     Hx/personal factors: SAM home environment, steps within home environment, fall risk , functional decline , new O2 requirements, increased reliance on DME , and advanced age, comorbidities    Examination:decreased strength , decreased balance, decreased activity tolerance, gait deviations, impulsivity, decreased safety awareness, Fluctuating vitals, impaired posture, and decreased cardiovascular endurance. Clinical: unpredictable Ongoing medical status, Trending/abnormal lab values, Risk for falls, Safety awareness, Impulsivity, bed/chair alarm, Continuous monitoring, Telemetry, Decreased activity tolerance compared to baseline, More restrictive AD use than baseline, Decline from PLOF., hypoxia, and new onset O2 use.      Complexity: high           Luigi Vazquez, PT

## 2023-10-27 NOTE — PROCEDURES
Arterial Line Insertion    Date/Time: 10/27/2023 12:51 PM    Performed by: Juan F Mcdowell DO  Authorized by: Juan F Mcdowell DO    Patient location:  ICU  Other Assisting Provider: Yes (comment) Nelson Zhang    Consent:     Consent obtained:  Written and verbal    Consent given by:  Patient and spouse    Risks discussed:  Bleeding, infection, pain, ischemia and repeat procedure  Universal protocol:     Patient identity confirmed:  Verbally with patient and arm band  Indications:     Indications: hemodynamic monitoring and continuous blood pressure monitoring    Pre-procedure details:     Skin preparation:  Chlorhexidine    Preparation: Patient was prepped and draped in sterile fashion    Anesthesia (see MAR for exact dosages): Anesthesia method:  Local infiltration    Local anesthetic:  Lidocaine 1% w/o epi  Procedure details:     Location / Tip of Catheter:  Radial    Laterality:  Left    Needle gauge:  20 G    Placement technique:  Percutaneous and ultrasound guided    Ultrasound image availability:  Not saved    Sterile ultrasound techniques: Sterile gel and sterile probe covers were used      Number of attempts:  1    Successful placement: no (catheter came out after attachment to line)    Post-procedure details:     Post-procedure:  Sterile dressing applied and sutured    Patient tolerance of procedure:   Tolerated well, no immediate complications

## 2023-10-27 NOTE — ASSESSMENT & PLAN NOTE
Patient did not know what town he was in, but knew he was in the hospital. He knew the year and month and why he was here.  Likely secondary to septic shock vs possible cva

## 2023-10-27 NOTE — SPEECH THERAPY NOTE
Speech Language/Pathology  Speech/Language Pathology  Assessment    Patient Name: Sabino FERRER Date: 10/27/2023     Problem List  Principal Problem:    Septic shock ([address])  Active Problems:    BPH with obstruction/lower urinary tract symptoms    Hyperlipidemia    Primary osteoarthritis of both hands    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    Narrow angle of anterior chamber of left eye    Vertigo    Hematuria    Acute cystitis with hematuria    Acute respiratory failure with hypoxia [facility])    Past Medical History  Past Medical History:   Diagnosis Date    Basal cell carcinoma     Cancer ([address])     NSTEMI (non-ST elevated myocardial infarction) ([address]) 2014    Pneumonia     Last Assessed:  10/7/14    Polymyalgia rheumatica ([address])     Last Assessed:  11/23/12    Polymyalgia rheumatica ([address]) 2011    Squamous cell skin cancer 05/24/2022    left frontal scalp anterior    Squamous cell skin cancer 05/24/2022    Vertex of scalp     Past Surgical History  Past Surgical History:   Procedure Laterality Date    APPENDECTOMY      CORONARY ANGIOPLASTY WITH [address]    MOHS SURGERY  07/27/2022    left frontal scalp and vertex of scalp        Bedside Swallow Evaluation:    Summary:  Pt presented w/ clear and fluent speech. Family present stated that he had transient altered speech but it is back to normal now. Patient tolerated breakfast and currently was eating lunch. Also tolerated well with no cough or wet vocal quality. No issues with mastication, transfer, bolus control, or swallow response. Denied any GERD type signs and symptoms. Denied difficulty with pills. Family agreed and did not note any difficulties as well. Oral pharyngeal stages appeared WNL    Recommendations:  Diet: Regular  Liquid: Thin  Meds: As tolerated  Supervision: Distant versus as needed  Positioning:Upright  Pt to take PO/Meds only when fully alert and upright.    Oral care  Aspiration precautions  Reflux precautions  Eval only, No f/u tx indicated. Please reconsult if status changes or concerns arise    Consider consult w/:  Nutrition    H&P/Admit info/ pertinent provider notes: (PMH noted above)  Chief Complaint:   Vertigo   History of Present Illness:  Omid Delcid is a 81 y.o. male with a PMH of HLD, vertigo, and cerebral arteriosclerosis with a cerebellar stroke history, presenting to the ED via ambulance with altered mental status since this morning. The patient states that this morning he woke up to use the bathroom around 6:30 and felt unwell. He went back to bed and woke up 2 hours later nauseous and could not stop dry heaving. Maria L Nettlesstevan notes that it is abnormal for him to sleep in that long, but denies any abnormal awakenings throughout the night. He states that he is feeling confused and dizzy. The patient's wife notes that he was feeling well yesterday, but did a lot more labor around the house and was likely dehydrated. He reports having cold intolerance and a loss in appetite. He denies any shortness of breath, chest pain, palpitations, fever, chills, and numbness. He denies any myalgias, headaches, loss of consciousness, changes in his vision and changes in his hearing. He denies any sick contacts, recent travel, or recent illnesses. He has been taking his medications as prescribed and denies any recent trauma. He denies any tobacco or alcohol use. He is allergic to iodinated contrast dye which gives him nausea. Nursing note:    Pt with urinary frequency. Voided small amt of sally urine followed by bright red blood from penis. Pt with c/o chills. Urine culture obtained sent to lab. At 0100 pt with rigors , temp to 102.4. . Telemetry [facility] , Provider, Ramiro Briggs came to evaluate at bedside. Sepsis protocol initiated. Blood cultures, procal and lactic sent to lab. Lungs clear, diminished at bases. Fluid bolus given tolerated well.  Pt became hypotensive to 65/40. Albumin given x1. Plan for transfer to ICU for further management     Per neuro:  Patient is a 81year old male with prior pertinent history of small punctate cerebellar infarct in the inferior vermis, HTN, HLD, CAD drug-eluting stent, DM, episodes of near syncope who initially presented on 10/26/2023 with dizziness, described as imbalance, when he would stand up starting at 9 am this am. When he is perfectly still he can make his symptoms resolve. These symptoms are reportedly similar to the last time when he was found to have a stroke. 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 Multifocal severe foraminal stenosis for which we will obtain an MRI C-spine. During exam he was non-focal NIHSS-0,   Etiology: suspect likely peripheral vs recrudescence of prior stroke symptoms vs less likely acute ischemic event, would recommend admitting under observation for stroke pathway. MRI brain/ MRI C-spine pending. Continue with Plavix 75 mg and Atorvastatin 40 mg daily. Remaining plan as below   Angel Theodore MD 10/26/23     Special Studies:  CTA 10/26/23  No acute intracranial abnormality. Mild cerebral chronic microangiopathic changes. No cervical or intracranial large vessel occlusion. No hemodynamically significant stenosis at the cervical carotid bifurcations. Advanced multifocal cervical spondylosis with severe C3-4 and moderate additional multilevel spinal stenosis. Multifocal severe foraminal stenosis. Follow-up MR imaging of the cervical spine is recommended to better assess the soft tissue contents of the   spinal canal.  Cxr 10/27/23:  Mild left basilar atelectasis. No focal consolidation, pleural effusion, or pneumothorax. MRI pending    Procalcitonin:   0.21  10/27   WBC:   20.51  10/27   6.58 10/26       Previous MBS:  none    Patient's goal: None stated    Did the pt report pain?   No  If yes, was nursing notified/was it addressed?   N/A    Reason for consult:  R/o aspiration  Determine safest and least restrictive diet  Change in mental status 2*  uti  Stroke protocol  Sepsis    Food Allergies: None   Current Diet: Regular   Premorbid diet: Regular   O2 requirement: 2 NC   Social/Prior living Home with spouse   Voice/Speech: WNL   Follows commands: Yes   Cognitive status: Alert and appropriate       Oral mech exam:  Dentition: Natural  Lips (VII): WNL  Tongue (XII): WNL  Secretion management: WNL

## 2023-10-27 NOTE — SEPSIS NOTE
Sepsis Note   Eduardo Trejo 81 y.o. male MRN: [id number]  Unit/Bed#: E4 -01 Encounter: [id number]       Initial Sepsis Screening       Row Name 10/27/23 0620                Is the patient's history suggestive of a new or worsening infection? Yes (Proceed)  -        Suspected source of infection urinary tract infection  -MH        Indicate SIRS criteria Hyperthemia > 38.3C (100.9F) OR Hypothermia <36C (96.8F); Tachypnea > 20 resp per min; Tachycardia > 90 bpm  -MH        Are two or more of the above signs & symptoms of infection both present and new to the patient? Yes (Proceed)  -        Assess for evidence of organ dysfunction: Are any of the below criteria present within 6 hours of suspected infection and SIRS criteria that are NOT considered to be chronic conditions? --                  User Key  (r) = Recorded By, (t) = Taken By, (c) = Cosigned By      [address] [place] [address] Name Provider Type    [address] [place] [address], MATTHEW Nurse Practitioner                        Body mass index is 27.42 kg/m².   Wt Readings from Last 1 Encounters:   10/26/23 81.8 kg (180 lb 5.4 oz)     IBW (Ideal Body Weight): 68.4 kg    Ideal body weight: 68.4 kg (150 lb 12.7 oz)  Adjusted ideal body weight: 73.8 kg (162 lb 9.8 oz)

## 2023-10-27 NOTE — PLAN OF CARE
Problem: MOBILITY - ADULT  Goal: Maintain or return to baseline ADL function  Description: INTERVENTIONS:  -  Assess patient's ability to carry out ADLs; assess patient's baseline for ADL function and identify physical deficits which impact ability to perform ADLs (bathing, care of mouth/teeth, toileting, grooming, dressing, etc.)  - Assess/evaluate cause of self-care deficits   - Assess range of motion  - Assess patient's mobility; develop plan if impaired  - Assess patient's need for assistive devices and provide as appropriate  - Encourage maximum independence but intervene and supervise when necessary  - Involve family in performance of ADLs  - Assess for home care needs following discharge   - Consider OT consult to assist with ADL evaluation and planning for discharge  - Provide patient education as appropriate  Outcome: Progressing  Goal: Maintains/Returns to pre admission functional level  Description: INTERVENTIONS:  - Perform BMAT or MOVE assessment daily.   - Set and communicate daily mobility goal to care team and patient/family/caregiver. - Collaborate with rehabilitation services on mobility goals if consulted  - Perform Range of Motion 4 times a day. - Reposition patient every 2 hours.   - Dangle patient 3 times a day  - Stand patient 3 times a day  - Ambulate patient 3 times a day  - Out of bed to chair 3 times a day   - Out of bed for meals 3 times a day  - Out of bed for toileting  - Record patient progress and toleration of activity level   Outcome: Progressing     Problem: Prexisting or High Potential for Compromised Skin Integrity  Goal: Skin integrity is maintained or improved  Description: INTERVENTIONS:  - Identify patients at risk for skin breakdown  - Assess and monitor skin integrity  - Assess and monitor nutrition and hydration status  - Monitor labs   - Assess for incontinence   - Turn and reposition patient  - Assist with mobility/ambulation  - Relieve pressure over bony prominences  - Avoid friction and shearing  - Provide appropriate hygiene as needed including keeping skin clean and dry  - Evaluate need for skin moisturizer/barrier cream  - Collaborate with interdisciplinary team   - Patient/family teaching  - Consider wound care consult   Outcome: Progressing     Problem: PAIN - ADULT  Goal: Verbalizes/displays adequate comfort level or baseline comfort level  Description: Interventions:  - Encourage patient to monitor pain and request assistance  - Assess pain using appropriate pain scale  - Administer analgesics based on type and severity of pain and evaluate response  - Implement non-pharmacological measures as appropriate and evaluate response  - Consider cultural and social influences on pain and pain management  - Notify physician/advanced practitioner if interventions unsuccessful or patient reports new pain  Outcome: Progressing     Problem: INFECTION - ADULT  Goal: Absence or prevention of progression during hospitalization  Description: INTERVENTIONS:  - Assess and monitor for signs and symptoms of infection  - Monitor lab/diagnostic results  - Monitor all insertion sites, i.e. indwelling lines, tubes, and drains  - Monitor endotracheal if appropriate and nasal secretions for changes in amount and color  - [place] appropriate cooling/warming therapies per order  - Administer medications as ordered  - Instruct and encourage patient and family to use good hand hygiene technique  - Identify and instruct in appropriate isolation precautions for identified infection/condition  Outcome: Progressing  Goal: Absence of fever/infection during neutropenic period  Description: INTERVENTIONS:  - Monitor WBC    Outcome: Progressing     Problem: SAFETY ADULT  Goal: Maintain or return to baseline ADL function  Description: INTERVENTIONS:  -  Assess patient's ability to carry out ADLs; assess patient's baseline for ADL function and identify physical deficits which impact ability to perform ADLs (bathing, care of mouth/teeth, toileting, grooming, dressing, etc.)  - Assess/evaluate cause of self-care deficits   - Assess range of motion  - Assess patient's mobility; develop plan if impaired  - Assess patient's need for assistive devices and provide as appropriate  - Encourage maximum independence but intervene and supervise when necessary  - Involve family in performance of ADLs  - Assess for home care needs following discharge   - Consider OT consult to assist with ADL evaluation and planning for discharge  - Provide patient education as appropriate  Outcome: Progressing  Goal: Maintains/Returns to pre admission functional level  Description: INTERVENTIONS:  - Perform BMAT or MOVE assessment daily.   - Set and communicate daily mobility goal to care team and patient/family/caregiver. - Collaborate with rehabilitation services on mobility goals if consulted  - Perform Range of Motion 4 times a day. - Reposition patient every 2 hours.   - Dangle patient 3 times a day  - Stand patient 3 times a day  - Ambulate patient 3 times a day  - Out of bed to chair 3 times a day   - Out of bed for meals 3 times a day  - Out of bed for toileting  - Record patient progress and toleration of activity level   Outcome: Progressing  Goal: Patient will remain free of falls  Description: INTERVENTIONS:  - Educate patient/family on patient safety including physical limitations  - Instruct patient to call for assistance with activity   - Consult OT/PT to assist with strengthening/mobility   - Keep Call bell within reach  - Keep bed low and locked with side rails adjusted as appropriate  - Keep care items and personal belongings within reach  - Initiate and maintain comfort rounds  - Make Fall Risk Sign visible to staff  - Offer Toileting every 2 Hours, in advance of need  - Initiate/Maintain bed alarm  - Obtain necessary fall risk management equipment:   - Apply yellow socks and bracelet for high fall risk patients  - Consider moving patient to room near nurses station  Outcome: Progressing     Problem: DISCHARGE PLANNING  Goal: Discharge to home or other facility with appropriate resources  Description: INTERVENTIONS:  - Identify barriers to discharge w/patient and caregiver  - Arrange for needed discharge resources and transportation as appropriate  - Identify discharge learning needs (meds, wound care, etc.)  - Arrange for interpretive services to assist at discharge as needed  - Refer to Case Management Department for coordinating discharge planning if the patient needs post-hospital services based on physician/advanced practitioner order or complex needs related to functional status, cognitive ability, or social support system  Outcome: Progressing     Problem: Knowledge Deficit  Goal: Patient/family/caregiver demonstrates understanding of disease process, treatment plan, medications, and discharge instructions  Description: Complete learning assessment and assess knowledge base.   Interventions:  - Provide teaching at level of understanding  - Provide teaching via preferred learning methods  Outcome: Progressing

## 2023-10-27 NOTE — SEPSIS NOTE
Sepsis Note   Liz Heart 81 y.o. male MRN: [id number]  Unit/Bed#: E4 -01 Encounter: [id number]       Initial Sepsis Screening       Row Name 10/27/23 0620                Is the patient's history suggestive of a new or worsening infection? Yes (Proceed)  -        Suspected source of infection urinary tract infection  -MH        Indicate SIRS criteria Hyperthemia > 38.3C (100.9F) OR Hypothermia <36C (96.8F); Tachypnea > 20 resp per min; Tachycardia > 90 bpm  -MH        Are two or more of the above signs & symptoms of infection both present and new to the patient? Yes (Proceed)  -        Assess for evidence of organ dysfunction: Are any of the below criteria present within 6 hours of suspected infection and SIRS criteria that are NOT considered to be chronic conditions? --                  User Key  (r) = Recorded By, (t) = Taken By, (c) = Cosigned By      [address] [place] [address] Provider Type    [address] [place] [address] Nurse Practitioner                    Default Flowsheet Data (last 720 hours)       Sepsis Reassess       [address] [place] [address] Road Name 10/27/23 3149                   Repeat Volume Status and Tissue Perfusion Assessment Performed    Date of Reassessment: 10/27/23  -        Time of Reassessment: 0621 -        Sepsis Reassessment Note: Click "NEXT" below (NOT "close") to generate sepsis reassessment note. --        Repeat Volume Status and Tissue Perfusion Assessment Performed --                  User Key  (r) = Recorded By, (t) = Taken By, (c) = Cosigned By      [address] [place] [address] Provider Type    [address] [place] [address] Nurse Practitioner                    Body mass index is 27.42 kg/m².   Wt Readings from Last 1 Encounters:   10/26/23 81.8 kg (180 lb 5.4 oz)     IBW (Ideal Body Weight): 68.4 kg    Ideal body weight: 68.4 kg (150 lb 12.7 oz)  Adjusted ideal body weight: 73.8 kg (162 lb 9.8 oz)

## 2023-10-27 NOTE — QUICK NOTE
RN reports end of void hematuria, resolved on its own   History of BPH with obstruction  Hg 13.7. Creat 0.8; both at baseline  Will check bladder scan, PVR and UA      ADDENDUM:  Notified by RN of rigors and fever 102.4F, also with tachycardia and tachypnea. Meets sepsis criteria   Had episode of post-void hematuria today. UA was ordered and confirmed UTI with large leuks, positive nitrites, innumerable RBC/WBC  Sepsis alert called  Will check lactate, PCT, blood cultures  IV fluid resuscitation x3L. Ceftriaxone 2 g x 1 followed by Ceftriaxone 1 g daily  Urinary retention protocol  History of BPH with obstruction, will ask urology to evaluate  Wife reports rigors at home this morning, unclear if he had fever. Admitted under stroke pathway. Symptoms likely 2/2 UTI/sepsis and not neurologic in nature. Will d/w neurology for discontinuation of stroke pathway/MRI/ECHO    Addendum: Septic shock with hypotension despite resuscitation. Will discuss with ICU    Patient evaluated by ICU AP. Plan for xfer to ICU although delayed due to staffing  Continue close BP monitoring.  Will add one dose of IV albumin 5%

## 2023-10-27 NOTE — ASSESSMENT & PLAN NOTE
81-year-old female initially managed as a case of septic shock secondary to UTI secondary to left nephrolithiasis. Antibiotic management per infectious disease. Currently on vancomycin with pharmacy assistance  Appreciate urology recommendations.   Patient is status post left cystoscopy retrograde pyelogram with insertion of stent ureteral.

## 2023-10-27 NOTE — ASSESSMENT & PLAN NOTE
Unclear etiology, present suddenly when waking up on 10/26.  Says he feels "off balance"    Plan  Continue stroke pathway started on med/surg  Follow MRI  Follow TTE results normal...

## 2023-10-27 NOTE — PLAN OF CARE
Problem: OCCUPATIONAL THERAPY ADULT  Goal: Performs self-care activities at highest level of function for planned discharge setting. See evaluation for individualized goals. Description: Treatment Interventions: ADL retraining, Functional transfer training, UE strengthening/ROM, Endurance training, Patient/family training, Equipment evaluation/education, Compensatory technique education          See flowsheet documentation for full assessment, interventions and recommendations. Note: Limitation: Decreased ADL status, Decreased UE strength, Decreased Safe judgement during ADL, Decreased endurance, Decreased high-level ADLs  Prognosis: Good  Assessment: Pt is a 87y/o male admitted to the hospital 2* symptoms of altered mental status, nausea; CTA=neg acute findings. Pt noted with septic shock, vertigo. Pt with PMH basal cell CA, NSTEMI, PNA, polymyalgia rheumatica, cardiac stent. PTA pt states independence with all aspects of his ADLs, transfers, ambulation--w/o device; neg falls, +. During initial eval, pt demonstrated slight deficits with his functional balance, functional mobility, ADL status, transfers safety, and activity tolerance. Pt would benefit from continued OT tx for the above deficits. 2-3xwk/1-2wks. The patient's raw score on the AM-PAC Daily Activity Inpatient Short Form is 21. A raw score of greater than or equal to 19 suggests the patient may benefit from discharge to home. Please refer to the recommendation of the Occupational Therapist for safe discharge planning.      OT Discharge Recommendation: Home with home health rehabilitation

## 2023-10-27 NOTE — OCCUPATIONAL THERAPY NOTE
Occupational Therapy Evaluation     Patient Name: Annabelle Lopez  UWZJK'T Date: 10/27/2023  Problem List  Principal Problem:    Septic shock ([address])  Active Problems:    BPH with obstruction/lower urinary tract symptoms    Hyperlipidemia    Primary osteoarthritis of both hands    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    Narrow angle of anterior chamber of left eye    Vertigo    Hematuria    Acute cystitis with hematuria    Acute respiratory failure with hypoxia ([address])    Toxic metabolic encephalopathy    Past Medical History  Past Medical History:   Diagnosis Date    Basal cell carcinoma     Cancer ([address])     NSTEMI (non-ST elevated myocardial infarction) ([address]) 2014    Pneumonia     Last Assessed:  10/7/14    Polymyalgia rheumatica ([address])     Last Assessed:  11/23/12    Polymyalgia rheumatica ([address]) 2011    Squamous cell skin cancer 05/24/2022    left frontal scalp anterior    Squamous cell skin cancer 05/24/2022    Vertex of scalp     Past Surgical History  Past Surgical History:   Procedure Laterality Date    APPENDECTOMY      CORONARY ANGIOPLASTY WITH STENT PLACEMENT      HERNIA REPAIR  2000    MOHS SURGERY  07/27/2022    left frontal scalp and vertex of scalp           10/27/23 1442   Note Type   Note type Evaluation   Pain Assessment   Pain Assessment Tool FLACC   Pain Rating: FLACC (Rest) - Face 0   Pain Rating: FLACC (Rest) - Legs 0   Pain Rating: FLACC (Rest) - Activity 0   Pain Rating: FLACC (Rest) - Cry 0   Pain Rating: FLACC (Rest) - Consolability 0   Score: FLACC (Rest) 0   Restrictions/Precautions   Weight Bearing Precautions Per Order No   Other Precautions Chair Alarm; Bed Alarm; Impulsive; Fall Risk;Multiple lines;Telemetry;O2   Home Living   Type of Home House   Home Layout Two level   Bathroom Shower/Tub Tub/shower unit   Bathroom Toilet Raised   Bathroom Equipment Grab bars in [address] Walker;Cane   Prior Function   Lives With Spouse   Falls in the last 6 months 0   Lifestyle   Autonomy PTA pt states independence with all aspects of his ADLs, transfers, ambulation--w/o device; neg falls, +   Reciprocal Relationships supportive SO   Service to Others worked for [employer], working out   Subjective   Subjective "I just don't sit around."   ADL   Where Assessed Edge of bed   Eating Assistance 6  Modified independent   Grooming Assistance 6  [address] [facility] 5  Supervision/Setup   LB Bathing Assistance 4  [address] 5  Supervision/Setup   LB Dressing Assistance 4  [address]  5  Supervision/Setup   Bed Mobility   Rolling R 5  Supervision   Rolling L 5  Supervision   Supine to Sit 4  Minimal assistance   Additional items Assist x 1; Increased time required;LE management;Verbal cues   Transfers   Sit to Stand 4  Minimal assistance   Additional items Assist x 1; Increased time required;Verbal cues   Stand to Sit 4  Minimal assistance   Additional items Assist x 1; Increased time required;Verbal cues   Functional Mobility   Functional Mobility 4  Minimal assistance   Additional Comments x1   Additional items Rolling walker   Balance   Static Sitting Fair +   Dynamic Sitting Fair -   Static Standing Fair -   Dynamic Standing Poor +   Activity Tolerance   Activity Tolerance Patient limited by fatigue   Medical Staff Made Aware nsg, P.T.   RUE Assessment   RUE Assessment WFL   RUE Strength   RUE Overall Strength Within Functional Limits - able to perform ADL tasks with strength  (4/5 throughout)   LUE Assessment   LUE Assessment WFL   LUE Strength   LUE Overall Strength Within Functional Limits - able to perform ADL tasks with strength  (4/5 throughout)   Hand Function   Gross Motor Coordination Functional   Fine Motor Coordination Functional   Sensation   Light Touch No apparent deficits   Proprioception   Proprioception No apparent deficits Vision-Basic Assessment   Current Vision   (glasses)   Vision - Complex Assessment   Acuity Able to read clock/calendar on wall without difficulty   Psychosocial   Psychosocial (WDL) WDL   Perception   Inattention/Neglect Appears intact   Cognition   Overall Cognitive Status WFL   Arousal/Participation Alert   Attention Within functional limits   Orientation Level Oriented X4   Memory Within functional limits   Following Commands Follows one step commands without difficulty   Assessment   Limitation Decreased ADL status; Decreased UE strength;Decreased Safe judgement during ADL;Decreased endurance;Decreased high-level ADLs   Prognosis Good   Assessment Pt is a 82y/o male admitted to the hospital 2* symptoms of altered mental status, nausea; CTA=neg acute findings. Pt noted with septic shock, vertigo. Pt with PMH basal cell CA, NSTEMI, PNA, polymyalgia rheumatica, cardiac stent. PTA pt states independence with all aspects of his ADLs, transfers, ambulation--w/o device; neg falls, +. During initial eval, pt demonstrated slight deficits with his functional balance, functional mobility, ADL status, transfers safety, and activity tolerance. Pt would benefit from continued OT tx for the above deficits. 2-3xwk/1-2wks. The patient's raw score on the AM-PAC Daily Activity Inpatient Short Form is 21. A raw score of greater than or equal to 19 suggests the patient may benefit from discharge to home. Please refer to the recommendation of the Occupational Therapist for safe discharge planning. Goals   Patient Goals "to go home"   STG Time Frame   (1-5 days)   Short Term Goal #1 Pt will demonstrate improved activity tolerance to good(20-30mins) and standing tolerance to 3-5mins to assist with ADLs. Short Term Goal #2 Pt will demonstrate proper walker/transfer safety 100% of the time. Short Term Goal  Pt will demonstrate mod I with their sit-stand transfers to assist with completion of their LE dressing.    LTG Time Frame   (5-10days)   Long Term Goal #1 Pt will demonstrate mod I with their UE and LE bathing/dresssing. Long Term Goal #2 Pt will demonstrate g/g- balance with all functional activities. Long Term Goal Pt will independently verbalize 2-3 potential fall risks/transfer safety hazards and their appropriate compensation techniques. Plan   Treatment Interventions ADL retraining;Functional transfer training;UE strengthening/ROM; Endurance training;Patient/family training;Equipment evaluation/education; Compensatory technique education   Goal Expiration Date 11/10/23   OT Treatment Day 0   OT Frequency 2-3x/wk   Discharge Recommendation   OT Discharge Recommendation Home with home health rehabilitation   AM-PAC Daily Activity Inpatient   Lower Body Dressing 3   Bathing 3   Toileting 3   Upper Body Dressing 4   Grooming 4   Eating 4   Daily Activity Raw Score 21   Daily Activity Standardized Score (Calc for Raw Score >=11) 44.27   AM-PAC Applied Cognition Inpatient   Following a Speech/Presentation 4   Understanding Ordinary Conversation 4   Taking Medications 4   Remembering Where Things Are Placed or Put Away 4   Remembering List of 4-5 Errands 3   Taking Care of Complicated Tasks 3   Applied Cognition Raw Score 22   Applied Cognition Standardized Score 47.83   Tivis Gentle

## 2023-10-27 NOTE — PLAN OF CARE
Problem: PHYSICAL THERAPY ADULT  Goal: Performs mobility at highest level of function for planned discharge setting. See evaluation for individualized goals. Description: Treatment/Interventions: Functional transfer training, LE strengthening/ROM, Elevations, Therapeutic exercise, Endurance training, Patient/family training, Equipment eval/education, Bed mobility, Gait training, Compensatory technique education, Continued evaluation, Spoke to nursing, OT, Family  Equipment Recommended: Other (Comment) (monitor)       See flowsheet documentation for full assessment, interventions and recommendations. Note: Prognosis: Good  Problem List: Decreased strength, Decreased endurance, Impaired balance, Decreased mobility, Decreased cognition, Impaired judgement, Decreased safety awareness  Assessment: Samantha Phillips is a 81 y.o. male with a PMH of HLD, vertigo, and cerebral arteriosclerosis with a cerebellar stroke history, presenting to the ED via ambulance with altered mental status. Septic shock 2* UTI. PT consulted. [facility] to admission [facility]. Very independent at baseline. Ambulates without AD, still completes yardwork, drives and performs regular exercise. No hx of falls. Currently presents with functional limitations related to hospitalization with decreased general strength, balance, activity tolerance, functional mobility, safety awareness and locomotion requiring more restrictive AD than baseline and unsteady. No dizziness throughout session reported as was previous to admission. /67 and on 2L 96%. Given impairments with functional decline from baseline will benefit from skilled PT in order to facilitate return to PLOF. The patient's AM-PAC Basic Mobility Inpatient Short Form Raw Score is 18. A Raw score of greater than 16 suggests the patient may benefit from discharge to home.  Please also refer to the recommendation of the Physical Therapist for safe discharge planning. Anticipate abiltiy to return home with OPPT pending achievement of IPPT mobility goals for safe transition to home. PT will follow and progress per POC 3-5x/wk. Barriers to Discharge: Inaccessible home environment  Barriers to Discharge Comments: stairs  PT Discharge Recommendation: Home with outpatient rehabilitation (pending progress and ability to acheive mobility goals as inpatient.)    See flowsheet documentation for full assessment.

## 2023-10-27 NOTE — ASSESSMENT & PLAN NOTE
Overnight 10/26-10/27 became febrile with Tmax 102.4 and hypotensive not responding to 30 cc/kg fluid bolus. Also with tachypnea and tachycardia. Lactic acid, procalcitonin normal.  UA with large leukocytes, positive nitrites, innumerable WBCs. Chest x-ray with what looks to be vascular congestion on my review    Plan  Maintain MAP greater than 65  Continue ceftriaxone 2 g daily  Wean vasopressor support as tolerated. Currently on 7 of norepinephrine.   Follow blood and urine cultures

## 2023-10-27 NOTE — ASSESSMENT & PLAN NOTE
Requiring 2 L nasal cannula to maintain saturations greater than 90%.   Likely secondary to tachypnea and mild volume overload  Wean as tolerated

## 2023-10-27 NOTE — NURSING NOTE
Pt with urinary frequency. Voided small amt of sally urine followed by bright red blood from penis. Pt with c/o chills. Urine culture obtained sent to lab. At 0100 pt with rigors , temp to 102.4. . Telemetry . [facility] , Provider, Noris Dueñas came to evaluate at bedside. Sepsis protocol initiated. Blood cultures, procal and lactic sent to lab. Lungs clear, diminished at bases. Fluid bolus given tolerated well. Pt became hypotensive to 65/40. Albumin given x1.  Plan for transfer to ICU for further management

## 2023-10-28 ENCOUNTER — APPOINTMENT (INPATIENT)
Dept: CT IMAGING | Facility: HOSPITAL | Age: 88
DRG: 853 | End: 2023-10-28
Payer: MEDICARE

## 2023-10-28 PROBLEM — R93.89 ABNORMAL ULTRASOUND: Status: ACTIVE | Noted: 2023-10-28

## 2023-10-28 PROBLEM — R31.9 HEMATURIA: Status: RESOLVED | Noted: 2023-10-27 | Resolved: 2023-10-28

## 2023-10-28 PROBLEM — R78.81 POSITIVE BLOOD CULTURE: Status: ACTIVE | Noted: 2023-10-28

## 2023-10-28 PROBLEM — G92.8 TOXIC METABOLIC ENCEPHALOPATHY: Status: RESOLVED | Noted: 2023-10-27 | Resolved: 2023-10-28

## 2023-10-28 PROBLEM — D64.89 OTHER SPECIFIED ANEMIAS: Status: ACTIVE | Noted: 2023-10-28

## 2023-10-28 LAB
ANION GAP SERPL CALCULATED.3IONS-SCNC: 4 MMOL/L
BASOPHILS # BLD AUTO: 0.03 THOUSANDS/ÂΜL (ref 0–0.1)
BASOPHILS NFR BLD AUTO: 0 % (ref 0–1)
BUN SERPL-MCNC: 21 MG/DL (ref 5–25)
CALCIUM SERPL-MCNC: 7.7 MG/DL (ref 8.4–10.2)
CHLORIDE SERPL-SCNC: 107 MMOL/L (ref 96–108)
CO2 SERPL-SCNC: 25 MMOL/L (ref 21–32)
CREAT SERPL-MCNC: 0.81 MG/DL (ref 0.6–1.3)
EOSINOPHIL # BLD AUTO: 0 THOUSAND/ÂΜL (ref 0–0.61)
EOSINOPHIL NFR BLD AUTO: 0 % (ref 0–6)
ERYTHROCYTE [DISTWIDTH] IN BLOOD BY AUTOMATED COUNT: 15 % (ref 11.6–15.1)
GFR SERPL CREATININE-BSD FRML MDRD: 79 ML/MIN/1.73SQ M
GLUCOSE SERPL-MCNC: 88 MG/DL (ref 65–140)
HCT VFR BLD AUTO: 36.6 % (ref 36.5–49.3)
HGB BLD-MCNC: 11.6 G/DL (ref 12–17)
IMM GRANULOCYTES # BLD AUTO: 0.03 THOUSAND/UL (ref 0–0.2)
IMM GRANULOCYTES NFR BLD AUTO: 0 % (ref 0–2)
LYMPHOCYTES # BLD AUTO: 0.69 THOUSANDS/ÂΜL (ref 0.6–4.47)
LYMPHOCYTES NFR BLD AUTO: 7 % (ref 14–44)
MAGNESIUM SERPL-MCNC: 1.7 MG/DL (ref 1.9–2.7)
MCH RBC QN AUTO: 27.2 PG (ref 26.8–34.3)
MCHC RBC AUTO-ENTMCNC: 31.7 G/DL (ref 31.4–37.4)
MCV RBC AUTO: 86 FL (ref 82–98)
MONOCYTES # BLD AUTO: 0.73 THOUSAND/ÂΜL (ref 0.17–1.22)
MONOCYTES NFR BLD AUTO: 7 % (ref 4–12)
NEUTROPHILS # BLD AUTO: 9.2 THOUSANDS/ÂΜL (ref 1.85–7.62)
NEUTS SEG NFR BLD AUTO: 86 % (ref 43–75)
NRBC BLD AUTO-RTO: 0 /100 WBCS
PLATELET # BLD AUTO: 160 THOUSANDS/UL (ref 149–390)
PMV BLD AUTO: 10.1 FL (ref 8.9–12.7)
POTASSIUM SERPL-SCNC: 3.7 MMOL/L (ref 3.5–5.3)
RBC # BLD AUTO: 4.27 MILLION/UL (ref 3.88–5.62)
SODIUM SERPL-SCNC: 136 MMOL/L (ref 135–147)
WBC # BLD AUTO: 10.68 THOUSAND/UL (ref 4.31–10.16)

## 2023-10-28 PROCEDURE — G1004 CDSM NDSC: HCPCS

## 2023-10-28 PROCEDURE — 83735 ASSAY OF MAGNESIUM: CPT | Performed by: INTERNAL MEDICINE

## 2023-10-28 PROCEDURE — 85025 COMPLETE CBC W/AUTO DIFF WBC: CPT | Performed by: INTERNAL MEDICINE

## 2023-10-28 PROCEDURE — 80048 BASIC METABOLIC PNL TOTAL CA: CPT | Performed by: INTERNAL MEDICINE

## 2023-10-28 PROCEDURE — NC001 PR NO CHARGE: Performed by: INTERNAL MEDICINE

## 2023-10-28 PROCEDURE — 74176 CT ABD & PELVIS W/O CONTRAST: CPT

## 2023-10-28 PROCEDURE — 99223 1ST HOSP IP/OBS HIGH 75: CPT | Performed by: UROLOGY

## 2023-10-28 PROCEDURE — 99232 SBSQ HOSP IP/OBS MODERATE 35: CPT | Performed by: INTERNAL MEDICINE

## 2023-10-28 RX ORDER — MAGNESIUM SULFATE HEPTAHYDRATE 40 MG/ML
2 INJECTION, SOLUTION INTRAVENOUS ONCE
Status: COMPLETED | OUTPATIENT
Start: 2023-10-28 | End: 2023-10-28

## 2023-10-28 RX ORDER — CEFAZOLIN SODIUM 2 G/50ML
2000 SOLUTION INTRAVENOUS EVERY 8 HOURS
Status: DISCONTINUED | OUTPATIENT
Start: 2023-10-28 | End: 2023-10-28

## 2023-10-28 RX ORDER — POTASSIUM CHLORIDE 20 MEQ/1
40 TABLET, EXTENDED RELEASE ORAL ONCE
Status: COMPLETED | OUTPATIENT
Start: 2023-10-28 | End: 2023-10-28

## 2023-10-28 RX ORDER — POLYETHYLENE GLYCOL 3350 17 G/17G
17 POWDER, FOR SOLUTION ORAL DAILY
Status: DISCONTINUED | OUTPATIENT
Start: 2023-10-28 | End: 2023-11-01 | Stop reason: HOSPADM

## 2023-10-28 RX ORDER — DOPAMINE HYDROCHLORIDE 160 MG/100ML
10 INJECTION, SOLUTION INTRAVENOUS CONTINUOUS
Status: DISCONTINUED | OUTPATIENT
Start: 2023-10-28 | End: 2023-10-28

## 2023-10-28 RX ORDER — MIDODRINE HYDROCHLORIDE 5 MG/1
10 TABLET ORAL
Status: DISCONTINUED | OUTPATIENT
Start: 2023-10-28 | End: 2023-10-28

## 2023-10-28 RX ADMIN — POTASSIUM CHLORIDE 40 MEQ: 1500 TABLET, EXTENDED RELEASE ORAL at 06:39

## 2023-10-28 RX ADMIN — CHLORHEXIDINE GLUCONATE 15 ML: 1.2 RINSE ORAL at 22:00

## 2023-10-28 RX ADMIN — CHLORHEXIDINE GLUCONATE 15 ML: 1.2 RINSE ORAL at 08:23

## 2023-10-28 RX ADMIN — ENOXAPARIN SODIUM 40 MG: 40 INJECTION SUBCUTANEOUS at 08:23

## 2023-10-28 RX ADMIN — POLYETHYLENE GLYCOL 3350 17 G: 17 POWDER, FOR SOLUTION ORAL at 08:23

## 2023-10-28 RX ADMIN — GLYCERIN 2 DROP: .002; .002; .01 SOLUTION/ DROPS OPHTHALMIC at 21:27

## 2023-10-28 RX ADMIN — CEFAZOLIN SODIUM 2000 MG: 2 SOLUTION INTRAVENOUS at 13:47

## 2023-10-28 RX ADMIN — GLYCERIN 2 DROP: .002; .002; .01 SOLUTION/ DROPS OPHTHALMIC at 08:26

## 2023-10-28 RX ADMIN — SODIUM CHLORIDE, SODIUM GLUCONATE, SODIUM ACETATE, POTASSIUM CHLORIDE, MAGNESIUM CHLORIDE, SODIUM PHOSPHATE, DIBASIC, AND POTASSIUM PHOSPHATE 100 ML/HR: .53; .5; .37; .037; .03; .012; .00082 INJECTION, SOLUTION INTRAVENOUS at 04:59

## 2023-10-28 RX ADMIN — VANCOMYCIN HYDROCHLORIDE 1750 MG: 1 INJECTION, POWDER, LYOPHILIZED, FOR SOLUTION INTRAVENOUS at 16:08

## 2023-10-28 RX ADMIN — MAGNESIUM SULFATE 2 G: 2 INJECTION INTRAVENOUS at 06:39

## 2023-10-28 RX ADMIN — MIDODRINE HYDROCHLORIDE 5 MG: 5 TABLET ORAL at 06:39

## 2023-10-28 RX ADMIN — CLOPIDOGREL BISULFATE 75 MG: 75 TABLET ORAL at 08:23

## 2023-10-28 RX ADMIN — ATORVASTATIN CALCIUM 40 MG: 40 TABLET, FILM COATED ORAL at 18:53

## 2023-10-28 NOTE — PLAN OF CARE
Problem: MOBILITY - ADULT  Goal: Maintain or return to baseline ADL function  Description: INTERVENTIONS:  -  Assess patient's ability to carry out ADLs; assess patient's baseline for ADL function and identify physical deficits which impact ability to perform ADLs (bathing, care of mouth/teeth, toileting, grooming, dressing, etc.)  - Assess/evaluate cause of self-care deficits   - Assess range of motion  - Assess patient's mobility; develop plan if impaired  - Assess patient's need for assistive devices and provide as appropriate  - Encourage maximum independence but intervene and supervise when necessary  - Involve family in performance of ADLs  - Assess for home care needs following discharge   - Consider OT consult to assist with ADL evaluation and planning for discharge  - Provide patient education as appropriate  Outcome: Progressing  Goal: Maintains/Returns to pre admission functional level  Description: INTERVENTIONS:  - Perform BMAT or MOVE assessment daily.   - Set and communicate daily mobility goal to care team and patient/family/caregiver. - Collaborate with rehabilitation services on mobility goals if consulted  - Perform Range of Motion 4 times a day. - Reposition patient every 2 hours.   - Dangle patient 3 times a day  - Stand patient 3 times a day  - Ambulate patient 3 times a day  - Out of bed to chair 3 times a day   - Out of bed for meals 3 times a day  - Out of bed for toileting  - Record patient progress and toleration of activity level   Outcome: Progressing     Problem: Prexisting or High Potential for Compromised Skin Integrity  Goal: Skin integrity is maintained or improved  Description: INTERVENTIONS:  - Identify patients at risk for skin breakdown  - Assess and monitor skin integrity  - Assess and monitor nutrition and hydration status  - Monitor labs   - Assess for incontinence   - Turn and reposition patient  - Assist with mobility/ambulation  - Relieve pressure over bony prominences  - Avoid friction and shearing  - Provide appropriate hygiene as needed including keeping skin clean and dry  - Evaluate need for skin moisturizer/barrier cream  - Collaborate with interdisciplinary team   - Patient/family teaching  - Consider wound care consult   Outcome: Progressing     Problem: PAIN - ADULT  Goal: Verbalizes/displays adequate comfort level or baseline comfort level  Description: Interventions:  - Encourage patient to monitor pain and request assistance  - Assess pain using appropriate pain scale  - Administer analgesics based on type and severity of pain and evaluate response  - Implement non-pharmacological measures as appropriate and evaluate response  - Consider cultural and social influences on pain and pain management  - Notify physician/advanced practitioner if interventions unsuccessful or patient reports new pain  Outcome: Progressing     Problem: INFECTION - ADULT  Goal: Absence or prevention of progression during hospitalization  Description: INTERVENTIONS:  - Assess and monitor for signs and symptoms of infection  - Monitor lab/diagnostic results  - Monitor all insertion sites, i.e. indwelling lines, tubes, and drains  - Monitor endotracheal if appropriate and nasal secretions for changes in amount and color  - [place] appropriate cooling/warming therapies per order  - Administer medications as ordered  - Instruct and encourage patient and family to use good hand hygiene technique  - Identify and instruct in appropriate isolation precautions for identified infection/condition  Outcome: Progressing  Goal: Absence of fever/infection during neutropenic period  Description: INTERVENTIONS:  - Monitor WBC    Outcome: Progressing     Problem: SAFETY ADULT  Goal: Maintain or return to baseline ADL function  Description: INTERVENTIONS:  -  Assess patient's ability to carry out ADLs; assess patient's baseline for ADL function and identify physical deficits which impact ability to perform ADLs (bathing, care of mouth/teeth, toileting, grooming, dressing, etc.)  - Assess/evaluate cause of self-care deficits   - Assess range of motion  - Assess patient's mobility; develop plan if impaired  - Assess patient's need for assistive devices and provide as appropriate  - Encourage maximum independence but intervene and supervise when necessary  - Involve family in performance of ADLs  - Assess for home care needs following discharge   - Consider OT consult to assist with ADL evaluation and planning for discharge  - Provide patient education as appropriate  Outcome: Progressing  Goal: Maintains/Returns to pre admission functional level  Description: INTERVENTIONS:  - Perform BMAT or MOVE assessment daily.   - Set and communicate daily mobility goal to care team and patient/family/caregiver. - Collaborate with rehabilitation services on mobility goals if consulted  - Perform Range of Motion 4 times a day. - Reposition patient every 2 hours.   - Dangle patient 3 times a day  - Stand patient 3 times a day  - Ambulate patient 3 times a day  - Out of bed to chair 3 times a day   - Out of bed for meals 3 times a day  - Out of bed for toileting  - Record patient progress and toleration of activity level   Outcome: Progressing  Goal: Patient will remain free of falls  Description: INTERVENTIONS:  - Educate patient/family on patient safety including physical limitations  - Instruct patient to call for assistance with activity   - Consult OT/PT to assist with strengthening/mobility   - Keep Call bell within reach  - Keep bed low and locked with side rails adjusted as appropriate  - Keep care items and personal belongings within reach  - Initiate and maintain comfort rounds  - Make Fall Risk Sign visible to staff  - Offer Toileting every 2 Hours, in advance of need  - Initiate/Maintain bed alarm  - Obtain necessary fall risk management equipment:   - Apply yellow socks and bracelet for high fall risk patients  - Consider moving patient to room near nurses station  Outcome: Progressing     Problem: DISCHARGE PLANNING  Goal: Discharge to home or other facility with appropriate resources  Description: INTERVENTIONS:  - Identify barriers to discharge w/patient and caregiver  - Arrange for needed discharge resources and transportation as appropriate  - Identify discharge learning needs (meds, wound care, etc.)  - Arrange for interpretive services to assist at discharge as needed  - Refer to Case Management Department for coordinating discharge planning if the patient needs post-hospital services based on physician/advanced practitioner order or complex needs related to functional status, cognitive ability, or social support system  Outcome: Progressing     Problem: Knowledge Deficit  Goal: Patient/family/caregiver demonstrates understanding of disease process, treatment plan, medications, and discharge instructions  Description: Complete learning assessment and assess knowledge base.   Interventions:  - Provide teaching at level of understanding  - Provide teaching via preferred learning methods  Outcome: Progressing

## 2023-10-28 NOTE — ASSESSMENT & PLAN NOTE
10/27 2/2 blood cultures positive for Staphylococcus epidermidis. Given septic shock and both sets are positive, this is possibly a true infection.   10/27 TTE without evidence of vegetations    We will consider infectious disease consult  Repeat blood cultures tomorrow 10/29, 48 hours after initial set  Antibiotics as above

## 2023-10-28 NOTE — ASSESSMENT & PLAN NOTE
Lab Results   Component Value Date    HGB 11.6 (L) 10/28/2023    HGB 11.2 (L) 10/27/2023    HGB 13.7 10/26/2023     Most likely secondary to hemodilution and critical illness

## 2023-10-28 NOTE — ASSESSMENT & PLAN NOTE
Overnight 10/26-10/27 became febrile with Tmax 102.4 and hypotensive not responding to 30 cc/kg fluid bolus. Also with tachypnea and tachycardia. Lactic acid, procalcitonin normal.  UA with large leukocytes, positive nitrites, innumerable WBCs. Chest x-ray with what looks to be vascular congestion on my review. 2/2 blood cultures positive for Staphylococcus epidermidis - given septic shock and both sets are positive this is possibly true infection    Plan  Maintain MAP greater than 65  Continue ceftriaxone 2 g daily  Now off of vasopressors. Stop midodrine.   Follow blood and urine cultures  Staph epi in both the urine and the blood  We will consider infectious disease consult  Plan for left urethral stent placement  If blood pressures remain stable later today can likely downgrade out of ICU

## 2023-10-28 NOTE — ASSESSMENT & PLAN NOTE
Ultrasound kidney and bladder is pending official review, however findings as listed by tech showing possible nephrolithiasis near the UVJ. CTAP: 6 mm distal left ureteral calculus located 1 cm proximal to the left UVJ. Two adjacent 4 mm left bladder calculi possibly located within the left UVJ     8 mm dependent bladder calculus. No hydronephrosis. Small bibasilar pleural effusions. Potential mild pulmonary edema. Large quantity of rectal vault stool.     Plan:  Appreciate urology's assistance with his care  Plan for left ureteral stent today

## 2023-10-28 NOTE — ASSESSMENT & PLAN NOTE
Unclear etiology, present suddenly when waking up on 10/26.  Says he feels "off balance"    Plan  Continue stroke pathway started on med/surg  Follow MRI -we will need further discussion with neurology if this is needed or if it is more likely recrudescence of old stroke given septic shock

## 2023-10-28 NOTE — ASSESSMENT & PLAN NOTE
Overnight 10/26-10/27 became febrile with Tmax 102.4 and hypotensive not responding to 30 cc/kg fluid bolus. Also with tachypnea and tachycardia. Lactic acid, procalcitonin normal.  UA with large leukocytes, positive nitrites, innumerable WBCs. Chest x-ray with what looks to be vascular congestion on my review. 2/2 blood cultures positive for Staphylococcus epidermidis - given septic shock and both sets are positive this is possibly true infection    Plan  Maintain MAP greater than 65  Continue ceftriaxone 2 g daily  Now off of vasopressors. Stop midodrine.   Follow blood and urine cultures  We will consider infectious disease consult  If blood pressures remain stable later today can likely downgrade out of ICU

## 2023-10-28 NOTE — PLAN OF CARE
Problem: MOBILITY - ADULT  Goal: Maintain or return to baseline ADL function  Description: INTERVENTIONS:  -  Assess patient's ability to carry out ADLs; assess patient's baseline for ADL function and identify physical deficits which impact ability to perform ADLs (bathing, care of mouth/teeth, toileting, grooming, dressing, etc.)  - Assess/evaluate cause of self-care deficits   - Assess range of motion  - Assess patient's mobility; develop plan if impaired  - Assess patient's need for assistive devices and provide as appropriate  - Encourage maximum independence but intervene and supervise when necessary  - Involve family in performance of ADLs  - Assess for home care needs following discharge   - Consider OT consult to assist with ADL evaluation and planning for discharge  - Provide patient education as appropriate  Outcome: Progressing  Goal: Maintains/Returns to pre admission functional level  Description: INTERVENTIONS:  - Perform BMAT or MOVE assessment daily.   - Set and communicate daily mobility goal to care team and patient/family/caregiver. - Collaborate with rehabilitation services on mobility goals if consulted  - Perform Range of Motion 4 times a day. - Reposition patient every 2 hours.   - Dangle patient 3 times a day  - Stand patient 3 times a day  - Ambulate patient 3 times a day  - Out of bed to chair 3 times a day   - Out of bed for meals 3 times a day  - Out of bed for toileting  - Record patient progress and toleration of activity level   Outcome: Progressing     Problem: Prexisting or High Potential for Compromised Skin Integrity  Goal: Skin integrity is maintained or improved  Description: INTERVENTIONS:  - Identify patients at risk for skin breakdown  - Assess and monitor skin integrity  - Assess and monitor nutrition and hydration status  - Monitor labs   - Assess for incontinence   - Turn and reposition patient  - Assist with mobility/ambulation  - Relieve pressure over bony prominences  - Avoid friction and shearing  - Provide appropriate hygiene as needed including keeping skin clean and dry  - Evaluate need for skin moisturizer/barrier cream  - Collaborate with interdisciplinary team   - Patient/family teaching  - Consider wound care consult   Outcome: Progressing     Problem: PAIN - ADULT  Goal: Verbalizes/displays adequate comfort level or baseline comfort level  Description: Interventions:  - Encourage patient to monitor pain and request assistance  - Assess pain using appropriate pain scale  - Administer analgesics based on type and severity of pain and evaluate response  - Implement non-pharmacological measures as appropriate and evaluate response  - Consider cultural and social influences on pain and pain management  - Notify physician/advanced practitioner if interventions unsuccessful or patient reports new pain  Outcome: Progressing

## 2023-10-28 NOTE — ASSESSMENT & PLAN NOTE
Ultrasound kidney and bladder is pending official review, however findings as listed by tech showing possible nephrolithiasis near the UVJ. We will consider CT scan for better clarification and possible urologic consult subsequently.

## 2023-10-28 NOTE — PROGRESS NOTES
[facility]  Progress Note  Name: Daniella Pena  MRN: [id number]  Unit/Bed#: ICU 08 I Date of Admission: 10/26/2023   Date of Service: 10/28/2023 I Hospital Day: 2    Assessment/Plan   * Septic shock [facility])  Assessment & Plan  Overnight 10/26-10/27 became febrile with Tmax 102.4 and hypotensive not responding to 30 cc/kg fluid bolus. Also with tachypnea and tachycardia. Lactic acid, procalcitonin normal.  UA with large leukocytes, positive nitrites, innumerable WBCs. Chest x-ray with what looks to be vascular congestion on my review. 2/2 blood cultures positive for Staphylococcus epidermidis - given septic shock and both sets are positive this is possibly true infection    Plan  Maintain MAP greater than 65  Continue ceftriaxone 2 g daily  Now off of vasopressors. Stop midodrine. Follow blood and urine cultures  We will consider infectious disease consult  If blood pressures remain stable later today can likely downgrade out of ICU    Acute cystitis with hematuria  Assessment & Plan  UA with nitrites, leukocytes. Follow cultures. Positive blood culture  Assessment & Plan  10/27 2/2 blood cultures positive for Staphylococcus epidermidis. Given septic shock and both sets are positive, this is possibly a true infection. 10/27 TTE without evidence of vegetations    We will consider infectious disease consult  Repeat blood cultures tomorrow 10/29, 48 hours after initial set  Antibiotics as above    Abnormal ultrasound  Assessment & Plan  Ultrasound kidney and bladder is pending official review, however findings as listed by tech showing possible nephrolithiasis near the UVJ. We will consider CT scan for better clarification and possible urologic consult subsequently. Acute respiratory failure with hypoxia (HCC)  Assessment & Plan  Requiring 2 L nasal cannula to maintain saturations greater than 90%.   Likely secondary to tachypnea and mild volume overload  Wean as tolerated    Vertigo  Assessment & Plan  Unclear etiology, present suddenly when waking up on 10/26. Says he feels "off balance"    Plan  Continue stroke pathway started on med/surg  Follow MRI -we will need further discussion with neurology if this is needed or if it is more likely recrudescence of old stroke given septic shock    Other specified anemias  Assessment & Plan  Lab Results   Component Value Date    HGB 11.6 (L) 10/28/2023    HGB 11.2 (L) 10/27/2023    HGB 13.7 10/26/2023     Most likely secondary to hemodilution and critical illness    BPH with obstruction/lower urinary tract symptoms  Assessment & Plan  Not retaining urine at this time, continue to monitor         ----------------------------------------------------------------------------------------  HPI/24hr events: Vasopressors turned off overnight. Patient reports that he has to urinate he needs to stand to do it. Has not had bowel movement since being in the hospital.  Denies any pain or significant shortness of breath. Patient appropriate for transfer out of the ICU today?: Patient does not meet criteria for ICU Follow-up Clinic; referral has not been made.    Disposition: Transfer to Med-Surg   Code Status: Level 1 - Full Code  ---------------------------------------------------------------------------------------      Review of Systems  Review of systems was reviewed and negative unless stated above in HPI/24-hour events   ---------------------------------------------------------------------------------------  OBJECTIVE    Vitals   Vitals:    10/28/23 0430 10/28/23 0515 10/28/23 0539 10/28/23 0615   BP: 111/80 111/66  114/74   BP Location:       Pulse: 74 70  70   Resp: 18 18  (!) 37   Temp:       TempSrc:       SpO2: 95% 92%  98%   Weight:   85.2 kg (187 lb 13.3 oz)    Height:         Temp (24hrs), Av.8 °F (37.7 °C), Min:98.3 °F (36.8 °C), Max:102.8 °F (39.3 °C)  Current: Temperature: 98.3 °F (36.8 °C)  Arterial Line BP: 92/44  Arterial Line MAP (mmHg): (!) 60 mmHg    Respiratory:  SpO2: SpO2: 98 %  Nasal Cannula O2 Flow Rate (L/min): 3 L/min    Invasive/non-invasive ventilation settings   Respiratory      Lab Data (Last 4 hours)      None           O2/Vent Data (Last 4 hours)      None                    Physical Exam  Constitutional:       General: He is not in acute distress. HENT:      Mouth/Throat:      Mouth: Mucous membranes are moist.      Pharynx: Oropharynx is clear. Cardiovascular:      Rate and Rhythm: Normal rate and regular rhythm. Heart sounds: No murmur heard. Pulmonary:      Effort: Pulmonary effort is normal. No respiratory distress. Breath sounds: Normal breath sounds. Abdominal:      General: Abdomen is flat. There is no distension. Palpations: Abdomen is soft. Musculoskeletal:      Right lower leg: No edema. Left lower leg: No edema. Skin:     General: Skin is warm and dry. Neurological:      Mental Status: He is alert. Mental status is at baseline.              Laboratory and Diagnostics:  Results from last 7 days   Lab Units 10/28/23  0442 10/27/23  1055 10/26/23  1040   WBC Thousand/uL 10.68* 20.51* 6.58   HEMOGLOBIN g/dL 11.6* 11.2* 13.7   HEMATOCRIT % 36.6 35.7* 41.8   PLATELETS Thousands/uL 160 187 278   NEUTROS PCT % 86*  --  63   BANDS PCT %  --  8  --    MONOS PCT % 7  --  9   MONO PCT %  --  2*  --    EOS PCT % 0 0 1     Results from last 7 days   Lab Units 10/28/23  0442 10/27/23  0528 10/26/23  1040   SODIUM mmol/L 136 140 136   POTASSIUM mmol/L 3.7 4.4 3.9   CHLORIDE mmol/L 107 106 105   CO2 mmol/L 25 18* 19*   ANION GAP mmol/L 4 16 12   BUN mg/dL 21 31* 31*   CREATININE mg/dL 0.81 0.99 0.85   CALCIUM mg/dL 7.7* 9.2 8.9   GLUCOSE RANDOM mg/dL 88 91 176*   ALT U/L  --   --  17   AST U/L  --   --  20   ALK PHOS U/L  --   --  61   ALBUMIN g/dL  --   --  3.9   TOTAL BILIRUBIN mg/dL  --   --  0.89     Results from last 7 days   Lab Units 10/28/23  0442   MAGNESIUM mg/dL 1.7*      Results from last 7 days   Lab Units 10/26/23  1040   INR  0.96   PTT seconds 24          Results from last 7 days   Lab Units 10/27/23  0246   LACTIC ACID mmol/L 1.7     ABG:    VBG:    Results from last 7 days   Lab Units 10/27/23  0256   PROCALCITONIN ng/ml 0.21       Micro  Results from last 7 days   Lab Units 10/27/23  0224 10/26/23  2345   GRAM STAIN RESULT  Gram positive cocci in clusters*  Gram positive cocci in clusters*  --    URINE CULTURE   --  Culture too young- will reincubate         Intake and Output  I/O         10/26 0701  10/27 0700 10/27 0701  10/28 0700    P. O.  640    I.V. (mL/kg)  3382.9 (39.7)    IV Piggyback 2000     Total Intake(mL/kg) 2000 (24.8) 4022.9 (47.2)    Urine (mL/kg/hr) 300 1625 (0.8)    Stool 0     Total Output 300 1625    Net +1700 +2397.9          Unmeasured Urine Occurrence  1 x    Unmeasured Stool Occurrence 2 x             Height and Weights   Height: 5' 8" (172.7 cm)  IBW (Ideal Body Weight): 68.4 kg  Body mass index is 28.56 kg/m². Weight (last 2 days)       Date/Time Weight    10/28/23 0539 85.2 (187.83)    10/27/23 0945 80.7 (178)    10/27/23 0700 80.8 (178.13)    10/26/23 1919 81.8 (180.34)              Nutrition       Diet Orders   (From admission, onward)                 Start     Ordered    10/27/23 0718  Diet Regular; Regular House  Diet effective now        References:    Adult Nutrition Support Algorithm    RD Therapeutic Diet Order Protocol   Question Answer Comment   Diet Type Regular    Regular Regular House    RD to adjust diet per protocol?  Yes        10/27/23 0718                      Active Medications  Scheduled Meds:  Current Facility-Administered Medications   Medication Dose Route Frequency Provider Last Rate    acetaminophen  975 mg Oral Q8H PRN Nguyen Lux, DO      aluminum-magnesium hydroxide-simethicone  30 mL Oral Q6H PRN Nguyen Lux, DO      atorvastatin  40 mg Oral QPM Nguyen Lux, DO cefTRIAXone  2,000 mg Intravenous Q24H Sonjia Plane, DO 2,000 mg (10/27/23 5437)    chlorhexidine  15 mL Mouth/Throat Q12H [facility] Sonjia Plane, DO      clopidogrel  75 mg Oral Daily Destini Dux Bevak, DO      enoxaparin  40 mg Subcutaneous Daily Sonjia Plane, DO      glycerin-hypromellose-  2 drop Both Eyes BID Destini Dux Bevak, DO      magnesium sulfate  2 g Intravenous Once Sonjia Plane, DO 2 g (10/28/23 5072)    meclizine  25 mg Oral Q8H PRN Sonjia Plane, DO      ondansetron  4 mg Intravenous Q6H PRN Sonjia Plane, DO      polyethylene glycol  17 g Oral Daily Destini Dux Bevak, DO       Continuous Infusions:     PRN Meds:   acetaminophen, 975 mg, Q8H PRN  aluminum-magnesium hydroxide-simethicone, 30 mL, Q6H PRN  meclizine, 25 mg, Q8H PRN  ondansetron, 4 mg, Q6H PRN        Invasive Devices Review  Invasive Devices       Peripheral Intravenous Line  Duration             Peripheral IV 10/27/23 Dorsal (posterior); Right Hand <1 day    Peripheral IV 10/27/23 Left;Ventral (anterior) Forearm <1 day              Arterial Line  Duration             Arterial Line 10/27/23 Radial <1 day              Drain  Duration             External Urinary Catheter Small <1 day                    Rationale for remaining devices:   ---------------------------------------------------------------------------------------  Advance Directive and Living Will:      Power of :    POLST:    ---------------------------------------------------------------------------------------  Care Time Delivered:   No Critical Care time spent       Chato Plane, DO      Portions of the record may have been created with voice recognition software. Occasional wrong word or "sound a like" substitutions may have occurred due to the inherent limitations of voice recognition software.   Read the chart carefully and recognize, using context, where substitutions have occurred

## 2023-10-28 NOTE — CONSULTS
CONSULT    Patient Name: Kevin Fong  Patient MRN: [id number]  Date of Service: 10/28/2023   Date / Time Note Created: 10/28/2023 4:11 PM   Referring Provider: Zhanna Jerez DO    Provider Creating Note: Farhat Chacko, [address] [place] [address]    PCP: Cosme Fofana  Attending Provider:  Zhanna Jerez DO    Reason for Consult: Flank Pain    HPI:  Kevin Fong is an 81-year-old male without prior  history presenting to [facility] emergency room with altered mental status and vertigo, cold intolerance, anorexia without flank, abdominal, suprapubic pain, or gross hematuria but positive urinary urgency and frequency. He was initially admitted to the internal medicine service for stroke work-up. But later developed fever with temperature 102.4 max, tachycardia and tachypnea. He was transferred per sepsis protocol to the intensive care unit. He was started on Levophed due to progressive hypotension. He had leukocytosis of 20.5 with normal renal function. There is been interval improvement with WBC count of 10.6 today. Infectious work-up included blood and urine cultures which are positive for coag negative Staphylococcus. Our service was contacted against background of renal ultrasound finding demonstrating left UVJ 9 mm calculus without associated hydronephrosis.          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 No associated hydronephrosis. Question potential bladder stone. Coag negative staph bacteremia--of  etiology with related sepsis. Recommendations  Continue medical stabilization and empiric antibiosis. Narrow per sensitivities. Trend labs. Monitor voiding. Obtain stat CT of the abdomen and pelvis--stone search for additional clarification   If there is evidence of ureteral stone patient may require ureteral stent versus PCN. We will await CT results.         Past Medical History:   Diagnosis Date    Basal cell carcinoma     Cancer ([facility])     NSTEMI (non-ST elevated myocardial infarction) ([facility]) 2014    Pneumonia     Last Assessed:  10/7/14    Polymyalgia rheumatica ([facility])     Last Assessed:  11/23/12    Polymyalgia rheumatica ([facility]) 2011    Squamous cell skin cancer 05/24/2022    left frontal scalp anterior    Squamous cell skin cancer 05/24/2022    Vertex of scalp       Past Surgical History:   Procedure Laterality Date    APPENDECTOMY      CORONARY ANGIOPLASTY WITH STENT PLACEMENT      HERNIA REPAIR  2000    MOHS SURGERY  07/27/2022    left frontal scalp and vertex of scalp       Family History   Problem Relation Age of Onset    Pulmonary embolism Father     Other Father         Transurethral resection of prostate    Stroke Sister        Social History     Socioeconomic History    Marital status: /Civil Union     Spouse name: Not on file    Number of children: Not on file    Years of education: Not on file    Highest education level: Not on file   Occupational History    Not on file   Tobacco Use    Smoking status: Never    Smokeless tobacco: Never   Vaping Use    Vaping Use: Never used   Substance and Sexual Activity    Alcohol use: Not Currently     Comment: Social    Drug use: Never    Sexual activity: Yes   Other Topics Concern    Not on file   Social History Narrative    Exercises regularly        Consumes 2-3 cups of coffee per week and 1 glass of ice tea per day     Social Determinants of Health     Financial Resource Strain: Low Risk  (10/23/2023)    Overall Financial Resource Strain (CARDIA)     Difficulty of Paying Living Expenses: Not hard at all   Food Insecurity: Not on file   Transportation Needs: No Transportation Needs (10/23/2023)    PRAPARE - Transportation     Lack of Transportation (Medical): No     Lack of Transportation (Non-Medical):  No   Physical Activity: Not on file   Stress: Not on file   Social Connections: Not on file   Intimate Partner Violence: Not on file   Housing Stability: Not on file       Allergies   Allergen Reactions    Iv Dye [Iodinated Contrast Media] Rash     Possible delayed rash February 2022       Review of Systems  10 point review of systems negative except as noted in HPI  Constitutional:   positive for  - chills, fatigue, and fever  Cardiovascular:   no chest pain or dyspnea on exertion  Gastrointestinal:   no abdominal pain, change in bowel habits, or black or bloody stools  Genito-Urinary:   positive for - urinary frequency/urgency  negative for - dysuria, hematuria, or scrotal mass/pain  Neurological:   no TIA or stroke symptoms     Chart Review   Allergies, current medications, history, problem list    Vital Signs  /62   Pulse 78   Temp 98.5 °F (36.9 °C)   Resp (!) 26   Ht 5' 8" (1.727 m)   Wt 85.2 kg (187 lb 13.3 oz)   SpO2 92%   BMI 28.56 kg/m²     Physical Exam  General appearance: alert and oriented, in no acute distress, appears stated age, cooperative, and no distress  Head: Normocephalic, without obvious abnormality, atraumatic  Neck: no JVD and supple, symmetrical, trachea midline  Lungs: clear to auscultation bilaterally  Heart: regular rate and rhythm, S1, S2 normal, no murmur, click, rub or gallop  Abdomen: soft, non-tender; bowel sounds normal; no masses,  no organomegaly  Extremities: extremities normal, warm and well-perfused; no cyanosis, clubbing, or edema  Pulses: 2+ and symmetric  Neurologic: Grossly normal  No urinary drains     Laboratory Studies  Lab Results   Component Value Date    HGBA1C 6.3 (H) 10/27/2023     06/30/2015    K 3.7 10/28/2023    K 4.5 06/30/2015     10/28/2023     06/30/2015    CO2 25 10/28/2023    CO2 28 06/30/2015    GLUCOSE 99 06/30/2015    CREATININE 0.81 10/28/2023    CREATININE 0.89 06/30/2015    BUN 21 10/28/2023    BUN 32 (H) 06/30/2015    MG 1.7 (L) 10/28/2023     Lab Results   Component Value Date    WBC 10.68 (H) 10/28/2023    WBC 6.32 06/30/2015    RBC 4.27 10/28/2023    RBC 4.85 06/30/2015    HGB 11.6 (L) 10/28/2023    HGB 13.5 06/30/2015    HCT 36.6 10/28/2023    HCT 40.0 06/30/2015    MCV 86 10/28/2023    MCV 83 06/30/2015    MCH 27.2 10/28/2023    MCH 27.8 06/30/2015    RDW 15.0 10/28/2023    RDW 15.4 (H) 06/30/2015     10/28/2023     06/30/2015         Imaging and Other Studies  ). US kidney and bladder    Result Date: 10/28/2023  Narrative: RENAL ULTRASOUND INDICATION: Acute cystitis, concern for hydronephrosis. COMPARISON: CTA chest abdomen pelvis 02/14/2022 TECHNIQUE:   Ultrasound of the retroperitoneum was performed with a curvilinear transducer utilizing volumetric sweeps and still imaging techniques. FINDINGS: KIDNEYS: Grossly symmetric and normal size. Right kidney:  10.2 x 4.6 x 3.5 cm. Volume 85.7 mL Left kidney:  11.5 x 6.1 x 5.3 cm. Volume 197.3 mL Right kidney Normal echogenicity and contour. No mass is identified. No hydronephrosis. No shadowing calculi. No perinephric fluid collections. Left kidney Normal echogenicity and contour. Multiple cortical lesions identified, largest as follows; Finding 1: 1.3 x 1.8 x 1.4 cm upper pole cyst with rim calcification. Finding 2: 2.4 x 3.2 x 2.8 cm lower pole exophytic cyst with single thin septation. No hydronephrosis. No shadowing calculi.  No perinephric fluid collections. URETERS: Nonvisualized. BLADDER: Normally distended. 9 mm shadowing calculus in the area of the left ureterovesical junction. No focal thickening or mass lesions. Bilateral ureteral jets not visualized. Impression: Left UVJ 9 mm calculus with no visualization of ureteral jets. No associated hydronephrosis. Minimally complicated left renal cysts as detailed above. Resident: Gunner Hardy, the attending radiologist, have reviewed the images and agree with the final report above. Workstation performed: [id number]     XR chest portable    Result Date: 10/27/2023  Narrative: CHEST INDICATION:   Sepsis. COMPARISON: CTA chest abdomen pelvis 2/14/2022, chest radiograph 1/19/2022 EXAM PERFORMED/VIEWS:  XR CHEST PORTABLE FINDINGS: Cardiomediastinal silhouette appears unremarkable. Mild blunting of the left costophrenic angle. No pneumothorax or pleural effusion. Degenerative changes of the glenohumeral joints. Unchanged left scapula focal sclerosis. Impression: Mild left basilar atelectasis. No focal consolidation, pleural effusion, or pneumothorax. Resident: Denise Pickens, the attending radiologist, have reviewed the images and agree with the final report above. Workstation performed: [id number]     Echo complete w/ contrast if indicated    Result Date: 10/27/2023  Narrative:   Left Ventricle: Left ventricular cavity size is normal. Wall thickness is mildly increased. The left ventricular ejection fraction is 65% by visual estimation. . Systolic function is normal. Wall motion is normal. Diastolic function is normal.   Left Atrium: The atrium is severely dilated. Aortic Valve: There is mild regurgitation. Mitral Valve: There is mild regurgitation. CTA head and neck with and without contrast    Result Date: 10/26/2023  Narrative: CTA NECK AND BRAIN WITH AND WITHOUT CONTRAST INDICATION: Vertigo, evaluate for central cause.  COMPARISON:   Head CT and CT angiography of the head and neck from February 15, 2022 TECHNIQUE:  Routine CT imaging of the Brain without contrast.  Post contrast imaging was performed after administration of iodinated contrast through the neck and brain. Post contrast axial 0.625 mm images timed to opacify the arterial system. 3D rendering was performed on an independent workstation. MIP reconstructions performed. Coronal reconstructions were performed of the noncontrast portion of the brain. Radiation dose length product (DLP) for this visit:  1532 mGy-cm . This examination, like all CT scans performed in the [facility], was performed utilizing techniques to minimize radiation dose exposure, including the use of iterative reconstruction and automated exposure control. IV Contrast:  100 mL of iohexol (OMNIPAQUE) IMAGE QUALITY:   Diagnostic FINDINGS: NONCONTRAST BRAIN PARENCHYMA: No intracranial hemorrhage or cortical infarction. Mild generalized age-appropriate parenchymal volume loss. Mild intracranial carotid artery atherosclerotic calcifications. VENTRICLES AND EXTRA-AXIAL SPACES:  Normal for the patient's age. VISUALIZED ORBITS: Evidence of prior bilateral lens surgery. PARANASAL SINUSES: Normal visualized paranasal sinuses. CERVICAL VASCULATURE AORTIC ARCH AND GREAT VESSELS:  Normal aortic arch and great vessel origins. Normal visualized subclavian vessels. RIGHT VERTEBRAL ARTERY CERVICAL SEGMENT:  Normal origin. The vessel is normal in caliber throughout the neck. LEFT VERTEBRAL ARTERY CERVICAL SEGMENT:  Normal origin. The vessel is normal in caliber throughout the neck. Tortuous loop in the proximal V1 segment of the left vertebral artery. RIGHT EXTRACRANIAL CAROTID SEGMENT:  Normal caliber common carotid artery. Normal bifurcation and cervical internal carotid artery. No stenosis or dissection. Minimal atherosclerotic calcification at the right carotid bifurcation and ICA origin without  stenosis.  LEFT EXTRACRANIAL CAROTID SEGMENT: Mild atherosclerotic disease of the distal common carotid artery and proximal cervical internal carotid artery without significant stenosis compared to the more distal ICA. NASCET criteria was used to determine the degree of internal carotid artery diameter stenosis. INTRACRANIAL VASCULATURE INTERNAL CAROTID ARTERIES: Mild atherosclerotic calcification in the bilateral cavernous and proximal supraclinoid ICA segments with no greater than mild stenosis noted on the right side. No significant stenosis identified in the left ICA. The carotid termini and ophthalmic artery origins are unremarkable in appearance. ANTERIOR CIRCULATION: Mild hypoplasia of the right A1 segment. The left A1 segment is unremarkable. Patent anterior communicating artery. Proximal A2 branches are normal. MIDDLE CEREBRAL ARTERY CIRCULATION:  M1 segment and middle cerebral artery branches demonstrate normal enhancement bilaterally. DISTAL VERTEBRAL ARTERIES: The right vertebral artery is slightly smaller in caliber than the left, developmental variation. The right posterior inferior cerebellar artery branch is not well visualized. The left posterior inferior cerebellar artery branch is patent. Vertebrobasilar junction is unremarkable. BASILAR ARTERY:  Basilar artery is normal in caliber. Normal superior cerebellar arteries. POSTERIOR CEREBRAL ARTERIES: Both posterior cerebral arteries arises from the basilar tip. Both arteries demonstrate normal enhancement. Small patent posterior communicating arteries. VENOUS STRUCTURES:  Normal. NON VASCULAR ANATOMY BONY STRUCTURES:  No acute osseous abnormality. Multilevel cervical spondylosis with moderate right C2-3 and severe right greater than left foraminal stenosis. There is severe C3-4 spinal stenosis with moderate right and severe left foraminal narrowing. Moderate C4-5 and severe bilateral foraminal stenosis. Moderate C5-C6 spinal canal stenosis is noted with moderate to severe right foraminal narrowing.  Severe left C6-7 foraminal stenosis is noted. SOFT TISSUES OF THE NECK:  Unremarkable. THORACIC INLET:  Normal.     Impression: No acute intracranial abnormality. Mild cerebral chronic microangiopathic changes. No cervical or intracranial large vessel occlusion. No hemodynamically significant stenosis at the cervical carotid bifurcations. Advanced multifocal cervical spondylosis with severe C3-4 and moderate additional multilevel spinal stenosis. Multifocal severe foraminal stenosis. Follow-up MR imaging of the cervical spine is recommended to better assess the soft tissue contents of the  spinal canal. The study was marked in EPIC for significant notification.  Workstation performed: [id number]       Medications   Current Facility-Administered Medications   Medication Dose Route Frequency Provider Last Rate    acetaminophen  975 mg Oral Q8H PRN Emilie Moon, DO      aluminum-magnesium hydroxide-simethicone  30 mL Oral Q6H PRN Emilie Moon, DO      atorvastatin  40 mg Oral QPM Emilie Moon, DO      chlorhexidine  15 mL Mouth/Throat Q12H [address] Emilie Moon, DO      clopidogrel  75 mg Oral Daily Zoanne Gander Bevak, DO      enoxaparin  40 mg Subcutaneous Daily Emilie Moon, DO      glycerin-hypromellose-  2 drop Both Eyes BID Emilie Moon, DO      meclizine  25 mg Oral Q8H PRN Zoanne Gander Clois Swanson, DO      ondansetron  4 mg Intravenous Q6H PRN Emilie Moon, DO      polyethylene glycol  17 g Oral Daily Zoanne Gander Bevak, DO      vancomycin  1,750 mg Intravenous Once Zoanne Gander Bevak, DO 1,750 mg (10/28/23 1608)    [START ON 10/29/2023] vancomycin  750 mg Intravenous Q12H DO Patt Zuñiga CRNP

## 2023-10-29 ENCOUNTER — ANESTHESIA (INPATIENT)
Dept: PERIOP | Facility: HOSPITAL | Age: 88
DRG: 853 | End: 2023-10-29
Payer: MEDICARE

## 2023-10-29 ENCOUNTER — APPOINTMENT (INPATIENT)
Dept: RADIOLOGY | Facility: HOSPITAL | Age: 88
DRG: 853 | End: 2023-10-29
Payer: MEDICARE

## 2023-10-29 ENCOUNTER — ANESTHESIA EVENT (INPATIENT)
Dept: PERIOP | Facility: HOSPITAL | Age: 88
DRG: 853 | End: 2023-10-29
Payer: MEDICARE

## 2023-10-29 LAB
ANION GAP SERPL CALCULATED.3IONS-SCNC: 5 MMOL/L
ANISOCYTOSIS BLD QL SMEAR: PRESENT
BACTERIA UR CULT: ABNORMAL
BASOPHILS # BLD MANUAL: 0.07 THOUSAND/UL (ref 0–0.1)
BASOPHILS NFR MAR MANUAL: 1 % (ref 0–1)
BUN SERPL-MCNC: 19 MG/DL (ref 5–25)
BURR CELLS BLD QL SMEAR: PRESENT
CALCIUM SERPL-MCNC: 8 MG/DL (ref 8.4–10.2)
CHLORIDE SERPL-SCNC: 106 MMOL/L (ref 96–108)
CO2 SERPL-SCNC: 26 MMOL/L (ref 21–32)
CREAT SERPL-MCNC: 0.79 MG/DL (ref 0.6–1.3)
EOSINOPHIL # BLD MANUAL: 0.13 THOUSAND/UL (ref 0–0.4)
EOSINOPHIL NFR BLD MANUAL: 2 % (ref 0–6)
ERYTHROCYTE [DISTWIDTH] IN BLOOD BY AUTOMATED COUNT: 14.9 % (ref 11.6–15.1)
GFR SERPL CREATININE-BSD FRML MDRD: 80 ML/MIN/1.73SQ M
GLUCOSE SERPL-MCNC: 85 MG/DL (ref 65–140)
HCT VFR BLD AUTO: 37.8 % (ref 36.5–49.3)
HGB BLD-MCNC: 12.2 G/DL (ref 12–17)
LYMPHOCYTES # BLD AUTO: 1.41 THOUSAND/UL (ref 0.6–4.47)
LYMPHOCYTES # BLD AUTO: 21 % (ref 14–44)
MAGNESIUM SERPL-MCNC: 1.6 MG/DL (ref 1.9–2.7)
MCH RBC QN AUTO: 27.2 PG (ref 26.8–34.3)
MCHC RBC AUTO-ENTMCNC: 32.3 G/DL (ref 31.4–37.4)
MCV RBC AUTO: 84 FL (ref 82–98)
MONOCYTES # BLD AUTO: 0.74 THOUSAND/UL (ref 0–1.22)
MONOCYTES NFR BLD: 11 % (ref 4–12)
NEUTROPHILS # BLD MANUAL: 4.36 THOUSAND/UL (ref 1.85–7.62)
NEUTS BAND NFR BLD MANUAL: 4 % (ref 0–8)
NEUTS SEG NFR BLD AUTO: 61 % (ref 43–75)
OVALOCYTES BLD QL SMEAR: PRESENT
PLATELET # BLD AUTO: 165 THOUSANDS/UL (ref 149–390)
PLATELET BLD QL SMEAR: ADEQUATE
PMV BLD AUTO: 10.5 FL (ref 8.9–12.7)
POTASSIUM SERPL-SCNC: 3.8 MMOL/L (ref 3.5–5.3)
RBC # BLD AUTO: 4.49 MILLION/UL (ref 3.88–5.62)
SODIUM SERPL-SCNC: 137 MMOL/L (ref 135–147)
VANCOMYCIN SERPL-MCNC: 9.9 UG/ML (ref 10–20)
WBC # BLD AUTO: 6.7 THOUSAND/UL (ref 4.31–10.16)

## 2023-10-29 PROCEDURE — 80202 ASSAY OF VANCOMYCIN: CPT | Performed by: INTERNAL MEDICINE

## 2023-10-29 PROCEDURE — 80048 BASIC METABOLIC PNL TOTAL CA: CPT | Performed by: INTERNAL MEDICINE

## 2023-10-29 PROCEDURE — C2617 STENT, NON-COR, TEM W/O DEL: HCPCS | Performed by: UROLOGY

## 2023-10-29 PROCEDURE — 87086 URINE CULTURE/COLONY COUNT: CPT | Performed by: UROLOGY

## 2023-10-29 PROCEDURE — 52332 CYSTOSCOPY AND TREATMENT: CPT | Performed by: UROLOGY

## 2023-10-29 PROCEDURE — BT1F1ZZ FLUOROSCOPY OF LEFT KIDNEY, URETER AND BLADDER USING LOW OSMOLAR CONTRAST: ICD-10-PCS | Performed by: RADIOLOGY

## 2023-10-29 PROCEDURE — 87040 BLOOD CULTURE FOR BACTERIA: CPT | Performed by: NURSE PRACTITIONER

## 2023-10-29 PROCEDURE — 85027 COMPLETE CBC AUTOMATED: CPT | Performed by: INTERNAL MEDICINE

## 2023-10-29 PROCEDURE — 85007 BL SMEAR W/DIFF WBC COUNT: CPT | Performed by: INTERNAL MEDICINE

## 2023-10-29 PROCEDURE — 83735 ASSAY OF MAGNESIUM: CPT | Performed by: INTERNAL MEDICINE

## 2023-10-29 PROCEDURE — C1769 GUIDE WIRE: HCPCS | Performed by: UROLOGY

## 2023-10-29 PROCEDURE — 99024 POSTOP FOLLOW-UP VISIT: CPT | Performed by: UROLOGY

## 2023-10-29 PROCEDURE — C1758 CATHETER, URETERAL: HCPCS | Performed by: UROLOGY

## 2023-10-29 PROCEDURE — 0T778DZ DILATION OF LEFT URETER WITH INTRALUMINAL DEVICE, VIA NATURAL OR ARTIFICIAL OPENING ENDOSCOPIC: ICD-10-PCS | Performed by: UROLOGY

## 2023-10-29 PROCEDURE — 87040 BLOOD CULTURE FOR BACTERIA: CPT | Performed by: INTERNAL MEDICINE

## 2023-10-29 PROCEDURE — 74420 UROGRAPHY RTRGR +-KUB: CPT

## 2023-10-29 DEVICE — INLAY OPTIMA URETERAL STENT W/O GUIDEWIRE
Type: IMPLANTABLE DEVICE | Site: URETER | Status: FUNCTIONAL
Brand: BARD® INLAY OPTIMA® URETERAL STENT

## 2023-10-29 RX ORDER — FENTANYL CITRATE/PF 50 MCG/ML
25 SYRINGE (ML) INJECTION
Status: DISCONTINUED | OUTPATIENT
Start: 2023-10-29 | End: 2023-10-29 | Stop reason: HOSPADM

## 2023-10-29 RX ORDER — MAGNESIUM HYDROXIDE 1200 MG/15ML
LIQUID ORAL AS NEEDED
Status: DISCONTINUED | OUTPATIENT
Start: 2023-10-29 | End: 2023-10-29 | Stop reason: HOSPADM

## 2023-10-29 RX ORDER — PHENYLEPHRINE HCL IN 0.9% NACL 1 MG/10 ML
SYRINGE (ML) INTRAVENOUS AS NEEDED
Status: DISCONTINUED | OUTPATIENT
Start: 2023-10-29 | End: 2023-10-29

## 2023-10-29 RX ORDER — CEFAZOLIN SODIUM 1 G/50ML
SOLUTION INTRAVENOUS AS NEEDED
Status: DISCONTINUED | OUTPATIENT
Start: 2023-10-29 | End: 2023-10-29

## 2023-10-29 RX ORDER — SODIUM CHLORIDE, SODIUM LACTATE, POTASSIUM CHLORIDE, CALCIUM CHLORIDE 600; 310; 30; 20 MG/100ML; MG/100ML; MG/100ML; MG/100ML
INJECTION, SOLUTION INTRAVENOUS CONTINUOUS PRN
Status: DISCONTINUED | OUTPATIENT
Start: 2023-10-29 | End: 2023-10-29

## 2023-10-29 RX ORDER — FENTANYL CITRATE 50 UG/ML
INJECTION, SOLUTION INTRAMUSCULAR; INTRAVENOUS AS NEEDED
Status: DISCONTINUED | OUTPATIENT
Start: 2023-10-29 | End: 2023-10-29

## 2023-10-29 RX ORDER — PROPOFOL 10 MG/ML
INJECTION, EMULSION INTRAVENOUS AS NEEDED
Status: DISCONTINUED | OUTPATIENT
Start: 2023-10-29 | End: 2023-10-29

## 2023-10-29 RX ORDER — ONDANSETRON 2 MG/ML
4 INJECTION INTRAMUSCULAR; INTRAVENOUS ONCE AS NEEDED
Status: DISCONTINUED | OUTPATIENT
Start: 2023-10-29 | End: 2023-10-29 | Stop reason: HOSPADM

## 2023-10-29 RX ORDER — LIDOCAINE HYDROCHLORIDE 20 MG/ML
INJECTION, SOLUTION EPIDURAL; INFILTRATION; INTRACAUDAL; PERINEURAL AS NEEDED
Status: DISCONTINUED | OUTPATIENT
Start: 2023-10-29 | End: 2023-10-29

## 2023-10-29 RX ADMIN — CHLORHEXIDINE GLUCONATE 15 ML: 1.2 RINSE ORAL at 09:48

## 2023-10-29 RX ADMIN — ONDANSETRON 4 MG: 2 INJECTION INTRAMUSCULAR; INTRAVENOUS at 08:55

## 2023-10-29 RX ADMIN — CEFAZOLIN SODIUM 1000 MG: 1 SOLUTION INTRAVENOUS at 08:44

## 2023-10-29 RX ADMIN — VANCOMYCIN HYDROCHLORIDE 750 MG: 750 INJECTION, SOLUTION INTRAVENOUS at 06:26

## 2023-10-29 RX ADMIN — FENTANYL CITRATE 50 MCG: 50 INJECTION INTRAMUSCULAR; INTRAVENOUS at 08:54

## 2023-10-29 RX ADMIN — PROPOFOL 110 MG: 10 INJECTION, EMULSION INTRAVENOUS at 08:38

## 2023-10-29 RX ADMIN — GLYCERIN 2 DROP: .002; .002; .01 SOLUTION/ DROPS OPHTHALMIC at 09:49

## 2023-10-29 RX ADMIN — ATORVASTATIN CALCIUM 40 MG: 40 TABLET, FILM COATED ORAL at 17:38

## 2023-10-29 RX ADMIN — VANCOMYCIN HYDROCHLORIDE 750 MG: 750 INJECTION, SOLUTION INTRAVENOUS at 17:38

## 2023-10-29 RX ADMIN — LIDOCAINE HYDROCHLORIDE 60 MG: 20 INJECTION, SOLUTION EPIDURAL; INFILTRATION; INTRACAUDAL at 08:38

## 2023-10-29 RX ADMIN — SODIUM CHLORIDE, SODIUM LACTATE, POTASSIUM CHLORIDE, AND CALCIUM CHLORIDE: .6; .31; .03; .02 INJECTION, SOLUTION INTRAVENOUS at 08:32

## 2023-10-29 RX ADMIN — Medication 100 MCG: at 08:53

## 2023-10-29 RX ADMIN — CLOPIDOGREL BISULFATE 75 MG: 75 TABLET ORAL at 09:48

## 2023-10-29 RX ADMIN — GLYCERIN 2 DROP: .002; .002; .01 SOLUTION/ DROPS OPHTHALMIC at 21:01

## 2023-10-29 RX ADMIN — ENOXAPARIN SODIUM 40 MG: 40 INJECTION SUBCUTANEOUS at 09:48

## 2023-10-29 RX ADMIN — FENTANYL CITRATE 50 MCG: 50 INJECTION INTRAMUSCULAR; INTRAVENOUS at 08:51

## 2023-10-29 RX ADMIN — CHLORHEXIDINE GLUCONATE 15 ML: 1.2 RINSE ORAL at 21:00

## 2023-10-29 NOTE — ANESTHESIA PREPROCEDURE EVALUATION
Procedure:  CYSTOSCOPY RETROGRADE PYELOGRAM WITH INSERTION STENT URETERAL (Left: Ureter)    Relevant Problems   CARDIO   (+) Arteriosclerotic coronary artery disease   (+) Benign essential hypertension   (+) Hyperlipidemia      /RENAL   (+) BPH with obstruction/lower urinary tract symptoms      HEMATOLOGY   (+) Other specified anemias      MUSCULOSKELETAL   (+) Primary osteoarthritis of both hands      PULMONARY   (+) Acute respiratory failure with hypoxia (HCC)        Physical Exam    Airway    Mallampati score: II         Dental       Cardiovascular  Rhythm: irregular    Pulmonary   Breath sounds clear to auscultation, Decreased breath sounds, Wheezes    Other Findings        Anesthesia Plan  ASA Score- 3 Emergent    Anesthesia Type- general with ASA Monitors. Additional Monitors:     Airway Plan:            Plan Factors-Exercise tolerance (METS): <4 METS. Chart reviewed. EKG reviewed. Existing labs reviewed. Patient summary reviewed. Patient is not a current smoker. Patient not instructed to abstain from smoking on day of procedure. Patient did not smoke on day of surgery. Obstructive sleep apnea risk education given perioperatively. Induction- intravenous. Postoperative Plan-     Informed Consent- Anesthetic plan and risks discussed with patient.

## 2023-10-29 NOTE — QUICK NOTE
Consult received, attempted to see patient but he is currently in the OR. Chart briefly reviewed. Presented to the ED 10/26 with dizziness. Found to afebrile with normal WBC. Later that evening developed fever and leukocytosis as well as refractory hypotension requiring pressors. .  Blood cultures obtained and growing coagulase-negative staph from 2 sets. Urine culture also positive for coagulase-negative staph. CT A/P showed distal left ureteral calculus without obvious hydronephrosis. Seen by urology and is currently in OR for cystoscopy and stent placement. Initially on cefazolin but was transition to vancomycin overnight. Blood pressures appear to be improved. Would recommend continuing vancomycin with dosing per pharmacy for now pending final blood culture results. Patient will be formally evaluated by ID attending tomorrow. Please call in the interim with any questions.

## 2023-10-29 NOTE — OP NOTE
OPERATIVE REPORT  PATIENT NAME: Liz Heart    :  1935  MRN: [id number]  Pt Location: [facility] OR ROOM 05    SURGERY DATE: 10/29/2023    Surgeon(s) and Role:     * Owen Alas MD - Primary    Preop Diagnosis:  Left nephrolithiasis [N20.0]    Post-Op Diagnosis Codes:     * Left nephrolithiasis [N20.0]    Procedure(s):  Left - CYSTOSCOPY RETROGRADE PYELOGRAM WITH INSERTION STENT URETERAL    Specimen(s):  ID Type Source Tests Collected by Time Destination   A :  Urine Urine, Cystoscopic  Owen Alas MD 10/29/2023 4787        Estimated Blood Loss:   Minimal    Drains:  Urethral Catheter Latex 18 Fr. (Active)   Number of days: 0       Anesthesia Type:   General    Operative Indications:  Left nephrolithiasis [N20.0]    Operative Findings:  3 small bladder stones. Filling defect in the distal left ureter consistent with ureteral calculus. No sign of left-sided hydronephrosis. Urine appeared clean even with upon stent insertion. Left 24-6 Gabonese double-J ureteral stent placed without string. Complications:   None    Procedure and Technique:  Bandar Campbell is an 50-year-old male admitted to the ICU with fever and sepsis. He had a white blood cell count as high as 20,000 and fever to 102. The only source identified was gram-positive cocci in his urine and in his bloodstream.  These are most consistent with staph epi. On the evening of 2023 and ultrasound was done showing what appeared to be a distal left ureteral calculus but no evidence of hydronephrosis. I recommended a CT stone study. This was performed and the patient was evaluated by the urology advanced practitioner. Fortunately his clinical condition had improved significantly. He was afebrile with normalization of his white blood cell count and Levophed has been discontinued.   The CT scan interestingly showed BPH with bladder stones as well as a distal left ureteral calculus measuring up to 6 mm, however, there was no sign of left-sided hydronephrosis. I discussed at length with the patient and his family the indications to observe versus place a ureteral stent. At 81years of age with sepsis and a left ureteral calculus I feel that the safest maneuver is to place a left ureteral stent as the patient will require ureteroscopy in the future to remove his distal ureteral calculi and cystolitholapaxy to remove his bladder stones. He may even require TURP to relieve bladder outlet obstruction. At this time I am recommending cystoscopy with left retrograde pyelography and left ureteral stent insertion. Risk of the procedure including, but not limited to bleeding, infection, ureteral injury, sepsis, and need for additional procedures was discussed and reviewed informed consent obtained. Patient was brought to the operating room on October 29, 2023. After the smooth induction of general LMA anesthesia, the patient was placed in dorsolithotomy position. His genitalia prepped and draped in sterile fashion. Intravenous antibiotics were administered. Timeout was performed with all members the operative team confirming the patient's identity, procedure to be performed, and laterality of the case. 25 Norwegian rigid cystoscope with 30 degree lens was inserted. The bladder was thoroughly inspected. Lateral lobe hyperplasia of the prostate was appreciated. Intravesical protrusion of the prostate was noted. The bladder was entered. The urine appeared clear. A culture was obtained. 3 bladder stones measuring perhaps up to 1 cm were identified. There were no mucosal abnormalities or lesions otherwise. Attention was focused on the left ureteral orifice which was easily identified and cannulated with a 5 [place] open-ended catheter. Retrograde pyelogram was performed and a filling defect in the distal left ureter was identified consistent with the 6 mm ureteral calculus. Interestingly again there was no hydronephrosis.   A wire was placed in the left renal pelvis under fluoroscopic guidance and a left 24-6 Spanish double-J ureteral stent was then placed over top of the wire. The proximal coil was appreciated within the left renal pelvis and the distal coil was visualized within the bladder. The E flux from the left collecting system was clear. The wire was removed. The stent was in proper position. The bladder was left full and the cystoscope was removed. An 25 Spanish [place] catheter was inserted and connected to gravity drainage. Overall the patient tolerated the procedure well and there were no complications. The patient was extubated in the operating room and transferred the PACU in stable condition at the conclusion of the case.     Patient Disposition:  PACU         SIGNATURE: Wagner Baker MD  DATE: October 29, 2023  TIME: 8:58 AM

## 2023-10-29 NOTE — PROGRESS NOTES
Vancomycin Monitoring    Demographics    [facility]    Assessment    Today is day 1 of vancomycin therapy for bacteremia. Patient has yet to receive first dose of vancomycin. Renal function is stable.     Plan    Vancomycin 1750 mg x1 now followed by 750 mg q12h starting 10/29 0600    Level ordered for 10/30 am labs     Arias Gonzalez PharmD

## 2023-10-29 NOTE — ANESTHESIA POSTPROCEDURE EVALUATION
Post-Op Assessment Note    CV Status:  Stable  Pain Score: 1    Pain management: adequate     Mental Status:  Alert and awake   Hydration Status:  Euvolemic   PONV Controlled:  Controlled   Airway Patency:  Patent      Post Op Vitals Reviewed: Yes      Staff: Anesthesiologist         No notable events documented.     /69 (10/29/23 0930)    Temp      Pulse 57 (10/29/23 0930)   Resp 15 (10/29/23 0930)    SpO2 95 % (10/29/23 0930)

## 2023-10-29 NOTE — PROGRESS NOTES
[facility]  Progress Note  Name: Kenneth Rome  MRN: [id number]  Unit/Bed#: ICU 08 I Date of Admission: 10/26/2023   Date of Service: 10/28/2023 I Hospital Day: 2    Assessment/Plan   Other specified anemias  Assessment & Plan  Lab Results   Component Value Date    HGB 11.6 (L) 10/28/2023    HGB 11.2 (L) 10/27/2023    HGB 13.7 10/26/2023     Most likely secondary to hemodilution and critical illness    Abnormal ultrasound  Assessment & Plan  Ultrasound kidney and bladder is pending official review, however findings as listed by tech showing possible nephrolithiasis near the UVJ. CTAP: 6 mm distal left ureteral calculus located 1 cm proximal to the left UVJ. Two adjacent 4 mm left bladder calculi possibly located within the left UVJ     8 mm dependent bladder calculus. No hydronephrosis. Small bibasilar pleural effusions. Potential mild pulmonary edema. Large quantity of rectal vault stool. Plan:  Appreciate urology's assistance with his care  Plan for left ureteral stent today    Positive blood culture  Assessment & Plan  10/27 2/2 blood cultures positive for Staphylococcus epidermidis. Given septic shock and both sets are positive, this is possibly a true infection. 10/27 TTE without evidence of vegetations    We will consider infectious disease consult  Repeat blood cultures tomorrow 10/29, 48 hours after initial set  Antibiotics as above    Acute respiratory failure with hypoxia (HCC)  Assessment & Plan  Requiring 2 L nasal cannula to maintain saturations greater than 90%. Likely secondary to tachypnea and mild volume overload  Wean as tolerated    Acute cystitis with hematuria  Assessment & Plan  UA with nitrites, leukocytes. Follow cultures. Vertigo  Assessment & Plan  Unclear etiology, present suddenly when waking up on 10/26.  Says he feels "off balance"    Plan  Continue stroke pathway started on med/surg  Follow MRI -we will need further discussion with neurology if this is needed or if it is more likely recrudescence of old stroke given septic shock    BPH with obstruction/lower urinary tract symptoms  Assessment & Plan  Not retaining urine at this time, continue to monitor    * Septic shock [facility])  Assessment & Plan  Overnight 10/26-10/27 became febrile with Tmax 102.4 and hypotensive not responding to 30 cc/kg fluid bolus. Also with tachypnea and tachycardia. Lactic acid, procalcitonin normal.  UA with large leukocytes, positive nitrites, innumerable WBCs. Chest x-ray with what looks to be vascular congestion on my review. 2/2 blood cultures positive for Staphylococcus epidermidis - given septic shock and both sets are positive this is possibly true infection    Plan  Maintain MAP greater than 65  Continue ceftriaxone 2 g daily  Now off of vasopressors. Stop midodrine. Follow blood and urine cultures  Staph epi in both the urine and the blood  We will consider infectious disease consult  Plan for left urethral stent placement  If blood pressures remain stable later today can likely downgrade out of ICU             ICU Core Measures     A: Assess, Prevent, and Manage Pain Has pain been assessed? Yes  Need for changes to pain regimen? No   B: Both SAT/SAT  N/A   C: Choice of Sedation RASS Goal: 0 Alert and Calm or N/A patient not on sedation  Need for changes to sedation or analgesia regimen? No   D: Delirium CAM-ICU: Negative   E: Early Mobility  Plan for early mobility? Yes   F: Family Engagement Plan for family engagement today? Yes       Antibiotic Review: Patient on appropriate coverage based on culture data. Review of Invasive Devices:            Prophylaxis:  VTE VTE covered by:  enoxaparin, Subcutaneous, 40 mg at 10/28/23 0098       Stress Ulcer  not ordered       Subjective   HPI/24hr events: No events overnight, feels improved    Review of Systems   All other systems reviewed and are negative.         Objective Vitals I/O      Most Recent Min/Max in 24hrs   Temp 98.3 °F (36.8 °C) Temp  Min: 98.3 °F (36.8 °C)  Max: 102.8 °F (39.3 °C)   Pulse 92 Pulse  Min: 70  Max: 94   Resp (!) 26 Resp  Min: 15  Max: 37   /65 BP  Min: 83/62  Max: 158/86   O2 Sat 91 % SpO2  Min: 89 %  Max: 98 %      Intake/Output Summary (Last 24 hours) at 10/28/2023 2254  Last data filed at 10/28/2023 2238  Gross per 24 hour   Intake 1353.89 ml   Output 1760 ml   Net -406.11 ml         Diet NPO  Diet Regular; Regular House     Invasive Monitoring Physical exam    Physical Exam  Eyes:      Pupils: Pupils are equal, round, and reactive to light. Skin:     General: Skin is warm and dry. HENT:      Head: Normocephalic. Mouth/Throat:      Mouth: Mucous membranes are dry. Cardiovascular:      Rate and Rhythm: Normal rate. Musculoskeletal:         General: Swelling present. Abdominal:      Palpations: Abdomen is soft. Tenderness: There is no abdominal tenderness. Constitutional:       Appearance: He is well-developed. He is ill-appearing. Pulmonary:      Effort: Pulmonary effort is normal.      Breath sounds: Normal breath sounds. Neurological:      Comments: Sleepy but arousable, follows commands, moves all extremities           Diagnostic Studies      EKG:   Imaging:  I have personally reviewed pertinent reports.    and I have personally reviewed pertinent films in PACS     Medications:  Scheduled PRN   atorvastatin, 40 mg, QPM  chlorhexidine, 15 mL, Q12H [address]  clopidogrel, 75 mg, Daily  enoxaparin, 40 mg, Daily  glycerin-hypromellose-, 2 drop, BID  polyethylene glycol, 17 g, Daily  [START ON 10/29/2023] vancomycin, 750 mg, Q12H      acetaminophen, 975 mg, Q8H PRN  aluminum-magnesium hydroxide-simethicone, 30 mL, Q6H PRN  meclizine, 25 mg, Q8H PRN  ondansetron, 4 mg, Q6H PRN       Continuous          Labs:    CBC    Recent Labs     10/27/23  1055 10/28/23  0442   WBC 20.51* 10.68*   HGB 11.2* 11.6*   HCT 35.7* 36.6   PLT 187 160   BANDSPCT 8  --      BMP    Recent Labs     10/27/23  0528 10/28/23  0442   SODIUM 140 136   K 4.4 3.7    107   CO2 18* 25   AGAP 16 4   BUN 31* 21   CREATININE 0.99 0.81   CALCIUM 9.2 7.7*       Coags    No recent results     Additional Electrolytes  Recent Labs     10/28/23  0442   MG 1.7*          Blood Gas    No recent results  No recent results LFTs  No recent results    Infectious  Recent Labs     10/27/23  0256   PROCALCITONI 0.21     Glucose  Recent Labs     10/27/23  0528 10/28/23  0442   GLUC 91 [address] [facility], [facility]

## 2023-10-29 NOTE — PLAN OF CARE
Problem: MOBILITY - ADULT  Goal: Maintain or return to baseline ADL function  Description: INTERVENTIONS:  -  Assess patient's ability to carry out ADLs; assess patient's baseline for ADL function and identify physical deficits which impact ability to perform ADLs (bathing, care of mouth/teeth, toileting, grooming, dressing, etc.)  - Assess/evaluate cause of self-care deficits   - Assess range of motion  - Assess patient's mobility; develop plan if impaired  - Assess patient's need for assistive devices and provide as appropriate  - Encourage maximum independence but intervene and supervise when necessary  - Involve family in performance of ADLs  - Assess for home care needs following discharge   - Consider OT consult to assist with ADL evaluation and planning for discharge  - Provide patient education as appropriate  Outcome: Progressing  Goal: Maintains/Returns to pre admission functional level  Description: INTERVENTIONS:  - Perform BMAT or MOVE assessment daily.   - Set and communicate daily mobility goal to care team and patient/family/caregiver. - Collaborate with rehabilitation services on mobility goals if consulted  - Perform Range of Motion 4 times a day. - Reposition patient every 2 hours.   - Dangle patient 3 times a day  - Stand patient 3 times a day  - Ambulate patient 3 times a day  - Out of bed to chair 3 times a day   - Out of bed for meals 3 times a day  - Out of bed for toileting  - Record patient progress and toleration of activity level   Outcome: Progressing     Problem: Prexisting or High Potential for Compromised Skin Integrity  Goal: Skin integrity is maintained or improved  Description: INTERVENTIONS:  - Identify patients at risk for skin breakdown  - Assess and monitor skin integrity  - Assess and monitor nutrition and hydration status  - Monitor labs   - Assess for incontinence   - Turn and reposition patient  - Assist with mobility/ambulation  - Relieve pressure over bony prominences  - Avoid friction and shearing  - Provide appropriate hygiene as needed including keeping skin clean and dry  - Evaluate need for skin moisturizer/barrier cream  - Collaborate with interdisciplinary team   - Patient/family teaching  - Consider wound care consult   Outcome: Progressing     Problem: PAIN - ADULT  Goal: Verbalizes/displays adequate comfort level or baseline comfort level  Description: Interventions:  - Encourage patient to monitor pain and request assistance  - Assess pain using appropriate pain scale  - Administer analgesics based on type and severity of pain and evaluate response  - Implement non-pharmacological measures as appropriate and evaluate response  - Consider cultural and social influences on pain and pain management  - Notify physician/advanced practitioner if interventions unsuccessful or patient reports new pain  Outcome: Progressing     Problem: INFECTION - ADULT  Goal: Absence or prevention of progression during hospitalization  Description: INTERVENTIONS:  - Assess and monitor for signs and symptoms of infection  - Monitor lab/diagnostic results  - Monitor all insertion sites, i.e. indwelling lines, tubes, and drains  - Monitor endotracheal if appropriate and nasal secretions for changes in amount and color  - [place] appropriate cooling/warming therapies per order  - Administer medications as ordered  - Instruct and encourage patient and family to use good hand hygiene technique  - Identify and instruct in appropriate isolation precautions for identified infection/condition  Outcome: Progressing  Goal: Absence of fever/infection during neutropenic period  Description: INTERVENTIONS:  - Monitor WBC    Outcome: Progressing

## 2023-10-29 NOTE — PROGRESS NOTES
Roni Carrillo is a 81 y.o. male who is currently ordered Vancomycin IV with management by the Pharmacy Consult service. Relevant clinical data and objective / subjective history reviewed. Vancomycin Assessment:  Indication and Goal AUC/Trough: Bacteremia (goal -600, trough >10)  Clinical Status: stable  Micro:     Renal Function:  SCr: 0.79 mg/dL  CrCl: 70.1 mL/min  Renal replacement: Not on dialysis  Days of Therapy: 2  Current Dose: 750mg IV every 12 hours   Random level: 9.9mcg/ml  Vancomycin Plan:  New Dosinmg IV every 12 hours  Estimated AUC: 444 mcg*hr/mL  Estimated Trough: 15 mcg/mL  Next Level: 23 with AM labs  Renal Function Monitoring: Daily BMP and [place] will continue to follow closely for s/sx of nephrotoxicity, infusion reactions and appropriateness of therapy. BMP and CBC will be ordered per protocol. We will continue to follow the patient’s culture results and clinical progress daily.     Anu Pablo, CaryD

## 2023-10-29 NOTE — PROGRESS NOTES
Today Sho Cary went to the operating room. I placed a left ureteral stent. There were stones in the bladder and a stone in the distal ureter but no evidence of hydronephrosis. The urine actually appeared relatively clear. I have also placed a Valladares catheter. Maintain Valladares catheter overnight. Consider void trial in the morning of October 30, 2023. Patient will require outpatient follow-up for cystolitholapaxy to remove bladder stones and left ureteroscopy to remove his distal ureteral calculus. We will also assess for possible TURP. Continue with conservative management at this time including antibiotics per infectious disease.   Appreciate the assistance of the ICU team.

## 2023-10-30 ENCOUNTER — TELEPHONE (OUTPATIENT)
Dept: UROLOGY | Facility: AMBULATORY SURGERY CENTER | Age: 88
End: 2023-10-30

## 2023-10-30 PROBLEM — N20.9 UROLITHIASIS: Status: ACTIVE | Noted: 2023-10-30

## 2023-10-30 LAB
BACTERIA BLD CULT: ABNORMAL
BACTERIA BLD CULT: ABNORMAL
GRAM STN SPEC: ABNORMAL
GRAM STN SPEC: ABNORMAL
S EPIDERMIDIS DNA BLD POS QL NAA+NON-PRB: DETECTED

## 2023-10-30 PROCEDURE — 99223 1ST HOSP IP/OBS HIGH 75: CPT | Performed by: INTERNAL MEDICINE

## 2023-10-30 PROCEDURE — 99232 SBSQ HOSP IP/OBS MODERATE 35: CPT | Performed by: STUDENT IN AN ORGANIZED HEALTH CARE EDUCATION/TRAINING PROGRAM

## 2023-10-30 PROCEDURE — 99232 SBSQ HOSP IP/OBS MODERATE 35: CPT | Performed by: PHYSICIAN ASSISTANT

## 2023-10-30 RX ORDER — CEFAZOLIN SODIUM 2 G/50ML
2000 SOLUTION INTRAVENOUS EVERY 8 HOURS
Status: DISCONTINUED | OUTPATIENT
Start: 2023-10-30 | End: 2023-11-01 | Stop reason: HOSPADM

## 2023-10-30 RX ADMIN — ATORVASTATIN CALCIUM 40 MG: 40 TABLET, FILM COATED ORAL at 17:56

## 2023-10-30 RX ADMIN — CLOPIDOGREL BISULFATE 75 MG: 75 TABLET ORAL at 08:17

## 2023-10-30 RX ADMIN — VANCOMYCIN HYDROCHLORIDE 750 MG: 750 INJECTION, SOLUTION INTRAVENOUS at 05:28

## 2023-10-30 RX ADMIN — GLYCERIN 2 DROP: .002; .002; .01 SOLUTION/ DROPS OPHTHALMIC at 08:17

## 2023-10-30 RX ADMIN — GLYCERIN 2 DROP: .002; .002; .01 SOLUTION/ DROPS OPHTHALMIC at 20:14

## 2023-10-30 RX ADMIN — CEFAZOLIN SODIUM 2000 MG: 2 SOLUTION INTRAVENOUS at 17:56

## 2023-10-30 RX ADMIN — ENOXAPARIN SODIUM 40 MG: 40 INJECTION SUBCUTANEOUS at 08:17

## 2023-10-30 NOTE — PROGRESS NOTES
[facility]  Progress Note  Name: Sissy Yoo  MRN: [id number]  Unit/Bed#: [facility] -01 I Date of Admission: 10/26/2023   Date of Service: 10/30/2023 I Hospital Day: 4    Assessment/Plan   * Septic shock [facility])  Assessment & Plan  52-year-old female initially managed as a case of septic shock secondary to UTI secondary to left nephrolithiasis. Antibiotic management per infectious disease. Currently on vancomycin with pharmacy assistance  Appreciate urology recommendations. Patient is status post left cystoscopy retrograde pyelogram with insertion of stent ureteral.    Positive blood culture  Assessment & Plan  Staph epidermidis. Infectious disease suspects that the cultures appear real secondary to urinary source. See treatment plan as written in the main problem. Acute respiratory failure with hypoxia (HCC)  Assessment & Plan  Wean off as tolerated . Possibly attributed due to fluid overload. Acute cystitis with hematuria  Assessment & Plan  See main problem    Vertigo  Assessment & Plan  Patient presented with acute onset vertigo. Per neurology recommendations:  "These symptoms are reportedly similar to the last time when he was found to have a stroke. 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 Multifocal severe foraminal stenosis for which we will obtain an MRI C-spine. During exam he was non-focal NIHSS-0, Etiology: suspect likely peripheral vs recrudescence of prior stroke symptoms vs less likely acute ischemic event, would recommend admitting under observation for stroke pathway. MRI brain/ MRI C-spine pending. Continue with Plavix 75 mg and Atorvastatin 40 mg daily."    Per ICU note: Neurology consulted- they recommended MRI Brain initially but this seems recrudescence.   Discussed with neurology and we will cancel MRI   Await follow-up neurology recommendations. Hyperlipidemia  Assessment & Plan  Continue statin    BPH with obstruction/lower urinary tract symptoms  Assessment & Plan  Urology recommends trial of void, possible TURP on follow-up    Hematuria-resolved as of 10/28/2023  Assessment & Plan  In setting of UTI. See sepsis d/t UTI           VTE Pharmacologic Prophylaxis:   Pharmacologic: Enoxaparin (Lovenox)  Mechanical VTE Prophylaxis in Place: Yes    Discussions with Specialists or Other Care Team Provider: nursing, id    Education and Discussions with Family / Patient: patient    Current Length of Stay: 4 day(s)    Current Patient Status: Inpatient   Certification Statement: The patient will continue to require additional inpatient hospital stay due to iv antibiotics, await cultures, id reevaluation    Discharge Plan: active    Code Status: Level 1 - Full Code      Subjective:   Patient seen and examined at bedside. Comfortable, no new issues overnight. Objective:     Vitals:   Temp (24hrs), Av.4 °F (36.9 °C), Min:97.8 °F (36.6 °C), Max:99.2 °F (37.3 °C)    Temp:  [97.8 °F (36.6 °C)-99.2 °F (37.3 °C)] 97.9 °F (36.6 °C)  HR:  [63-75] 75  Resp:  [17-19] 18  BP: ()/(52-86) 131/86  SpO2:  [88 %-97 %] 91 %  Body mass index is 28.66 kg/m². Input and Output Summary (last 24 hours): Intake/Output Summary (Last 24 hours) at 10/30/2023 1748  Last data filed at 10/30/2023 1543  Gross per 24 hour   Intake 540 ml   Output 2100 ml   Net -1560 ml       Physical Exam:     Physical Exam  Vitals reviewed. Constitutional:       General: He is not in acute distress. HENT:      Head: Normocephalic. Nose: Nose normal.      Mouth/Throat:      Mouth: Mucous membranes are moist.   Eyes:      General: No scleral icterus. Cardiovascular:      Rate and Rhythm: Normal rate. Pulmonary:      Effort: Pulmonary effort is normal. No respiratory distress. Abdominal:      Palpations: Abdomen is soft. Tenderness:  There is no abdominal tenderness. Skin:     General: Skin is warm. Neurological:      Mental Status: He is alert. Mental status is at baseline. Psychiatric:         Mood and Affect: Mood normal.         Behavior: Behavior normal.       Additional Data:     Labs:    Results from last 7 days   Lab Units 10/29/23  0401 10/28/23  0442   WBC Thousand/uL 6.70 10.68*   HEMOGLOBIN g/dL 12.2 11.6*   HEMATOCRIT % 37.8 36.6   PLATELETS Thousands/uL 165 160   BANDS PCT % 4  --    NEUTROS PCT %  --  86*   LYMPHS PCT %  --  7*   LYMPHO PCT % 21  --    MONOS PCT %  --  7   MONO PCT % 11  --    EOS PCT % 2 0     Results from last 7 days   Lab Units 10/29/23  0401 10/27/23  0528 10/26/23  1040   SODIUM mmol/L 137   < > 136   POTASSIUM mmol/L 3.8   < > 3.9   CHLORIDE mmol/L 106   < > 105   CO2 mmol/L 26   < > 19*   BUN mg/dL 19   < > 31*   CREATININE mg/dL 0.79   < > 0.85   ANION GAP mmol/L 5   < > 12   CALCIUM mg/dL 8.0*   < > 8.9   ALBUMIN g/dL  --   --  3.9   TOTAL BILIRUBIN mg/dL  --   --  0.89   ALK PHOS U/L  --   --  61   ALT U/L  --   --  17   AST U/L  --   --  20   GLUCOSE RANDOM mg/dL 85   < > 176*    < > = values in this interval not displayed. Results from last 7 days   Lab Units 10/26/23  1040   INR  0.96     Results from last 7 days   Lab Units 10/26/23  1034   POC GLUCOSE mg/dl 157*     Results from last 7 days   Lab Units 10/27/23  1055   HEMOGLOBIN A1C % 6.3*     Results from last 7 days   Lab Units 10/27/23  0256 10/27/23  0246   LACTIC ACID mmol/L  --  1.7   PROCALCITONIN ng/ml 0.21  --            * I Have Reviewed All Lab Data Listed Above. * Additional Pertinent Lab Tests Reviewed: [facility] Admission Reviewed      Lines:   Invasive Devices       Peripheral Intravenous Line  Duration             Peripheral IV 10/27/23 Left;Ventral (anterior) Forearm 3 days    Peripheral IV 10/29/23 Distal;Dorsal (posterior); Left Forearm 1 day                       Imaging:    Imaging Reports Reviewed Today Include: imaging since admission    Recent Cultures (last 7 days):     Results from last 7 days   Lab Units 10/29/23  0856 10/29/23  0437 10/29/23  0402 10/27/23  0224 10/26/23  2345   BLOOD CULTURE   --  No Growth at 24 hrs. No Growth at 24 hrs. Staphylococcus epidermidis*  Staphylococcus epidermidis*  --    GRAM STAIN RESULT   --   --   --  Gram positive cocci in clusters*  Gram positive cocci in clusters*  --    URINE CULTURE  No Growth <100 cfu/mL  --   --   --  >100,000 cfu/ml Staphylococcus coagulase negative*       Last 24 Hours Medication List:   Current Facility-Administered Medications   Medication Dose Route Frequency Provider Last Rate    acetaminophen  975 mg Oral Q8H PRN Hewitt HUSAM Romero      aluminum-magnesium hydroxide-simethicone  30 mL Oral Q6H PRN Hewitt Heather, MATTHEWNP      atorvastatin  40 mg Oral QPM Hewitt HUSAM Romero      cefazolin  2,000 mg Intravenous Q8H Blaire Aldea, DO      clopidogrel  75 mg Oral Daily Hewitt HUSAM Romero      enoxaparin  40 mg Subcutaneous Daily Hewitt HUSAM Romero      glycerin-hypromellose-  2 drop Both Eyes BID Hewitt HUSAM Romero      meclizine  25 mg Oral Q8H PRN Hewitt Heather, HUSAM      ondansetron  4 mg Intravenous Q6H PRN Hewitt Heather, HUSAM      polyethylene glycol  17 g Oral Daily HUSAM Barraza          Today, Patient Was Seen By: Abad Salazar MD    ** Please Note: Dictation voice to text software may have been used in the creation of this document.  **

## 2023-10-30 NOTE — PLAN OF CARE
Problem: MOBILITY - ADULT  Goal: Maintain or return to baseline ADL function  Description: INTERVENTIONS:  -  Assess patient's ability to carry out ADLs; assess patient's baseline for ADL function and identify physical deficits which impact ability to perform ADLs (bathing, care of mouth/teeth, toileting, grooming, dressing, etc.)  - Assess/evaluate cause of self-care deficits   - Assess range of motion  - Assess patient's mobility; develop plan if impaired  - Assess patient's need for assistive devices and provide as appropriate  - Encourage maximum independence but intervene and supervise when necessary  - Involve family in performance of ADLs  - Assess for home care needs following discharge   - Consider OT consult to assist with ADL evaluation and planning for discharge  - Provide patient education as appropriate  Outcome: Progressing  Goal: Maintains/Returns to pre admission functional level  Description: INTERVENTIONS:  - Perform BMAT or MOVE assessment daily.   - Set and communicate daily mobility goal to care team and patient/family/caregiver. - Collaborate with rehabilitation services on mobility goals if consulted  - Perform Range of Motion 4 times a day. - Reposition patient every 2 hours.   - Dangle patient 3 times a day  - Stand patient 3 times a day  - Ambulate patient 3 times a day  - Out of bed to chair 3 times a day   - Out of bed for meals 3 times a day  - Out of bed for toileting  - Record patient progress and toleration of activity level   Outcome: Progressing     Problem: Prexisting or High Potential for Compromised Skin Integrity  Goal: Skin integrity is maintained or improved  Description: INTERVENTIONS:  - Identify patients at risk for skin breakdown  - Assess and monitor skin integrity  - Assess and monitor nutrition and hydration status  - Monitor labs   - Assess for incontinence   - Turn and reposition patient  - Assist with mobility/ambulation  - Relieve pressure over bony prominences  - Avoid friction and shearing  - Provide appropriate hygiene as needed including keeping skin clean and dry  - Evaluate need for skin moisturizer/barrier cream  - Collaborate with interdisciplinary team   - Patient/family teaching  - Consider wound care consult   Outcome: Progressing     Problem: PAIN - ADULT  Goal: Verbalizes/displays adequate comfort level or baseline comfort level  Description: Interventions:  - Encourage patient to monitor pain and request assistance  - Assess pain using appropriate pain scale  - Administer analgesics based on type and severity of pain and evaluate response  - Implement non-pharmacological measures as appropriate and evaluate response  - Consider cultural and social influences on pain and pain management  - Notify physician/advanced practitioner if interventions unsuccessful or patient reports new pain  Outcome: Progressing     Problem: INFECTION - ADULT  Goal: Absence or prevention of progression during hospitalization  Description: INTERVENTIONS:  - Assess and monitor for signs and symptoms of infection  - Monitor lab/diagnostic results  - Monitor all insertion sites, i.e. indwelling lines, tubes, and drains  - Monitor endotracheal if appropriate and nasal secretions for changes in amount and color  - [place] appropriate cooling/warming therapies per order  - Administer medications as ordered  - Instruct and encourage patient and family to use good hand hygiene technique  - Identify and instruct in appropriate isolation precautions for identified infection/condition  Outcome: Progressing  Goal: Absence of fever/infection during neutropenic period  Description: INTERVENTIONS:  - Monitor WBC    Outcome: Progressing     Problem: SAFETY ADULT  Goal: Maintain or return to baseline ADL function  Description: INTERVENTIONS:  -  Assess patient's ability to carry out ADLs; assess patient's baseline for ADL function and identify physical deficits which impact ability to perform ADLs (bathing, care of mouth/teeth, toileting, grooming, dressing, etc.)  - Assess/evaluate cause of self-care deficits   - Assess range of motion  - Assess patient's mobility; develop plan if impaired  - Assess patient's need for assistive devices and provide as appropriate  - Encourage maximum independence but intervene and supervise when necessary  - Involve family in performance of ADLs  - Assess for home care needs following discharge   - Consider OT consult to assist with ADL evaluation and planning for discharge  - Provide patient education as appropriate  Outcome: Progressing  Goal: Maintains/Returns to pre admission functional level  Description: INTERVENTIONS:  - Perform BMAT or MOVE assessment daily.   - Set and communicate daily mobility goal to care team and patient/family/caregiver. - Collaborate with rehabilitation services on mobility goals if consulted  - Perform Range of Motion 4 times a day. - Reposition patient every 2 hours.   - Dangle patient 3 times a day  - Stand patient 3 times a day  - Ambulate patient 3 times a day  - Out of bed to chair 3 times a day   - Out of bed for meals 3 times a day  - Out of bed for toileting  - Record patient progress and toleration of activity level   Outcome: Progressing  Goal: Patient will remain free of falls  Description: INTERVENTIONS:  - Educate patient/family on patient safety including physical limitations  - Instruct patient to call for assistance with activity   - Consult OT/PT to assist with strengthening/mobility   - Keep Call bell within reach  - Keep bed low and locked with side rails adjusted as appropriate  - Keep care items and personal belongings within reach  - Initiate and maintain comfort rounds  - Make Fall Risk Sign visible to staff  - Offer Toileting every 2 Hours, in advance of need  - Initiate/Maintain bed alarm  - Obtain necessary fall risk management equipment:   - Apply yellow socks and bracelet for high fall risk patients  - Consider moving patient to room near nurses station  Outcome: Progressing     Problem: DISCHARGE PLANNING  Goal: Discharge to home or other facility with appropriate resources  Description: INTERVENTIONS:  - Identify barriers to discharge w/patient and caregiver  - Arrange for needed discharge resources and transportation as appropriate  - Identify discharge learning needs (meds, wound care, etc.)  - Arrange for interpretive services to assist at discharge as needed  - Refer to Case Management Department for coordi discharge planning if the patient needs post-hospital services based on physician/advanced practitioner order or complex needs related to functional status, cognitive ability, or social support system  Outcome: Progressing     Problem: Knowledge Deficit  Goal: Patient/family/caregiver demonstrates understanding of disease process, treatment plan, medications, and discharge instructions  Description: Complete learning assessment and assess knowledge base.   Interventions:  - Provide teaching at level of understanding  - Provide teaching via preferred learning methods  Outcome: Progressing

## 2023-10-30 NOTE — ASSESSMENT & PLAN NOTE
Left distal ureteral stone without hydronephrosis  Few small bladder calculi  BPH    He is POD#1 cystoscopy LEFT ureteral stent insertion. His spann catheter can be removed today for voiding trial. His left ureteral stent will stay in place for few weeks at which point he will return as outpatient for discussion then to the OR for cystoscopy LEFT ureteroscopy laser and basket stone treatment, stent exchange, possible TURP.

## 2023-10-30 NOTE — ASSESSMENT & PLAN NOTE
Patient presented with acute onset vertigo. Per neurology recommendations:  "These symptoms are reportedly similar to the last time when he was found to have a stroke. 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 Multifocal severe foraminal stenosis for which we will obtain an MRI C-spine. During exam he was non-focal NIHSS-0, Etiology: suspect likely peripheral vs recrudescence of prior stroke symptoms vs less likely acute ischemic event, would recommend admitting under observation for stroke pathway. MRI brain/ MRI C-spine pending. Continue with Plavix 75 mg and Atorvastatin 40 mg daily."    Per ICU note: Neurology consulted- they recommended MRI Brain initially but this seems recrudescence. Discussed with neurology and we will cancel MRI   Await follow-up neurology recommendations.

## 2023-10-30 NOTE — TELEPHONE ENCOUNTER
----- Message from Mercy Santa MD sent at 10/29/2023  9:07 AM EDT -----  Abdirahman Adam has a left ureteral stent in place. He needs to be scheduled for the operating room in 6 to 8 weeks with cystoscopy, left ureteroscopy, holmium laser lithotripsy left retrograde pyelography and left ureteral stent insertion. I would also book the case for cystolitholapaxy and possible TURP. Please make sure that the patient sees me in the office in the next few weeks. Thank you.   FT

## 2023-10-30 NOTE — PROGRESS NOTES
Vancomycin Monitoring    Demographics    Jude Tran    Assessment    Vancomycin has been d/c'd by ID.    Plan    Consult closed, trough cancelled     Cary OlivierD

## 2023-10-30 NOTE — ASSESSMENT & PLAN NOTE
Staph epidermidis. Infectious disease suspects that the cultures appear real secondary to urinary source. See treatment plan as written in the main problem.

## 2023-10-30 NOTE — CONSULTS
Consultation - Infectious Disease   Evins Liter 81 y.o. male MRN: [id number]  Unit/Bed#: ICU 08 Encounter: [id number]      IMPRESSION & RECOMMENDATIONS:   Impression/Recommendations:  1. Septic shock, evolving early. High fevers, leukocytosis, refractory hypotension. Secondary to staph epidermidis bacteremia. Patient is now clinically improving with resolution of fevers, normalization of WBC count, and improvement in blood pressures.    -Antibiotic plan as below  -Follow temperatures and hemodynamics  -Follow CBC    2. Coagulase-negative staph bacteremia. Although blood cultures appear to have isolated multiple strains of Staph epidermidis, findings do appear to be real and likely secondary to urinary source. No other acute infectious findings on CT abdomen/pelvis. No intravascular prosthetic devices. No open wounds. No visible vegetations on 2D echo. Bacteremia appears to be quickly clearing.    -Continue IV vancomycin, dosing per pharmacy.  -Follow-up pending susceptibilities. Discussed with micro lab. -Anticipate transition to cefazolin soon if susceptibilities allow.  -If repeat blood cultures remain negative, plan for 2-week course of IV antibiotic. 3.  UTI. In the setting of #4. Urine culture with growth of greater than 100,000 coagulase-negative staph. Appears to be etiology of bacteremia.    -Antibiotic plan as above    4. Left ureterolithiasis. Status post ureteral stent placement on 10/29. Operative culture with no growth, but patient was already receiving antibiotics prior to surgery.    -Antibiotic plan as above  -Urology follow-up ongoing. Eventual plan for second stage procedure. Antibiotics:  Vancomycin 3      I discussed above plan with primary service, with patient and his family at bedside. Personally reviewed prior medical records. Thank you for this consultation. We will follow along with you.         HISTORY OF PRESENT ILLNESS:  Reason for Consult: Staph bacteremia, septic shock    HPI: Jude Tran is a 81 y.o. male with CAD, prior CVA, BPH who presented on 10/26 due to confusion and dizziness. Developed early high fevers with acute rise in WBC count up to 20. Also developed refractory hypotension requiring transfer to ICU and initiation of pressor support. Abdominal CT showed distal left ureteral calculus without obvious hydronephrosis. Patient went to the OR for cystoscopy and left ureteral stent placement on morning of 10/29. Patient was also noted to have multiple bladder stones. Blood pressures have improved postoperatively. Patient remains on vancomycin as blood cultures and urine culture have isolated coagulase-negative staph thus far. Today, patient is feeling much better. He is off pressor support. No further fevers. No active pain. Denies any open wounds. No intravascular prosthetic devices. REVIEW OF SYSTEMS:  A complete system-based review of systems is otherwise negative.     PAST MEDICAL HISTORY:  Past Medical History:   Diagnosis Date    Basal cell carcinoma     Cancer ([address])     NSTEMI (non-ST elevated myocardial infarction) ([address]) 2014    Pneumonia     Last Assessed:  10/7/14    Polymyalgia rheumatica ([address])     Last Assessed:  11/23/12    Polymyalgia rheumatica ([address]) 2011    Squamous cell skin cancer 05/24/2022    left frontal scalp anterior    Squamous cell skin cancer 05/24/2022    Vertex of scalp     Past Surgical History:   Procedure Laterality Date    APPENDECTOMY      CORONARY ANGIOPLASTY WITH STENT PLACEMENT      HERNIA REPAIR  2000    MOHS SURGERY  07/27/2022    left frontal scalp and vertex of scalp    CO CYSTO BLADDER W/URETERAL CATHETERIZATION Left 10/29/2023    Procedure: CYSTOSCOPY RETROGRADE PYELOGRAM WITH INSERTION STENT URETERAL;  Surgeon: Laxmi Bell MD;  [facility];  Service: Urology       FAMILY HISTORY:  Non-contributory    SOCIAL HISTORY:  Social History     Substance and Sexual Activity   Alcohol Use Not Currently    Comment: Social     Social History     Substance and Sexual Activity   Drug Use Never     Social History     Tobacco Use   Smoking Status Never   Smokeless Tobacco Never       ALLERGIES:  Allergies   Allergen Reactions    Iv Dye [Iodinated Contrast Media] Rash     Possible delayed rash 2022       MEDICATIONS:  All current active medications have been reviewed. PHYSICAL EXAM:  Vitals:  Temp:  [97.9 °F (36.6 °C)-99.2 °F (37.3 °C)] 99.2 °F (37.3 °C)  HR:  [57-72] 64  Resp:  [12-20] 19  BP: (104-137)/(52-82) 109/69  SpO2:  [82 %-97 %] 92 %  Temp (24hrs), Av.5 °F (36.9 °C), Min:97.9 °F (36.6 °C), Max:99.2 °F (37.3 °C)  Current: Temperature: 99.2 °F (37.3 °C)     Physical Exam:  General:  Well-nourished, well-developed, in no acute distress, nontoxic  Eyes:  Conjunctive clear with no hemorrhages or effusions  Oropharynx:  No ulcers, no lesions  Neck:  Supple, trachea midline  Lungs:  Clear to auscultation bilaterally, no accessory muscle use  Cardiac:  Regular rate and rhythm, no murmurs  Abdomen:  Soft, non-tender, non-distended  Valladares in place with blood-tinged urine in bag  Extremities:  No peripheral cyanosis, clubbing, or edema  Skin:  No diffuse rashes or draining wounds  Neurological:  Moves all four extremities spontaneously    LABS, IMAGING, & OTHER STUDIES:  Lab Results:  I have personally reviewed pertinent labs.   Results from last 7 days   Lab Units 10/29/23  0401 10/28/23  0442 10/27/23  0528 10/26/23  1040   POTASSIUM mmol/L 3.8 3.7 4.4 3.9   CHLORIDE mmol/L 106 107 106 105   CO2 mmol/L 26 25 18* 19*   BUN mg/dL 19 21 31* 31*   CREATININE mg/dL 0.79 0.81 0.99 0.85   EGFR ml/min/1.73sq m 80 79 68 78   CALCIUM mg/dL 8.0* 7.7* 9.2 8.9   AST U/L  --   --   --  20   ALT U/L  --   --   --  17   ALK PHOS U/L  --   --   --  61     Results from last 7 days   Lab Units 10/29/23  0401 10/28/23  0442 10/27/23  1055   WBC Thousand/uL 6.70 10.68* 20.51*   HEMOGLOBIN g/dL 12.2 11.6* 11.2* PLATELETS Thousands/uL 165 160 187     Results from last 7 days   Lab Units 10/29/23  0856 10/29/23  0437 10/29/23  0402 10/27/23  0224 10/26/23  2345   BLOOD CULTURE   --  No Growth at 24 hrs. No Growth at 24 hrs. Staphylococcus epidermidis*  Staphylococcus epidermidis*  --    GRAM STAIN RESULT   --   --   --  Gram positive cocci in clusters*  Gram positive cocci in clusters*  --    URINE CULTURE  No Growth <100 cfu/mL  --   --   --  >100,000 cfu/ml Staphylococcus coagulase negative*         Imaging Studies:   I have personally reviewed pertinent imaging study reports and images in PACS. CTA head/neck shows no acute intracranial abnormality. Multifocal cervical spondylosis with severe C3-4 moderate stenosis. Chest x-ray with mild left basilar atelectasis. Renal ultrasound with left UVJ calculus with no associated hydronephrosis. CT abdomen/pelvis with 6 mm distal left ureteral calculus, no hydronephrosis. EKG, Pathology, and Other Studies:   I have personally reviewed pertinent reports.

## 2023-10-30 NOTE — CASE MANAGEMENT
Case Management Discharge Planning Note    Patient name Aminta Dela Cruz  Location [address] [address]/[facility] Lynnette Desai* MRN [id number]  : 1935 Date 10/30/2023       Current Admission Date: 10/26/2023  Current Admission Diagnosis:Septic shock 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 2022    Balance problems  2022    Heart palpitations 2022    History of heart artery stent 2018    Primary osteoarthritis of both hands 2018    Basal cell carcinoma of skin 2015    BPH with obstruction/lower urinary tract symptoms 2014    Benign essential hypertension 2013    Actinic keratosis 2013    Arteriosclerotic coronary artery disease 2013    Hyperlipidemia 2012      LOS (days): 4  Geometric Mean LOS (GMLOS) (days): 5.10  Days to GMLOS:1     OBJECTIVE:  Risk of Unplanned Readmission Score: 9.91         Current admission status: Inpatient   Preferred Pharmacy:   [id number] [place] - [address] [facility] [address]  Phone: [phone] Fax: [phone] [facility] [place] [facility] [address] [facility] [address]  Phone: [phone] Fax: [phone]    Primary Care Provider: Scotty Pacheco DO    Primary Insurance: MEDICARE  Secondary Insurance: BLUE CROSS    DISCHARGE DETAILS:    Discharge planning discussed with[de-identified] pt and pt's wife  Freedom of Choice: Yes  Comments - Freedom of Choice: Both hopeful for pt to d/c home w/ VNA understanding need for IV Abx for 2 wks per ID with possible co-pays- agreeable to need for VNA wishing for referral to [facility] and using [facility] (to keep within the network)- referrals made as requested. CM contacted family/caregiver?: Yes  Were Treatment Team discharge recommendations reviewed with patient/caregiver?: Yes  Did patient/caregiver verbalize understanding of patient care needs?: Yes       Contacts  Patient Contacts: Nito Gibson  Relationship to Patient[de-identified] Family  Contact Method:  In Person  Reason/Outcome: Discharge Planning, Referral    Requested [address] [facility]         Is the patient interested in [facility] [address] at discharge?: Yes  [address] [facility] requested[de-identified] Nursing, Physical [address] [facility] Name[de-identified] [facility] Provider[de-identified] PCP  Home Health Services Needed[de-identified] Evaluate Functional Status and Safety, Administration of IV, IM or SC Medications, Gait/ADL Training, Strengthening/Theraputic Exercises to Improve Function  Homebound Criteria Met[de-identified] Uses an Assist Device (i.e. cane, walker, etc)  Supporting Clincal Findings[de-identified] Limited Endurance    DME Referral Provided  Referral made for DME?: No         Would you like to participate in our [employer] service program?  : No - Declined    Treatment Team Recommendation: Home with [address]  Discharge Destination Plan[de-identified] [facility] : Auto with designated , Family        Transported by [employer] and Unit #): wife

## 2023-10-30 NOTE — PROGRESS NOTES
Progress Note - Urology  Trey Davis 1935, 81 y.o. male MRN: [id number]    Unit/Bed#: [place] [address] -01 Encounter: [id number]      Urolithiasis  Assessment & Plan  Left distal ureteral stone without hydronephrosis  Few small bladder calculi  BPH    He is POD#1 cystoscopy LEFT ureteral stent insertion. His spann catheter can be removed today for voiding trial. His left ureteral stent will stay in place for few weeks at which point he will return as outpatient for discussion then to the OR for cystoscopy LEFT ureteroscopy laser and basket stone treatment, stent exchange, possible TURP. Urology will sign off but remain available for any further inpatient needs. Please feel free to contact the provider currently covering the Urology [facility] role for this campus with questions or concerns. Subjective: feels well today. urine clear via spann. understands what surgery happened yesterday. anxious to try flomax to go better. currently no dizziness. no further fevers or chills. Review of Systems   Constitutional: Negative. Negative for chills and fever. Respiratory: Negative. Cardiovascular: Negative. Gastrointestinal:  Negative for abdominal pain and diarrhea. Genitourinary:  Positive for frequency. Negative for decreased urine volume, difficulty urinating, dysuria, flank pain, hematuria and urgency. Musculoskeletal: Negative. Neurological:  Negative for dizziness, seizures and light-headedness. Objective:  Vitals: Blood pressure 116/71, pulse 63, temperature 97.8 °F (36.6 °C), temperature source Oral, resp. rate 18, height 5' 8" (1.727 m), weight 85.5 kg (188 lb 7.9 oz), SpO2 96 %. ,Body mass index is 28.66 kg/m².     Intake/Output Summary (Last 24 hours) at 10/30/2023 1311  Last data filed at 10/30/2023 0900  Gross per 24 hour   Intake 540 ml   Output 3450 ml   Net -2910 ml     Invasive Devices       Peripheral Intravenous Line  Duration             Peripheral IV 10/27/23 Left;Ventral (anterior) Forearm 3 days    Peripheral IV 10/29/23 Distal;Dorsal (posterior); Left Forearm 1 day              Drain  Duration             Urethral Catheter Latex 18 Fr. 1 day                    Physical Exam  Vitals and nursing note reviewed. Constitutional:       Appearance: He is well-developed. Comments: sitting in [place] chair   HENT:      Head: Normocephalic and atraumatic. Cardiovascular:      Rate and Rhythm: Normal rate and regular rhythm. Heart sounds: Normal heart sounds. No murmur heard. Pulmonary:      Effort: Pulmonary effort is normal.      Breath sounds: Normal breath sounds. Abdominal:      General: Bowel sounds are normal.      Palpations: Abdomen is soft. Genitourinary:     Comments: spann per urethra draining clear sally urine  Musculoskeletal:         General: Normal range of motion. Skin:     General: Skin is warm and dry. Capillary Refill: Capillary refill takes less than 2 seconds. Coloration: Skin is not pale. Neurological:      Mental Status: He is alert and oriented to person, place, and time.          Labs:  Recent Labs     10/28/23  0442 10/29/23  0401   WBC 10.68* 6.70     Recent Labs     10/28/23  0442 10/29/23  0401   HGB 11.6* 12.2       Recent Labs     10/28/23  0442 10/29/23  0401   CREATININE 0.81 0.79       History:    Past Medical History:   Diagnosis Date    Basal cell carcinoma     Cancer ([address] [facility])     NSTEMI (non-ST elevated myocardial infarction) ([address] [facility]) 2014    Pneumonia     Last Assessed:  10/7/14    Polymyalgia rheumatica ([address] [facility])     Last Assessed:  11/23/12    Polymyalgia rheumatica ([address] [facility]) 2011    Squamous cell skin cancer 05/24/2022    left frontal scalp anterior    Squamous cell skin cancer 05/24/2022    Vertex of scalp     Past Surgical History:   Procedure Laterality Date    APPENDECTOMY      CORONARY ANGIOPLASTY WITH [address] [facility] [address]    MOHS SURGERY  07/27/2022    left frontal scalp and vertex of scalp    MD CYSTO BLADDER W/URETERAL CATHETERIZATION Left 10/29/2023    Procedure: CYSTOSCOPY RETROGRADE PYELOGRAM WITH INSERTION STENT URETERAL;  Surgeon: Salma Rollins MD;  Location: [facility];  Service: Urology     Family History   Problem Relation Age of Onset    Pulmonary embolism Father     Other Father         Transurethral resection of prostate    Stroke Sister      Social History     Socioeconomic History    Marital status: /Civil Union     Spouse name: None    Number of children: None    Years of education: None    Highest education level: None   Occupational History    None   Tobacco Use    Smoking status: Never    Smokeless tobacco: Never   Vaping Use    Vaping Use: Never used   Substance and Sexual Activity    Alcohol use: Not Currently     Comment: Social    Drug use: Never    Sexual activity: Yes   Other Topics Concern    None   Social History Narrative    Exercises regularly        Consumes 2-3 cups of coffee per week and 1 glass of ice tea per day     Social Determinants of Health     Financial Resource Strain: Low Risk  (10/23/2023)    Overall Financial Resource Strain (CARDIA)     Difficulty of Paying Living Expenses: Not hard at all   Food Insecurity: Not on file   Transportation Needs: No Transportation Needs (10/23/2023)    PRAPARE - Transportation     Lack of Transportation (Medical): No     Lack of Transportation (Non-Medical):  No   Physical Activity: Not on file   Stress: Not on file   Social Connections: Not on file   Intimate Partner Violence: Not on file   Housing Stability: Not on file         Karla Bowser  Date: 10/30/2023 Time: 1:11 PM

## 2023-10-31 ENCOUNTER — HOME HEALTH ADMISSION (OUTPATIENT)
Dept: HOME HEALTH SERVICES | Facility: HOME HEALTHCARE | Age: 88
End: 2023-10-31
Payer: MEDICARE

## 2023-10-31 LAB
ANION GAP SERPL CALCULATED.3IONS-SCNC: 7 MMOL/L
BACTERIA UR CULT: NORMAL
BUN SERPL-MCNC: 16 MG/DL (ref 5–25)
CALCIUM SERPL-MCNC: 8.4 MG/DL (ref 8.4–10.2)
CHLORIDE SERPL-SCNC: 103 MMOL/L (ref 96–108)
CO2 SERPL-SCNC: 27 MMOL/L (ref 21–32)
CREAT SERPL-MCNC: 0.87 MG/DL (ref 0.6–1.3)
GFR SERPL CREATININE-BSD FRML MDRD: 77 ML/MIN/1.73SQ M
GLUCOSE SERPL-MCNC: 108 MG/DL (ref 65–140)
MAGNESIUM SERPL-MCNC: 1.8 MG/DL (ref 1.9–2.7)
POTASSIUM SERPL-SCNC: 4.2 MMOL/L (ref 3.5–5.3)
SODIUM SERPL-SCNC: 137 MMOL/L (ref 135–147)

## 2023-10-31 PROCEDURE — 97530 THERAPEUTIC ACTIVITIES: CPT

## 2023-10-31 PROCEDURE — 97535 SELF CARE MNGMENT TRAINING: CPT

## 2023-10-31 PROCEDURE — 99232 SBSQ HOSP IP/OBS MODERATE 35: CPT | Performed by: INTERNAL MEDICINE

## 2023-10-31 PROCEDURE — 80048 BASIC METABOLIC PNL TOTAL CA: CPT | Performed by: STUDENT IN AN ORGANIZED HEALTH CARE EDUCATION/TRAINING PROGRAM

## 2023-10-31 PROCEDURE — 99233 SBSQ HOSP IP/OBS HIGH 50: CPT | Performed by: INTERNAL MEDICINE

## 2023-10-31 PROCEDURE — 83735 ASSAY OF MAGNESIUM: CPT | Performed by: STUDENT IN AN ORGANIZED HEALTH CARE EDUCATION/TRAINING PROGRAM

## 2023-10-31 RX ADMIN — ENOXAPARIN SODIUM 40 MG: 40 INJECTION SUBCUTANEOUS at 08:43

## 2023-10-31 RX ADMIN — CEFAZOLIN SODIUM 2000 MG: 2 SOLUTION INTRAVENOUS at 12:19

## 2023-10-31 RX ADMIN — CEFAZOLIN SODIUM 2000 MG: 2 SOLUTION INTRAVENOUS at 01:48

## 2023-10-31 RX ADMIN — CEFAZOLIN SODIUM 2000 MG: 2 SOLUTION INTRAVENOUS at 17:18

## 2023-10-31 RX ADMIN — CLOPIDOGREL BISULFATE 75 MG: 75 TABLET ORAL at 08:43

## 2023-10-31 RX ADMIN — ATORVASTATIN CALCIUM 40 MG: 40 TABLET, FILM COATED ORAL at 17:18

## 2023-10-31 RX ADMIN — GLYCERIN 2 DROP: .002; .002; .01 SOLUTION/ DROPS OPHTHALMIC at 08:44

## 2023-10-31 RX ADMIN — GLYCERIN 2 DROP: .002; .002; .01 SOLUTION/ DROPS OPHTHALMIC at 22:08

## 2023-10-31 NOTE — PLAN OF CARE
Problem: PHYSICAL THERAPY ADULT  Goal: Performs mobility at highest level of function for planned discharge setting. See evaluation for individualized goals. Description: Treatment/Interventions: Functional transfer training, LE strengthening/ROM, Elevations, Therapeutic exercise, Endurance training, Patient/family training, Equipment eval/education, Bed mobility, Gait training, Compensatory technique education, Continued evaluation, Spoke to nursing, OT, Family  Equipment Recommended: Other (Comment) (monitor)       See flowsheet documentation for full assessment, interventions and recommendations. Outcome: Progressing  Note: Prognosis: Good  Problem List: Impaired balance  Assessment: Herb was seen for a f/u session; w no complaints throughout session. Demonstrates significant progress towards goals including improved am-pac, decreased level of assist for all mobility, increased walking distances, progression to stair trial, return to baseline LOF of amb no AD. Currently ambulating community distances and negotiating steps to simulate home environment. No adverse sx reported during session including pain, fatigue or lightheadedness. Given progress in therapy will update POC as appropriate. Barriers to Discharge: None  Barriers to Discharge Comments: stairs  Rehab Resource Intensity Level, PT: III (Minimum Resource Intensity) (will cont to monitor need for OPPT)    See flowsheet documentation for full assessment.

## 2023-10-31 NOTE — PROGRESS NOTES
[facility]  Progress Note  Name: Sheila Chen  MRN: [id number]  Unit/Bed#: [facility] -01 I Date of Admission: 10/26/2023   Date of Service: 10/31/2023 I Hospital Day: 5    Assessment/Plan   * Septic shock [facility])  Assessment & Plan  History of stroke presented to the hospital with vertigo/weakness subsequently found to have septic shock requiring ICU stay with vasopressors  Source: UTI with ureteral calculi undergoing cystoscopy/stenting  Growth of CNS in both blood cultures and urine  Antibiotics tailored to cefazolin with 14-day course end date 11/11/2023 per ID  PICC consent obtained placed on chart    Urolithiasis  Assessment & Plan  Underwent ureteral stent placement this admission. Vertigo  Assessment & Plan  Initial presentation for cute onset of vertigo. Seen by neurology. Symptoms unlikely stroke and patient was recommended to be taken off stroke pathway. MRI discontinued  Continue clopidogrel for history of stroke    Hyperlipidemia  Assessment & Plan  Continue atorvastatin    BPH with obstruction/lower urinary tract symptoms  Assessment & Plan  Urology recommends outpatient follow-up for possible TURP    VTE Pharmacologic Prophylaxis:   Moderate Risk (Score 3-4) - Pharmacological DVT Prophylaxis Ordered: enoxaparin (Lovenox). Patient Centered Rounds: I have performed bedside rounds with nursing staff today. Discussions with Specialists or Other Care Team Provider: Infectious disease and case management    Education and Discussions with Family / Patient: Updated  (wife) at bedside. Time Spent for Care: This time was spent on one or more of the following: performing physical exam; counseling and coordination of care; obtaining or reviewing history; documenting in the medical record; reviewing/ordering tests, medications or procedures; communicating with other healthcare professionals and discussing with patient's family/caregivers.     Current Length of Stay: 5 day(s)  Current Patient Status: Inpatient   Certification Statement: The patient will continue to require additional inpatient hospital stay due to PICC line placement and home antibiotics  Discharge Plan: Anticipate discharge tomorrow to home with home services. Code Status: Level 1 - Full Code      Subjective:   Patient seen and examined. Feeling very good. No diarrhea    Objective:   Vitals: Blood pressure 121/77, pulse 70, temperature 97.6 °F (36.4 °C), resp. rate 16, height 5' 8" (1.727 m), weight 85.5 kg (188 lb 7.9 oz), SpO2 95 %. Intake/Output Summary (Last 24 hours) at 10/31/2023 1909  Last data filed at 10/31/2023 1718  Gross per 24 hour   Intake 360 ml   Output 1325 ml   Net -965 ml       Physical Exam  Vitals reviewed. Constitutional:       General: He is not in acute distress. HENT:      Head: Atraumatic. Cardiovascular:      Rate and Rhythm: Regular rhythm. Heart sounds: Normal heart sounds. Pulmonary:      Effort: Pulmonary effort is normal.      Breath sounds: Decreased breath sounds present. No wheezing. Abdominal:      General: Bowel sounds are normal.      Palpations: Abdomen is soft. Tenderness: There is no abdominal tenderness. There is no rebound. Musculoskeletal:         General: No swelling or tenderness. Right lower leg: Edema present. Left lower leg: Edema present. Skin:     General: Skin is warm and dry. Neurological:      General: No focal deficit present. Mental Status: He is alert. Cranial Nerves: No cranial nerve deficit.    Psychiatric:         Mood and Affect: Mood normal.       Additional Data:   Labs:  Results from last 7 days   Lab Units 10/29/23  0401 10/28/23  0442 10/27/23  1055 10/26/23  1040   WBC Thousand/uL 6.70 10.68* 20.51* 6.58   HEMOGLOBIN g/dL 12.2 11.6* 11.2* 13.7   PLATELETS Thousands/uL 165 160 187 278   MCV fL 84 86 87 82   TOTAL NEUT ABS Thousand/uL 4.36  --  19.69*  --    BANDS PCT % 4  --  8 --    INR   --   --   --  0.96     Results from last 7 days   Lab Units 10/31/23  0457 10/29/23  0401 10/28/23  0442 10/27/23  0528 10/26/23  1040   SODIUM mmol/L 137 137 136   < > 136   POTASSIUM mmol/L 4.2 3.8 3.7   < > 3.9   CHLORIDE mmol/L 103 106 107   < > 105   CO2 mmol/L 27 26 25   < > 19*   ANION GAP mmol/L 7 5 4   < > 12   BUN mg/dL 16 19 21   < > 31*   CREATININE mg/dL 0.87 0.79 0.81   < > 0.85   CALCIUM mg/dL 8.4 8.0* 7.7*   < > 8.9   ALBUMIN g/dL  --   --   --   --  3.9   TOTAL BILIRUBIN mg/dL  --   --   --   --  0.89   ALK PHOS U/L  --   --   --   --  61   ALT U/L  --   --   --   --  17   AST U/L  --   --   --   --  20   EGFR ml/min/1.73sq m 77 80 79   < > 78   GLUCOSE RANDOM mg/dL 108 85 88   < > 176*    < > = values in this interval not displayed. Results from last 7 days   Lab Units 10/31/23  0457 10/29/23  0401 10/28/23  0442   MAGNESIUM mg/dL 1.8* 1.6* 1.7*         Results from last 7 days   Lab Units 10/26/23  1040   HS TNI 0HR ng/L 7          Results from last 7 days   Lab Units 10/27/23  0256 10/27/23  0246   LACTIC ACID mmol/L  --  1.7   PROCALCITONIN ng/ml 0.21  --      Results from last 7 days   Lab Units 10/26/23  1034   POC GLUCOSE mg/dl 157*     Results from last 7 days   Lab Units 10/27/23  1055   HEMOGLOBIN A1C % 6.3*         * I Have Reviewed All Lab Data Listed Above. Cultures:   Results from last 7 days   Lab Units 10/29/23  0856 10/29/23  0437 10/29/23  0402 10/27/23  0224 10/26/23  2345   BLOOD CULTURE   --  No Growth at 48 hrs. No Growth at 48 hrs. Staphylococcus epidermidis*  Staphylococcus epidermidis*  --    GRAM STAIN RESULT   --   --   --  Gram positive cocci in clusters*  Gram positive cocci in clusters*  --    URINE CULTURE  No Growth <100 cfu/mL  --   --   --  >100,000 cfu/ml Staphylococcus coagulase negative*                 Lines/Drains:  Invasive Devices       Peripheral Intravenous Line  Duration             Peripheral IV 10/29/23 Distal;Dorsal (posterior); Left Forearm 2 days                  Telemetry:      Imaging:  Imaging Reports Reviewed Today Include:   CT abdomen pelvis wo contrast    Result Date: 10/28/2023  Impression: 6 mm distal left ureteral calculus located 1 cm proximal to the left UVJ. Two adjacent 4 mm left bladder calculi possibly located within the left UVJ 8 mm dependent bladder calculus. No hydronephrosis. Small bibasilar pleural effusions. Potential mild pulmonary edema. Large quantity of rectal vault stool. The study was marked in [facility] for immediate notification. Workstation performed: [id number]     US kidney and bladder    Result Date: 10/28/2023  Impression: Left UVJ 9 mm calculus with no visualization of ureteral jets. No associated hydronephrosis. Minimally complicated left renal cysts as detailed above. Resident: Elham Martin, the attending radiologist, have reviewed the images and agree with the final report above. Workstation performed: [id number]     XR chest portable    Result Date: 10/27/2023  Impression: Mild left basilar atelectasis. No focal consolidation, pleural effusion, or pneumothorax. Resident: Raj Powell, the attending radiologist, have reviewed the images and agree with the final report above. Workstation performed: [id number]     CTA head and neck with and without contrast    Result Date: 10/26/2023  Impression: No acute intracranial abnormality. Mild cerebral chronic microangiopathic changes. No cervical or intracranial large vessel occlusion. No hemodynamically significant stenosis at the cervical carotid bifurcations. Advanced multifocal cervical spondylosis with severe C3-4 and moderate additional multilevel spinal stenosis. Multifocal severe foraminal stenosis. Follow-up MR imaging of the cervical spine is recommended to better assess the soft tissue contents of the  spinal canal. The study was marked in [employer] for significant notification.  Workstation performed: [id number]       Scheduled Meds:  Current Facility-Administered Medications   Medication Dose Route Frequency Provider Last Rate    acetaminophen  975 mg Oral Q8H PRN Brian Brock, CRNP      aluminum-magnesium hydroxide-simethicone  30 mL Oral Q6H PRN Brian Brock, CRNP      atorvastatin  40 mg Oral QPM Brian Brock, CRNP      cefazolin  2,000 mg Intravenous Q8H Blaire Aldea, DO 2,000 mg (10/31/23 5858)    clopidogrel  75 mg Oral Daily Brian Brock, CRNP      enoxaparin  40 mg Subcutaneous Daily Brian Brock, CRNP      glycerin-hypromellose-  2 drop Both Eyes BID Brian Brock, CRNP      meclizine  25 mg Oral Q8H PRN Brian Brock, CRNP      ondansetron  4 mg Intravenous Q6H PRN Brian Brock, CRNP      polyethylene glycol  17 g Oral Daily Brian Brock, CRSANDY         Today, Patient Was Seen By: Fernando Ambrosio DO    ** Please Note: Dictation voice to text software may have been used in the creation of this document.  **

## 2023-10-31 NOTE — ASSESSMENT & PLAN NOTE
History of stroke presented to the hospital with vertigo/weakness subsequently found to have septic shock requiring ICU stay with vasopressors  Source: UTI with ureteral calculi undergoing cystoscopy/stenting  Growth of CNS in both blood cultures and urine  Antibiotics tailored to cefazolin with 14-day course end date 11/11/2023 per ID  PICC consent obtained placed on chart

## 2023-10-31 NOTE — PHYSICAL THERAPY NOTE
PHYSICAL THERAPY NOTE          Patient Name: Caden MEJIA Date: 10/31/2023  Time: 0740-9757       10/31/23 1101   PT Last Visit   PT Visit Date 10/31/23   Note Type   Note Type Treatment   Pain Assessment   Pain Assessment Tool 0-10   Pain Score No Pain   Restrictions/Precautions   Other Precautions Fall Risk   General   Chart Reviewed Yes   Additional Pertinent History POD#2   Response to Previous Treatment Patient with no complaints from previous session. Family/Caregiver Present Yes  (at end of session)   Cognition   Overall Cognitive Status WFL   Arousal/Participation Cooperative   Attention Within functional limits   Orientation Level Oriented to person;Oriented to time;Oriented to place   Memory Within functional limits;Decreased recall of recent events   Following Commands Follows one step commands without difficulty   Bed Mobility   Supine to Sit 5  Supervision   Additional items Increased time required   Transfers   Sit to Stand 5  Supervision   Additional items Increased time required   Stand to Sit 5  Supervision   Additional items Armrests; Increased time required   Ambulation/Elevation   Gait pattern Improper Weight shift; Wide ELADIA; Excessively slow   Gait Assistance 5  Supervision   Additional items Assist x 1   Assistive Device None   Distance 250'   Stair Management Assistance 5  Supervision   Additional items Assist x 1   Stair Management Technique One rail R;Step to pattern; Foreward   Number of Stairs 9   Ambulation/Elevation Additional Comments ascend and descend 9 steps   Balance   Static Standing Fair   Dynamic Standing Fair -   Ambulatory Fair -   Endurance Deficit   Endurance Deficit No   Activity Tolerance   Activity Tolerance Patient tolerated treatment well   Medical Staff Made Aware CM   Nurse Made Aware Kailee Bhat RN   Assessment   Prognosis Good   Problem List Impaired balance   Assessment Herb was seen for a f/u session; w no complaints throughout session. Demonstrates significant progress towards goals including improved am-pac, decreased level of assist for all mobility, increased walking distances, progression to stair trial, return to baseline LOF of amb no AD. Currently ambulating community distances and negotiating steps to simulate home environment. No adverse sx reported during session including pain, fatigue or lightheadedness. Given progress in therapy will update POC as appropriate. Barriers to Discharge None   Goals   STG Expiration Date 11/06/23   Short Term Goal #1 10 days: 1). Pt will perform bed mobility with Eloy demonstrating appropriate technique 100% of the time in order to improve function. 2)  Perform all transfers with Eloy demonstrating safe and appropriate technique 100% of the time in order to improve ability to negotiate safely in home environment. 3) Amb with least restrictive AD prn > 150'x1 with mod I in order to demonstrate ability to negotiate in home environment. 4)  Improve overall strength and balance 1/2 grade in order to optimize ability to perform functional tasks and reduce fall risk. 5) Increase activity tolerance to 45 minutes in order to improve endurance to functional tasks. 6)  Negotiate stairs using most appropriate technique and S in order to be able to negotiate safely in home environment. 7) PT for ongoing patient and family/caregiver education, DME needs and d/c planning in order to promote highest level of function in least restrictive environment. PT Treatment Day 1   Plan   Treatment/Interventions Functional transfer training;LE strengthening/ROM; Elevations; Therapeutic exercise;Patient/family training;Equipment eval/education;Gait training;Bed mobility;Spoke to nursing   Progress Improving as expected   PT Frequency 1-2x/wk   Discharge Recommendation   Rehab Resource Intensity Level, PT III (Minimum Resource Intensity)  (will cont to monitor need for OPPT) AM-PAC Basic Mobility Inpatient   Turning in Flat Bed Without Bedrails 4   Lying on Back to Sitting on Edge of Flat Bed Without Bedrails 4   Moving Bed to Chair 3   Standing Up From Chair Using Arms 3   Walk in Room 3   Climb 3-5 Stairs With Railing 3   Basic Mobility Inpatient Raw Score 20   Basic Mobility Standardized Score 43.99   Highest Level Of Mobility   JH-HLM Goal 6: Walk 10 steps or more   JH-HLM Achieved 8: Walk 250 feet ot more   End of Consult   Patient Position at End of Consult Bedside chair; All needs within reach; Other (comment)   End of Consult Comments ID present       Gini Erwin, PT

## 2023-10-31 NOTE — PROGRESS NOTES
Progress Note - Infectious Disease   Sandy Novoa 81 y.o. male MRN: [id number]  Unit/Bed#: [place] [address] -01 Encounter: [id number]      IMPRESSION & RECOMMENDATIONS:   Impression/Recommendations:  1. Septic shock, evolving early. High fevers, leukocytosis, refractory hypotension. Secondary to staph epidermidis bacteremia. Patient is now clinically improving with resolution of fevers, normalization of WBC count, and improvement in blood pressures.     -Antibiotic plan as below  -Follow temperatures and hemodynamics  -Follow CBC     2. Coagulase-negative staph bacteremia. Although blood cultures appear to have isolated multiple strains of Staph epidermidis, findings do appear to be real and likely secondary to urinary source. No other acute infectious findings on CT abdomen/pelvis. No intravascular prosthetic devices. No open wounds. No visible vegetations on 2D echo. Bacteremia appears to be quickly clearing.     -Continue IV cefazolin 2 g every 8 hours  -Complete 2-week course of IV antibiotic from date of clearance, through 11/11.  -Weekly CBC with differential, creatinine while on IV antibiotic. Office made aware.   -Discontinue PICC line after last dose of IV antibiotic     3. UTI. In the setting of #4. Urine culture with growth of greater than 100,000 coagulase-negative staph. Appears to be etiology of bacteremia.     -Antibiotic plan as above     4. Left ureterolithiasis. Status post ureteral stent placement on 10/29. Operative culture with no growth, but patient was already receiving antibiotics prior to surgery.     -Antibiotic plan as above  -Urology follow-up ongoing. Eventual plan for second stage procedure. Antibiotics:  Antibiotic 4  Cefazolin 2  Negative blood cultures 10/29     I discussed above plan with Dr. Sheng Hamm, with patient and his family at bedside. Prescription for IV antibiotic and weekly blood work were placed in patient's chart.         Subjective:  Patient is feeling much better. He is ambulating around the halls. No pain. No fevers or chills. Valladares catheter was removed yesterday. Objective:  Vitals:  Temp:  [97.8 °F (36.6 °C)-98.7 °F (37.1 °C)] 98.7 °F (37.1 °C)  HR:  [63-75] 69  Resp:  [16-18] 16  BP: (115-131)/(71-86) 119/75  SpO2:  [91 %-98 %] 92 %  Temp (24hrs), Av.3 °F (36.8 °C), Min:97.8 °F (36.6 °C), Max:98.7 °F (37.1 °C)  Current: Temperature: 98.7 °F (37.1 °C)    Physical Exam:   General:  No acute distress, nontoxic  HEENT: Atraumatic normocephalic  Neck: Trachea midline  Psychiatric:  Awake and alert  Pulmonary:  Normal respiratory excursion without accessory muscle use  Abdomen:  Soft, nontender  Extremities:  No edema  Skin:  No rashes or draining wounds  Neuro: Moves all extremities spontaneously    Lab Results:  I have personally reviewed pertinent labs. Results from last 7 days   Lab Units 10/31/23  0457 10/29/23  0401 10/28/23  0442 10/27/23  0528 10/26/23  1040   POTASSIUM mmol/L 4.2 3.8 3.7   < > 3.9   CHLORIDE mmol/L 103 106 107   < > 105   CO2 mmol/L 27 26 25   < > 19*   BUN mg/dL 16 19 21   < > 31*   CREATININE mg/dL 0.87 0.79 0.81   < > 0.85   EGFR ml/min/1.73sq m 77 80 79   < > 78   CALCIUM mg/dL 8.4 8.0* 7.7*   < > 8.9   AST U/L  --   --   --   --  20   ALT U/L  --   --   --   --  17   ALK PHOS U/L  --   --   --   --  61    < > = values in this interval not displayed. Results from last 7 days   Lab Units 10/29/23  0401 10/28/23  0442 10/27/23  1055   WBC Thousand/uL 6.70 10.68* 20.51*   HEMOGLOBIN g/dL 12.2 11.6* 11.2*   PLATELETS Thousands/uL 165 160 187     Results from last 7 days   Lab Units 10/29/23  0856 10/29/23  0437 10/29/23  0402 10/27/23  0224 10/26/23  2345   BLOOD CULTURE   --  No Growth at 48 hrs. No Growth at 48 hrs.  Staphylococcus epidermidis*  Staphylococcus epidermidis*  --    GRAM STAIN RESULT   --   --   --  Gram positive cocci in clusters*  Gram positive cocci in clusters*  --    URINE CULTURE  No Growth <100 cfu/mL  --   --   --  >100,000 cfu/ml Staphylococcus coagulase negative*       Imaging Studies:   I have personally reviewed pertinent imaging study reports and images in PACS. EKG, Pathology, and Other Studies:   I have personally reviewed pertinent reports.

## 2023-10-31 NOTE — ASSESSMENT & PLAN NOTE
Initial presentation for cute onset of vertigo. Seen by neurology. Symptoms unlikely stroke and patient was recommended to be taken off stroke pathway.   MRI discontinued  Continue clopidogrel for history of stroke

## 2023-10-31 NOTE — PLAN OF CARE
Problem: MOBILITY - ADULT  Goal: Maintain or return to baseline ADL function  Description: INTERVENTIONS:  -  Assess patient's ability to carry out ADLs; assess patient's baseline for ADL function and identify physical deficits which impact ability to perform ADLs (bathing, care of mouth/teeth, toileting, grooming, dressing, etc.)  - Assess/evaluate cause of self-care deficits   - Assess range of motion  - Assess patient's mobility; develop plan if impaired  - Assess patient's need for assistive devices and provide as appropriate  - Encourage maximum independence but intervene and supervise when necessary  - Involve family in performance of ADLs  - Assess for home care needs following discharge   - Consider OT consult to assist with ADL evaluation and planning for discharge  - Provide patient education as appropriate  Outcome: Progressing  Goal: Maintains/Returns to pre admission functional level  Description: INTERVENTIONS:  - Perform BMAT or MOVE assessment daily.   - Set and communicate daily mobility goal to care team and patient/family/caregiver. - Collaborate with rehabilitation services on mobility goals if consulted  - Perform Range of Motion 4 times a day. - Reposition patient every 2 hours.   - Dangle patient 3 times a day  - Stand patient 3 times a day  - Ambulate patient 3 times a day  - Out of bed to chair 3 times a day   - Out of bed for meals 3 times a day  - Out of bed for toileting  - Record patient progress and toleration of activity level   Outcome: Progressing     Problem: Prexisting or High Potential for Compromised Skin Integrity  Goal: Skin integrity is maintained or improved  Description: INTERVENTIONS:  - Identify patients at risk for skin breakdown  - Assess and monitor skin integrity  - Assess and monitor nutrition and hydration status  - Monitor labs   - Assess for incontinence   - Turn and reposition patient  - Assist with mobility/ambulation  - Relieve pressure over bony prominences  - Avoid friction and shearing  - Provide appropriate hygiene as needed including keeping skin clean and dry  - Evaluate need for skin moisturizer/barrier cream  - Collaborate with interdisciplinary team   - Patient/family teaching  - Consider wound care consult   Outcome: Progressing     Problem: PAIN - ADULT  Goal: Verbalizes/displays adequate comfort level or baseline comfort level  Description: Interventions:  - Encourage patient to monitor pain and request assistance  - Assess pain using appropriate pain scale  - Administer analgesics based on type and severity of pain and evaluate response  - Implement non-pharmacological measures as appropriate and evaluate response  - Consider cultural and social influences on pain and pain management  - Notify physician/advanced practitioner if interventions unsuccessful or patient reports new pain  Outcome: Progressing     Problem: INFECTION - ADULT  Goal: Absence or prevention of progression during hospitalization  Description: INTERVENTIONS:  - Assess and monitor for signs and symptoms of infection  - Monitor lab/diagnostic results  - Monitor all insertion sites, i.e. indwelling lines, tubes, and drains  - Monitor endotracheal if appropriate and nasal secretions for changes in amount and color  - [place] appropriate cooling/warming therapies per order  - Administer medications as ordered  - Instruct and encourage patient and family to use good hand hygiene technique  - Identify and instruct in appropriate isolation precautions for identified infection/condition  Outcome: Progressing  Goal: Absence of fever/infection during neutropenic period  Description: INTERVENTIONS:  - Monitor WBC    Outcome: Progressing     Problem: SAFETY ADULT  Goal: Maintain or return to baseline ADL function  Description: INTERVENTIONS:  -  Assess patient's ability to carry out ADLs; assess patient's baseline for ADL function and identify physical deficits which impact ability to perform ADLs (bathing, care of mouth/teeth, toileting, grooming, dressing, etc.)  - Assess/evaluate cause of self-care deficits   - Assess range of motion  - Assess patient's mobility; develop plan if impaired  - Assess patient's need for assistive devices and provide as appropriate  - Encourage maximum independence but intervene and supervise when necessary  - Involve family in performance of ADLs  - Assess for home care needs following discharge   - Consider OT consult to assist with ADL evaluation and planning for discharge  - Provide patient education as appropriate  Outcome: Progressing  Goal: Maintains/Returns to pre admission functional level  Description: INTERVENTIONS:  - Perform BMAT or MOVE assessment daily.   - Set and communicate daily mobility goal to care team and patient/family/caregiver. - Collaborate with rehabilitation services on mobility goals if consulted  - Perform Range of Motion 4 times a day. - Reposition patient every 2 hours.   - Dangle patient 3 times a day  - Stand patient 3 times a day  - Ambulate patient 3 times a day  - Out of bed to chair 3 times a day   - Out of bed for meals 3 times a day  - Out of bed for toileting  - Record patient progress and toleration of activity level   Outcome: Progressing  Goal: Patient will remain free of falls  Description: INTERVENTIONS:  - Educate patient/family on patient safety including physical limitations  - Instruct patient to call for assistance with activity   - Consult OT/PT to assist with strengthening/mobility   - Keep Call bell within reach  - Keep bed low and locked with side rails adjusted as appropriate  - Keep care items and personal belongings within reach  - Initiate and maintain comfort rounds  - Make Fall Risk Sign visible to staff  - Offer Toileting every 2 Hours, in advance of need  - Initiate/Maintain bed alarm  - Obtain necessary fall risk management equipment:   - Apply yellow socks and bracelet for high fall risk patients  - Consider moving patient to room near nurses station  Outcome: Progressing     Problem: DISCHARGE PLANNING  Goal: Discharge to home or other facility with appropriate resources  Description: INTERVENTIONS:  - Identify barriers to discharge w/patient and caregiver  - Arrange for needed discharge resources and transportation as appropriate  - Identify discharge learning needs (meds, wound care, etc.)  - Arrange for interpretive services to assist at discharge as needed  - Refer to Case Management Department for coordinating discharge planning if the patient needs post-hospital services based on physician/advanced practitioner order or complex needs related to functional status, cognitive ability, or social support system  Outcome: Progressing     Problem: Knowledge Deficit  Goal: Patient/family/caregiver demonstrates understanding of disease process, treatment plan, medications, and discharge instructions  Description: Complete learning assessment and assess knowledge base.   Interventions:  - Provide teaching at level of understanding  - Provide teaching via preferred learning methods  Outcome: Progressing

## 2023-10-31 NOTE — CASE MANAGEMENT
Case Management Discharge Planning Note    Patient name Samantha Phillips  Location [address] [address]/[facility] [address]* MRN [id number]  : 1935 Date 10/31/2023       Current Admission Date: 10/26/2023  Current Admission Diagnosis:Septic shock [facility])   Patient Active Problem List    Diagnosis Date Noted    Urolithiasis 10/30/2023    Positive blood culture 10/28/2023    Abnormal ultrasound 10/28/2023    Other specified anemias 10/28/2023    Septic shock ([address]) 10/27/2023    Acute cystitis with hematuria 10/27/2023    Acute respiratory failure with hypoxia ([address]) 10/27/2023    Vertigo 10/26/2023    Narrow angle of anterior chamber of left eye 2023    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 2022    Balance problems  2022    Heart palpitations 2022    History of heart artery stent 2018    Primary osteoarthritis of both hands 2018    Basal cell carcinoma of skin 2015    BPH with obstruction/lower urinary tract symptoms 2014    Benign essential hypertension 2013    Actinic keratosis 2013    Arteriosclerotic coronary artery disease 2013    Hyperlipidemia 2012      LOS (days): 5  Geometric Mean LOS (GMLOS) (days): 9.90  Days to GMLOS:4.9     OBJECTIVE:  Risk of Unplanned Readmission Score: 8.66         Current admission status: Inpatient   Preferred Pharmacy:   [address] - [address] [facility] [address]  Phone: [phone] Fax: [phone] [place], [place] - [address] [address]  Phone: [phone] Fax: [phone]    Primary Care Provider: Jean Claude Schrader DO    Primary Insurance: MEDICARE  Secondary Insurance: BLUE CROSS    DISCHARGE DETAILS:    Discharge planning discussed with[de-identified] pt and pt's wife  Freedom of Choice: Yes  Comments - Freedom of Choice: pt and pt's wife agreeale to cose of IV Abx from [facility] and to utilize [facility]  CM contacted family/caregiver?: Yes  Were Treatment Team discharge recommendations reviewed with patient/caregiver?: Yes  Did patient/caregiver verbalize understanding of patient care needs?: Yes       Contacts  Patient Contacts: Pradeep Garcia  Relationship to Patient[de-identified] Family  Contact Method:  In Person  Reason/Outcome: Discharge [address]         Is the patient interested in [facility] at discharge?: Yes  [facility] requested[de-identified] Nursing, Physical [address] [facility] Name[de-identified] [facility] Provider[de-identified] PCP  Home Health Services Needed[de-identified] Administration of IV, IM or SC Medications, Evaluate Functional Status and Safety, Gait/ADL Training, Strengthening/Theraputic Exercises to Improve Function  Homebound Criteria Met[de-identified] Requires the Assistance of Another Person for Safe Ambulation or to Leave the Home, Immunosuppressed  Supporting Clincal Findings[de-identified] Limited Endurance    DME Referral Provided  Referral made for DME?: No         Would you like to participate in our [address] [facility] [employer] service program?  : No - Declined    Treatment Team Recommendation: Home with [address] [facility]  Discharge Destination Plan[de-identified] Home with [address] [facility]  Transport at Discharge : Auto with designated , Family                             IMM Given (Date):: 10/31/23  IMM Given to[de-identified] Patient     Additional Comments: CM confirmed with ID of ability to adjust afternoon IV Abx dose from 2pm to 4pm on day of discharge to assure community care for administration,

## 2023-10-31 NOTE — OCCUPATIONAL THERAPY NOTE
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    Narrow angle of anterior chamber of left eye    Vertigo    Acute cystitis with hematuria    Acute respiratory failure with hypoxia (HCC)    Positive blood culture    Abnormal ultrasound    Other specified anemias    Urolithiasis       10/31/23 1120   OT Last Visit   OT Visit Date 10/31/23   Note Type   Note Type Treatment   Pain Assessment   Pain Assessment Tool 0-10   Pain Score No Pain   Restrictions/Precautions   Weight Bearing Precautions Per Order No   ADL   Where Assessed Edge of bed   Grooming Assistance 5  Supervision/Setup   Grooming Deficit Setup;Verbal cueing;Supervision/safety; Increased time to complete;Wash/dry hands; Wash/dry face; Teeth care   UB Bathing Assistance 5  Supervision/Setup   UB Bathing Deficit Setup;Verbal cueing;Supervision/safety; Increased time to complete; Chest;Right arm;Left arm; Abdomen   UB Bathing Comments   (verbal cues to get under arms and chest)   LB Bathing Assistance 5  Supervision/Setup   LB Bathing Deficit Setup;Verbal cueing;Supervision/safety; Increased time to complete;Perineal area;Right upper leg;Buttocks; Left upper leg;Right lower leg including foot; Left lower leg including foot  (verbal cues toe get feet)   UB Dressing Assistance 5  Supervision/Setup   UB Dressing Deficit Setup;Supervision/safety; Thread RUE; Thread LUE;Pull around back   LB Dressing Assistance 5  Supervision/Setup   LB Dressing Deficit Setup;Supervision/safety; Don/doff R sock; Don/doff L sock   Functional Standing Tolerance   Time ~3 min during UB/LB bathing to wash perineal area and buttocks   Transfers   Sit to Stand 5  Supervision   Additional items Armrests; Increased time required   Stand to Sit 5  Supervision   Additional items Increased time required;Verbal cues;Armrests   Therapeutic Exercise - ROM   UE-ROM Yes   ROM- Right Upper Extremities   R Shoulder AROM; Flexion; Extension  (2)   R Elbow AROM;Elbow flexion;Elbow extension   R Weight/Reps/Sets   (2/10)   RUE ROM Comment   (cues for breathing, upright posture in chair, and slow pacing)   ROM - Left Upper Extremities    L Shoulder AROM; Flexion; Extension   L Elbow AROM;Elbow flexion;Elbow extension   L Weight/Reps/Sets   (2/10)   LUE ROM Comment   (cues for upright posture, breathing, and slow/control pacing)   Subjective   Subjective   ("im retired")   Cognition   Overall Cognitive Status WFL   Arousal/Participation Alert; Cooperative   Attention Within functional limits   Orientation Level Oriented X4   Memory Within functional limits   Following Commands Follows all commands and directions without difficulty   Comments   (pleasant and cooperative throughout session)   Activity Tolerance   Activity Tolerance Patient tolerated treatment well   Medical Staff Made Aware ANNIE [place]   Assessment   Assessment Pt seen for OT treatment session focusing on ADL training, UE strengthening and ROM, compensatory techniques and family/patient training. Pt greeted in bedside chair at start of session. Pt alert and cooperative throughout session. Pt with fair sitting balance and fair standing balance. Pt tolerated treatment well. Pt completed UB bathing, LB bathing, UB bathing (changed gown), LB dressing (changed socks), oral care, and BUE exercises during treatment today. BUE exercises included: elbow flexion/extension 2/10 and shoulder flexion/extension 2/10. Pt needed increased verbal cues for breathing, upright posture in chair and slow pacing during UE exercises. Pt benefited from short breaks in between each exercise set. Pt requires supervision assistance for ADLs and transfers during session today.  Pt reports no pain and no dizziness. Pt ended session seated in bedside chair. Call bell and phone within reach. All needs met and pt reports no further questions at this time. Continue to recommend home health when medically cleared. OT will continue to follow pt on caseload. Plan   Treatment Interventions ADL retraining;Functional transfer training;UE strengthening/ROM; Patient/family training;Equipment evaluation/education; Compensatory technique education   Goal Expiration Date 11/10/23   OT Treatment Day 1   OT Frequency 2-3x/wk   Discharge Recommendation   Rehab Resource Intensity Level, OT III (Minimum Resource Intensity)  (home with home health)   Additional Comments  The patient's raw score on the -PAC Daily Activity Inpatient Short Form is 21. A raw score of greater than or equal to 19 suggests the patient may benefit from discharge to home. Please refer to the recommendation of the Occupational Therapist for safe discharge planning. AM-PAC Daily Activity Inpatient   Lower Body Dressing 3   Bathing 3   Toileting 3   Upper Body Dressing 4   Grooming 4   Eating 4   Daily Activity Raw Score 21   Daily Activity Standardized Score (Calc for Raw Score >=11) 44.27   AM-PAC Applied Cognition Inpatient   Following a Speech/Presentation 4   Understanding Ordinary Conversation 4   Taking Medications 4   Remembering Where Things Are Placed or Put Away 4   Remembering List of 4-5 Errands 3   Taking Care of Complicated Tasks 3   Applied Cognition Raw Score 22   Applied Cognition Standardized Score 47.83   End of Consult   Education Provided Yes   Patient Position at End of Consult All needs within reach; Bedside chair   Nurse Communication Nurse aware of consult   Mateo Jeffery OT

## 2023-10-31 NOTE — PLAN OF CARE
Problem: MOBILITY - ADULT  Goal: Maintain or return to baseline ADL function  Description: INTERVENTIONS:  -  Assess patient's ability to carry out ADLs; assess patient's baseline for ADL function and identify physical deficits which impact ability to perform ADLs (bathing, care of mouth/teeth, toileting, grooming, dressing, etc.)  - Assess/evaluate cause of self-care deficits   - Assess range of motion  - Assess patient's mobility; develop plan if impaired  - Assess patient's need for assistive devices and provide as appropriate  - Encourage maximum independence but intervene and supervise when necessary  - Involve family in performance of ADLs  - Assess for home care needs following discharge   - Consider OT consult to assist with ADL evaluation and planning for discharge  - Provide patient education as appropriate  Outcome: Progressing  Goal: Maintains/Returns to pre admission functional level  Description: INTERVENTIONS:  - Perform BMAT or MOVE assessment daily.   - Set and communicate daily mobility goal to care team and patient/family/caregiver. - Collaborate with rehabilitation services on mobility goals if consulted  - Perform Range of Motion 4 times a day. - Reposition patient every 2 hours.   - Dangle patient 3 times a day  - Stand patient 3 times a day  - Ambulate patient 3 times a day  - Out of bed to chair 3 times a day   - Out of bed for meals 3 times a day  - Out of bed for toileting  - Record patient progress and toleration of activity level   Outcome: Progressing     Problem: Prexisting or High Potential for Compromised Skin Integrity  Goal: Skin integrity is maintained or improved  Description: INTERVENTIONS:  - Identify patients at risk for skin breakdown  - Assess and monitor skin integrity  - Assess and monitor nutrition and hydration status  - Monitor labs   - Assess for incontinence   - Turn and reposition patient  - Assist with mobility/ambulation  - Relieve pressure over bony prominences  - Avoid friction and shearing  - Provide appropriate hygiene as needed including keeping skin clean and dry  - Evaluate need for skin moisturizer/barrier cream  - Collaborate with interdisciplinary team   - Patient/family teaching  - Consider wound care consult   Outcome: Progressing     Problem: PAIN - ADULT  Goal: Verbalizes/displays adequate comfort level or baseline comfort level  Description: Interventions:  - Encourage patient to monitor pain and request assistance  - Assess pain using appropriate pain scale  - Administer analgesics based on type and severity of pain and evaluate response  - Implement non-pharmacological measures as appropriate and evaluate response  - Consider cultural and social influences on pain and pain management  - Notify physician/advanced practitioner if interventions unsuccessful or patient reports new pain  Outcome: Progressing     Problem: INFECTION - ADULT  Goal: Absence or prevention of progression during hospitalization  Description: INTERVENTIONS:  - Assess and monitor for signs and symptoms of infection  - Monitor lab/diagnostic results  - Monitor all insertion sites, i.e. indwelling lines, tubes, and drains  - Monitor endotracheal if appropriate and nasal secretions for changes in amount and color  - [place] appropriate cooling/warming therapies per order  - Administer medications as ordered  - Instruct and encourage patient and family to use good hand hygiene technique  - Identify and instruct in appropriate isolation precautions for identified infection/condition  Outcome: Progressing  Goal: Absence of fever/infection during neutropenic period  Description: INTERVENTIONS:  - Monitor WBC     Outcome: Progressing     Problem: SAFETY ADULT  Goal: Maintain or return to baseline ADL function  Description: INTERVENTIONS:  -  Assess patient's ability to carry out ADLs; assess patient's baseline for ADL function and identify physical deficits which impact ability to perform ADLs (bathing, care of mouth/teeth, toileting, grooming, dressing, etc.)  - Assess/evaluate cause of self-care deficits   - Assess range of motion  - Assess patient's mobility; develop plan if impaired  - Assess patient's need for assistive devices and provide as appropriate  - Encourage maximum independence but intervene and supervise when necessary  - Involve family in performance of ADLs  - Assess for home care needs following discharge   - Consider OT consult to assist with ADL evaluation and planning for discharge  - Provide patient education as appropriate  Outcome: Progressing  Goal: Maintains/Returns to pre admission functional level  Description: INTERVENTIONS:  - Perform BMAT or MOVE assessment daily.   - Set and communicate daily mobility goal to care team and patient/family/caregiver. - Collaborate with rehabilitation services on mobility goals if consulted  - Perform Range of Motion 4 times a day. - Reposition patient every 2 hours.   - Dangle patient 3 times a day  - Stand patient 3 times a day  - Ambulate patient 3 times a day  - Out of bed to chair 3 times a day   - Out of bed for meals 3 times a day  - Out of bed for toileting  - Record patient progress and toleration of activity level   Outcome: Progressing  Goal: Patient will remain free of falls  Description: INTERVENTIONS:  - Educate patient/family on patient safety including physical limitations  - Instruct patient to call for assistance with activity   - Consult OT/PT to assist with strengthening/mobility   - Keep Call bell within reach  - Keep bed low and locked with side rails adjusted as appropriate  - Keep care items and personal belongings within reach  - Initiate and maintain comfort rounds  - Make Fall Risk Sign visible to staff  - Offer Toileting every 2 Hours, in advance of need  - Initiate/Maintain bed alarm  - Obtain necessary fall risk management equipment:   - Apply yellow socks and bracelet for high fall risk patients  - Consider moving patient to room near nurses station  Outcome: Progressing     Problem: DISCHARGE PLANNING  Goal: Discharge to home or other facility with appropriate resources  Description: INTERVENTIONS:  - Identify barriers to discharge w/patient and caregiver  - Arrange for needed discharge resources and transportation as appropriate  - Identify discharge learning needs (meds, wound care, etc.)  - Arrange for interpretive services to assist at discharge as needed  - Refer to Case Management Department for coordinating discharge planning if the patient needs post-hospital services based on physician/advanced practitioner order or complex needs related to functional status, cognitive ability, or social support system  Outcome: Progressing     Problem: Knowledge Deficit  Goal: Patient/family/caregiver demonstrates understanding of disease process, treatment plan, medications, and discharge instructions  Description: Complete learning assessment and assess knowledge base.   Interventions:  - Provide teaching at level of understanding  - Provide teaching via preferred learning methods  Outcome: Progressing

## 2023-10-31 NOTE — PLAN OF CARE
Problem: OCCUPATIONAL THERAPY ADULT  Goal: Performs self-care activities at highest level of function for planned discharge setting. See evaluation for individualized goals. Description: Treatment Interventions: ADL retraining, Functional transfer training, UE strengthening/ROM, Patient/family training, Equipment evaluation/education, Compensatory technique education          See flowsheet documentation for full assessment, interventions and recommendations. Outcome: Progressing  Note: Limitation: Decreased ADL status, Decreased UE strength, Decreased Safe judgement during ADL, Decreased endurance, Decreased high-level ADLs  Prognosis: Good  Assessment: Pt seen for OT treatment session focusing on ADL training, UE strengthening and ROM, compensatory techniques and family/patient training. Pt greeted in bedside chair at start of session. Pt alert and cooperative throughout session. Pt with fair sitting balance and fair standing balance. Pt tolerated treatment well. Pt completed UB bathing, LB bathing, UB bathing (changed gown), LB dressing (changed socks), oral care, and BUE exercises during treatment today. BUE exercises included: elbow flexion/extension 2/10 and shoulder flexion/extension 2/10. Pt needed increased verbal cues for breathing, upright posture in chair and slow pacing during UE exercises. Pt benefited from short breaks in between each exercise set. Pt requires supervision assistance for ADLs and transfers during session today. Pt reports no  pain and no dizziness. Pt ended session seated in bedside chair. Call bell and phone within reach. All needs met and pt reports no further questions at this time. Continue to recommend home health when medically cleared. OT will continue to follow pt on caseload.      Rehab Resource Intensity Level, OT: III (Minimum Resource Intensity) (home with home health)

## 2023-10-31 NOTE — APP STUDENT NOTE
A/P-  Septic shock   - patient was on IV ceftriaxone and was switched to IV cefazolin   - per ID: will continue on abx until , discontinue PICC after last fose   - vancomycin discontinued, Infectious disease is following   - will monitor for progress clinically and switch to PO form for discharge   - last blood cultures show no growth after 48 hrs  2. Acute respiratory failure with hypoxia   - continue 2 L NC oxygen   - monitor respiratory status and oxygen saturation  3. Acute cystitis with hematuria   - monitor for urinary symptoms   - continue antibiotics as noted above in septic shock   - most recent urine culture shows no growth  4. Hyperlipidemia   - continue atorvastatin 40 mg  5. BPH w/ obstruction    - left ureteral stent in place with plan for cystoscopy in 6-8 weeks. - possible TURP   - monitor for urine retention  6. Vertigo   - follow up with neurology outpatient   - continue to follow with PT and OT  VTE Pharmacologic Prophylaxis:   Moderate Risk (Score 3-4) - Pharmacological DVT Prophylaxis Ordered: enoxaparin (Lovenox). Patient Centered Rounds: I performed bedside rounds with nursing staff today. Code Status: Level 1 - Full Code    Subjective:   Anna Marie Murcia is sitting up in bed and is feeling better today. He notes he is ambulating well on his own and sits up for a few minutes before fully getting  up to keep him from falling. He denies chest pain, shortness of breath, fevers, chills, and nausea. Denies any changes to his diet or bathroom use. Objective:     Vitals:   Temp (24hrs), Av.3 °F (36.8 °C), Min:97.8 °F (36.6 °C), Max:98.7 °F (37.1 °C)    Temp:  [97.8 °F (36.6 °C)-98.7 °F (37.1 °C)] 98.7 °F (37.1 °C)  HR:  [63-75] 69  Resp:  [16-18] 16  BP: (115-131)/(71-86) 119/75  SpO2:  [91 %-98 %] 92 %  Body mass index is 28.66 kg/m². Input and Output Summary (last 24 hours):      Intake/Output Summary (Last 24 hours) at 10/31/2023 1058  Last data filed at 10/31/2023 0851  Gross per 24 hour   Intake --   Output 1475 ml   Net -1475 ml       Physical Exam:   Physical Exam  Vitals and nursing note reviewed. Constitutional:       General: He is awake. He is not in acute distress. Appearance: Normal appearance. He is not ill-appearing, toxic-appearing or diaphoretic. Interventions: Nasal cannula in place. HENT:      Head: Normocephalic and atraumatic. Cardiovascular:      Rate and Rhythm: Normal rate and regular rhythm. Pulses: Normal pulses. Heart sounds: Normal heart sounds. No murmur heard. No friction rub. No gallop. Pulmonary:      Effort: Pulmonary effort is normal.      Breath sounds: Normal breath sounds. No wheezing, rhonchi or rales. Musculoskeletal:      Right lower leg: No edema. Left lower leg: No edema. Skin:     General: Skin is warm and dry. Coloration: Skin is not cyanotic, jaundiced or pale. Findings: No erythema. Neurological:      General: No focal deficit present. Mental Status: He is alert. Mental status is at baseline. Psychiatric:         Attention and Perception: Attention normal.         Mood and Affect: Mood normal.         Speech: Speech normal.         Behavior: Behavior normal. Behavior is cooperative.           Additional Data:     Labs:  Results from last 7 days   Lab Units 10/29/23  0401 10/28/23  0442   WBC Thousand/uL 6.70 10.68*   HEMOGLOBIN g/dL 12.2 11.6*   HEMATOCRIT % 37.8 36.6   PLATELETS Thousands/uL 165 160   BANDS PCT % 4  --    NEUTROS PCT %  --  86*   LYMPHS PCT %  --  7*   LYMPHO PCT % 21  --    MONOS PCT %  --  7   MONO PCT % 11  --    EOS PCT % 2 0     Results from last 7 days   Lab Units 10/31/23  0457 10/27/23  0528 10/26/23  1040   SODIUM mmol/L 137   < > 136   POTASSIUM mmol/L 4.2   < > 3.9   CHLORIDE mmol/L 103   < > 105   CO2 mmol/L 27   < > 19*   BUN mg/dL 16   < > 31*   CREATININE mg/dL 0.87   < > 0.85   ANION GAP mmol/L 7   < > 12   CALCIUM mg/dL 8.4   < > 8.9   ALBUMIN g/dL  --   -- 3. 9   TOTAL BILIRUBIN mg/dL  --   --  0.89   ALK PHOS U/L  --   --  61   ALT U/L  --   --  17   AST U/L  --   --  20   GLUCOSE RANDOM mg/dL 108   < > 176*    < > = values in this interval not displayed. Results from last 7 days   Lab Units 10/26/23  1040   INR  0.96     Results from last 7 days   Lab Units 10/26/23  1034   POC GLUCOSE mg/dl 157*     Results from last 7 days   Lab Units 10/27/23  1055   HEMOGLOBIN A1C % 6.3*     Results from last 7 days   Lab Units 10/27/23  0256 10/27/23  0246   LACTIC ACID mmol/L  --  1.7   PROCALCITONIN ng/ml 0.21  --        Lines/Drains:  Invasive Devices       Peripheral Intravenous Line  Duration             Peripheral IV 10/27/23 Left;Ventral (anterior) Forearm 3 days    Peripheral IV 10/29/23 Distal;Dorsal (posterior); Left Forearm 2 days                          Imaging: Reviewed radiology reports from this admission including: abdominal/pelvic CT    Recent Cultures (last 7 days):   Results from last 7 days   Lab Units 10/29/23  0856 10/29/23  0437 10/29/23  0402 10/27/23  0224 10/26/23  2345   BLOOD CULTURE   --  No Growth at 48 hrs. No Growth at 48 hrs.  Staphylococcus epidermidis*  Staphylococcus epidermidis*  --    GRAM STAIN RESULT   --   --   --  Gram positive cocci in clusters*  Gram positive cocci in clusters*  --    URINE CULTURE  No Growth <100 cfu/mL  --   --   --  >100,000 cfu/ml Staphylococcus coagulase negative*       Last 24 Hours Medication List:   Current Facility-Administered Medications   Medication Dose Route Frequency Provider Last Rate    acetaminophen  975 mg Oral Q8H PRN HUSAM Rodriguez      aluminum-magnesium hydroxide-simethicone  30 mL Oral Q6H PRN HUSAM Rodriguez      atorvastatin  40 mg Oral QPM HUSAM Rodriguez      cefazolin  2,000 mg Intravenous Q8H Blaire Aldea, DO 2,000 mg (10/31/23 0148)    clopidogrel  75 mg Oral Daily HUSAM Rodriguez      enoxaparin  40 mg Subcutaneous Daily HUSAM Rodriguez glycerin-hypromellose-  2 drop Both Eyes BID Brian Brock, CRNP      meclizine  25 mg Oral Q8H PRN Brian Brock, CRNP      ondansetron  4 mg Intravenous Q6H PRN Brian Brock, CRNP      polyethylene glycol  17 g Oral Daily Brian Brock, CRNP          Today, Patient Was Seen By: Shantell Diaz    **Please Note: This note may have been constructed using a voice recognition system. **

## 2023-11-01 ENCOUNTER — HOME CARE VISIT (OUTPATIENT)
Dept: HOME HEALTH SERVICES | Facility: HOME HEALTHCARE | Age: 88
End: 2023-11-01
Payer: MEDICARE

## 2023-11-01 ENCOUNTER — HOME CARE VISIT (OUTPATIENT)
Dept: HOME HEALTH SERVICES | Facility: HOME HEALTHCARE | Age: 88
End: 2023-11-01

## 2023-11-01 VITALS
DIASTOLIC BLOOD PRESSURE: 82 MMHG | RESPIRATION RATE: 18 BRPM | TEMPERATURE: 98.4 F | SYSTOLIC BLOOD PRESSURE: 142 MMHG | OXYGEN SATURATION: 98 % | HEART RATE: 80 BPM

## 2023-11-01 VITALS
DIASTOLIC BLOOD PRESSURE: 84 MMHG | HEIGHT: 68 IN | OXYGEN SATURATION: 96 % | RESPIRATION RATE: 14 BRPM | HEART RATE: 68 BPM | TEMPERATURE: 97.8 F | SYSTOLIC BLOOD PRESSURE: 146 MMHG | WEIGHT: 188.49 LBS | BODY MASS INDEX: 28.57 KG/M2

## 2023-11-01 PROCEDURE — 99233 SBSQ HOSP IP/OBS HIGH 50: CPT | Performed by: INTERNAL MEDICINE

## 2023-11-01 PROCEDURE — 36569 INSJ PICC 5 YR+ W/O IMAGING: CPT

## 2023-11-01 PROCEDURE — G0299 HHS/HOSPICE OF RN EA 15 MIN: HCPCS

## 2023-11-01 PROCEDURE — 400013 VN SOC

## 2023-11-01 PROCEDURE — 99239 HOSP IP/OBS DSCHRG MGMT >30: CPT | Performed by: INTERNAL MEDICINE

## 2023-11-01 PROCEDURE — 10330081 VN NO-PAY CLAIM PROCEDURE

## 2023-11-01 PROCEDURE — C1751 CATH, INF, PER/CENT/MIDLINE: HCPCS

## 2023-11-01 RX ORDER — CEFAZOLIN SODIUM 2 G/50ML
2000 SOLUTION INTRAVENOUS EVERY 8 HOURS
Refills: 0
Start: 2023-11-01 | End: 2023-11-11

## 2023-11-01 RX ADMIN — ENOXAPARIN SODIUM 40 MG: 40 INJECTION SUBCUTANEOUS at 08:06

## 2023-11-01 RX ADMIN — CLOPIDOGREL BISULFATE 75 MG: 75 TABLET ORAL at 08:06

## 2023-11-01 RX ADMIN — CEFAZOLIN SODIUM 2000 MG: 2 SOLUTION INTRAVENOUS at 18:00

## 2023-11-01 RX ADMIN — POLYETHYLENE GLYCOL 3350 17 G: 17 POWDER, FOR SOLUTION ORAL at 08:06

## 2023-11-01 RX ADMIN — CEFAZOLIN SODIUM 2000 MG: 2 SOLUTION INTRAVENOUS at 11:29

## 2023-11-01 RX ADMIN — GLYCERIN 2 DROP: .002; .002; .01 SOLUTION/ DROPS OPHTHALMIC at 08:06

## 2023-11-01 RX ADMIN — CEFAZOLIN SODIUM 2000 MG: 2 SOLUTION INTRAVENOUS at 03:43

## 2023-11-01 NOTE — PLAN OF CARE
Problem: MOBILITY - ADULT  Goal: Maintain or return to baseline ADL function  Description: INTERVENTIONS:  -  Assess patient's ability to carry out ADLs; assess patient's baseline for ADL function and identify physical deficits which impact ability to perform ADLs (bathing, care of mouth/teeth, toileting, grooming, dressing, etc.)  - Assess/evaluate cause of self-care deficits   - Assess range of motion  - Assess patient's mobility; develop plan if impaired  - Assess patient's need for assistive devices and provide as appropriate  - Encourage maximum independence but intervene and supervise when necessary  - Involve family in performance of ADLs  - Assess for home care needs following discharge   - Consider OT consult to assist with ADL evaluation and planning for discharge  - Provide patient education as appropriate  Outcome: Progressing  Goal: Maintains/Returns to pre admission functional level  Description: INTERVENTIONS:  - Perform BMAT or MOVE assessment daily.   - Set and communicate daily mobility goal to care team and patient/family/caregiver. - Collaborate with rehabilitation services on mobility goals if consulted  - Perform Range of Motion 4 times a day. - Reposition patient every 2 hours.   - Dangle patient 3 times a day  - Stand patient 3 times a day  - Ambulate patient 3 times a day  - Out of bed to chair 3 times a day   - Out of bed for meals 3 times a day  - Out of bed for toileting  - Record patient progress and toleration of activity level   Outcome: Progressing     Problem: Prexisting or High Potential for Compromised Skin Integrity  Goal: Skin integrity is maintained or improved  Description: INTERVENTIONS:  - Identify patients at risk for skin breakdown  - Assess and monitor skin integrity  - Assess and monitor nutrition and hydration status  - Monitor labs   - Assess for incontinence   - Turn and reposition patient  - Assist with mobility/ambulation  - Relieve pressure over bony prominences  - Avoid friction and shearing  - Provide appropriate hygiene as needed including keeping skin clean and dry  - Evaluate need for skin moisturizer/barrier cream  - Collaborate with interdisciplinary team   - Patient/family teaching  - Consider wound care consult   Outcome: Progressing     Problem: PAIN - ADULT  Goal: Verbalizes/displays adequate comfort level or baseline comfort level  Description: Interventions:  - Encourage patient to monitor pain and request assistance  - Assess pain using appropriate pain scale  - Administer analgesics based on type and severity of pain and evaluate response  - Implement non-pharmacological measures as appropriate and evaluate response  - Consider cultural and social influences on pain and pain management  - Notify physician/advanced practitioner if interventions unsuccessful or patient reports new pain  Outcome: Progressing     Problem: INFECTION - ADULT  Goal: Absence or prevention of progression during hospitalization  Description: INTERVENTIONS:  - Assess and monitor for signs and symptoms of infection  - Monitor lab/diagnostic results  - Monitor all insertion sites, i.e. indwelling lines, tubes, and drains  - Monitor endotracheal if appropriate and nasal secretions for changes in amount and color  - [place] appropriate cooling/warming therapies per order  - Administer medications as ordered  - Instruct and encourage patient and family to use good hand hygiene technique  - Identify and instruct in appropriate isolation precautions for identified infection/condition  Outcome: Progressing  Goal: Absence of fever/infection during neutropenic period  Description: INTERVENTIONS:  - Monitor WBC    Outcome: Progressing     Problem: SAFETY ADULT  Goal: Maintain or return to baseline ADL function  Description: INTERVENTIONS:  -  Assess patient's ability to carry out ADLs; assess patient's baseline for ADL function and identify physical deficits which impact ability to perform ADLs (bathing, care of mouth/teeth, toileting, grooming, dressing, etc.)  - Assess/evaluate cause of self-care deficits   - Assess range of motion  - Assess patient's mobility; develop plan if impaired  - Assess patient's need for assistive devices and provide as appropriate  - Encourage maximum independence but intervene and supervise when necessary  - Involve family in performance of ADLs  - Assess for home care needs following discharge   - Consider OT consult to assist with ADL evaluation and planning for discharge  - Provide patient education as appropriate  Outcome: Progressing  Goal: Maintains/Returns to pre admission functional level  Description: INTERVENTIONS:  - Perform BMAT or MOVE assessment daily.   - Set and communicate daily mobility goal to care team and patient/family/caregiver. - Collaborate with rehabilitation services on mobility goals if consulted  - Perform Range of Motion 4 times a day. - Reposition patient every 2 hours.   - Dangle patient 3 times a day  - Stand patient 3 times a day  - Ambulate patient 3 times a day  - Out of bed to chair 3 times a day   - Out of bed for meals 3 times a day  - Out of bed for toileting  - Record patient progress and toleration of activity level   Outcome: Progressing  Goal: Patient will remain free of falls  Description: INTERVENTIONS:  - Educate patient/family on patient safety including physical limitations  - Instruct patient to call for assistance with activity   - Consult OT/PT to assist with strengthening/mobility   - Keep Call bell within reach  - Keep bed low and locked with side rails adjusted as appropriate  - Keep care items and personal belongings within reach  - Initiate and maintain comfort rounds  - Make Fall Risk Sign visible to staff  - Offer Toileting every 2 Hours, in advance of need  - Initiate/Maintain bed alarm  - Obtain necessary fall risk management equipment:   - Apply yellow socks and bracelet for high fall risk patients  - Consider moving patient to room near nurses station  Outcome: Progressing     Problem: DISCHARGE PLANNING  Goal: Discharge to home or other facility with appropriate resources  Description: INTERVENTIONS:  - Identify barriers to discharge w/patient and caregiver  - Arrange for needed discharge resources and transportation as appropriate  - Identify discharge learning needs (meds, wound care, etc.)  - Arrange for interpretive services to assist at discharge as needed  - Refer to Case Management Department for coordinating discharge planning if the patient needs post-hospital services based on physician/advanced practitioner order or complex needs related to functional status, cognitive ability, or social support system  Outcome: Progressing     Problem: Knowledge Deficit  Goal: Patient/family/caregiver demonstrates understanding of disease process, treatment plan, medications, and discharge instructions  Description: Complete learning assessment and assess knowledge base.   Interventions:  - Provide teaching at level of understanding  - Provide teaching via preferred learning methods  Outcome: Progressing

## 2023-11-01 NOTE — PLAN OF CARE
Problem: MOBILITY - ADULT  Goal: Maintain or return to baseline ADL function  Description: INTERVENTIONS:  -  Assess patient's ability to carry out ADLs; assess patient's baseline for ADL function and identify physical deficits which impact ability to perform ADLs (bathing, care of mouth/teeth, toileting, grooming, dressing, etc.)  - Assess/evaluate cause of self-care deficits   - Assess range of motion  - Assess patient's mobility; develop plan if impaired  - Assess patient's need for assistive devices and provide as appropriate  - Encourage maximum independence but intervene and supervise when necessary  - Involve family in performance of ADLs  - Assess for home care needs following discharge   - Consider OT consult to assist with ADL evaluation and planning for discharge  - Provide patient education as appropriate  Outcome: Progressing  Goal: Maintains/Returns to pre admission functional level  Description: INTERVENTIONS:  - Perform BMAT or MOVE assessment daily.   - Set and communicate daily mobility goal to care team and patient/family/caregiver. - Collaborate with rehabilitation services on mobility goals if consulted  - Perform Range of Motion 4 times a day. - Reposition patient every 2 hours.   - Dangle patient 3 times a day  - Stand patient 3 times a day  - Ambulate patient 3 times a day  - Out of bed to chair 3 times a day   - Out of bed for meals 3 times a day  - Out of bed for toileting  - Record patient progress and toleration of activity level   Outcome: Progressing     Problem: Prexisting or High Potential for Compromised Skin Integrity  Goal: Skin integrity is maintained or improved  Description: INTERVENTIONS:  - Identify patients at risk for skin breakdown  - Assess and monitor skin integrity  - Assess and monitor nutrition and hydration status  - Monitor labs   - Assess for incontinence   - Turn and reposition patient  - Assist with mobility/ambulation  - Relieve pressure over bony prominences  - Avoid friction and shearing  - Provide appropriate hygiene as needed including keeping skin clean and dry  - Evaluate need for skin moisturizer/barrier cream  - Collaborate with interdisciplinary team   - Patient/family teaching  - Consider wound care consult   Outcome: Progressing     Problem: PAIN - ADULT  Goal: Verbalizes/displays adequate comfort level or baseline comfort level  Description: Interventions:  - Encourage patient to monitor pain and request assistance  - Assess pain using appropriate pain scale  - Administer analgesics based on type and severity of pain and evaluate response  - Implement non-pharmacological measures as appropriate and evaluate response  - Consider cultural and social influences on pain and pain management  - Notify physician/advanced practitioner if interventions unsuccessful or patient reports new pain  Outcome: Progressing     Problem: INFECTION - ADULT  Goal: Absence or prevention of progression during hospitalization  Description: INTERVENTIONS:  - Assess and monitor for signs and symptoms of infection  - Monitor lab/diagnostic results  - Monitor all insertion sites, i.e. indwelling lines, tubes, and drains  - Monitor endotracheal if appropriate and nasal secretions for changes in amount and color  - [place] appropriate cooling/warming therapies per order  - Administer medications as ordered  - Instruct and encourage patient and family to use good hand hygiene technique  - Identify and instruct in appropriate isolation precautions for identified infection/condition  Outcome: Progressing  Goal: Absence of fever/infection during neutropenic period  Description: INTERVENTIONS:  - Monitor WBC    Outcome: Progressing     Problem: SAFETY ADULT  Goal: Maintain or return to baseline ADL function  Description: INTERVENTIONS:  -  Assess patient's ability to carry out ADLs; assess patient's baseline for ADL function and identify physical deficits which impact ability to perform ADLs (bathing, care of mouth/teeth, toileting, grooming, dressing, etc.)  - Assess/evaluate cause of self-care deficits   - Assess range of motion  - Assess patient's mobility; develop plan if impaired  - Assess patient's need for assistive devices and provide as appropriate  - Encourage maximum independence but intervene and supervise when necessary  - Involve family in performance of ADLs  - Assess for home care needs following discharge   - Consider OT consult to assist with ADL evaluation and planning for discharge  - Provide patient education as appropriate  Outcome: Progressing  Goal: Maintains/Returns to pre admission functional level  Description: INTERVENTIONS:  - Perform BMAT or MOVE assessment daily.   - Set and communicate daily mobility goal to care team and patient/family/caregiver. - Collaborate with rehabilitation services on mobility goals if consulted  - Perform Range of Motion 4 times a day. - Reposition patient every 2 hours.   - Dangle patient 3 times a day  - Stand patient 3 times a day  - Ambulate patient 3 times a day  - Out of bed to chair 3 times a day   - Out of bed for meals 3 times a day  - Out of bed for toileting  - Record patient progress and toleration of activity level   Outcome: Progressing  Goal: Patient will remain free of falls  Description: INTERVENTIONS:  - Educate patient/family on patient safety including physical limitations  - Instruct patient to call for assistance with activity   - Consult OT/PT to assist with strengthening/mobility   - Keep Call bell within reach  - Keep bed low and locked with side rails adjusted as appropriate  - Keep care items and personal belongings within reach  - Initiate and maintain comfort rounds  - Make Fall Risk Sign visible to staff  - Offer Toileting every 2 Hours, in advance of need  - Initiate/Maintain bed alarm  - Obtain necessary fall risk management equipment:   - Apply yellow socks and bracelet for high fall risk patients  - Consider moving patient to room near nurses station  Outcome: Progressing     Problem: DISCHARGE PLANNING  Goal: Discharge to home or other facility with appropriate resources  Description: INTERVENTIONS:  - Identify barriers to discharge w/patient and caregiver  - Arrange for needed discharge resources and transportation as appropriate  - Identify discharge learning needs (meds, wound care, etc.)  - Arrange for interpretive services to assist at discharge as needed  - Refer to Case Management Department for coordinating discharge planning if the patient needs post-hospital services based on physician/advanced practitioner order or complex needs related to functional status, cognitive ability, or social support system  Outcome: Progressing     Problem: Knowledge Deficit  Goal: Patient/family/caregiver demonstrates understanding of disease process, treatment plan, medications, and discharge instructions  Description: Complete learning assessment and assess knowledge base.   Interventions:  - Provide teaching at level of understanding  - Provide teaching via preferred learning methods  Outcome: Progressing

## 2023-11-01 NOTE — APP STUDENT NOTE
A/P-  Septic shock              - patient was on IV ceftriaxone and was switched to IV cefazolin              - per ID: will continue on abx until , discontinue PICC after last fose              - vancomycin discontinued, Infectious disease is following              - PICC was placed today, patient's family has nurse coming to home to instruct family on the PICC              - last blood cultures show no growth after 72 hrs  Acute respiratory failure with hypoxia              - continue 2 L NC oxygen at home  Acute cystitis with hematuria              - continue antibiotics as noted above in septic shock              - urine culture shows no growth  Hyperlipidemia              - continue atorvastatin 40 mg  BPH w/ obstruction               - left ureteral stent in place with plan for cystoscopy in 6-8 weeks. - possible TURP              - monitor for urine retention  Vertigo              - follow up with neurology outpatient              - continue to follow with PT and OT  VTE Pharmacologic Prophylaxis:   Moderate Risk (Score 3-4) - Pharmacological DVT Prophylaxis Ordered: enoxaparin (Lovenox). Patient Centered Rounds: I performed bedside rounds with nursing staff today. Code Status: Level 1 - Full Code    Subjective:   Amy Chavez is sitting up in bed comfortably with family in the room. He is eager to return home today. He continues to note an increase in frequency of urination at night, hesitancy, and a slight burning sensation when he starts to urinate. He denies any fevers, chills, chest  pain, shortness of breath or trouble ambulating. He requests his PICC be placed earlier so that him and his wife can get home for their appointment with the home nurse.      Objective:     Vitals:   Temp (24hrs), Av.9 °F (36.6 °C), Min:97.6 °F (36.4 °C), Max:98.2 °F (36.8 °C)    Temp:  [97.6 °F (36.4 °C)-98.2 °F (36.8 °C)] 97.8 °F (36.6 °C)  HR:  [68-72] 68  Resp:  [14-18] 14  BP: (121-146)/(77-84) 146/84  SpO2:  [92 %-96 %] 96 %  Body mass index is 28.66 kg/m². Input and Output Summary (last 24 hours): Intake/Output Summary (Last 24 hours) at 11/1/2023 1057  Last data filed at 11/1/2023 0856  Gross per 24 hour   Intake 1040 ml   Output 1375 ml   Net -335 ml       Physical Exam:   Physical Exam  Vitals and nursing note reviewed. Constitutional:       General: He is awake. He is not in acute distress. Appearance: Normal appearance. He is normal weight. He is not ill-appearing, toxic-appearing or diaphoretic. Interventions: Nasal cannula in place. HENT:      Head: Normocephalic and atraumatic. Cardiovascular:      Rate and Rhythm: Normal rate and regular rhythm. Heart sounds: No murmur heard. No friction rub. No gallop. Pulmonary:      Effort: Pulmonary effort is normal.      Breath sounds: Normal breath sounds. No wheezing, rhonchi or rales. Abdominal:      General: Bowel sounds are normal.      Tenderness: There is no abdominal tenderness. There is no guarding. Musculoskeletal:      Right lower leg: No edema. Left lower leg: No edema. Skin:     General: Skin is warm and dry. Coloration: Skin is not jaundiced or pale. Findings: No erythema. Neurological:      General: No focal deficit present. Mental Status: He is alert. Mental status is at baseline. Psychiatric:         Mood and Affect: Mood normal.         Speech: Speech normal.         Behavior: Behavior normal. Behavior is cooperative.           Additional Data:     Labs:  Results from last 7 days   Lab Units 10/29/23  0401 10/28/23  0442   WBC Thousand/uL 6.70 10.68*   HEMOGLOBIN g/dL 12.2 11.6*   HEMATOCRIT % 37.8 36.6   PLATELETS Thousands/uL 165 160   BANDS PCT % 4  --    NEUTROS PCT %  --  86*   LYMPHS PCT %  --  7*   LYMPHO PCT % 21  --    MONOS PCT %  --  7   MONO PCT % 11  --    EOS PCT % 2 0     Results from last 7 days   Lab Units 10/31/23  0457 10/27/23  0528 10/26/23  1040   SODIUM mmol/L 137   < > 136   POTASSIUM mmol/L 4.2   < > 3.9   CHLORIDE mmol/L 103   < > 105   CO2 mmol/L 27   < > 19*   BUN mg/dL 16   < > 31*   CREATININE mg/dL 0.87   < > 0.85   ANION GAP mmol/L 7   < > 12   CALCIUM mg/dL 8.4   < > 8.9   ALBUMIN g/dL  --   --  3.9   TOTAL BILIRUBIN mg/dL  --   --  0.89   ALK PHOS U/L  --   --  61   ALT U/L  --   --  17   AST U/L  --   --  20   GLUCOSE RANDOM mg/dL 108   < > 176*    < > = values in this interval not displayed. Results from last 7 days   Lab Units 10/26/23  1040   INR  0.96     Results from last 7 days   Lab Units 10/26/23  1034   POC GLUCOSE mg/dl 157*     Results from last 7 days   Lab Units 10/27/23  1055   HEMOGLOBIN A1C % 6.3*     Results from last 7 days   Lab Units 10/27/23  0256 10/27/23  0246   LACTIC ACID mmol/L  --  1.7   PROCALCITONIN ng/ml 0.21  --        Lines/Drains:  Invasive Devices       Peripheral Intravenous Line  Duration             Peripheral IV 10/29/23 Distal;Dorsal (posterior); Left Forearm 3 days                          Imaging: Reviewed radiology reports from this admission including: FL retrograde pyelogram    Recent Cultures (last 7 days):   Results from last 7 days   Lab Units 10/29/23  0856 10/29/23  0437 10/29/23  0402 10/27/23  0224 10/26/23  2345   BLOOD CULTURE   --  No Growth at 72 hrs. No Growth at 72 hrs.  Staphylococcus epidermidis*  Staphylococcus epidermidis*  --    GRAM STAIN RESULT   --   --   --  Gram positive cocci in clusters*  Gram positive cocci in clusters*  --    URINE CULTURE  No Growth <100 cfu/mL  --   --   --  >100,000 cfu/ml Staphylococcus coagulase negative*       Last 24 Hours Medication List:   Current Facility-Administered Medications   Medication Dose Route Frequency Provider Last Rate    acetaminophen  975 mg Oral Q8H PRN HUSAM Weston      aluminum-magnesium hydroxide-simethicone  30 mL Oral Q6H PRN HUSAM Weston      atorvastatin  40 mg Oral QPM HUSAM Weston      cefazolin 2,000 mg Intravenous Q8H Blaire Aldea, DO 2,000 mg (11/01/23 2173)    clopidogrel  75 mg Oral Daily HUSAM Barraza      enoxaparin  40 mg Subcutaneous Daily HUSAM Barraza      glycerin-hypromellose-  2 drop Both Eyes BID HUSAM Barraza      meclizine  25 mg Oral Q8H PRN HUSAM Barraza      ondansetron  4 mg Intravenous Q6H PRN HUSAM Barraza      polyethylene glycol  17 g Oral Daily HUSAM Barraza          Today, Patient Was Seen By: Tatiana Cline    **Please Note: This note may have been constructed using a voice recognition system. **

## 2023-11-01 NOTE — DISCHARGE SUMMARY
[address] [facility]  Discharge- Kathy Woodsontingham 1935, 81 y.o. male MRN: [id number]  Unit/Bed#: 1575 [facility] 2 [address] [place] [address] [id number] Encounter: [id number]  Primary Care Provider: Chio Carrizales DO   Date and time admitted to hospital: 10/26/2023 10:23 AM    Admitting Provider:  Malina Mahmood MD  Discharge Provider:  Geovanny Salmeron DO  Admission Date: 10/26/2023       Discharge Date: 11/01/23   LOS: 6  Primary Care Physician at Discharge: Chio Carrizales [phone]    DISCHARGE DIAGNOSES  * Septic shock [facility])  Assessment & Plan  History of stroke presented to the hospital with vertigo/weakness subsequently found to have septic shock requiring ICU stay with vasopressors  Source: UTI with ureteral calculi undergoing cystoscopy/stenting  Growth of CNS in both blood cultures and urine  Antibiotics tailored to cefazolin with 14-day course end date 11/11/2023 per ID  PICC placed and to be removed on last dose    Urolithiasis  Assessment & Plan  Underwent ureteral stent placement this admission. Close follow-up/staged procedure with urology    Vertigo  Assessment & Plan  Initial presentation for cute onset of vertigo. Seen by neurology. Symptoms unlikely stroke and patient was recommended to be taken off stroke pathway. MRI discontinued  Continue clopidogrel for history of stroke    Hyperlipidemia  Assessment & Plan  Continue rosuvastatin    BPH with obstruction/lower urinary tract symptoms  Assessment & Plan  Urology recommends outpatient follow-up for possible TURP      REASON FOR ADMISSION  Altered Mental Status (Patient reports waking up and getting out of bed at 0730 this morning. Pt was with wife this morning and felt fine. Patient laid back down around 8 and got up at [phone]. Patient reports having problems with his memory at that time as well as feeling dizzy. Patient took a "white pill" for dizziness at this time.  Zofran given by ems pta. )    HOSPITAL COURSE:  Kathy Woodsontingham is a 81 y.o. male with a history of stroke on clopidogrel who presented to the hospital with dizziness and leg weakness. Initially he was placed on stroke pathway with plan for MRI. He subsequently was febrile and found to have CNS bacteremia and bacteriuria. He briefly required ICU level of care for vasopressors. He underwent urologic procedure/stent placement. He was seen by infectious disease. Recommendation is 14 days of intravenous antibiotics for CNS bacteremia. He is being discharged home today with PICC and will need close follow-up with urology. The case was discussed with spouse at bedside on day of discharge. Please see problem list listed above. CONSULTING PROVIDERS   IP CONSULT TO NEUROLOGY  IP CONSULT TO CASE MANAGEMENT  IP CONSULT TO NUTRITION SERVICES  IP CONSULT TO NEUROLOGY  IP CONSULT TO CASE MANAGEMENT  IP CONSULT TO INFECTIOUS DISEASES  IP CONSULT TO UROLOGY  IP CONSULT TO PICC TEAM    PROCEDURES PERFORMED  Procedure(s) (LRB):  CYSTOSCOPY RETROGRADE PYELOGRAM WITH INSERTION STENT URETERAL (Left)    RADIOLOGY RESULTS    CT abdomen pelvis wo contrast    Result Date: 10/28/2023  Impression: 6 mm distal left ureteral calculus located 1 cm proximal to the left UVJ. Two adjacent 4 mm left bladder calculi possibly located within the left UVJ 8 mm dependent bladder calculus. No hydronephrosis. Small bibasilar pleural effusions. Potential mild pulmonary edema. Large quantity of rectal vault stool. The study was marked in [facility] for immediate notification. Workstation performed: [id number]     US kidney and bladder    Result Date: 10/28/2023  Impression: Left UVJ 9 mm calculus with no visualization of ureteral jets. No associated hydronephrosis. Minimally complicated left renal cysts as detailed above. Resident: Grazyna Carlos, the attending radiologist, have reviewed the images and agree with the final report above.  Workstation performed: [id number]     XR chest portable    Result Date: 10/27/2023  Impression: Mild left basilar atelectasis. No focal consolidation, pleural effusion, or pneumothorax. Resident: Alberto Zhang, the attending radiologist, have reviewed the images and agree with the final report above. Workstation performed: [id number]     CTA head and neck with and without contrast    Result Date: 10/26/2023  Impression: No acute intracranial abnormality. Mild cerebral chronic microangiopathic changes. No cervical or intracranial large vessel occlusion. No hemodynamically significant stenosis at the cervical carotid bifurcations. Advanced multifocal cervical spondylosis with severe C3-4 and moderate additional multilevel spinal stenosis. Multifocal severe foraminal stenosis. Follow-up MR imaging of the cervical spine is recommended to better assess the soft tissue contents of the  spinal canal. The study was marked in EPIC for significant notification. Workstation performed: [id number]     Echo complete w/ contrast if indicated    Result Date: 10/27/2023  Narrative:   Left Ventricle: Left ventricular cavity size is normal. Wall thickness is mildly increased. The left ventricular ejection fraction is 65% by visual estimation. . Systolic function is normal. Wall motion is normal. Diastolic function is normal.   Left Atrium: The atrium is severely dilated. Aortic Valve: There is mild regurgitation. Mitral Valve: There is mild regurgitation.          LABS  Results from last 7 days   Lab Units 10/29/23  0401 10/28/23  0442 10/27/23  1055 10/26/23  1040   WBC Thousand/uL 6.70 10.68* 20.51* 6.58   HEMOGLOBIN g/dL 12.2 11.6* 11.2* 13.7   HEMATOCRIT % 37.8 36.6 35.7* 41.8   MCV fL 84 86 87 82   TOTAL NEUT ABS Thousand/uL 4.36  --  19.69*  --    BANDS PCT % 4  --  8  --    PLATELETS Thousands/uL 165 160 187 278   INR   --   --   --  0.96     Results from last 7 days   Lab Units 10/31/23  0457 10/29/23  0401 10/28/23  0442 10/27/23  0528 10/26/23  1040   SODIUM mmol/L 137 137 136 140 136   POTASSIUM mmol/L 4.2 3.8 3.7 4.4 3.9   CHLORIDE mmol/L 103 106 107 106 105   CO2 mmol/L 27 26 25 18* 19*   BUN mg/dL 16 19 21 31* 31*   CREATININE mg/dL 0.87 0.79 0.81 0.99 0.85   CALCIUM mg/dL 8.4 8.0* 7.7* 9.2 8.9   ALBUMIN g/dL  --   --   --   --  3.9   TOTAL BILIRUBIN mg/dL  --   --   --   --  0.89   ALK PHOS U/L  --   --   --   --  61   ALT U/L  --   --   --   --  17   AST U/L  --   --   --   --  20   EGFR ml/min/1.73sq m 77 80 79 68 78   GLUCOSE RANDOM mg/dL 108 85 88 91 176*         Results from last 7 days   Lab Units 10/26/23  1040   HS TNI 0HR ng/L 7              Results from last 7 days   Lab Units 10/26/23  1034   POC GLUCOSE mg/dl 157*     Results from last 7 days   Lab Units 10/27/23  1055   HEMOGLOBIN A1C % 6.3*         Results from last 7 days   Lab Units 10/27/23  0256 10/27/23  0246   LACTIC ACID mmol/L  --  1.7   PROCALCITONIN ng/ml 0.21  --      Results from last 7 days   Lab Units 10/27/23  0528   TRIGLYCERIDES mg/dL 70   CHOLESTEROL mg/dL 133   LDL CALC mg/dL 61   HDL mg/dL 58       Cultures:   Results from last 7 days   Lab Units 10/26/23  2345   COLOR UA  Light Orange   CLARITY UA  Turbid   SPEC GRAV UA  1.029   PH UA  6.5   LEUKOCYTES UA  Large*   NITRITE UA  Positive*   GLUCOSE UA mg/dl Negative   KETONES UA mg/dl Trace*   BILIRUBIN UA  Negative   BLOOD UA  Large*      Results from last 7 days   Lab Units 10/26/23  2345   RBC UA /hpf Innumerable*   WBC UA /hpf Innumerable*   EPITHELIAL CELLS WET PREP /hpf None Seen   BACTERIA UA /hpf None Seen      Results from last 7 days   Lab Units 10/29/23  0856 10/29/23  0437 10/29/23  0402 10/27/23  0224 10/26/23  2345   BLOOD CULTURE   --  No Growth at 72 hrs. No Growth at 72 hrs.  Staphylococcus epidermidis*  Staphylococcus epidermidis*  --    GRAM STAIN RESULT   --   --   --  Gram positive cocci in clusters*  Gram positive cocci in clusters*  --    URINE CULTURE  No Growth <100 cfu/mL  --   --   --  >100,000 cfu/ml Staphylococcus coagulase negative* DISCHARGE DAY VISIT AND PHYSICAL EXAM:  Subjective: Patient seen and examined. Got PICC line. No complaints. Vitals:   Blood Pressure: 146/84 (11/01/23 0700)  Pulse: 68 (11/01/23 0700)  Temperature: 97.8 °F (36.6 °C) (11/01/23 0700)  Temp Source: Oral (10/31/23 2120)  Respirations: 14 (11/01/23 0700)  Height: 5' 8" (172.7 cm) (10/27/23 0945)  Weight - Scale: 85.5 kg (188 lb 7.9 oz) (10/29/23 0537)  SpO2: 96 % (11/01/23 0700)    Physical Exam  Vitals reviewed. Constitutional:       General: He is not in acute distress. HENT:      Head: Atraumatic. Cardiovascular:      Rate and Rhythm: Regular rhythm. Heart sounds: Normal heart sounds. Pulmonary:      Effort: Pulmonary effort is normal.      Breath sounds: No wheezing. Abdominal:      General: Bowel sounds are normal.      Palpations: Abdomen is soft. Tenderness: There is no abdominal tenderness. There is no rebound. Musculoskeletal:         General: No swelling or tenderness. Skin:     General: Skin is warm and dry. Neurological:      General: No focal deficit present. Mental Status: He is alert. Cranial Nerves: No cranial nerve deficit. Psychiatric:         Mood and Affect: Mood normal.       Planned Re-admission: No  Discharge Disposition: Home with [address] [facility]    Test Results Pending at Discharge:   Pending Labs       Order Current Status    Blood culture Preliminary result    Blood culture Preliminary result        Incidental findings: Advanced multifocal cervical spondylosis with severe C3-4 and moderate additional multilevel spinal stenosis. Multifocal severe foraminal stenosis. Follow-up MR imaging of the cervical spine is recommended to better assess the soft tissue contents of the  spinal canal    Medications   Discharge Medication List: See after visit summary for reconciled discharge medications.      Diet restrictions: Low-sodium   Activity restrictions: No strenuous activity  Discharge Condition: stable    Outpatient Follow-Up and Discharge Instructions  See after visit summary section titled Discharge Instructions for information provided to patient and family. Code Status: Prior  Discharge Statement   I spent 35 minutes discharging the patient. This time was spent on the day of discharge. Greater than 50% of total time was spent with the patient and / or family counseling and / or coordination of care. ** Please Note: This note has been constructed using a voice recognition system.  **

## 2023-11-01 NOTE — PROCEDURES
PICC Line Insertion    Date/Time: 2023 11:23 AM    Performed by: Dusty Sheikh RN  Authorized by: Roxanne Maloney DO    Patient location:  Bedside  Other Assisting Provider: No    Consent:     Consent obtained:  Written (Frank Collet obtained consent)    Consent given by:  Patient    Procedural risks discussed: by MD.  Universal protocol:     Procedure explained and questions answered to patient or proxy's satisfaction: yes      Relevant documents present and verified: yes      Test results available and properly labeled: yes      Radiology Images displayed and confirmed. If images not available, report reviewed: yes      Required blood products, implants, devices, and special equipment available: yes      Site/side marked: yes      Immediately prior to procedure, a time out was called: yes      Patient identity confirmed:  Verbally with patient and arm band  Pre-procedure details:     Hand hygiene: Hand hygiene performed prior to insertion      Sterile barrier technique: All elements of maximal sterile technique followed      Skin preparation:  ChloraPrep    Skin preparation agent: Skin preparation agent completely dried prior to procedure    Indications:     PICC line indications: long term antibiotics    Anesthesia (see MAR for exact dosages):      Anesthesia method:  Local infiltration    Local anesthetic:  Lidocaine 1% w/o epi (2 ml)  Procedure details:     Location:  Basilic    Vessel type: vein      Laterality:  Right    Approach: percutaneous technique used      Patient position:  Flat    Procedural supplies:  Double lumen    Catheter size:  5 Fr    Landmarks identified: yes      Ultrasound guidance: yes      Ultrasound image availability:  Not saved    Sterile ultrasound techniques: Sterile gel and sterile probe covers were used      Number of attempts:  2    Successful placement: yes      Vessel of catheter tip end:  Sherlock 3CG confirmed    Total catheter length (cm):  40    Catheter out on skin (cm):  0    Max flow rate:  999ml/hr    Arm circumference:  32  Post-procedure details:     Post-procedure:  Securement device placed and dressing applied    Post-procedure complications: none      Patient tolerance of procedure:   Tolerated well, no immediate complications  Comments:      LOT # [id number] EXP 5-31-24

## 2023-11-01 NOTE — DISCHARGE INSTR - AVS FIRST PAGE
Bacteremia. Continue antibiotics end date 11/11/2023 and remove PICC after last dose. Please follow-up with urology.

## 2023-11-01 NOTE — ASSESSMENT & PLAN NOTE
History of stroke presented to the hospital with vertigo/weakness subsequently found to have septic shock requiring ICU stay with vasopressors  Source: UTI with ureteral calculi undergoing cystoscopy/stenting  Growth of CNS in both blood cultures and urine  Antibiotics tailored to cefazolin with 14-day course end date 11/11/2023 per ID  PICC placed and to be removed on last dose

## 2023-11-01 NOTE — NURSING NOTE
IV removed, PICC intact. AVS reviewed with patient and family who verbalized understanding. Rx ABX provided for pt via [facility], given to pt at d/c with instructions to refrigerate clearly labeled and explained. No other new Rx written.

## 2023-11-01 NOTE — PROGRESS NOTES
Progress Note - Infectious Disease   Trey Davis 81 y.o. male MRN: [id number]  Unit/Bed#: [address] 218-01 Encounter: [id number]    Impression/Plan:  1. Septic shock. Evolving early. High fevers, leukocytosis, and refractory hypotension. Secondary to staph epidermidis bacteremia from likely  source. Patient is now clinically improving with resolution of fevers, normalization of WBC count, and improvement in blood pressures.  -antibiotic as below  -monitor CBCD and BMP  -follow-up repeat blood cultures  -monitor vitals  -supportive care     2. Coagulase-negative staph bacteremia. Findings do appear to be real and likely secondary to urinary source. No other acute infectious findings on CT abdomen/pelvis. No intravascular prosthetic devices. No open wounds. No visible vegetations on 2D echo. The patient has been receiving IV cefazolin which he is tolerating without difficulty. He should complete a 2-week treatment course, through 11/11/2023. Repeat blood cultures are negative >72 hours. He is cleared for PICC placement.  -continue IV cefazolin through 11/11/2023  -weekly CBCD and creatinine while on IV antibiotic  -follow up repeat blood cultures  -monitor vitals  -patient is cleared for PICC placement  -PICC to be removed after final dose of IV antibiotic on 11/11/2023  -outpatient ID office will follow along with patient after discharge  -he should complete surveillance blood cultures 2 weeks after finishing IV antibiotic treatment, draw after 11/25/2023     3. UTI. In the setting of #4. Urine culture with growth of greater than 100,000 coagulase-negative staph. Suspect this is etiology of bacteremia.  -antibiotic as above  -monitor  symptoms  -monitor urine output     4. Left ureterolithiasis. Status post ureteral stent placement on 10/29/2023. Operative culture with no growth, but patient was already receiving antibiotics prior to surgery.   He will need definitive stone treatment in the future.  -continue follow-up with urology     Above plan was discussed in detail with patient at the bedside. Above plan was discussed in detail with SLIM. Antibiotics:  Cefazolin 5  Antibiotics 6    Subjective:  Patient reports he is feeling fairly good this morning. Has no suprapubic pain or flank pain. Has no urethral irritation. He has no fever, chills, sweats, shakes; no nausea, vomiting, abdominal pain; no cough, shortness of breath, or chest pain. Patient understands the plan for a PICC line and that he will need to continue his IV antibiotics at home. No new symptoms. Objective:  Vitals:  Temp:  [97.6 °F (36.4 °C)-98.2 °F (36.8 °C)] 98.2 °F (36.8 °C)  HR:  [70-72] 72  Resp:  [16-18] 18  BP: (121-123)/(77-78) 123/78  SpO2:  [92 %-95 %] 92 %  Temp (24hrs), Av.9 °F (36.6 °C), Min:97.6 °F (36.4 °C), Max:98.2 °F (36.8 °C)  Current: Temperature: 98.2 °F (36.8 °C)    Physical Exam:   General Appearance:  Alert, interactive, nontoxic, no acute distress. He appears comfortable sitting up in bed having breakfast.   Throat: Oropharynx moist without lesions. Lungs:   Clear to auscultation bilaterally; no wheezes, rhonchi or rales; respirations unlabored air. Heart:  RRR; no murmur, rub or gallop. Abdomen:   Soft, non-tender, non-distended, positive bowel sounds. Extremities: No clubbing or cyanosis, +B/L LE edema. Skin: No new rashes noted on exposed skin.      Labs, Imaging, & Other studies:   All pertinent labs and imaging studies were personally reviewed  Results from last 7 days   Lab Units 10/29/23  0401 10/28/23  0442 10/27/23  1055   WBC Thousand/uL 6.70 10.68* 20.51*   HEMOGLOBIN g/dL 12.2 11.6* 11.2*   PLATELETS Thousands/uL 165 160 187     Results from last 7 days   Lab Units 10/31/23  0457 10/27/23  0528 10/26/23  1040   POTASSIUM mmol/L 4.2   < > 3.9   CHLORIDE mmol/L 103   < > 105   CO2 mmol/L 27   < > 19*   BUN mg/dL 16   < > 31*   CREATININE mg/dL 0.87   < > 0.85   EGFR ml/min/1.73sq m 77   < > 78   CALCIUM mg/dL 8.4   < > 8.9   AST U/L  --   --  20   ALT U/L  --   --  17   ALK PHOS U/L  --   --  61    < > = values in this interval not displayed. Results from last 7 days   Lab Units 10/29/23  0856 10/29/23  0437 10/29/23  0402 10/27/23  0224 10/26/23  2345   BLOOD CULTURE   --  No Growth at 48 hrs. No Growth at 48 hrs.  Staphylococcus epidermidis*  Staphylococcus epidermidis*  --    GRAM STAIN RESULT   --   --   --  Gram positive cocci in clusters*  Gram positive cocci in clusters*  --    URINE CULTURE  No Growth <100 cfu/mL  --   --   --  >100,000 cfu/ml Staphylococcus coagulase negative*

## 2023-11-01 NOTE — CASE MANAGEMENT
Case Management Discharge Planning Note    Patient name Tammie Roland  Location [facility] 2 Ama Farmer* MRN [id number]  : 1935 Date 2023       Current Admission Date: 10/26/2023  Current Admission Diagnosis:Septic shock [facility])   Patient Active Problem List    Diagnosis Date Noted    Urolithiasis 10/30/2023    Positive blood culture 10/28/2023    Abnormal ultrasound 10/28/2023    Other specified anemias 10/28/2023    Septic shock ([address]) 10/27/2023    Acute cystitis with hematuria 10/27/2023    Acute respiratory failure with hypoxia ([address]) 10/27/2023    Vertigo 10/26/2023    Narrow angle of anterior chamber of left eye 2023    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 2022    Balance problems  2022    Heart palpitations 2022    History of heart artery stent 2018    Primary osteoarthritis of both hands 2018    Basal cell carcinoma of skin 2015    BPH with obstruction/lower urinary tract symptoms 2014    Benign essential hypertension 2013    Actinic keratosis 2013    Arteriosclerotic coronary artery disease 2013    Hyperlipidemia 2012      LOS (days): 6  Geometric Mean LOS (GMLOS) (days): 9.90  Days to GMLOS:4     OBJECTIVE:  Risk of Unplanned Readmission Score: 8.8         Current admission status: Inpatient   Preferred Pharmacy:   [address] - [address] [facility] [address]  Phone: [phone] Fax: [phone] [facility] [address] [address]  Phone: [phone] Fax: [phone]    Primary Care Provider: Marla Rivera DO    Primary Insurance: MEDICARE  Secondary Insurance: BLUE CROSS    DISCHARGE DETAILS:       Additional Comments: MD is planning on discharging pt home today.  Pt rec PICC today and uploaded to [facility]. [facility] will be out to the home at 1600 and [facility] delivered IV med to pt's room. RN on the floor as IV abx in refrig until pt leaves. Phone numbers for [facility] and [facility] both on AVS. TC to spouse to make sure they are aware of time RN coming to home. Spouse is all set with discharge today and will transport home.

## 2023-11-02 ENCOUNTER — TRANSITIONAL CARE MANAGEMENT (OUTPATIENT)
Dept: FAMILY MEDICINE CLINIC | Facility: CLINIC | Age: 88
End: 2023-11-02

## 2023-11-02 ENCOUNTER — TELEPHONE (OUTPATIENT)
Dept: INFECTIOUS DISEASES | Facility: CLINIC | Age: 88
End: 2023-11-02

## 2023-11-02 ENCOUNTER — HOME CARE VISIT (OUTPATIENT)
Dept: HOME HEALTH SERVICES | Facility: HOME HEALTHCARE | Age: 88
End: 2023-11-02
Payer: MEDICARE

## 2023-11-02 VITALS
OXYGEN SATURATION: 96 % | SYSTOLIC BLOOD PRESSURE: 140 MMHG | HEART RATE: 72 BPM | TEMPERATURE: 98.4 F | RESPIRATION RATE: 16 BRPM | DIASTOLIC BLOOD PRESSURE: 80 MMHG

## 2023-11-02 DIAGNOSIS — R78.81 POSITIVE BLOOD CULTURE: Primary | ICD-10-CM

## 2023-11-02 PROCEDURE — G0299 HHS/HOSPICE OF RN EA 15 MIN: HCPCS

## 2023-11-02 NOTE — PROGRESS NOTES
OPAT NOTE    Supervising/Discharge physician: Rafael    Diagnosis: Staph Bacteremia    Drug: Cefazolin    Dose/Frequency: 2g Q8    Labs/Frequency: CBCD Cre weekly (One more time)    End Date: 11/11        Infusion/[facility] contact:Kody/MARTIN    Next appointment: PRN        MA assigned:  Samuel Urrutia

## 2023-11-02 NOTE — TELEPHONE ENCOUNTER
Spoke to patient's wife with regard to follow up PRN. Patient's labs have been sent to [facility], along with PICC removal. Patient will require blood cultures 11/25 or after. They will call if questions/concerns. They ask that I mail the order for blood cultures to them. They thank me for my call.

## 2023-11-02 NOTE — TELEPHONE ENCOUNTER
Post Op Note    Bruce Ceballos is a 81 y.o. male s/p Left - CYSTOSCOPY RETROGRADE PYELOGRAM WITH INSERTION STENT URETERAL performed 10/29/2023. Bruce Ceballos was seen on call by Dr. Natalya Burnett. How would you rate your pain on a scale from 1 to 10, 10 being the worst pain ever? 0  Have you had a fever? No  Have your bowel movements been regular? Yes  Do you have any difficulty urinating? No  Do you have any other questions or concerns that I can address at this time? None at this time. Patient is doing well overall. He has no complaints. Reviewed stent expectations with patient. Informed that next step will be setting up another surgery for the stent and that our surgery scheduler will  be in touch. He states he is unavailable 11/15-11/26.

## 2023-11-03 ENCOUNTER — HOME CARE VISIT (OUTPATIENT)
Dept: HOME HEALTH SERVICES | Facility: HOME HEALTHCARE | Age: 88
End: 2023-11-03

## 2023-11-03 LAB
BACTERIA BLD CULT: NORMAL
BACTERIA BLD CULT: NORMAL

## 2023-11-03 PROCEDURE — G0151 HHCP-SERV OF PT,EA 15 MIN: HCPCS

## 2023-11-05 VITALS — DIASTOLIC BLOOD PRESSURE: 82 MMHG | SYSTOLIC BLOOD PRESSURE: 136 MMHG | OXYGEN SATURATION: 92 % | HEART RATE: 74 BPM

## 2023-11-06 ENCOUNTER — HOME CARE VISIT (OUTPATIENT)
Dept: HOME HEALTH SERVICES | Facility: HOME HEALTHCARE | Age: 88
End: 2023-11-06
Payer: MEDICARE

## 2023-11-06 ENCOUNTER — LAB REQUISITION (OUTPATIENT)
Dept: LAB | Facility: HOSPITAL | Age: 88
End: 2023-11-06
Payer: MEDICARE

## 2023-11-06 VITALS
DIASTOLIC BLOOD PRESSURE: 80 MMHG | HEART RATE: 72 BPM | SYSTOLIC BLOOD PRESSURE: 128 MMHG | OXYGEN SATURATION: 98 % | TEMPERATURE: 98.1 F | RESPIRATION RATE: 16 BRPM

## 2023-11-06 DIAGNOSIS — R78.81 BACTEREMIA: ICD-10-CM

## 2023-11-06 LAB
BASOPHILS # BLD AUTO: 0.06 THOUSANDS/ÂΜL (ref 0–0.1)
BASOPHILS NFR BLD AUTO: 1 % (ref 0–1)
CREAT SERPL-MCNC: 1.03 MG/DL (ref 0.6–1.3)
EOSINOPHIL # BLD AUTO: 0.16 THOUSAND/ÂΜL (ref 0–0.61)
EOSINOPHIL NFR BLD AUTO: 2 % (ref 0–6)
ERYTHROCYTE [DISTWIDTH] IN BLOOD BY AUTOMATED COUNT: 14.5 % (ref 11.6–15.1)
GFR SERPL CREATININE-BSD FRML MDRD: 65 ML/MIN/1.73SQ M
HCT VFR BLD AUTO: 37.8 % (ref 36.5–49.3)
HGB BLD-MCNC: 12.2 G/DL (ref 12–17)
IMM GRANULOCYTES # BLD AUTO: 0.03 THOUSAND/UL (ref 0–0.2)
IMM GRANULOCYTES NFR BLD AUTO: 0 % (ref 0–2)
LYMPHOCYTES # BLD AUTO: 1.41 THOUSANDS/ÂΜL (ref 0.6–4.47)
LYMPHOCYTES NFR BLD AUTO: 18 % (ref 14–44)
MCH RBC QN AUTO: 27.2 PG (ref 26.8–34.3)
MCHC RBC AUTO-ENTMCNC: 32.3 G/DL (ref 31.4–37.4)
MCV RBC AUTO: 84 FL (ref 82–98)
MONOCYTES # BLD AUTO: 0.79 THOUSAND/ÂΜL (ref 0.17–1.22)
MONOCYTES NFR BLD AUTO: 10 % (ref 4–12)
NEUTROPHILS # BLD AUTO: 5.33 THOUSANDS/ÂΜL (ref 1.85–7.62)
NEUTS SEG NFR BLD AUTO: 69 % (ref 43–75)
NRBC BLD AUTO-RTO: 0 /100 WBCS
PLATELET # BLD AUTO: 479 THOUSANDS/UL (ref 149–390)
PMV BLD AUTO: 10.3 FL (ref 8.9–12.7)
RBC # BLD AUTO: 4.48 MILLION/UL (ref 3.88–5.62)
WBC # BLD AUTO: 7.78 THOUSAND/UL (ref 4.31–10.16)

## 2023-11-06 PROCEDURE — G0299 HHS/HOSPICE OF RN EA 15 MIN: HCPCS

## 2023-11-06 PROCEDURE — 85025 COMPLETE CBC W/AUTO DIFF WBC: CPT | Performed by: INTERNAL MEDICINE

## 2023-11-06 PROCEDURE — 82565 ASSAY OF CREATININE: CPT | Performed by: INTERNAL MEDICINE

## 2023-11-06 NOTE — CASE COMMUNICATION
Evaluated this patient on 11/3/23 and this is a 1x1 visit and DC. He is doing well from a P.T. standpoint. He feels his balance is off a little but he needs more advanced balance skills than we can do at home. Recommended he consider going for op therapy in [place] where they have a balance program. he and his wife are willing to look into this service. No further home P.T. visits planned.

## 2023-11-07 ENCOUNTER — HOME CARE VISIT (OUTPATIENT)
Dept: HOME HEALTH SERVICES | Facility: HOME HEALTHCARE | Age: 88
End: 2023-11-07
Payer: MEDICARE

## 2023-11-08 ENCOUNTER — VBI (OUTPATIENT)
Dept: ADMINISTRATIVE | Facility: OTHER | Age: 88
End: 2023-11-08

## 2023-11-08 NOTE — TELEPHONE ENCOUNTER
11/08/23 3:37 PM    Patient contacted (spoke with patient) to bring Advance Directive, POLST, or Living Will document to next scheduled pcp visit. Thank you.   Paul Grimes  [facility]

## 2023-11-09 ENCOUNTER — HOME CARE VISIT (OUTPATIENT)
Dept: HOME HEALTH SERVICES | Facility: HOME HEALTHCARE | Age: 88
End: 2023-11-09
Payer: MEDICARE

## 2023-11-09 PROCEDURE — G0180 MD CERTIFICATION HHA PATIENT: HCPCS | Performed by: INTERNAL MEDICINE

## 2023-11-12 ENCOUNTER — HOME CARE VISIT (OUTPATIENT)
Dept: HOME HEALTH SERVICES | Facility: HOME HEALTHCARE | Age: 88
End: 2023-11-12
Payer: MEDICARE

## 2023-11-12 VITALS
SYSTOLIC BLOOD PRESSURE: 122 MMHG | TEMPERATURE: 97.9 F | OXYGEN SATURATION: 98 % | RESPIRATION RATE: 18 BRPM | HEART RATE: 78 BPM | DIASTOLIC BLOOD PRESSURE: 70 MMHG

## 2023-11-12 PROCEDURE — G0299 HHS/HOSPICE OF RN EA 15 MIN: HCPCS

## 2023-11-13 ENCOUNTER — TELEMEDICINE (OUTPATIENT)
Dept: FAMILY MEDICINE CLINIC | Facility: CLINIC | Age: 88
End: 2023-11-13
Payer: MEDICARE

## 2023-11-13 DIAGNOSIS — R65.21 SEPTIC SHOCK (HCC): Primary | ICD-10-CM

## 2023-11-13 DIAGNOSIS — A41.9 SEPTIC SHOCK (HCC): Primary | ICD-10-CM

## 2023-11-13 DIAGNOSIS — R42 VERTIGO: ICD-10-CM

## 2023-11-13 PROCEDURE — 99495 TRANSJ CARE MGMT MOD F2F 14D: CPT | Performed by: INTERNAL MEDICINE

## 2023-11-13 NOTE — ASSESSMENT & PLAN NOTE
Still complains of vertigo, continue to observe as for now. If no improvement follow-up in the office.

## 2023-11-13 NOTE — PROGRESS NOTES
Virtual TCM Visit:    Verification of patient location:    Patient is located at Home in the following state in which I hold an active license [place]    Assessment/Plan:        Problem List Items Addressed This Visit       Vertigo     Still complains of vertigo, continue to observe as for now. If no improvement follow-up in the office. Septic shock ([address]) - Primary     Treated with IV cefazolin likely due to UTI due to CNS. Blood cultures at the end of November as per ID recommendation. He finished course of antibiotics. Reason for visit is TCM visit    Encounter provider Daniel Guzmán MD     Provider located at [address].  [facility] [address] [facility] [address] [phone]    Recent Visits  No visits were found meeting these conditions. Showing recent visits within past 7 days and meeting all other requirements  Today's Visits  Date Type Provider Dept   11/13/23 Telemedicine Daniel Guzmán MD [address] [place] [address] Primary Care   Showing today's visits and meeting all other requirements  Future Appointments  No visits were found meeting these conditions. Showing future appointments within next 150 days and meeting all other requirements       After connecting through [employer], the patient was identified by name and date of birth. Neo Cha was informed that this is a telemedicine visit and that the visit is being conducted through the [facility]. He agrees to proceed. .  My office door was closed. No one else was in the room. He acknowledged consent and understanding of privacy and security of the video platform. The patient has agreed to participate and understands they can discontinue the visit at any time. Patient is aware this is a billable service. Transitional Care Management Review:  Neo Cha is a 81 y.o. male here for TCM follow up.      During the TCM phone call patient stated:    TCM Call       Date and time call was made  11/2/2023  3:48 PM    Hospital care reviewed  Records reviewed    Patient was hospitialized at  [facility] - [facility]    Date of Admission  10/26/23    Date of discharge  11/01/23    Diagnosis  Septic Shock    Disposition  Home    Were the patients medications reviewed and updated  Yes    Current Symptoms  None          TCM Call       Post hospital issues  None    Scheduled for follow up? Yes  Dr Gerald Torres 11.13.23 Virtual Visit    Did you obtain your prescribed medications  Yes    Do you need help managing your prescriptions or medications  No    Is transportation to your appointment needed  No    I have advised the patient to call PCP with any new or worsening symptoms  ETELVINA Jaramillo    Living Arrangements  Spouse or Significiant other    Support System  Spouse    The type of support provided  Emotional; Financial; Physical          Subjective:     Patient ID: Sloane Su is a 81 y.o. male. It was a virtual visit after recent admission due to sepsis due to UTI associated with vertigo. He was treated with IV cefazolin through PICC line which was recently removed. He has follow-up with urology. Ureteral stent was placed during admission. He has blood work from 11/6/2023 revealed close to baseline kidney function and normal cell count. The quality of connection was very poor but overall patient did not report any active symptoms except of continuation of vertigo. We discussed with him if any new chills, fevers, problems with urination he will let us know immediately. He has scheduled blood work to recheck on his blood cultures and he has follow-up with urology. Review of Systems   Constitutional:  Negative for chills and fever. Respiratory:  Negative for cough, shortness of breath and wheezing. Cardiovascular:  Negative for chest pain, palpitations and leg swelling.    Genitourinary:  Negative for dysuria, frequency, hematuria and urgency. Objective: There were no vitals filed for this visit. Physical Exam  Neurological:      Mental Status: He is alert. Medications have been reviewed by provider in current encounter    I spen 20 minutes with the patient during this visit. Leni Rocha MD      VIRTUAL VISIT [address] verbally agrees to participate in [employer]. Pt is aware that [employer] could be limited without vital signs or the ability to perform a full hands-on physical exam. Sabino Ya understands he or the provider may request at any time to terminate the video visit and request the patient to seek care or treatment in person.

## 2023-11-13 NOTE — ASSESSMENT & PLAN NOTE
Treated with IV cefazolin likely due to UTI due to CNS. Blood cultures at the end of November as per ID recommendation. He finished course of antibiotics.

## 2023-11-16 DIAGNOSIS — I25.10 ARTERIOSCLEROTIC CORONARY ARTERY DISEASE: ICD-10-CM

## 2023-11-16 DIAGNOSIS — I10 BENIGN ESSENTIAL HYPERTENSION: ICD-10-CM

## 2023-11-16 RX ORDER — METOPROLOL SUCCINATE 25 MG/1
12.5 TABLET, EXTENDED RELEASE ORAL DAILY
Qty: 45 TABLET | Refills: 3 | Status: SHIPPED | OUTPATIENT
Start: 2023-11-16

## 2023-11-27 ENCOUNTER — APPOINTMENT (OUTPATIENT)
Dept: LAB | Facility: MEDICAL CENTER | Age: 88
End: 2023-11-27
Payer: MEDICARE

## 2023-11-27 DIAGNOSIS — R78.81 POSITIVE BLOOD CULTURE: ICD-10-CM

## 2023-11-27 PROCEDURE — 36415 COLL VENOUS BLD VENIPUNCTURE: CPT

## 2023-11-27 PROCEDURE — 87040 BLOOD CULTURE FOR BACTERIA: CPT

## 2023-11-29 ENCOUNTER — PREP FOR PROCEDURE (OUTPATIENT)
Dept: UROLOGY | Facility: AMBULATORY SURGERY CENTER | Age: 88
End: 2023-11-29

## 2023-11-29 DIAGNOSIS — Z01.812 PRE-PROCEDURE LAB EXAM: ICD-10-CM

## 2023-11-29 DIAGNOSIS — N20.0 KIDNEY STONE: Primary | ICD-10-CM

## 2023-11-29 DIAGNOSIS — N20.0 KIDNEY STONE: ICD-10-CM

## 2023-11-29 DIAGNOSIS — R39.89 SUSPECTED UTI: ICD-10-CM

## 2023-11-29 DIAGNOSIS — N21.0 URINARY BLADDER STONE: Primary | ICD-10-CM

## 2023-11-29 DIAGNOSIS — Z01.818 PREOP EXAMINATION: ICD-10-CM

## 2023-12-02 LAB
BACTERIA BLD CULT: NORMAL
BACTERIA BLD CULT: NORMAL

## 2023-12-04 ENCOUNTER — APPOINTMENT (OUTPATIENT)
Dept: LAB | Facility: MEDICAL CENTER | Age: 88
End: 2023-12-04
Payer: MEDICARE

## 2023-12-04 ENCOUNTER — LAB REQUISITION (OUTPATIENT)
Dept: LAB | Facility: HOSPITAL | Age: 88
End: 2023-12-04
Payer: MEDICARE

## 2023-12-04 DIAGNOSIS — N21.0 CALCULUS IN BLADDER: ICD-10-CM

## 2023-12-04 DIAGNOSIS — N21.0 URINARY BLADDER STONE: ICD-10-CM

## 2023-12-04 DIAGNOSIS — N20.0 KIDNEY STONE: ICD-10-CM

## 2023-12-04 DIAGNOSIS — Z01.812 PRE-PROCEDURE LAB EXAM: ICD-10-CM

## 2023-12-04 DIAGNOSIS — R39.89 SUSPECTED UTI: ICD-10-CM

## 2023-12-04 DIAGNOSIS — Z01.818 PREOP EXAMINATION: ICD-10-CM

## 2023-12-04 LAB
ABO GROUP BLD: NORMAL
BLD GP AB SCN SERPL QL: NEGATIVE
RH BLD: POSITIVE
SPECIMEN EXPIRATION DATE: NORMAL

## 2023-12-04 PROCEDURE — 86900 BLOOD TYPING SEROLOGIC ABO: CPT | Performed by: UROLOGY

## 2023-12-04 PROCEDURE — 36415 COLL VENOUS BLD VENIPUNCTURE: CPT

## 2023-12-04 PROCEDURE — 87086 URINE CULTURE/COLONY COUNT: CPT

## 2023-12-04 PROCEDURE — 86901 BLOOD TYPING SEROLOGIC RH(D): CPT | Performed by: UROLOGY

## 2023-12-04 PROCEDURE — 86850 RBC ANTIBODY SCREEN: CPT | Performed by: UROLOGY

## 2023-12-05 ENCOUNTER — CONSULT (OUTPATIENT)
Dept: FAMILY MEDICINE CLINIC | Facility: CLINIC | Age: 88
End: 2023-12-05
Payer: MEDICARE

## 2023-12-05 VITALS
TEMPERATURE: 98 F | HEIGHT: 68 IN | WEIGHT: 169.4 LBS | HEART RATE: 69 BPM | DIASTOLIC BLOOD PRESSURE: 70 MMHG | RESPIRATION RATE: 12 BRPM | BODY MASS INDEX: 25.67 KG/M2 | SYSTOLIC BLOOD PRESSURE: 120 MMHG | OXYGEN SATURATION: 100 %

## 2023-12-05 DIAGNOSIS — Z01.818 PREOP EXAMINATION: Primary | ICD-10-CM

## 2023-12-05 DIAGNOSIS — N21.0 URINARY BLADDER STONE: ICD-10-CM

## 2023-12-05 DIAGNOSIS — N20.0 KIDNEY STONE: ICD-10-CM

## 2023-12-05 DIAGNOSIS — Z95.5 HISTORY OF HEART ARTERY STENT: ICD-10-CM

## 2023-12-05 LAB — BACTERIA UR CULT: NORMAL

## 2023-12-05 PROCEDURE — 99214 OFFICE O/P EST MOD 30 MIN: CPT | Performed by: FAMILY MEDICINE

## 2023-12-05 PROCEDURE — 93000 ELECTROCARDIOGRAM COMPLETE: CPT | Performed by: FAMILY MEDICINE

## 2023-12-05 NOTE — PATIENT INSTRUCTIONS
1. Preop examination  -     Ambulatory referral to [facility]  -     POCT ECG    2. Urinary bladder stone  -     Ambulatory referral to [facility]    3. Kidney stone  -     Ambulatory referral to [facility]    4.  History of heart artery stent

## 2023-12-05 NOTE — PROGRESS NOTES
Assessment/Plan:       Problem List Items Addressed This Visit          Other    History of heart artery stent     Other Visit Diagnoses       Preop examination    -  Primary    Relevant Orders    POCT ECG (Completed)    Urinary bladder stone        Kidney stone                 Surgical clearance: The patient is found to be at low/moderate risk from a cardiovascular standpoint. Additional cardiac testing is not  necessary. Preoperative testing reviewed includes ECG. Re-evaluation is needed if the patient should present with symptoms prior to surgery/ procedure. Surgical clearance will be faxed to Dr. Levon Robins. Benefits outweigh risks of surgery given high risk of infection/septic shock. Cardiac risk score of 2    Hold plavix for 5 days, if needed. Subjective:      Patient ID: Sandy Novoa is a 81 y.o. male. HPI    81year old with pmh of NSTEMI, s/sp stent x 2 (2014), CVA, presenting for pre op examination,     CYSTOSCOPY URETEROSCOPY WITH LITHOTRIPSY HOLMIUM LASER, RETROGRADE PYELOGRAM AND INSERTION STENT URETERAL, TRANSURETHRAL RESECTION OF PROSTATE     Recently admitted with septic shock secondary to UTI with ureteral calculi, finished abx and feeling well at this time. Can walk flight of steps without shortness of breath. Lives in two story house with no problems, does house activities including carrying things up from basement. Remains active at home.         The following portions of the patient's history were reviewed and updated as appropriate: allergies, current medications, past family history, past medical history, past social history, past surgical history and problem list.      Current Outpatient Medications:     acetaminophen (TYLENOL) 325 mg tablet, Take 650 mg by mouth Uses 3 x a day, Disp: , Rfl:     carboxymethylcellulose (REFRESH PLUS) 0.5 % SOLN, 1 drop 2 (two) times a day as needed for dry eyes, Disp: , Rfl:     clopidogrel (PLAVIX) 75 mg tablet, TAKE 1 TABLET BY MOUTH EVERY DAY, Disp: 90 tablet, Rfl: 3    Glucosamine-Chondroitin 250-200 MG TABS, daily [facility], Disp: , Rfl:     metoprolol succinate (TOPROL-XL) 25 mg 24 hr tablet, TAKE 1/2 TABLET BY MOUTH EVERY DAY, Disp: 45 tablet, Rfl: 3    rosuvastatin (CRESTOR) 10 MG tablet, Take 1 tablet (10 mg total) by mouth daily, Disp: 90 tablet, Rfl: 3     Review of Systems   Constitutional:  Negative for activity change and appetite change. Respiratory:  Negative for apnea and chest tightness. Cardiovascular:  Negative for chest pain and palpitations. Gastrointestinal:  Negative for abdominal distention and abdominal pain. Musculoskeletal:  Negative for arthralgias and back pain. Objective:      /70 (BP Location: Right arm, Patient Position: Sitting, Cuff Size: Standard)   Pulse 69   Temp 98 °F (36.7 °C) (Temporal)   Resp 12   Ht 5' 8" (1.727 m)   Wt 76.8 kg (169 lb 6.4 oz)   SpO2 100%   BMI 25.76 kg/m²          Physical Exam  Constitutional:       Appearance: Normal appearance. Cardiovascular:      Rate and Rhythm: Normal rate and regular rhythm. Pulses: Normal pulses. Heart sounds: Normal heart sounds. Pulmonary:      Effort: Pulmonary effort is normal.      Breath sounds: Normal breath sounds. Abdominal:      General: Abdomen is flat. Tenderness: There is no abdominal tenderness. Musculoskeletal:         General: Normal range of motion. Neurological:      Mental Status: He is alert.            Maisha Flores MD

## 2023-12-06 ENCOUNTER — OFFICE VISIT (OUTPATIENT)
Dept: UROLOGY | Facility: AMBULATORY SURGERY CENTER | Age: 88
End: 2023-12-06
Payer: MEDICARE

## 2023-12-06 VITALS
RESPIRATION RATE: 18 BRPM | HEIGHT: 68 IN | BODY MASS INDEX: 25.61 KG/M2 | HEART RATE: 66 BPM | DIASTOLIC BLOOD PRESSURE: 82 MMHG | OXYGEN SATURATION: 99 % | SYSTOLIC BLOOD PRESSURE: 120 MMHG | WEIGHT: 169 LBS

## 2023-12-06 DIAGNOSIS — N20.0 CALCULUS OF KIDNEY: Primary | ICD-10-CM

## 2023-12-06 PROCEDURE — 99214 OFFICE O/P EST MOD 30 MIN: CPT | Performed by: UROLOGY

## 2023-12-06 RX ORDER — CEFAZOLIN SODIUM 10 G/1
INJECTION, POWDER, FOR SOLUTION INTRAVENOUS
COMMUNITY
Start: 2023-11-07

## 2023-12-06 NOTE — H&P (VIEW-ONLY)
12/6/2023    Neo Cha  1935  [id number]        Assessment  History of left 6 mm distal ureteral calculus status post left ureteral stent insertion, bladder stone      Discussion  Today we discussed cystoscopy with cystolitholapaxy along with left ureteroscopy with holmium laser lithotripsy, left retrograde pyelography and left ureteral stent insertion. Risk of the procedure were discussed and reviewed and informed consent was obtained. Surgery scheduled for December 15, 2023 at [address] [place] [address] [facility]. The patient was instructed that he can continue on his Plavix and he should continue with his beta-blocker. History of Present Illness  81 y.o. male with a history of left ureteral calculus with sepsis. He was seen in the ICU at [address] in October 2023. He had a white blood cell count of 20,000 at that time and a fever to 102. Culture showed staph epi in both the blood and urine. I previously discussed this with the ICU team that that is atypical for sepsis. Typically we see gram-negative species. Nonetheless, he returns to the office today to discuss his upcoming surgery to remove the stones within his left ureter as well as his bladder stone. At this time he states that he is voiding well and I would hold on performing TURP. His wife is present with him in the office today.           AUA Symptom Score  AUA SYMPTOM SCORE      Flowsheet Row Most Recent Value   AUA SYMPTOM SCORE    How often have you had a sensation of not emptying your bladder completely after you finished urinating? 0 (P)     How often have you had to urinate again less than two hours after you finished urinating? 0 (P)     How often have you found you stopped and started again several times when you urinate? 0 (P)     How often have you found it difficult to postpone urination? 0 (P)     How often have you had a weak urinary stream? 0 (P)     How often have you had to push or strain to begin urination? 0 (P)     How many times did you most typically get up to urinate from the time you went to bed at night until the time you got up in the morning? 2 (P)     Quality of Life: If you were to spend the rest of your life with your urinary condition just the way it is now, how would you feel about that? 4 (P)     AUA SYMPTOM SCORE 2 (P)             Review of Systems  Review of Systems   Constitutional: Negative. Negative for activity change and fever. HENT: Negative. Negative for congestion. Eyes: Negative. Respiratory: Negative. Negative for cough, chest tightness and shortness of breath. Cardiovascular: Negative. Negative for chest pain and leg swelling. Gastrointestinal: Negative. Negative for abdominal distention, constipation, diarrhea and vomiting. Endocrine: Negative. Genitourinary:  Negative for decreased urine volume, difficulty urinating, dysuria, flank pain, frequency, hematuria, penile pain, testicular pain and urgency. Per HPI   Musculoskeletal: Negative. Skin: Negative. Allergic/Immunologic: Negative. Neurological: Negative. Negative for dizziness and headaches. Hematological: Negative. Psychiatric/Behavioral: Negative.            Past Medical History  Past Medical History:   Diagnosis Date    Basal cell carcinoma     Cancer ([facility])     NSTEMI (non-ST elevated myocardial infarction) ([facility]) 2014    Pneumonia     Last Assessed:  10/7/14    Polymyalgia rheumatica ([facility])     Last Assessed:  11/23/12    Polymyalgia rheumatica ([facility]) 2011    Squamous cell skin cancer 05/24/2022    left frontal scalp anterior    Squamous cell skin cancer 05/24/2022    Vertex of scalp       Past Social History  Past Surgical History:   Procedure Laterality Date    APPENDECTOMY      CORONARY ANGIOPLASTY WITH STENT PLACEMENT      FL RETROGRADE PYELOGRAM  10/29/2023    HERNIA REPAIR  2000    MOHS SURGERY  07/27/2022    left frontal scalp and vertex of scalp    AL CYSTO BLADDER W/URETERAL CATHETERIZATION Left 10/29/2023    Procedure: CYSTOSCOPY RETROGRADE PYELOGRAM WITH INSERTION STENT URETERAL;  Surgeon: Ever Barnes MD;  Location: [facility];  Service: Urology       Past Family History  Family History   Problem Relation Age of Onset    Pulmonary embolism Father     Other Father         Transurethral resection of prostate    Stroke Sister        Past Social history  Social History     Socioeconomic History    Marital status: /Civil Union     Spouse name: Not on file    Number of children: Not on file    Years of education: Not on file    Highest education level: Not on file   Occupational History    Not on file   Tobacco Use    Smoking status: Never    Smokeless tobacco: Never   Vaping Use    Vaping Use: Never used   Substance and Sexual Activity    Alcohol use: Not Currently     Comment: Social    Drug use: Never    Sexual activity: Yes   Other Topics Concern    Not on file   Social History Narrative    Exercises regularly        Consumes 2-3 cups of coffee per week and 1 glass of ice tea per day     Social Determinants of Health     Financial Resource Strain: Low Risk  (10/23/2023)    Overall Financial Resource Strain (CARDIA)     Difficulty of Paying Living Expenses: Not hard at all   Food Insecurity: Not on file   Transportation Needs: No Transportation Needs (10/23/2023)    PRAPARE - Transportation     Lack of Transportation (Medical): No     Lack of Transportation (Non-Medical):  No   Physical Activity: Not on file   Stress: Not on file   Social Connections: Not on file   Intimate Partner Violence: Not on file   Housing Stability: Not on file       Current Medications  Current Outpatient Medications   Medication Sig Dispense Refill    acetaminophen (TYLENOL) 325 mg tablet Take 650 mg by mouth Uses 3 x a day      carboxymethylcellulose (REFRESH PLUS) 0.5 % SOLN 1 drop 2 (two) times a day as needed for dry eyes      ceFAZolin (ANCEF) 10 g injection       clopidogrel (PLAVIX) 75 mg tablet TAKE 1 TABLET BY MOUTH EVERY DAY 90 tablet 3    Glucosamine-Chondroitin 250-200 MG TABS daily [facility] joint health      metoprolol succinate (TOPROL-XL) 25 mg 24 hr tablet TAKE 1/2 TABLET BY MOUTH EVERY DAY 45 tablet 3    rosuvastatin (CRESTOR) 10 MG tablet Take 1 tablet (10 mg total) by mouth daily 90 tablet 3     No current facility-administered medications for this visit. Allergies  Allergies   Allergen Reactions    Iv Dye [Iodinated Contrast Media] Rash     Possible delayed rash February 2022       Past Medical History, Social History, Family History, medications and allergies were reviewed. Vitals  Vitals:    12/06/23 0738   BP: 120/82   BP Location: Left arm   Patient Position: Sitting   Cuff Size: Standard   Pulse: 66   Resp: 18   SpO2: 99%   Weight: 76.7 kg (169 lb)   Height: 5' 8" (1.727 m)       Physical Exam  Physical Exam  Constitutional:       Appearance: Normal appearance. HENT:      Head: Normocephalic and atraumatic. Mouth/Throat:      Pharynx: Oropharynx is clear. Eyes:      Extraocular Movements: Extraocular movements intact. Conjunctiva/sclera: Conjunctivae normal.   Cardiovascular:      Rate and Rhythm: Normal rate and regular rhythm. Pulses: Normal pulses. Heart sounds: Normal heart sounds. Pulmonary:      Effort: Pulmonary effort is normal.   Abdominal:      General: Abdomen is flat. Palpations: Abdomen is soft. Musculoskeletal:         General: Normal range of motion. Cervical back: Normal range of motion. Skin:     General: Skin is warm and dry. Neurological:      General: No focal deficit present. Mental Status: He is alert and oriented to person, place, and time.    Psychiatric:         Mood and Affect: Mood normal.         Behavior: Behavior normal.               Results  No results found for: "PSA"  Lab Results   Component Value Date    GLUCOSE 99 06/30/2015    CALCIUM 8.4 10/31/2023     06/30/2015    K 4.2 10/31/2023 CO2 27 10/31/2023     10/31/2023    BUN 16 10/31/2023    CREATININE 1.03 11/06/2023     Lab Results   Component Value Date    WBC 7.78 11/06/2023    HGB 12.2 11/06/2023    HCT 37.8 11/06/2023    MCV 84 11/06/2023     (H) 11/06/2023         Office Urine Dip  No results found for this or any previous visit (from the past 1 hour(s)).]

## 2023-12-06 NOTE — PRE-PROCEDURE INSTRUCTIONS
Pre-Surgery Instructions:   Medication Instructions    acetaminophen (TYLENOL) 325 mg tablet Uses PRN- OK to take day of surgery    carboxymethylcellulose (REFRESH PLUS) 0.5 % SOLN Use as directed    clopidogrel (PLAVIX) 75 mg tablet Do not hold as per Dr. Janell Green    Glucosamine-Chondroitin 250-200 MG TABS Stop taking 7 days prior to surgery. metoprolol succinate (TOPROL-XL) 25 mg 24 hr tablet Take day of surgery. rosuvastatin (CRESTOR) 10 MG tablet Take night before surgery      Medication instructions for day surgery reviewed. Please use only a sip of water to take your instructed medications. Avoid all over the counter vitamins, supplements and NSAIDS for one week prior to surgery per anesthesia guidelines. Tylenol is ok to take as needed. You will receive a call one business day prior to surgery with an arrival time and hospital directions. If your surgery is scheduled on a Monday, the hospital will be calling you on the Friday prior to your surgery. If you have not heard from anyone by 8pm, please call the hospital supervisor through the hospital  at [phone]. Lauren Every [phone]). Do not eat or drink anything after midnight the night before your surgery, including candy, mints, lifesavers, or chewing gum. Do not drink alcohol 24hrs before your surgery. Try not to smoke at least 24hrs before your surgery. Follow the pre surgery showering instructions as listed in the [facility] Surgical Experience Booklet” or otherwise provided by your surgeon's office. Do not use a blade to shave the surgical area 1 week before surgery. It is okay to use a clean electric clippers up to 24 hours before surgery. Do not apply any lotions, creams, including makeup, cologne, deodorant, or perfumes after showering on the day of your surgery. Do not use dry shampoo, hair spray, hair gel, or any type of hair products. No contact lenses, eye make-up, or artificial eyelashes.  Remove nail polish, including gel polish, and any artificial, gel, or acrylic nails if possible. Remove all jewelry including rings and body piercing jewelry. Wear causal clothing that is easy to take on and off. Consider your type of surgery. Keep any valuables, jewelry, piercings at home. Please bring any specially ordered equipment (sling, braces) if indicated. Arrange for a responsible person to drive you to and from the hospital on the day of your surgery. Visitor Guidelines discussed. Call the surgeon's office with any new illnesses, exposures, or additional questions prior to surgery. Please reference your [facility] Surgical Experience Booklet” for additional information to prepare for your upcoming surgery.

## 2023-12-06 NOTE — PROGRESS NOTES
12/6/2023    Omid Delcid  1935  [id number]        Assessment  History of left 6 mm distal ureteral calculus status post left ureteral stent insertion, bladder stone      Discussion  Today we discussed cystoscopy with cystolitholapaxy along with left ureteroscopy with holmium laser lithotripsy, left retrograde pyelography and left ureteral stent insertion. Risk of the procedure were discussed and reviewed and informed consent was obtained. Surgery scheduled for December 15, 2023 at [address] [place] [address] [address] The patient was instructed that he can continue on his Plavix and he should continue with his beta-blocker. History of Present Illness  81 y.o. male with a history of left ureteral calculus with sepsis. He was seen in the ICU at [facility] in October 2023. He had a white blood cell count of 20,000 at that time and a fever to 102. Culture showed staph epi in both the blood and urine. I previously discussed this with the ICU team that that is atypical for sepsis. Typically we see gram-negative species. Nonetheless, he returns to the office today to discuss his upcoming surgery to remove the stones within his left ureter as well as his bladder stone. At this time he states that he is voiding well and I would hold on performing TURP. His wife is present with him in the office today.           AUA Symptom Score  AUA SYMPTOM SCORE      Flowsheet Row Most Recent Value   AUA SYMPTOM SCORE    How often have you had a sensation of not emptying your bladder completely after you finished urinating? 0 (P)     How often have you had to urinate again less than two hours after you finished urinating? 0 (P)     How often have you found you stopped and started again several times when you urinate? 0 (P)     How often have you found it difficult to postpone urination? 0 (P)     How often have you had a weak urinary stream? 0 (P)     How often have you had to push or strain to begin urination? 0 (P)     How many times did you most typically get up to urinate from the time you went to bed at night until the time you got up in the morning? 2 (P)     Quality of Life: If you were to spend the rest of your life with your urinary condition just the way it is now, how would you feel about that? 4 (P)     AUA SYMPTOM SCORE 2 (P)             Review of Systems  Review of Systems   Constitutional: Negative. Negative for activity change and fever. HENT: Negative. Negative for congestion. Eyes: Negative. Respiratory: Negative. Negative for cough, chest tightness and shortness of breath. Cardiovascular: Negative. Negative for chest pain and leg swelling. Gastrointestinal: Negative. Negative for abdominal distention, constipation, diarrhea and vomiting. Endocrine: Negative. Genitourinary:  Negative for decreased urine volume, difficulty urinating, dysuria, flank pain, frequency, hematuria, penile pain, testicular pain and urgency. Per HPI   Musculoskeletal: Negative. Skin: Negative. Allergic/Immunologic: Negative. Neurological: Negative. Negative for dizziness and headaches. Hematological: Negative. Psychiatric/Behavioral: Negative.            Past Medical History  Past Medical History:   Diagnosis Date    Basal cell carcinoma     Cancer ([address] [facility])     NSTEMI (non-ST elevated myocardial infarction) ([address] [facility]) 2014    Pneumonia     Last Assessed:  10/7/14    Polymyalgia rheumatica ([facility])     Last Assessed:  11/23/12    Polymyalgia rheumatica ([facility]) 2011    Squamous cell skin cancer 05/24/2022    left frontal scalp anterior    Squamous cell skin cancer 05/24/2022    Vertex of scalp       Past Social History  Past Surgical History:   Procedure Laterality Date    APPENDECTOMY      CORONARY ANGIOPLASTY WITH STENT PLACEMENT      FL RETROGRADE PYELOGRAM  10/29/2023    HERNIA REPAIR  2000    MOHS SURGERY  07/27/2022    left frontal scalp and vertex of scalp    LA CYSTO BLADDER W/URETERAL CATHETERIZATION Left 10/29/2023    Procedure: CYSTOSCOPY RETROGRADE PYELOGRAM WITH INSERTION STENT URETERAL;  Surgeon: Ton Adame MD;  Location[facility];  Service: Urology       Past Family History  Family History   Problem Relation Age of Onset    Pulmonary embolism Father     Other Father         Transurethral resection of prostate    Stroke Sister        Past Social history  Social History     Socioeconomic History    Marital status: /Civil Union     Spouse name: Not on file    Number of children: Not on file    Years of education: Not on file    Highest education level: Not on file   Occupational History    Not on file   Tobacco Use    Smoking status: Never    Smokeless tobacco: Never   Vaping Use    Vaping Use: Never used   Substance and Sexual Activity    Alcohol use: Not Currently     Comment: Social    Drug use: Never    Sexual activity: Yes   Other Topics Concern    Not on file   Social History Narrative    Exercises regularly        Consumes 2-3 cups of coffee per week and 1 glass of ice tea per day     Social Determinants of Health     Financial Resource Strain: Low Risk  (10/23/2023)    Overall Financial Resource Strain (CARDIA)     Difficulty of Paying Living Expenses: Not hard at all   Food Insecurity: Not on file   Transportation Needs: No Transportation Needs (10/23/2023)    PRAPARE - Transportation     Lack of Transportation (Medical): No     Lack of Transportation (Non-Medical):  No   Physical Activity: Not on file   Stress: Not on file   Social Connections: Not on file   Intimate Partner Violence: Not on file   Housing Stability: Not on file       Current Medications  Current Outpatient Medications   Medication Sig Dispense Refill    acetaminophen (TYLENOL) 325 mg tablet Take 650 mg by mouth Uses 3 x a day      carboxymethylcellulose (REFRESH PLUS) 0.5 % SOLN 1 drop 2 (two) times a day as needed for dry eyes      ceFAZolin (ANCEF) 10 g injection       clopidogrel (PLAVIX) 75 mg tablet TAKE 1 TABLET BY MOUTH EVERY DAY 90 tablet 3    Glucosamine-Chondroitin 250-200 MG TABS daily [facility] joint health      metoprolol succinate (TOPROL-XL) 25 mg 24 hr tablet TAKE 1/2 TABLET BY MOUTH EVERY DAY 45 tablet 3    rosuvastatin (CRESTOR) 10 MG tablet Take 1 tablet (10 mg total) by mouth daily 90 tablet 3     No current facility-administered medications for this visit. Allergies  Allergies   Allergen Reactions    Iv Dye [Iodinated Contrast Media] Rash     Possible delayed rash February 2022       Past Medical History, Social History, Family History, medications and allergies were reviewed. Vitals  Vitals:    12/06/23 0738   BP: 120/82   BP Location: Left arm   Patient Position: Sitting   Cuff Size: Standard   Pulse: 66   Resp: 18   SpO2: 99%   Weight: 76.7 kg (169 lb)   Height: 5' 8" (1.727 m)       Physical Exam  Physical Exam  Constitutional:       Appearance: Normal appearance. HENT:      Head: Normocephalic and atraumatic. Mouth/Throat:      Pharynx: Oropharynx is clear. Eyes:      Extraocular Movements: Extraocular movements intact. Conjunctiva/sclera: Conjunctivae normal.   Cardiovascular:      Rate and Rhythm: Normal rate and regular rhythm. Pulses: Normal pulses. Heart sounds: Normal heart sounds. Pulmonary:      Effort: Pulmonary effort is normal.   Abdominal:      General: Abdomen is flat. Palpations: Abdomen is soft. Musculoskeletal:         General: Normal range of motion. Cervical back: Normal range of motion. Skin:     General: Skin is warm and dry. Neurological:      General: No focal deficit present. Mental Status: He is alert and oriented to person, place, and time.    Psychiatric:         Mood and Affect: Mood normal.         Behavior: Behavior normal.               Results  No results found for: "PSA"  Lab Results   Component Value Date    GLUCOSE 99 06/30/2015    CALCIUM 8.4 10/31/2023     06/30/2015    K 4.2 10/31/2023 CO2 27 10/31/2023     10/31/2023    BUN 16 10/31/2023    CREATININE 1.03 11/06/2023     Lab Results   Component Value Date    WBC 7.78 11/06/2023    HGB 12.2 11/06/2023    HCT 37.8 11/06/2023    MCV 84 11/06/2023     (H) 11/06/2023         Office Urine Dip  No results found for this or any previous visit (from the past 1 hour(s)).]

## 2023-12-15 ENCOUNTER — ANESTHESIA EVENT (OUTPATIENT)
Dept: PERIOP | Facility: HOSPITAL | Age: 88
End: 2023-12-15
Payer: MEDICARE

## 2023-12-15 ENCOUNTER — HOSPITAL ENCOUNTER (OUTPATIENT)
Facility: HOSPITAL | Age: 88
Setting detail: OUTPATIENT SURGERY
Discharge: HOME | End: 2023-12-15
Attending: UROLOGY | Admitting: UROLOGY
Payer: MEDICARE

## 2023-12-15 ENCOUNTER — APPOINTMENT (OUTPATIENT)
Dept: RADIOLOGY | Facility: HOSPITAL | Age: 88
End: 2023-12-15
Payer: MEDICARE

## 2023-12-15 ENCOUNTER — ANESTHESIA (OUTPATIENT)
Dept: PERIOP | Facility: HOSPITAL | Age: 88
End: 2023-12-15
Payer: MEDICARE

## 2023-12-15 VITALS
OXYGEN SATURATION: 97 % | DIASTOLIC BLOOD PRESSURE: 67 MMHG | SYSTOLIC BLOOD PRESSURE: 123 MMHG | TEMPERATURE: 97.5 F | BODY MASS INDEX: 24.4 KG/M2 | HEART RATE: 62 BPM | WEIGHT: 161 LBS | HEIGHT: 68 IN | RESPIRATION RATE: 17 BRPM

## 2023-12-15 DIAGNOSIS — N20.0 KIDNEY STONE: ICD-10-CM

## 2023-12-15 LAB
ABO GROUP BLD: NORMAL
RH BLD: POSITIVE

## 2023-12-15 PROCEDURE — C1758 CATHETER, URETERAL: HCPCS | Performed by: UROLOGY

## 2023-12-15 PROCEDURE — C1769 GUIDE WIRE: HCPCS | Performed by: UROLOGY

## 2023-12-15 PROCEDURE — 82360 CALCULUS ASSAY QUANT: CPT | Performed by: UROLOGY

## 2023-12-15 PROCEDURE — 74420 UROGRAPHY RTRGR +-KUB: CPT

## 2023-12-15 PROCEDURE — 52317 REMOVE BLADDER STONE: CPT | Performed by: UROLOGY

## 2023-12-15 PROCEDURE — 87086 URINE CULTURE/COLONY COUNT: CPT | Performed by: UROLOGY

## 2023-12-15 PROCEDURE — 52356 CYSTO/URETERO W/LITHOTRIPSY: CPT | Performed by: UROLOGY

## 2023-12-15 PROCEDURE — C2617 STENT, NON-COR, TEM W/O DEL: HCPCS | Performed by: UROLOGY

## 2023-12-15 RX ORDER — LIDOCAINE HYDROCHLORIDE 10 MG/ML
INJECTION, SOLUTION EPIDURAL; INFILTRATION; INTRACAUDAL; PERINEURAL AS NEEDED
Status: DISCONTINUED | OUTPATIENT
Start: 2023-12-15 | End: 2023-12-15

## 2023-12-15 RX ORDER — ONDANSETRON 2 MG/ML
INJECTION INTRAMUSCULAR; INTRAVENOUS AS NEEDED
Status: DISCONTINUED | OUTPATIENT
Start: 2023-12-15 | End: 2023-12-15

## 2023-12-15 RX ORDER — FENTANYL CITRATE/PF 50 MCG/ML
25 SYRINGE (ML) INJECTION
Status: DISCONTINUED | OUTPATIENT
Start: 2023-12-15 | End: 2023-12-15 | Stop reason: HOSPADM

## 2023-12-15 RX ORDER — CEPHALEXIN 500 MG/1
500 CAPSULE ORAL 3 TIMES DAILY
Qty: 9 CAPSULE | Refills: 0 | Status: SHIPPED | OUTPATIENT
Start: 2023-12-15 | End: 2023-12-18

## 2023-12-15 RX ORDER — MAGNESIUM HYDROXIDE 1200 MG/15ML
LIQUID ORAL AS NEEDED
Status: DISCONTINUED | OUTPATIENT
Start: 2023-12-15 | End: 2023-12-15 | Stop reason: HOSPADM

## 2023-12-15 RX ORDER — HYDROCODONE BITARTRATE AND ACETAMINOPHEN 5; 325 MG/1; MG/1
1 TABLET ORAL EVERY 4 HOURS PRN
Status: DISCONTINUED | OUTPATIENT
Start: 2023-12-15 | End: 2023-12-15 | Stop reason: HOSPADM

## 2023-12-15 RX ORDER — PROPOFOL 10 MG/ML
INJECTION, EMULSION INTRAVENOUS AS NEEDED
Status: DISCONTINUED | OUTPATIENT
Start: 2023-12-15 | End: 2023-12-15

## 2023-12-15 RX ORDER — SODIUM CHLORIDE, SODIUM LACTATE, POTASSIUM CHLORIDE, CALCIUM CHLORIDE 600; 310; 30; 20 MG/100ML; MG/100ML; MG/100ML; MG/100ML
20 INJECTION, SOLUTION INTRAVENOUS CONTINUOUS
Status: DISCONTINUED | OUTPATIENT
Start: 2023-12-15 | End: 2023-12-15 | Stop reason: HOSPADM

## 2023-12-15 RX ORDER — ONDANSETRON 2 MG/ML
4 INJECTION INTRAMUSCULAR; INTRAVENOUS ONCE AS NEEDED
Status: DISCONTINUED | OUTPATIENT
Start: 2023-12-15 | End: 2023-12-15 | Stop reason: HOSPADM

## 2023-12-15 RX ORDER — EPHEDRINE SULFATE 50 MG/ML
INJECTION INTRAVENOUS AS NEEDED
Status: DISCONTINUED | OUTPATIENT
Start: 2023-12-15 | End: 2023-12-15

## 2023-12-15 RX ORDER — CEFAZOLIN SODIUM 2 G/50ML
SOLUTION INTRAVENOUS AS NEEDED
Status: DISCONTINUED | OUTPATIENT
Start: 2023-12-15 | End: 2023-12-15

## 2023-12-15 RX ADMIN — ONDANSETRON 4 MG: 2 INJECTION INTRAMUSCULAR; INTRAVENOUS at 09:01

## 2023-12-15 RX ADMIN — SODIUM CHLORIDE, SODIUM LACTATE, POTASSIUM CHLORIDE, AND CALCIUM CHLORIDE 20 ML/HR: .6; .31; .03; .02 INJECTION, SOLUTION INTRAVENOUS at 08:12

## 2023-12-15 RX ADMIN — CEFAZOLIN SODIUM 2000 MG: 2 SOLUTION INTRAVENOUS at 08:38

## 2023-12-15 RX ADMIN — PROPOFOL 150 MG: 10 INJECTION, EMULSION INTRAVENOUS at 08:35

## 2023-12-15 RX ADMIN — EPHEDRINE SULFATE 10 MG: 50 INJECTION INTRAVENOUS at 08:44

## 2023-12-15 RX ADMIN — LIDOCAINE HYDROCHLORIDE 50 MG: 10 INJECTION, SOLUTION EPIDURAL; INFILTRATION; INTRACAUDAL; PERINEURAL at 08:35

## 2023-12-15 RX ADMIN — PROPOFOL 100 MG: 10 INJECTION, EMULSION INTRAVENOUS at 08:37

## 2023-12-15 RX ADMIN — EPHEDRINE SULFATE 10 MG: 50 INJECTION INTRAVENOUS at 08:48

## 2023-12-15 NOTE — INTERVAL H&P NOTE
H&P reviewed. After examining the patient I find no changes in the patients condition since the H&P had been written. There were no vitals filed for this visit. Stephani Hawkins is an 72-year-old male who I recently stented for a 6 mm distal left ureteral calculus. She was in the ICU at [facility]. Cultures at that time revealed Staphylococcus pan sensitive to cefazolin. He also has a bladder stone. We discussed today performing left ureteroscopy with holmium laser lithotripsy, left retrograde pyelography and left ureteral stent insertion. I will also remove his bladder stones performing cystolitholapaxy with holmium laser. We also discussed whether or not to perform a TURP at this time. Based on his age and relatively minimal lower urinary tract symptoms he wishes to hold and I feel that this is reasonable. He understands that this is a procedure that we could perform in the future if needed. Risk of ureteroscopy including, but not limited to bleeding, infection, sepsis, ureteral injury, "passed" ureteral calculus, and need for additional surgery were discussed and reviewed. Informed consent obtained.

## 2023-12-15 NOTE — ANESTHESIA PREPROCEDURE EVALUATION
Procedure:  CYSTOSCOPY URETEROSCOPY WITH LITHOTRIPSY HOLMIUM LASER, RETROGRADE PYELOGRAM AND INSERTION STENT URETERAL (Left: Bladder)  LITHOLAPAXY HOLMIUM LASER (Ureter)  TRANSURETHRAL RESECTION OF PROSTATE (TURP) (Abdomen)    Relevant Problems   CARDIO   (+) Arteriosclerotic coronary artery disease   (+) Benign essential hypertension   (+) Hyperlipidemia      /RENAL   (+) BPH with obstruction/lower urinary tract symptoms      HEMATOLOGY   (+) Other specified anemias      MUSCULOSKELETAL   (+) Primary osteoarthritis of both hands      PULMONARY   (+) Acute respiratory failure with hypoxia (HCC)        Physical Exam    Airway    Mallampati score: II  TM Distance: >3 FB  Neck ROM: full     Dental   No notable dental hx     Cardiovascular  Rhythm: regular, Rate: normal, Cardiovascular exam normal    Pulmonary  Pulmonary exam normal Breath sounds clear to auscultation    Other Findings        Anesthesia Plan  ASA Score- 2     Anesthesia Type- general with ASA Monitors. Additional Monitors:     Airway Plan: LMA. Plan Factors-Exercise tolerance (METS): >4 METS. Chart reviewed. EKG reviewed. Imaging results reviewed. Existing labs reviewed. Patient summary reviewed. Patient is not a current smoker. Obstructive sleep apnea risk education given perioperatively. Induction- intravenous. Postoperative Plan-     Informed Consent- Anesthetic plan and risks discussed with patient. I personally reviewed this patient with the CRNA. Discussed and agreed on the Anesthesia Plan with the CRNA. Aminta Jaimes

## 2023-12-15 NOTE — OP NOTE
OPERATIVE REPORT  PATIENT NAME: Nestor Mancera    :  1935  MRN: [id number]  Pt Location: [facility] 01    SURGERY DATE: 12/15/2023    Surgeons and Role:     Merced Lynch MD - Primary    Preop Diagnosis:  Kidney stone [N20.0]  Bladder stone, distal left ureteral calculus    Post-Op Diagnosis Codes:     * Kidney stone [N20.0]  Bladder stone, distal left ureteral calculus    Procedure(s):  Cystoscopy, cystolitholapaxy, left ureteroscopy, holmium laser lithotripsy, left retrograde pyelogram, left ureteral stent insertion    Specimen(s):  ID Type Source Tests Collected by Time Destination   A : urine cultue Urine Urine, Cystoscopic URINE CULTURE Merced Lnych MD 12/15/2023 7571    B : bladder stone Calculus Urinary Bladder STONE ANALYSIS Merced Lynch MD 12/15/2023 0901    C : left ureteral stone Calculus Ureter, Left STONE ANALYSIS Merced Lynch MD 12/15/2023 0904        Estimated Blood Loss:   Minimal    Drains:  Left 24-6 Lebanese double-J ureteral stent with string    Anesthesia Type:   General    Operative Indications:  Kidney stone [N20.0]      Operative Findings:  3 bladder stones removed with holmium laser lithotripsy, 1 large distal left ureteral calculus removed with holmium laser lithotripsy and basket stone extraction. Left 24-6 Lebanese double-J ureteral stent with string placed    Complications:   None    Procedure and Technique:      Procedure Description: Nestor Mancera is a 81y.o.-year-old male with a history of bladder stones as well as distal left ureteral calculi. 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 We discussed in the office that I will be removing his bladder stones as well as his left ureteral calculus and will not be performing TURP as he is voiding well without difficulty. He is also on Plavix which was not held at my recommendation for this procedure. Risk and benefits of ureteroscopy were discussed and reviewed. Informed consent was obtained. The patient was brought to the operating room on December 15, 2023. After the smooth induction of general LMA anesthesia, the patient was placed in the dorsal lithotomy position. His genitalia was prepped and draped in a sterile fashion. Intravenous antibiotics were administered. A timeout was performed with all members of the operative team confirmed the patient's identity, procedure to be performed, and laterality of the case. A 22 [place] rigid cystoscope with 30° lens was inserted. The bladder was thoroughly inspected. It was no evidence of mucosal abnormalities or lesions. There was marked BPH. 3 stones were identified in the bladder. Each was under 1 cm in size. I utilized a 365 µm holmium laser fiber through 5 [place] open-ended catheter to decimated the stones until all fragments were removed with the Ellik. Next, I focused my. Attention on the left ureteral orifice. The stent was grasped and removed. A wire was inserted and secured as a safety. A short semirigid ureteroscope was then inserted adjacent to the wire. The stone was identified and was engaged and decimated with a 365 µ holmium laser fiber. The fragments were removed with a Skylight basket. The ureteroscope was then repassed multiple times to ensure that the ureter was free and clear of any residual stones. Additional contrast was injected as a roadmap for stent insertion. The ureteroscope and Valladares catheter were then removed. The wire was backloaded through the cystoscope. The cystoscope was repassed into the bladder. A 24-6 [place] left double-J ureteral stent was then placed without difficulty. The proximal coil was appreciated in the left renal pelvis and the distal coil was visualized within the bladder. The bladder was emptied and the cystoscope was removed.  A string was left in place and secured to the dorsum of the phallus with Tegaderm. Overall the patient tolerated the procedure well and were no complications. The patient was extubated in the operating room and transferred to the PACU in stable condition at the conclusion of the case.       Patient Disposition:  PACU         SIGNATURE: Owen Alas MD  DATE: December 15, 2023  TIME: 9:19 AM

## 2023-12-17 LAB — BACTERIA UR CULT: NORMAL

## 2023-12-18 ENCOUNTER — NURSE TRIAGE (OUTPATIENT)
Age: 88
End: 2023-12-18

## 2023-12-18 ENCOUNTER — HOSPITAL ENCOUNTER (EMERGENCY)
Facility: HOSPITAL | Age: 88
Discharge: HOME | End: 2023-12-18
Attending: EMERGENCY MEDICINE | Admitting: EMERGENCY MEDICINE
Payer: MEDICARE

## 2023-12-18 ENCOUNTER — TELEPHONE (OUTPATIENT)
Dept: OTHER | Facility: HOSPITAL | Age: 88
End: 2023-12-18

## 2023-12-18 ENCOUNTER — APPOINTMENT (EMERGENCY)
Dept: RADIOLOGY | Facility: HOSPITAL | Age: 88
End: 2023-12-18
Payer: MEDICARE

## 2023-12-18 VITALS
OXYGEN SATURATION: 94 % | HEART RATE: 56 BPM | RESPIRATION RATE: 18 BRPM | DIASTOLIC BLOOD PRESSURE: 80 MMHG | SYSTOLIC BLOOD PRESSURE: 125 MMHG | TEMPERATURE: 97.4 F

## 2023-12-18 DIAGNOSIS — Z96.0 RETAINED URETERAL STENT: Primary | ICD-10-CM

## 2023-12-18 PROCEDURE — 99284 EMERGENCY DEPT VISIT MOD MDM: CPT | Performed by: EMERGENCY MEDICINE

## 2023-12-18 PROCEDURE — 99284 EMERGENCY DEPT VISIT MOD MDM: CPT

## 2023-12-18 PROCEDURE — 74018 RADEX ABDOMEN 1 VIEW: CPT

## 2023-12-18 NOTE — QUICK NOTE
Received TT about patient who was sent to the emergency department by the office after attempting to remove his stent and only the string without a stent came out.  KUB reveals stent is still in position.  Recommend outpatient follow-up with urology office for cystoscopy stent removal in the next week or 2.  Message was sent to [place] office to schedule this.

## 2023-12-18 NOTE — ED ATTENDING ATTESTATION
12/18/2023  I, Eun Rivas DO, saw and evaluated the patient. I have discussed the patient with the resident/non-physician practitioner and agree with the resident's/non-physician practitioner's findings, Plan of Care, and MDM as documented in the resident's/non-physician practitioner's note, except where noted. All available labs and Radiology studies were reviewed.  I was present for key portions of any procedure(s) performed by the resident/non-physician practitioner and I was immediately available to provide assistance.       At this point I agree with the current assessment done in the Emergency Department.  I have conducted an independent evaluation of this patient a history and physical is as follows:    Chief Complaint   Patient presents with    Medical Problem     Pt reports when he pulled string of stent, the stent did not come out      88-year-old male presents for evaluation.  Patient had ureteral stent that he was supposed removed today.  Patient states when he remove the stent Valladares came out with the string and the stent did not come.  Patient has been leaking urine since that time but denies discomfort.  On exam-no acute distress, heart regular, no respiratory distress, abdomen soft and nontender.  Plan-discussed with urology, KUB shows stent still in place.  Urology recommends discharge with outpatient follow-up    ED Course         Critical Care Time  Procedures

## 2023-12-18 NOTE — DISCHARGE INSTRUCTIONS
You were seen in the ED for a failed stent removal. Urology recommended follow up in the office for removal. You should receive a call from the urology office regarding an appointment for removal.     Please return to the ED if you stop urinating, develop pain or other concerning symptoms.

## 2023-12-18 NOTE — TELEPHONE ENCOUNTER
Patient had Procedure 12/15/23     CYSTOSCOPY URETEROSCOPY WITH LITHOTRIPSY HOLMIUM LASER, RETROGRADE PYELOGRAM AND INSERTION STENT URETERAL (Left: Bladder)      LITHOLAPAXY HOLMIUM LASER (Ureter)       Today patient was due for stent removal. Patient pulled string, felt it was coming out, and string detached. No stent came out.      Sent patient to ED.

## 2023-12-18 NOTE — TELEPHONE ENCOUNTER
"Lm for Jeremías that he is scheduled for 1/4 @10\"00 at [place] office fot Cysto stent removal  "

## 2023-12-18 NOTE — TELEPHONE ENCOUNTER
Patient was sent into the emergency room due to attempting to remove his stent at which point only his string and no stent came out.  He has no symptoms.  Patient should be scheduled for cystoscopy in the office for stent removal in the next week or 2 please

## 2023-12-19 NOTE — ED PROVIDER NOTES
History  Chief Complaint   Patient presents with    Medical Problem     Pt reports when he pulled string of stent, the stent did not come out      HPI  Patient is 88 y.o. male with history of left renal calculi with lithotripsy and left ureteral stent placement 12/15 who presents to the emergency department for failure of left stent removal. Patient reports was supposed to remove ureteral stent today and when tried to remove string came out but not stent. Patient reports after initial removal blood at ureteral meatus which cleared with urination. Patient also reports urine leakage since removal. Patient reports called urology and told to come to ED. Patient denies flank pain, dysuria, abdominal pain, n/v, fever. .         Prior to Admission Medications   Prescriptions Last Dose Informant Patient Reported? Taking?   Glucosamine-Chondroitin 250-200 MG TABS  Self Yes No   Sig: daily Cosamine joint health   acetaminophen (TYLENOL) 325 mg tablet  Self Yes No   Sig: Take 650 mg by mouth Uses 3 x a day   carboxymethylcellulose (REFRESH PLUS) 0.5 % SOLN  Self Yes No   Si drop 2 (two) times a day as needed for dry eyes   cephalexin (KEFLEX) 500 mg capsule   No No   Sig: Take 1 capsule (500 mg total) by mouth 3 (three) times a day for 3 days   clopidogrel (PLAVIX) 75 mg tablet  Self No No   Sig: TAKE 1 TABLET BY MOUTH EVERY DAY   metoprolol succinate (TOPROL-XL) 25 mg 24 hr tablet  Self No No   Sig: TAKE 1/2 TABLET BY MOUTH EVERY DAY   rosuvastatin (CRESTOR) 10 MG tablet  Self No No   Sig: Take 1 tablet (10 mg total) by mouth daily      Facility-Administered Medications: None       Past Medical History:   Diagnosis Date    Basal cell carcinoma     Cancer (HCC)     NSTEMI (non-ST elevated myocardial infarction) (HCC)     Pneumonia     Last Assessed:  10/7/14    Polymyalgia rheumatica (HCC)     Last Assessed:  12    Polymyalgia rheumatica (HCC)     Squamous cell skin cancer 2022    left frontal scalp  anterior    Squamous cell skin cancer 05/24/2022    Vertex of scalp       Past Surgical History:   Procedure Laterality Date    APPENDECTOMY      CORONARY ANGIOPLASTY WITH STENT PLACEMENT      x2    FL RETROGRADE PYELOGRAM  10/29/2023    FL RETROGRADE PYELOGRAM  12/15/2023    HERNIA REPAIR  2000    MOHS SURGERY  07/27/2022    left frontal scalp and vertex of scalp    VA CYSTO BLADDER W/URETERAL CATHETERIZATION Left 10/29/2023    Procedure: CYSTOSCOPY RETROGRADE PYELOGRAM WITH INSERTION STENT URETERAL;  Surgeon: Aamir Srinivasan MD;  Location: [facility];  Service: Urology    VA CYSTO/URETERO W/LITHOTRIPSY &INDWELL STENT INSRT Left 12/15/2023    Procedure: CYSTOSCOPY URETEROSCOPY WITH LITHOTRIPSY HOLMIUM LASER, RETROGRADE PYELOGRAM AND INSERTION STENT URETERAL;  Surgeon: Aamir Srinivasan MD;  Location: [facility];  Service: Urology    VA LITHOLAPAXY SMPL/SM <2.5 CM N/A 12/15/2023    Procedure: LITHOLAPAXY HOLMIUM LASER;  Surgeon: Aamir Srinivasan MD;  Location: [facility];  Service: Urology       Family History   Problem Relation Age of Onset    Pulmonary embolism Father     Other Father         Transurethral resection of prostate    Stroke Sister      I have reviewed and agree with the history as documented.    E-Cigarette/Vaping    E-Cigarette Use Never User      E-Cigarette/Vaping Substances    Nicotine No     THC No     CBD No     Flavoring No     Other No     Unknown No      Social History     Tobacco Use    Smoking status: Never    Smokeless tobacco: Never   Vaping Use    Vaping status: Never Used   Substance Use Topics    Alcohol use: Not Currently     Comment: Social    Drug use: Never        Review of Systems   Constitutional:  Negative for chills and fever.   HENT:  Negative for ear pain and sore throat.    Respiratory:  Negative for cough and shortness of breath.    Cardiovascular:  Negative for chest pain, palpitations and leg swelling.   Gastrointestinal:  Negative for abdominal pain,  diarrhea, nausea and vomiting.   Genitourinary:  Negative for dysuria and frequency.        Urine leakage, failed stent removal   Musculoskeletal:  Negative for back pain and neck pain.   Skin:  Negative for rash.   Neurological:  Negative for dizziness, light-headedness and headaches.       Physical Exam  ED Triage Vitals [12/18/23 1035]   Temperature Pulse Respirations Blood Pressure SpO2   (!) 97.4 °F (36.3 °C) 75 18 125/80 96 %      Temp Source Heart Rate Source Patient Position - Orthostatic VS BP Location FiO2 (%)   Tympanic Monitor Sitting Left arm --      Pain Score       No Pain             Orthostatic Vital Signs  Vitals:    12/18/23 1035 12/18/23 1115 12/18/23 1200 12/18/23 1300   BP: 125/80      Pulse: 75 58 56 56   Patient Position - Orthostatic VS: Sitting          Physical Exam  Vitals reviewed.   Constitutional:       General: He is awake.   HENT:      Head: Normocephalic and atraumatic.      Mouth/Throat:      Mouth: Mucous membranes are moist.   Eyes:      Extraocular Movements: Extraocular movements intact.      Right eye: No nystagmus.      Left eye: No nystagmus.      Conjunctiva/sclera: Conjunctivae normal.      Pupils: Pupils are equal, round, and reactive to light.   Cardiovascular:      Rate and Rhythm: Normal rate and regular rhythm.      Pulses: Normal pulses.      Heart sounds: Normal heart sounds, S1 normal and S2 normal. Heart sounds not distant. No murmur heard.     No friction rub. No gallop.   Pulmonary:      Breath sounds: No stridor. No wheezing, rhonchi or rales.      Comments: CTA b/l   Abdominal:      General: Bowel sounds are normal.      Palpations: Abdomen is soft.      Tenderness: There is no abdominal tenderness.   Genitourinary:     Comments: No obvious trauma to urethral meatus. Minor urine leakage from penis  Musculoskeletal:      Right lower leg: No edema.      Left lower leg: No edema.   Skin:     General: Skin is warm and dry.      Capillary Refill: Capillary refill  takes less than 2 seconds.   Neurological:      Mental Status: He is alert and oriented to person, place, and time.      GCS: GCS eye subscore is 4. GCS verbal subscore is 5. GCS motor subscore is 6.         ED Medications  Medications - No data to display    Diagnostic Studies  Results Reviewed       None                   XR abdomen 1 view kub   ED Interpretation by Mary Hooks,  (12/18 2866)   Ureteral stent appears in place traversing through bladder and into urethra?       Final Result by Kendrick Guzman MD (12/18 7587)      Left nephroureteral stent extends into the urethra.      This report is in agreement with the preliminary interpretation.            Workstation performed: [id number]               Procedures  Procedures      ED Course  ED Course as of 12/18/23 2251   Mon Dec 18, 2023   1145 Spoke with urology, requested KUB to determine stent placement.                             SBIRT 22yo+      Flowsheet Row Most Recent Value   Initial Alcohol Screen: US AUDIT-C     1. How often do you have a drink containing alcohol? 0 Filed at: 12/18/2023 1036   2. How many drinks containing alcohol do you have on a typical day you are drinking?  0 Filed at: 12/18/2023 1036   3b. FEMALE Any Age, or MALE 65+: How often do you have 4 or more drinks on one occassion? 0 Filed at: 12/18/2023 1036   Audit-C Score 0 Filed at: 12/18/2023 1036   ZORAN: How many times in the past year have you...    Used an illegal drug or used a prescription medication for non-medical reasons? Never Filed at: 12/18/2023 1036                  Medical Decision Making  Amount and/or Complexity of Data Reviewed  Radiology: ordered and independent interpretation performed.        Patient is 88 y.o. male with PMH of left renal calculi with lithotripsy and left ureteral stent placement 12/15 who presents to the emergency department for failure of left stent removal.     Vital signs stable. On exam no acute distress.  Head atraumatic,  normocephalic.  PERRL, EOMI, conjunctive clear.  Mucous membranes moist.  Heart RRR, no murmurs, rubs or gallops.  Breath sounds clear to auscultation laterally, no rales, wheezing, rhonchi.  Abdomen soft nontender.  No obvious trauma to urethral meatus.  Minor urine leakage from penis..    Differential diagnosis included but not limited to retained stent. Plan will reach out to urology.    View ED course above for further discussion on patient workup.     All labs reviewed and utilized in the medical decision making process  All radiology studies independently viewed by me and interpreted by the radiologist.  I reviewed all testing with the patient.     Upon re-evaluation patient remains stable. Amenable to plan for outpatient follow up.    I have reviewed the patient's vital signs, nursing notes, and other relevant tests/information. I had a detailed discussion with the patient regarding the history, exam findings, and any diagnostic results.   Plan to discharge home in stable condition with retained ureteral stent, follow up with urology.  Discussed with patient who is agreeable to plan.  I discussed discharge instructions, need for follow-up, and oral return precautions for what to return for in addition to the written return precautions and discharge instructions, specifically highlighting areas of special concern.  The patient verbalized understanding of the discharge instructions and warnings that would necessitate return to the Emergency Department including urinary retention, abdominal or penile pain, other concerning symptoms.  All questions the patient had were answered prior to discharge to the best of my ability.       Disposition  Final diagnoses:   Retained ureteral stent     Time reflects when diagnosis was documented in both MDM as applicable and the Disposition within this note       Time User Action Codes Description Comment    12/18/2023  1:09 PM Mary Hooks Add [Z96.0] Retained ureteral  stent           ED Disposition       ED Disposition   Discharge    Condition   Stable    Date/Time   Mon Dec 18, 2023 1242    Comment   Jeremías Verde discharge to home/self care.                   Follow-up Information       Follow up With Specialties Details Why Contact Info Additional Information    [facility]   [address]  [facility] [address]  [phone] [facility], [address], [place], [place], [address]   [phone]    [facility] Emergency Medicine Go to  If symptoms worsen [address] [facility] [address]  [phone] [facility], [address] [place], [place], [address]   [phone]            Discharge Medication List as of 12/18/2023  1:11 PM        CONTINUE these medications which have NOT CHANGED    Details   acetaminophen (TYLENOL) 325 mg tablet Take 650 mg by mouth Uses 3 x a day, Historical Med      carboxymethylcellulose (REFRESH PLUS) 0.5 % SOLN 1 drop 2 (two) times a day as needed for dry eyes, Historical Med      cephalexin (KEFLEX) 500 mg capsule Take 1 capsule (500 mg total) by mouth 3 (three) times a day for 3 days, Starting Fri 12/15/2023, Until Mon 12/18/2023, Normal      clopidogrel (PLAVIX) 75 mg tablet TAKE 1 TABLET BY MOUTH EVERY DAY, Normal      Glucosamine-Chondroitin 250-200 MG TABS daily [facility] Modumetal, Historical Med      metoprolol succinate (TOPROL-XL) 25 mg 24 hr tablet TAKE 1/2 TABLET BY MOUTH EVERY DAY, Starting Thu 11/16/2023, Normal      rosuvastatin (CRESTOR) 10 MG tablet Take 1 tablet (10 mg total) by mouth daily, Starting Thu 10/20/2022, Normal           No discharge procedures on file.    PDMP Review       None             ED Provider  Attending physically available and evaluated Jeremías Verde. I managed the patient along with the ED  Attending.    Electronically Signed by           Mary Hooks,   12/18/23 2577

## 2023-12-22 LAB
CALCIUM OXALATE DIHYDRATE MFR STONE IR: 20 %
COLOR STONE: NORMAL
COLOR STONE: NORMAL
COM MFR STONE: 100 %
COM MFR STONE: 80 %
COMMENT-STONE3: NORMAL
COMMENT-STONE3: NORMAL
COMPOSITION: NORMAL
COMPOSITION: NORMAL
LABORATORY COMMENT REPORT: NORMAL
LABORATORY COMMENT REPORT: NORMAL
PHOTO: NORMAL
PHOTO: NORMAL
SIZE STONE: NORMAL MM
SIZE STONE: NORMAL MM
SPEC SOURCE SUBJ: NORMAL
SPEC SOURCE SUBJ: NORMAL
STONE ANALYSIS-IMP: NORMAL
WT STONE: 24 MG
WT STONE: 257 MG

## 2023-12-26 PROBLEM — N30.01 ACUTE CYSTITIS WITH HEMATURIA: Status: RESOLVED | Noted: 2023-10-27 | Resolved: 2023-12-26

## 2023-12-29 ENCOUNTER — TELEPHONE (OUTPATIENT)
Dept: UROLOGY | Facility: CLINIC | Age: 88
End: 2023-12-29

## 2023-12-29 NOTE — TELEPHONE ENCOUNTER
Called patient and left a detailed vm, per communication consent. Patient was scheduled inappropriately on AP's schedule. Patient's cysto stent removal needs to be rescheduled.     Informed the patient that he was rescheduled to 1/9/24 at 11:40 am with Siddharth Ureña PA-C at the [place] office. Informed the patient to call the office back to confirm his new apt at [phone].

## 2024-01-02 NOTE — TELEPHONE ENCOUNTER
Called pt and spoke with him about why appt rescheduled. He understands and needs to check with other appointments.     Pt said he would call back today #[phone] to confirm or change.

## 2024-01-04 NOTE — TELEPHONE ENCOUNTER
The below appointment has been verbally confirmed:    Date: 1/9/2024     Arrival Time: 11:25 AM     Visit Type: URO CYSTO SIMPLE PROCEDURE [facility] [[id number]]     Provider: Siddharth Ureña PA-C Department: [facility]

## 2024-01-09 ENCOUNTER — PROCEDURE VISIT (OUTPATIENT)
Dept: UROLOGY | Facility: AMBULATORY SURGERY CENTER | Age: 89
End: 2024-01-09
Payer: MEDICARE

## 2024-01-09 VITALS
OXYGEN SATURATION: 96 % | DIASTOLIC BLOOD PRESSURE: 74 MMHG | HEIGHT: 68 IN | HEART RATE: 64 BPM | RESPIRATION RATE: 16 BRPM | WEIGHT: 161 LBS | BODY MASS INDEX: 24.4 KG/M2 | SYSTOLIC BLOOD PRESSURE: 130 MMHG

## 2024-01-09 DIAGNOSIS — E78.5 HYPERLIPIDEMIA, UNSPECIFIED HYPERLIPIDEMIA TYPE: ICD-10-CM

## 2024-01-09 DIAGNOSIS — I25.10 ARTERIOSCLEROTIC CORONARY ARTERY DISEASE: ICD-10-CM

## 2024-01-09 DIAGNOSIS — N20.0 CALCULUS OF KIDNEY: Primary | ICD-10-CM

## 2024-01-09 PROCEDURE — 52310 CYSTOSCOPY AND TREATMENT: CPT

## 2024-01-09 PROCEDURE — 99024 POSTOP FOLLOW-UP VISIT: CPT

## 2024-01-09 RX ORDER — CEFDINIR 300 MG/1
300 CAPSULE ORAL EVERY 12 HOURS SCHEDULED
Qty: 6 CAPSULE | Refills: 0 | Status: ON HOLD | OUTPATIENT
Start: 2024-01-09 | End: 2024-01-13

## 2024-01-09 RX ORDER — ROSUVASTATIN CALCIUM 10 MG/1
10 TABLET, COATED ORAL DAILY
Qty: 90 TABLET | Refills: 1 | Status: SHIPPED | OUTPATIENT
Start: 2024-01-09

## 2024-01-09 NOTE — PROGRESS NOTES
Office Visit- Urology  Jeremías Verde 1935 MRN: [id number]      Assessment/Discussion/Plan    88 y.o. male managed by     Ureteral stent in place   -Patient is s/p cystoscopy with cystolitholapaxy along with left ureteroscopy appointment for lithotripsy, insertion of left ureteral stent.  -Ureteral stent was on string string broke and patient was unable to pull surgery.  Stent out  -Presents the office today for cystoscopy/stent removal  -See procedure detail above cystoscopy below  -3-day course of cefdinir given due to urologic instrumentation today  -Patient will follow-up at the end of this month with an ultrasound prior for postoperative visit      Chief Complaint:   Jeremías is a 88 y.o. male presenting to the office for a follow up visit regarding cystoscopy/stent removal        Subjective    Patient is a 88-year-old male with history of left ureteral calculus with sepsis.  He underwent cystoscopy with cystolitholopaxy/left ureteroscopy with thulium laser lithotripsy left retrograde pyelogram IV and left ureteral stent insertion on 12/6/2023.  His stent was on a string and he was instructed to pull stent but when he went to remove stent the string broke with no removal of the stent.  He presents today for a cystoscopy/stent removal      Past Medical History  Past Medical History:   Diagnosis Date    Basal cell carcinoma     Cancer (HCC)     NSTEMI (non-ST elevated myocardial infarction) (HCC) 2014    Pneumonia     Last Assessed:  10/7/14    Polymyalgia rheumatica (HCC)     Last Assessed:  11/23/12    Polymyalgia rheumatica (HCC) 2011    Squamous cell skin cancer 05/24/2022    left frontal scalp anterior    Squamous cell skin cancer 05/24/2022    Vertex of scalp       Past Surgical History  Past Surgical History:   Procedure Laterality Date    APPENDECTOMY      CORONARY ANGIOPLASTY WITH STENT PLACEMENT      x2    FL RETROGRADE PYELOGRAM  10/29/2023    FL RETROGRADE PYELOGRAM  12/15/2023    HERNIA REPAIR   2000    MOHS SURGERY  07/27/2022    left frontal scalp and vertex of scalp    DC CYSTO BLADDER W/URETERAL CATHETERIZATION Left 10/29/2023    Procedure: CYSTOSCOPY RETROGRADE PYELOGRAM WITH INSERTION STENT URETERAL;  Surgeon: Aamir Srinivasan MD;  Location: [facility];  Service: Urology    DC CYSTO/URETERO W/LITHOTRIPSY &INDWELL STENT INSRT Left 12/15/2023    Procedure: CYSTOSCOPY URETEROSCOPY WITH LITHOTRIPSY HOLMIUM LASER, RETROGRADE PYELOGRAM AND INSERTION STENT URETERAL;  Surgeon: Aamir Srinivasan MD;  Location: [facility];  Service: Urology    DC LITHOLAPAXY SMPL/SM <2.5 CM N/A 12/15/2023    Procedure: LITHOLAPAXY HOLMIUM LASER;  Surgeon: Aamir Srinivasan MD;  Location: [facility];  Service: Urology       Past Family History  Family History   Problem Relation Age of Onset    Pulmonary embolism Father     Other Father         Transurethral resection of prostate    Stroke Sister        Past Social history  Social History     Socioeconomic History    Marital status: /Civil Union     Spouse name: Not on file    Number of children: Not on file    Years of education: Not on file    Highest education level: Not on file   Occupational History    Not on file   Tobacco Use    Smoking status: Never    Smokeless tobacco: Never   Vaping Use    Vaping status: Never Used   Substance and Sexual Activity    Alcohol use: Not Currently     Comment: Social    Drug use: Never    Sexual activity: Yes   Other Topics Concern    Not on file   Social History Narrative    Exercises regularly        Consumes 2-3 cups of coffee per week and 1 glass of ice tea per day     Social Determinants of Health     Financial Resource Strain: Low Risk  (10/23/2023)    Overall Financial Resource Strain (CARDIA)     Difficulty of Paying Living Expenses: Not hard at all   Food Insecurity: Not on file   Transportation Needs: No Transportation Needs (10/23/2023)    PRAPARE - Transportation     Lack of Transportation (Medical): No     " Lack of Transportation (Non-Medical): No   Physical Activity: Not on file   Stress: Not on file   Social Connections: Not on file   Intimate Partner Violence: Not on file   Housing Stability: Not on file       Current Medications  Current Outpatient Medications   Medication Sig Dispense Refill    acetaminophen (TYLENOL) 325 mg tablet Take 650 mg by mouth Uses 3 x a day      carboxymethylcellulose (REFRESH PLUS) 0.5 % SOLN 1 drop 2 (two) times a day as needed for dry eyes      cefdinir (OMNICEF) 300 mg capsule Take 1 capsule (300 mg total) by mouth every 12 (twelve) hours for 3 days 6 capsule 0    clopidogrel (PLAVIX) 75 mg tablet TAKE 1 TABLET BY MOUTH EVERY DAY 90 tablet 3    metoprolol succinate (TOPROL-XL) 25 mg 24 hr tablet TAKE 1/2 TABLET BY MOUTH EVERY DAY 45 tablet 3    rosuvastatin (CRESTOR) 10 MG tablet TAKE 1 TABLET BY MOUTH EVERY DAY 90 tablet 1    Glucosamine-Chondroitin 250-200 MG TABS daily [facility] (Patient not taking: Reported on 1/9/2024)       No current facility-administered medications for this visit.       Allergies  Allergies   Allergen Reactions    Iv Dye [Iodinated Contrast Media] Rash     Possible delayed rash February 2022       OBJECTIVE    Vitals   Vitals:    01/09/24 1152   BP: 130/74   BP Location: Left arm   Patient Position: Sitting   Cuff Size: Standard   Pulse: 64   Resp: 16   SpO2: 96%   Weight: 73 kg (161 lb)   Height: 5' 8\" (1.727 m)       PVR:    Physical Exam  Constitutional:       General: He is not in acute distress.     Appearance: Normal appearance. He is normal weight. He is not ill-appearing or toxic-appearing.   HENT:      Head: Normocephalic and atraumatic.   Eyes:      Conjunctiva/sclera: Conjunctivae normal.   Cardiovascular:      Rate and Rhythm: Normal rate.   Pulmonary:      Effort: Pulmonary effort is normal. No respiratory distress.   Skin:     General: Skin is warm and dry.   Neurological:      General: No focal deficit present.      Mental Status: " He is alert and oriented to person, place, and time.      Cranial Nerves: No cranial nerve deficit.   Psychiatric:         Mood and Affect: Mood normal.         Behavior: Behavior normal.         Thought Content: Thought content normal.          Cystoscopy     Date/Time  1/9/2024 11:40 AM     Performed by  Siddharth Ureña PA-C   Authorized by  Siddharth Ureña PA-C     Universal Protocol:  Consent: Verbal consent obtained. Written consent obtained.  Consent given by: patient  Patient identity confirmed: verbally with patient      Procedure Details:  Procedure type: simple removal of a foreign body, stone, or stent    Patient tolerance: Patient tolerated the procedure well with no immediate complications    Additional Procedure Details: Cystoscopy was performed with removal of left ureteral stent without complication       Labs:     Lab Results   Component Value Date    CREATININE 1.03 11/06/2023      Lab Results   Component Value Date    HGBA1C 6.3 (H) 10/27/2023     Lab Results   Component Value Date    GLUCOSE 99 06/30/2015    CALCIUM 8.4 10/31/2023     06/30/2015    K 4.2 10/31/2023    CO2 27 10/31/2023     10/31/2023    BUN 16 10/31/2023    CREATININE 1.03 11/06/2023       I have personally reviewed all pertinent lab results and reviewed with patient    Imaging       Siddharth Ureña PA-C  Date: 1/9/2024 Time: 2:35 PM  [facility]    This note was written using fluency dictation software. Please excuse any resulting minor grammatical errors.

## 2024-01-10 ENCOUNTER — TELEPHONE (OUTPATIENT)
Dept: ADMINISTRATIVE | Facility: OTHER | Age: 89
End: 2024-01-10

## 2024-01-10 NOTE — TELEPHONE ENCOUNTER
01/10/24 12:24 PM    Patient contacted (spoke with patient) to bring Advance Directive, POLST, or Living Will document to next scheduled pcp visit.    Thank you.  Rosalva Keene  [facility]

## 2024-01-11 PROBLEM — J96.01 ACUTE RESPIRATORY FAILURE WITH HYPOXIA (HCC): Status: RESOLVED | Noted: 2023-10-27 | Resolved: 2024-01-11

## 2024-01-11 PROBLEM — H40.003 OPEN-ANGLE GLAUCOMA SUSPECT OF BOTH EYES: Status: ACTIVE | Noted: 2023-12-07

## 2024-01-11 PROBLEM — A41.9 SEPTIC SHOCK (HCC): Status: RESOLVED | Noted: 2023-10-27 | Resolved: 2024-01-11

## 2024-01-11 PROBLEM — R65.21 SEPTIC SHOCK (HCC): Status: RESOLVED | Noted: 2023-10-27 | Resolved: 2024-01-11

## 2024-01-12 ENCOUNTER — APPOINTMENT (EMERGENCY)
Dept: CT IMAGING | Facility: HOSPITAL | Age: 89
DRG: 884 | End: 2024-01-12
Payer: MEDICARE

## 2024-01-12 ENCOUNTER — HOSPITAL ENCOUNTER (INPATIENT)
Facility: HOSPITAL | Age: 89
LOS: 1 days | Discharge: HOME | DRG: 884 | End: 2024-01-13
Attending: EMERGENCY MEDICINE | Admitting: INTERNAL MEDICINE
Payer: MEDICARE

## 2024-01-12 ENCOUNTER — APPOINTMENT (EMERGENCY)
Dept: MRI IMAGING | Facility: HOSPITAL | Age: 89
DRG: 884 | End: 2024-01-12
Payer: MEDICARE

## 2024-01-12 ENCOUNTER — OFFICE VISIT (OUTPATIENT)
Dept: FAMILY MEDICINE CLINIC | Facility: CLINIC | Age: 89
End: 2024-01-12
Payer: MEDICARE

## 2024-01-12 VITALS
RESPIRATION RATE: 18 BRPM | WEIGHT: 158 LBS | HEIGHT: 68 IN | DIASTOLIC BLOOD PRESSURE: 76 MMHG | TEMPERATURE: 98.2 F | HEART RATE: 88 BPM | OXYGEN SATURATION: 97 % | BODY MASS INDEX: 23.95 KG/M2 | SYSTOLIC BLOOD PRESSURE: 122 MMHG

## 2024-01-12 DIAGNOSIS — Z86.73 CEREBRAL ARTERIOSCLEROSIS WITH HISTORY OF PREVIOUS STROKE: ICD-10-CM

## 2024-01-12 DIAGNOSIS — R41.82 ACUTE ON CHRONIC ALTERATION IN MENTAL STATUS: Primary | ICD-10-CM

## 2024-01-12 DIAGNOSIS — N21.8 CALCULUS OF OTHER LOWER URINARY TRACT LOCATION: ICD-10-CM

## 2024-01-12 DIAGNOSIS — G93.40 ENCEPHALOPATHY: Primary | ICD-10-CM

## 2024-01-12 DIAGNOSIS — I25.10 ARTERIOSCLEROTIC CORONARY ARTERY DISEASE: ICD-10-CM

## 2024-01-12 DIAGNOSIS — I10 BENIGN ESSENTIAL HYPERTENSION: ICD-10-CM

## 2024-01-12 DIAGNOSIS — R41.82 ALTERED MENTAL STATUS: ICD-10-CM

## 2024-01-12 DIAGNOSIS — I67.2 CEREBRAL ARTERIOSCLEROSIS WITH HISTORY OF PREVIOUS STROKE: ICD-10-CM

## 2024-01-12 DIAGNOSIS — N20.0 CALCULUS OF KIDNEY: ICD-10-CM

## 2024-01-12 LAB
2HR DELTA HS TROPONIN: 1 NG/L
ALBUMIN SERPL BCP-MCNC: 4.3 G/DL (ref 3.5–5)
ALP SERPL-CCNC: 63 U/L (ref 34–104)
ALT SERPL W P-5'-P-CCNC: 17 U/L (ref 7–52)
AMMONIA PLAS-SCNC: 15 UMOL/L (ref 18–72)
AMPHETAMINES SERPL QL SCN: NEGATIVE
ANION GAP SERPL CALCULATED.3IONS-SCNC: 7 MMOL/L
AST SERPL W P-5'-P-CCNC: 23 U/L (ref 13–39)
ATRIAL RATE: 65 BPM
ATRIAL RATE: 69 BPM
BACTERIA UR QL AUTO: ABNORMAL /HPF
BARBITURATES UR QL: NEGATIVE
BASOPHILS # BLD AUTO: 0.04 THOUSANDS/ÂΜL (ref 0–0.1)
BASOPHILS NFR BLD AUTO: 1 % (ref 0–1)
BENZODIAZ UR QL: NEGATIVE
BILIRUB SERPL-MCNC: 0.63 MG/DL (ref 0.2–1)
BILIRUB UR QL STRIP: NEGATIVE
BUN SERPL-MCNC: 30 MG/DL (ref 5–25)
CALCIUM SERPL-MCNC: 9.4 MG/DL (ref 8.4–10.2)
CARDIAC TROPONIN I PNL SERPL HS: 13 NG/L
CARDIAC TROPONIN I PNL SERPL HS: 14 NG/L
CHLORIDE SERPL-SCNC: 106 MMOL/L (ref 96–108)
CLARITY UR: CLEAR
CO2 SERPL-SCNC: 27 MMOL/L (ref 21–32)
COCAINE UR QL: NEGATIVE
COLOR UR: YELLOW
CREAT SERPL-MCNC: 0.88 MG/DL (ref 0.6–1.3)
EOSINOPHIL # BLD AUTO: 0.04 THOUSAND/ÂΜL (ref 0–0.61)
EOSINOPHIL NFR BLD AUTO: 1 % (ref 0–6)
ERYTHROCYTE [DISTWIDTH] IN BLOOD BY AUTOMATED COUNT: 15.2 % (ref 11.6–15.1)
ETHANOL SERPL-MCNC: <10 MG/DL
FLUAV RNA RESP QL NAA+PROBE: NEGATIVE
FLUBV RNA RESP QL NAA+PROBE: NEGATIVE
GFR SERPL CREATININE-BSD FRML MDRD: 77 ML/MIN/1.73SQ M
GLUCOSE SERPL-MCNC: 86 MG/DL (ref 65–140)
GLUCOSE UR STRIP-MCNC: NEGATIVE MG/DL
HCT VFR BLD AUTO: 43.6 % (ref 36.5–49.3)
HGB BLD-MCNC: 14 G/DL (ref 12–17)
HGB UR QL STRIP.AUTO: ABNORMAL
HYALINE CASTS #/AREA URNS LPF: ABNORMAL /LPF
IMM GRANULOCYTES # BLD AUTO: 0.01 THOUSAND/UL (ref 0–0.2)
IMM GRANULOCYTES NFR BLD AUTO: 0 % (ref 0–2)
KETONES UR STRIP-MCNC: NEGATIVE MG/DL
LACTATE SERPL-SCNC: 0.7 MMOL/L (ref 0.5–2)
LEUKOCYTE ESTERASE UR QL STRIP: NEGATIVE
LYMPHOCYTES # BLD AUTO: 1.01 THOUSANDS/ÂΜL (ref 0.6–4.47)
LYMPHOCYTES NFR BLD AUTO: 20 % (ref 14–44)
MCH RBC QN AUTO: 27.3 PG (ref 26.8–34.3)
MCHC RBC AUTO-ENTMCNC: 32.1 G/DL (ref 31.4–37.4)
MCV RBC AUTO: 85 FL (ref 82–98)
METHADONE UR QL: NEGATIVE
MONOCYTES # BLD AUTO: 0.54 THOUSAND/ÂΜL (ref 0.17–1.22)
MONOCYTES NFR BLD AUTO: 11 % (ref 4–12)
MUCOUS THREADS UR QL AUTO: ABNORMAL
NEUTROPHILS # BLD AUTO: 3.38 THOUSANDS/ÂΜL (ref 1.85–7.62)
NEUTS SEG NFR BLD AUTO: 67 % (ref 43–75)
NITRITE UR QL STRIP: NEGATIVE
NON-SQ EPI CELLS URNS QL MICRO: ABNORMAL /HPF
NRBC BLD AUTO-RTO: 0 /100 WBCS
OPIATES UR QL SCN: NEGATIVE
OTHER STN SPEC: ABNORMAL
OXYCODONE+OXYMORPHONE UR QL SCN: NEGATIVE
P AXIS: 70 DEGREES
P AXIS: 90 DEGREES
PCP UR QL: NEGATIVE
PH UR STRIP.AUTO: 5 [PH]
PLATELET # BLD AUTO: 339 THOUSANDS/UL (ref 149–390)
PMV BLD AUTO: 10.9 FL (ref 8.9–12.7)
POTASSIUM SERPL-SCNC: 4 MMOL/L (ref 3.5–5.3)
PR INTERVAL: 158 MS
PR INTERVAL: 200 MS
PROT SERPL-MCNC: 7.8 G/DL (ref 6.4–8.4)
PROT UR STRIP-MCNC: NEGATIVE MG/DL
QRS AXIS: -3 DEGREES
QRS AXIS: 16 DEGREES
QRSD INTERVAL: 102 MS
QRSD INTERVAL: 104 MS
QT INTERVAL: 396 MS
QT INTERVAL: 412 MS
QTC INTERVAL: 411 MS
QTC INTERVAL: 441 MS
RBC # BLD AUTO: 5.12 MILLION/UL (ref 3.88–5.62)
RBC #/AREA URNS AUTO: ABNORMAL /HPF
RSV RNA RESP QL NAA+PROBE: NEGATIVE
SARS-COV-2 RNA RESP QL NAA+PROBE: NEGATIVE
SODIUM SERPL-SCNC: 140 MMOL/L (ref 135–147)
SP GR UR STRIP.AUTO: >=1.03 (ref 1–1.03)
T WAVE AXIS: 62 DEGREES
T WAVE AXIS: 89 DEGREES
THC UR QL: NEGATIVE
TSH SERPL DL<=0.05 MIU/L-ACNC: 1.38 UIU/ML (ref 0.45–4.5)
URATE CRY URNS QL MICRO: ABNORMAL /HPF
UROBILINOGEN UR QL STRIP.AUTO: 0.2 E.U./DL
VENTRICULAR RATE: 65 BPM
VENTRICULAR RATE: 69 BPM
WBC # BLD AUTO: 5.02 THOUSAND/UL (ref 4.31–10.16)
WBC #/AREA URNS AUTO: ABNORMAL /HPF

## 2024-01-12 PROCEDURE — 70553 MRI BRAIN STEM W/O & W/DYE: CPT

## 2024-01-12 PROCEDURE — 84425 ASSAY OF VITAMIN B-1: CPT | Performed by: PHYSICIAN ASSISTANT

## 2024-01-12 PROCEDURE — 0241U HB NFCT DS VIR RESP RNA 4 TRGT: CPT | Performed by: EMERGENCY MEDICINE

## 2024-01-12 PROCEDURE — 84443 ASSAY THYROID STIM HORMONE: CPT | Performed by: EMERGENCY MEDICINE

## 2024-01-12 PROCEDURE — 85025 COMPLETE CBC W/AUTO DIFF WBC: CPT | Performed by: EMERGENCY MEDICINE

## 2024-01-12 PROCEDURE — 99215 OFFICE O/P EST HI 40 MIN: CPT | Performed by: STUDENT IN AN ORGANIZED HEALTH CARE EDUCATION/TRAINING PROGRAM

## 2024-01-12 PROCEDURE — 82077 ASSAY SPEC XCP UR&BREATH IA: CPT | Performed by: EMERGENCY MEDICINE

## 2024-01-12 PROCEDURE — 93005 ELECTROCARDIOGRAM TRACING: CPT

## 2024-01-12 PROCEDURE — 99285 EMERGENCY DEPT VISIT HI MDM: CPT

## 2024-01-12 PROCEDURE — 96361 HYDRATE IV INFUSION ADD-ON: CPT

## 2024-01-12 PROCEDURE — 81001 URINALYSIS AUTO W/SCOPE: CPT | Performed by: EMERGENCY MEDICINE

## 2024-01-12 PROCEDURE — 71250 CT THORAX DX C-: CPT

## 2024-01-12 PROCEDURE — 99214 OFFICE O/P EST MOD 30 MIN: CPT | Performed by: FAMILY MEDICINE

## 2024-01-12 PROCEDURE — 80053 COMPREHEN METABOLIC PANEL: CPT | Performed by: EMERGENCY MEDICINE

## 2024-01-12 PROCEDURE — 36415 COLL VENOUS BLD VENIPUNCTURE: CPT | Performed by: EMERGENCY MEDICINE

## 2024-01-12 PROCEDURE — A9585 GADOBUTROL INJECTION: HCPCS | Performed by: PHYSICIAN ASSISTANT

## 2024-01-12 PROCEDURE — 82746 ASSAY OF FOLIC ACID SERUM: CPT | Performed by: PHYSICIAN ASSISTANT

## 2024-01-12 PROCEDURE — 83605 ASSAY OF LACTIC ACID: CPT | Performed by: EMERGENCY MEDICINE

## 2024-01-12 PROCEDURE — 96374 THER/PROPH/DIAG INJ IV PUSH: CPT

## 2024-01-12 PROCEDURE — 70450 CT HEAD/BRAIN W/O DYE: CPT

## 2024-01-12 PROCEDURE — 82607 VITAMIN B-12: CPT | Performed by: PHYSICIAN ASSISTANT

## 2024-01-12 PROCEDURE — 99285 EMERGENCY DEPT VISIT HI MDM: CPT | Performed by: EMERGENCY MEDICINE

## 2024-01-12 PROCEDURE — 99222 1ST HOSP IP/OBS MODERATE 55: CPT | Performed by: INTERNAL MEDICINE

## 2024-01-12 PROCEDURE — 82140 ASSAY OF AMMONIA: CPT | Performed by: PHYSICIAN ASSISTANT

## 2024-01-12 PROCEDURE — 84484 ASSAY OF TROPONIN QUANT: CPT | Performed by: EMERGENCY MEDICINE

## 2024-01-12 PROCEDURE — 87040 BLOOD CULTURE FOR BACTERIA: CPT | Performed by: EMERGENCY MEDICINE

## 2024-01-12 PROCEDURE — 74176 CT ABD & PELVIS W/O CONTRAST: CPT

## 2024-01-12 PROCEDURE — 80307 DRUG TEST PRSMV CHEM ANLYZR: CPT | Performed by: EMERGENCY MEDICINE

## 2024-01-12 RX ORDER — DIPHENHYDRAMINE HYDROCHLORIDE 50 MG/ML
50 INJECTION INTRAMUSCULAR; INTRAVENOUS ONCE
Status: COMPLETED | OUTPATIENT
Start: 2024-01-12 | End: 2024-01-12

## 2024-01-12 RX ORDER — ENOXAPARIN SODIUM 100 MG/ML
40 INJECTION SUBCUTANEOUS DAILY
Status: DISCONTINUED | OUTPATIENT
Start: 2024-01-13 | End: 2024-01-13 | Stop reason: HOSPADM

## 2024-01-12 RX ORDER — CEFDINIR 300 MG/1
300 CAPSULE ORAL EVERY 12 HOURS SCHEDULED
Status: DISCONTINUED | OUTPATIENT
Start: 2024-01-12 | End: 2024-01-13 | Stop reason: HOSPADM

## 2024-01-12 RX ORDER — GADOBUTROL 604.72 MG/ML
7 INJECTION INTRAVENOUS
Status: COMPLETED | OUTPATIENT
Start: 2024-01-12 | End: 2024-01-12

## 2024-01-12 RX ADMIN — CEFDINIR 300 MG: 300 CAPSULE ORAL at 22:48

## 2024-01-12 RX ADMIN — SODIUM CHLORIDE 1000 ML: 0.9 INJECTION, SOLUTION INTRAVENOUS at 13:21

## 2024-01-12 RX ADMIN — GADOBUTROL 7 ML: 604.72 INJECTION INTRAVENOUS at 18:31

## 2024-01-12 RX ADMIN — DIPHENHYDRAMINE HYDROCHLORIDE 50 MG: 50 INJECTION, SOLUTION INTRAMUSCULAR; INTRAVENOUS at 17:23

## 2024-01-12 NOTE — ASSESSMENT & PLAN NOTE
"Jeremías Verde is a 88 y.o. male with HTN, HLD, DM type II, prior Right cerebellar infarct, polymyalgia rheumatica, basal cell carcinoma s/p resection, CAD s/p stent on Plavix, and recent hospitalization for urosepsis (October 2023) who was referred to [place] ED by his PCP for worsening mental status, deteriorating over the last few months.     Of note, patient was hospitalized for urosepsis secondary to a kidney stone in October 2023.  Since this admission, patient's mental status has deteriorated. He was evaluated by his PCP who noted altered mental status and referred him to the ED. Family reports patient has been making paranoid comments about his medications, resulting in patient refusing medications, as well as weight loss.    Workup:  - CT head: Unremarkable for acute abnormalities  - CT CAP:  \"New mild left-sided hydroureteronephrosis without an obstructive ureteral calculus. Findings can be seen with a recently passed left-sided stone or infection. The previously noted distal left ureteral calculus is not seen on the current examination.   Persistent 0.4 cm dependent calculus within the left aspect of the bladder. The previously noted 0.8 cm dependent calculus is not seen on the current examination.\"  - MRI brain w wo: Unremarkable for evidence of acute intracranial abnormalities; chronic right cerebellar infarct noted    - Labs on presentation:  UA: Negative nitrite, 0-1 WBC, occasional bacteria  Vitamin B12 low, 282  Labs WNL: RDU, flu/COVID/RSV, TSH, lactic acid, troponin, ethanol level, ammonia, folate    Etiology remains unclear at this point.  MRI brain unremarkable for stroke. Concern for underlying cognitive impairment at baseline, possibly noticed by family.  Found to have low vitamin B12, possibly contributing to worsening of suspected baseline cognitive impairment. Low suspicion for encephalitis as patient appears to be intermittently agitated rather than continuously/worsening sedation, " however will send off autoimmune/paraneoplastic panel.  Low suspicion for CNS infection.  Low suspicion for seizures.    Plan:  - Would not recommend routine EEG at this time  - Will hold off on LP at this time; can consider if patient worsens  - Pending: Blood cultures x 2, vitamin B1, homocystine, methylmalonic acid, Lyme total AB, autoimmune/paraneoplastic panel  - Delirium precautions  - Recommend follow-up outpatient with neurology memory clinic and geriatrics  - Medical management and supportive care per primary team, notify with changes  - No further inpatient recommendations at this time; discussed extensively with patient and family at bedside

## 2024-01-12 NOTE — PROGRESS NOTES
FAMILY PRACTICE OFFICE VISIT  Rob Gagnon D.O.    [facility]  [facility]  [address]  [address]  [place], [address]      NAME: Jeremías Verde  AGE: 88 y.o. SEX: male  : 1935   MRN: [id number]    DATE: 2024  TIME: 10:38 AM    Assessment and Plan     Problem List Items Addressed This Visit       Arteriosclerotic coronary artery disease    Benign essential hypertension    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    Urolithiasis     Other Visit Diagnoses       Acute on chronic alteration in mental status    -  Primary    Relevant Orders    Transfer to other facility            Patient Instructions   Dramatic change in mental status, somewhat progressive since urosepsis/septic shock in October, marked change in the past few days.  He has been off medication for 36 hours.  Had only used 3 out of the 6 cefdinir.  No OTC medication to explain changes, he stopped all OTC medication back in October.    Did have CTA of brain back in October.    His wife and grandson will take him to the emergency room at [facility] for evaluation regarding acute on chronic mental status changes, he did have retrieval retained ureteral stent on the , rule out urosepsis, rule out new CVA, does not appear medication related.  Await evaluation    Chief Complaint     Chief Complaint   Patient presents with    Follow-up     ED Retained ureteral stent       History of Present Illness   Jeremías Verde is a 88 y.o.-year-old male who is in today accompanied by his wife along with grandson, they have noted significant issues with behavior/memory/coherence, onset since hospitalization late October with septic shock/urosepsis, much worse the past few days.    He had been seen at the emergency room at [facility]  with retained ureteral stent, attempt at removing stent at home, string broke.    Does see urology, had stent removal with cystoscopy  "January 9.  Had received prescription for 3 days cefdinir.    The past few days he has been much more confused, he stopped all of his medications for the past 36 hours, he relates he will not take any medications not manufactured in the [place].  He denies any new weakness in arm, leg.    He has dropped 20 pounds since October.    No fevers, no chills, denies any chest pain, increased palpitations, no recent dizziness.  He denies any headache.  Does not have abdominal pain, denies blood in urine, denies dysuria.  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    BP Readings from Last 3 Encounters:   01/12/24 122/76   01/09/24 130/74   12/18/23 125/80       Wt Readings from Last 3 Encounters:   01/12/24 71.7 kg (158 lb)   01/09/24 73 kg (161 lb)   12/15/23 73 kg (161 lb)       Physical Exam  Constitutional:       Comments: 88-year-old male seated " on table, dramatic difference with behavior/cognition here today versus the last time I had seen him in October.  Very tangential, some paranoia, expresses concern medications are not made in the [place].    He is alert, oriented to place, family.  He did not know today's date, 0/3 short-term recall, easily distracted.  He was able to spell the word world backwards.    He was able to take himself off of the table, ambulate in hallway, he was able to squat in the room without issue.  Not orthostatic.  I see no focal weakness in arm/leg.   HENT:      Right Ear: Tympanic membrane normal.      Left Ear: Tympanic membrane normal.      Mouth/Throat:      Pharynx: Oropharynx is clear.   Eyes:      General: No scleral icterus.  Cardiovascular:      Rate and Rhythm: Normal rate and regular rhythm.      Heart sounds: Normal heart sounds.   Pulmonary:      Effort: Pulmonary effort is normal. No respiratory distress.      Breath sounds: Normal breath sounds. No wheezing, rhonchi or rales.   Abdominal:      Palpations: Abdomen is soft.      Tenderness: There is no abdominal tenderness. There is no right CVA tenderness, left CVA tenderness or guarding.   Musculoskeletal:      Right lower leg: No edema.      Left lower leg: No edema.   Lymphadenopathy:      Cervical: No cervical adenopathy.   Skin:     Coloration: Skin is not jaundiced.         ALLERGIES:  Allergies   Allergen Reactions    Iv Dye [Iodinated Contrast Media] Rash     Possible delayed rash February 2022       Current Medications     Current Outpatient Medications   Medication Sig Dispense Refill    acetaminophen (TYLENOL) 325 mg tablet Take 650 mg by mouth Uses 3 x a day      carboxymethylcellulose (REFRESH PLUS) 0.5 % SOLN 1 drop 2 (two) times a day as needed for dry eyes (Patient not taking: Reported on 1/12/2024)      cefdinir (OMNICEF) 300 mg capsule Take 1 capsule (300 mg total) by mouth every 12 (twelve) hours for 3 days (Patient not taking: Reported on  1/12/2024) 6 capsule 0    clopidogrel (PLAVIX) 75 mg tablet TAKE 1 TABLET BY MOUTH EVERY DAY (Patient not taking: Reported on 1/12/2024) 90 tablet 3    Glucosamine-Chondroitin 250-200 MG TABS daily [facility] (Patient not taking: Reported on 1/9/2024)      metoprolol succinate (TOPROL-XL) 25 mg 24 hr tablet TAKE 1/2 TABLET BY MOUTH EVERY DAY (Patient not taking: Reported on 1/12/2024) 45 tablet 3    rosuvastatin (CRESTOR) 10 MG tablet TAKE 1 TABLET BY MOUTH EVERY DAY (Patient not taking: Reported on 1/12/2024) 90 tablet 1     No current facility-administered medications for this visit.            Orders Placed This Encounter   Procedures    Transfer to other facility         Rob Gagnon DO

## 2024-01-12 NOTE — ASSESSMENT & PLAN NOTE
Patient presenting with altered mental status over the course of the past week according to family, exhibiting paranoid behaviors  Overall is alert and oriented x 3, relatively appropriate responses to simple questioning  Suspicion for organic etiology is lower, primary suspicion for psychiatric disorder  Infectious workup thus far unremarkable, MRI brain is pending  Geriatrics consult placed

## 2024-01-12 NOTE — PATIENT INSTRUCTIONS
Dramatic change in mental status, somewhat progressive since urosepsis/septic shock in October, marked change in the past few days.  He has been off medication for 36 hours.  Had only used 3 out of the 6 cefdinir.  No OTC medication to explain changes, he stopped all OTC medication back in October.    Did have CTA of brain back in October.    His wife and grandson will take him to the emergency room at [facility] for evaluation regarding acute on chronic mental status changes, he did have retrieval retained ureteral stent on the ninth, rule out urosepsis, rule out new CVA, does not appear medication related.  Await evaluation

## 2024-01-12 NOTE — ED PROVIDER NOTES
History  Chief Complaint   Patient presents with    Altered Mental Status     Change in mental status per family. Sent by pcp for further evaluation. No longer compliant with medications.      Patient is an 88-year-old male.  Past medical history is significant for polymyalgia rheumatica coronary artery disease, and skin cancer.  He had a bout of urosepsis related to a kidney stone back in October.  Since that his mental status has deteriorated.  3 days ago he had a office cystoscopy for removal of a retained ureteral stent.  He was placed on empiric antibiotics which she was noncompliant with.  Since then his mental status has deteriorated further.  He was seen by his primary MD today who noted his altered mental status, and referred him to the emergency room.  Patient himself, is without complaints.  No headache, fever, focal motor or sensory complaints, chest pain, shortness of breath, or abdominal pain.  Family does report he has become noncompliant with medications.  He has had weight loss.  He has become increasingly confused.        Prior to Admission Medications   Prescriptions Last Dose Informant Patient Reported? Taking?   Glucosamine-Chondroitin 250-200 MG TABS Not Taking Spouse/Significant Other Yes No   Sig: daily Cosamine joint health   Patient not taking: Reported on 2024   acetaminophen (TYLENOL) 325 mg tablet  Spouse/Significant Other Yes No   Sig: Take 650 mg by mouth Uses 3 x a day   carboxymethylcellulose (REFRESH PLUS) 0.5 % SOLN Not Taking Spouse/Significant Other Yes No   Si drop 2 (two) times a day as needed for dry eyes   Patient not taking: Reported on 2024   cefdinir (OMNICEF) 300 mg capsule Not Taking Spouse/Significant Other No No   Sig: Take 1 capsule (300 mg total) by mouth every 12 (twelve) hours for 3 days   Patient not taking: Reported on 2024   clopidogrel (PLAVIX) 75 mg tablet Not Taking Spouse/Significant Other No No   Sig: TAKE 1 TABLET BY MOUTH EVERY DAY    Patient not taking: Reported on 1/12/2024   metoprolol succinate (TOPROL-XL) 25 mg 24 hr tablet Not Taking Spouse/Significant Other No No   Sig: TAKE 1/2 TABLET BY MOUTH EVERY DAY   Patient not taking: Reported on 1/12/2024   rosuvastatin (CRESTOR) 10 MG tablet Not Taking Spouse/Significant Other No No   Sig: TAKE 1 TABLET BY MOUTH EVERY DAY   Patient not taking: Reported on 1/12/2024      Facility-Administered Medications: None       Past Medical History:   Diagnosis Date    Acute respiratory failure with hypoxia (HCC)     Basal cell carcinoma     Cancer (HCC)     NSTEMI (non-ST elevated myocardial infarction) (HCC) 2014    Pneumonia     Last Assessed:  10/7/14    Polymyalgia rheumatica (HCC)     Last Assessed:  11/23/12    Polymyalgia rheumatica (HCC) 2011    Septic shock (HCC)     Squamous cell skin cancer 05/24/2022    left frontal scalp anterior    Squamous cell skin cancer 05/24/2022    Vertex of scalp       Past Surgical History:   Procedure Laterality Date    APPENDECTOMY      CORONARY ANGIOPLASTY WITH STENT PLACEMENT      x2    FL RETROGRADE PYELOGRAM  10/29/2023    FL RETROGRADE PYELOGRAM  12/15/2023    HERNIA REPAIR  2000    MOHS SURGERY  07/27/2022    left frontal scalp and vertex of scalp    TN CYSTO BLADDER W/URETERAL CATHETERIZATION Left 10/29/2023    Procedure: CYSTOSCOPY RETROGRADE PYELOGRAM WITH INSERTION STENT URETERAL;  Surgeon: Aamir Srinivasan MD;  Location: [facility];  Service: Urology    TN CYSTO/URETERO W/LITHOTRIPSY &INDWELL STENT INSRT Left 12/15/2023    Procedure: CYSTOSCOPY URETEROSCOPY WITH LITHOTRIPSY HOLMIUM LASER, RETROGRADE PYELOGRAM AND INSERTION STENT URETERAL;  Surgeon: Aamir Srinivasan MD;  Location: [facility];  Service: Urology    TN LITHOLAPAXY SMPL/SM <2.5 CM N/A 12/15/2023    Procedure: LITHOLAPAXY HOLMIUM LASER;  Surgeon: Aamir Srinivasan MD;  Location: [facility];  Service: Urology       Family History   Problem Relation Age of Onset    Pulmonary  embolism Father     Other Father         Transurethral resection of prostate    Stroke Sister      I have reviewed and agree with the history as documented.    E-Cigarette/Vaping    E-Cigarette Use Never User      E-Cigarette/Vaping Substances    Nicotine No     THC No     CBD No     Flavoring No     Other No     Unknown No      Social History     Tobacco Use    Smoking status: Never    Smokeless tobacco: Never   Vaping Use    Vaping status: Never Used   Substance Use Topics    Alcohol use: Not Currently     Comment: Social    Drug use: Never       Review of Systems   Constitutional:  Negative for chills and fever.   HENT:  Negative for rhinorrhea and sore throat.    Eyes:  Negative for pain, redness and visual disturbance.   Respiratory:  Negative for cough and shortness of breath.    Cardiovascular:  Negative for chest pain and leg swelling.   Gastrointestinal:  Negative for abdominal pain, diarrhea and vomiting.   Endocrine: Negative for polydipsia and polyuria.   Genitourinary:  Negative for dysuria, frequency and hematuria.   Musculoskeletal:  Negative for back pain and neck pain.   Skin:  Negative for rash and wound.   Allergic/Immunologic: Negative for immunocompromised state.   Neurological:  Negative for weakness, numbness and headaches.   Psychiatric/Behavioral:  Positive for confusion. Negative for hallucinations and suicidal ideas.    All other systems reviewed and are negative.      Physical Exam  Physical Exam  Vitals reviewed.   Constitutional:       General: He is not in acute distress.     Appearance: He is not toxic-appearing.   HENT:      Head: Normocephalic and atraumatic.      Nose: Nose normal.      Mouth/Throat:      Mouth: Mucous membranes are moist.   Eyes:      General:         Right eye: No discharge.         Left eye: No discharge.      Conjunctiva/sclera: Conjunctivae normal.   Cardiovascular:      Rate and Rhythm: Normal rate and regular rhythm.      Pulses: Normal pulses.      Heart  sounds: Normal heart sounds. No murmur heard.     No friction rub. No gallop.   Pulmonary:      Effort: Pulmonary effort is normal. No respiratory distress.      Breath sounds: Normal breath sounds. No stridor. No wheezing, rhonchi or rales.   Abdominal:      General: Bowel sounds are normal. There is no distension.      Palpations: Abdomen is soft.      Tenderness: There is no abdominal tenderness. There is no right CVA tenderness, left CVA tenderness, guarding or rebound.   Musculoskeletal:         General: No swelling, tenderness, deformity or signs of injury. Normal range of motion.      Cervical back: Normal range of motion and neck supple. No rigidity.      Right lower leg: No edema.      Left lower leg: No edema.      Comments: No calf pain or unilateral leg swelling   Skin:     General: Skin is warm and dry.      Coloration: Skin is not jaundiced or pale.      Findings: No bruising, erythema or rash.   Neurological:      General: No focal deficit present.      Mental Status: He is alert and oriented to person, place, and time.      Cranial Nerves: No facial asymmetry.      Sensory: No sensory deficit.      Motor: Motor function is intact.      Gait: Gait normal.   Psychiatric:         Mood and Affect: Mood normal.         Behavior: Behavior normal.         Vital Signs  ED Triage Vitals [01/12/24 1107]   Temperature Pulse Respirations Blood Pressure SpO2   98.1 °F (36.7 °C) 78 18 161/93 99 %      Temp src Heart Rate Source Patient Position - Orthostatic VS BP Location FiO2 (%)   -- -- -- -- --      Pain Score       --           Vitals:    01/12/24 1107   BP: 161/93   Pulse: 78         Visual Acuity      ED Medications  Medications   sodium chloride 0.9 % bolus 1,000 mL (has no administration in time range)       Diagnostic Studies  Results Reviewed       Procedure Component Value Units Date/Time    Rapid drug screen, urine [[id number]] Collected: 01/12/24 1217    Lab Status: In process Specimen: Urine, Other  Updated: 01/12/24 1228    Comprehensive metabolic panel [[id number]]     Lab Status: No result Specimen: Blood     TSH, 3rd generation with Free T4 reflex [[id number]]     Lab Status: No result Specimen: Blood     Lactic acid, plasma (w/reflex if result > 2.0) [[id number]]     Lab Status: No result Specimen: Blood     HS Troponin 0hr (reflex protocol) [[id number]]     Lab Status: No result Specimen: Blood     Ethanol [[id number]]     Lab Status: No result Specimen: Blood     CBC and differential [[id number]]     Lab Status: No result Specimen: Blood     FLU/RSV/COVID - if FLU/RSV clinically relevant [[id number]]     Lab Status: No result Specimen: Nares from Nose     Blood culture #1 [[id number]]     Lab Status: No result Specimen: Blood     Blood culture #2 [[id number]]     Lab Status: No result Specimen: Blood     UA w Reflex to Microscopic w Reflex to Culture [[id number]] Collected: 01/12/24 1217    Lab Status: In process Specimen: Urine, Other Updated: 01/12/24 1223                   CT head without contrast    (Results Pending)   CT chest abdomen pelvis wo contrast    (Results Pending)              Procedures  ECG 12 Lead Documentation Only    Date/Time: 1/12/2024 1:38 PM    Performed by: Ras Haynes MD  Authorized by: Ras Haynes MD    ECG reviewed by me, the ED Provider: yes    Patient location:  ED  Comments:      Normal sinus rhythm with frequent PVCs.  Incomplete right bundle branch block.  T wave inversions in the inferior and lateral leads.  Abnormal EKG.  ECG 12 Lead Documentation Only    Date/Time: 1/12/2024 3:44 PM    Performed by: Ras Haynes MD  Authorized by: Ras Haynes MD    ECG reviewed by me, the ED Provider: yes    Patient location:  ED  Comments:      Normal sinus rhythm with premature atrial contractions.  Incomplete right bundle branch block.  LVH, may be normal variant.  Flipped T's laterally.  Similar to earlier EKG except flipped teaser not present inferiorly.  Abnormal  EKG.           ED Course                               SBIRT 22yo+      Flowsheet Row Most Recent Value   Initial Alcohol Screen: US AUDIT-C     1. How often do you have a drink containing alcohol? 0 Filed at: 01/12/2024 1207   2. How many drinks containing alcohol do you have on a typical day you are drinking?  0 Filed at: 01/12/2024 1207   3b. FEMALE Any Age, or MALE 65+: How often do you have 4 or more drinks on one occassion? 0 Filed at: 01/12/2024 1207   Audit-C Score 0 Filed at: 01/12/2024 1207   ZORAN: How many times in the past year have you...    Used an illegal drug or used a prescription medication for non-medical reasons? Never Filed at: 01/12/2024 1207                      Medical Decision Making  As per history, it sounds like patient developed some degree of encephalopathy after his urosepsis.  It seems to have been exacerbated significantly over the last couple days.  ED workup did not show any definite etiology.  He is not infected.  Urinalysis was not consistent with UTI.  Neck is supple.  Doubt meningitis.  CT of head showed small vessel disease.  No acute stroke or intracranial hemorrhage.  CT of the abdomen and pelvis was consistent with a recently passed stone/stent removal.  Laboratory evaluation was generally nonspecific.  Patient did have acute confusion in the emergency room.  He also had some evidence of delusions and paranoia.  It would be unlikely that patient would develop a primary psychiatric disorder at 88 years old.  There likely is an organic etiology.  I consulted with neurology.  Neurology saw patient in the emergency room.  They ordered an MRI and additional laboratory evaluation.  Recommended admission for further evaluation and geriatric consult.  Consulted with Shashank for admission.    Amount and/or Complexity of Data Reviewed  Independent Historian: spouse     Details: Family  External Data Reviewed: notes.  Labs: ordered. Decision-making details documented in ED  Course.  Radiology: ordered. Decision-making details documented in ED Course.  ECG/medicine tests: ordered and independent interpretation performed. Decision-making details documented in ED Course.  Discussion of management or test interpretation with external provider(s): Hospitalist, neurology    Risk  Prescription drug management.  Decision regarding hospitalization.             Disposition  Final diagnoses:   None     ED Disposition       None          Follow-up Information    None         Patient's Medications   Discharge Prescriptions    No medications on file       No discharge procedures on file.    PDMP Review       None            ED Provider  Electronically Signed by             Ras Haynes MD  01/14/24 9186

## 2024-01-12 NOTE — CONSULTS
Consultation - Neurology   Jeremías Verde 88 y.o. male MRN: [id number]  Unit/Bed#: ED-40 Encounter: [id number]    Assessment/Plan   Change in mental status  Assessment & Plan  88 year old with hx of htn, hyperlipidemia, dm, prior cerebellar infarct, polymyalgia rheumatica, basal cell carcinoma, cad w ken, and urosepsis.   The patient had urosepsis secondary to a kidney stone in October, since then his mental status has deteriorated.  He had cystoscopy for removal of retained stents in the office 3 days ago.  He was put on antibiotics but has been compliant. .  His mental status has been deteriorating over the last few months.  He was evaluated by his PCPand noted altered mental status and referred him to the ED.     Per family, he has been more confused than normal and making comments that was concerning for confusion delirium.  The family reports after he had sepsis on October and since that time he was confused at that time, but there has been a sharp decline since that time.  The patient will not take his medication anymore, he has lost weight, he has not been eating or taking his home medications.    He has been more paranoid/confused.   He has had STM loss.     VS - 98/1, pulse 78, respiration 18, blood pressure 161/93 mmhg.     Labs/imaging:  Ct of head was completed -no acute intracranial abnormality.   CT of chest abdomen and pelvis pending     UA was negative for leukocytes or nitrates  Rapid drug screen negative.   CBC was normal  Covid/flu normal  Blood cultures pending  CMP with BUN of 30  TSH was normal  LA was normal  Troponin was normal  Ethanol was normal.     Acute on chronic encephalopathy - unclear etiology - rule out toxic metabolic/infectious etiologies, CT of head with out acute intracranial pathology, this does not appear to be ischemic in nature.  Unclear if the patient has an underlying neurodegenerative process/start of dementia process, cannot exclude an underlying psychiatric illness  contributing. No evidence to suspect CNS infectious/inflammatory etiology.     -  Admit to medicine team - would recommend-  -  MRI of brain with and with out contrast  -  Serum B12, ammonia, thiamine  -  TSH normal  -  No need for EEG at this time  -  Hold on LP at this time  -  Monitor for infectious/metabolic derangements, per ED medicine team  -  Geriatric consult, may benefit from low dose Seroquel at night.  The patient will need formal memory evaluation as an out patient.   -  Delirium precautions  -  Fall precautions  -  Sleep wake cycle regulation  -  Examined alongside attending, plan of care per attending physician      Recommendations for outpatient neurological follow up have yet to be determined.    History of Present Illness   Change in mental status    HPI: Jeremías Verde is a 88 y.o. with hx of htn, hyperlipidemia, dm, prior cerebellar infarct, polymyalgia rheumatica, basal cell carcinoma, cad w ken, and urosepsis.   The patient had urosepsis secondary to a kidney stone in October, since then his mental status has deteriorated.  He had cystoscopy for removal of retained stents in the office 3 days ago.  He was put on antibiotics but has not been compliant. .  His mental status has been deteriorating over the last few months.  He was evaluated by his PCPand noted altered mental status and referred him to the ED.     Per family, he has been more confused than normal and making comments that was concerning for confusion delirium.  The family reports after he had sepsis on October and since that time he was confused at that time, but there has been a sharp decline since that time.  The patient will not take his medication anymore, he has lost weight, he has not been eating or taking his home medications (Wednesday he stopped taking his medication).  Family has noticed, stm loss, now escalated to paranoia and more disorganized thinking, he will only take drugs made in the [place].  He will have  issues with chronologically of events.   The patient has not been seeing things that are not there, the patient is not hearing things.     No facial droop, no focal weakness or numbness, no ataxia.  No focal neurological complaints or symptoms. No headache or vision changes.  No tremor, no trouble speaking.      No recent illness, no fever or chills.     VS - 98/1, pulse 78, respiration 18, blood pressure 161/93 mmhg.     Labs/imaging:  Ct of head was completed -no acute intracranial abnormality.   CT of chest abdomen and pelvis pending -      UA was negative for leukocytes or nitrates  Rapid drug screen negative.   CBC was normal  Covid/flu normal  Blood cultures pending  CMP with BUN of 30  TSH was normal  LA was normal  Troponin was normal  Ethanol was normal.     The patient was seen by neurology in October of 2022 for dizziness, imbalance.  It was felt this was likely peripheral vs recrudescence, MRI of brain/C-spine was not completed.  (Please see note for details)    Inpatient consult to Neurology  Consult performed by: Landry Crowell PA-C  Consult ordered by: Ras Haynes MD        Review of Systems  Unreliable secondary to change in mental status.     Historical Information   Past Medical History:   Diagnosis Date    Acute respiratory failure with hypoxia (HCC)     Basal cell carcinoma     Cancer (HCC)     NSTEMI (non-ST elevated myocardial infarction) (HCC) 2014    Pneumonia     Last Assessed:  10/7/14    Polymyalgia rheumatica (HCC)     Last Assessed:  11/23/12    Polymyalgia rheumatica (HCC) 2011    Septic shock (HCC)     Squamous cell skin cancer 05/24/2022    left frontal scalp anterior    Squamous cell skin cancer 05/24/2022    Vertex of scalp     Past Surgical History:   Procedure Laterality Date    APPENDECTOMY      CORONARY ANGIOPLASTY WITH STENT PLACEMENT      x2    FL RETROGRADE PYELOGRAM  10/29/2023    FL RETROGRADE PYELOGRAM  12/15/2023    HERNIA REPAIR  2000    MOHS SURGERY   07/27/2022    left frontal scalp and vertex of scalp    OK CYSTO BLADDER W/URETERAL CATHETERIZATION Left 10/29/2023    Procedure: CYSTOSCOPY RETROGRADE PYELOGRAM WITH INSERTION STENT URETERAL;  Surgeon: Aamir Srinivasan MD;  Location: [facility];  Service: Urology    OK CYSTO/URETERO W/LITHOTRIPSY &INDWELL STENT INSRT Left 12/15/2023    Procedure: CYSTOSCOPY URETEROSCOPY WITH LITHOTRIPSY HOLMIUM LASER, RETROGRADE PYELOGRAM AND INSERTION STENT URETERAL;  Surgeon: Aamir Srinivasan MD;  [facility];  Service: Urology    OK LITHOLAPAXY SMPL/SM <2.5 CM N/A 12/15/2023    Procedure: LITHOLAPAXY HOLMIUM LASER;  Surgeon: Aamir Srinivasan MD;  [facility];  Service: Urology     Social History   Social History     Substance and Sexual Activity   Alcohol Use Not Currently    Comment: Social     Social History     Substance and Sexual Activity   Drug Use Never     E-Cigarette/Vaping    E-Cigarette Use Never User      E-Cigarette/Vaping Substances    Nicotine No     THC No     CBD No     Flavoring No     Other No     Unknown No      Social History     Tobacco Use   Smoking Status Never   Smokeless Tobacco Never     Family History:   Family History   Problem Relation Age of Onset    Pulmonary embolism Father     Other Father         Transurethral resection of prostate    Stroke Sister      Meds/Allergies   all current active meds have been reviewed, current meds:   Current Facility-Administered Medications   Medication Dose Route Frequency    sodium chloride 0.9 % bolus 1,000 mL  1,000 mL Intravenous Once   , and PTA meds:   Prior to Admission Medications   Prescriptions Last Dose Informant Patient Reported? Taking?   Glucosamine-Chondroitin 250-200 MG TABS Not Taking Spouse/Significant Other Yes No   Sig: daily Cosamine joint health   Patient not taking: Reported on 1/9/2024   acetaminophen (TYLENOL) 325 mg tablet  Spouse/Significant Other Yes No   Sig: Take 650 mg by mouth Uses 3 x a day    carboxymethylcellulose (REFRESH PLUS) 0.5 % SOLN Not Taking Spouse/Significant Other Yes No   Si drop 2 (two) times a day as needed for dry eyes   Patient not taking: Reported on 2024   cefdinir (OMNICEF) 300 mg capsule Not Taking Spouse/Significant Other No No   Sig: Take 1 capsule (300 mg total) by mouth every 12 (twelve) hours for 3 days   Patient not taking: Reported on 2024   clopidogrel (PLAVIX) 75 mg tablet Not Taking Spouse/Significant Other No No   Sig: TAKE 1 TABLET BY MOUTH EVERY DAY   Patient not taking: Reported on 2024   metoprolol succinate (TOPROL-XL) 25 mg 24 hr tablet Not Taking Spouse/Significant Other No No   Sig: TAKE 1/2 TABLET BY MOUTH EVERY DAY   Patient not taking: Reported on 2024   rosuvastatin (CRESTOR) 10 MG tablet Not Taking Spouse/Significant Other No No   Sig: TAKE 1 TABLET BY MOUTH EVERY DAY   Patient not taking: Reported on 2024      Facility-Administered Medications: None     Allergies   Allergen Reactions    Iv Dye [Iodinated Contrast Media] Rash     Possible delayed rash 2022     Objective   Vitals:Blood pressure 120/84, pulse 74, temperature 98.1 °F (36.7 °C), resp. rate 18, weight 71.7 kg (158 lb 1.1 oz), SpO2 95%.,Body mass index is 24.03 kg/m².  No intake or output data in the 24 hours ending 24 1638    Invasive Devices:   Invasive Devices       Peripheral Intravenous Line  Duration             Peripheral IV 24 Right Antecubital <1 day                  Physical Exam  Vitals reviewed.   Constitutional:       Appearance: He is not toxic-appearing.   HENT:      Head: Normocephalic and atraumatic.      Mouth/Throat:      Mouth: Mucous membranes are moist.   Eyes:      Extraocular Movements: Extraocular movements intact.      Pupils: Pupils are equal, round, and reactive to light.   Cardiovascular:      Rate and Rhythm: Normal rate.   Pulmonary:      Effort: No respiratory distress.   Abdominal:      General: There is no  distension.   Musculoskeletal:         General: No swelling or tenderness.   Skin:     General: Skin is warm and dry.   Neurological:      Mental Status: He is alert and oriented to person, place, and time.      Cranial Nerves: Cranial nerves 2-12 are intact.      Motor: Motor strength is normal.     Coordination: Finger-Nose-Finger Test and Heel to Shin Test normal.      Deep Tendon Reflexes:      Reflex Scores:       Tricep reflexes are 1+ on the right side and 1+ on the left side.       Bicep reflexes are 1+ on the right side and 1+ on the left side.       Brachioradialis reflexes are 1+ on the right side and 1+ on the left side.       Patellar reflexes are 1+ on the right side and 1+ on the left side.       Achilles reflexes are 1+ on the right side and 1+ on the left side.      Neurologic Exam     Mental Status   Oriented to person, place, and time.   Attention: decreased. Concentration: decreased.   Speech: (Pressured speech.  Paranoia. )  Level of consciousness: alert  Able to name object. Able to read. Able to repeat.   The patient is awake and alert, he has pressured tangential speech.  No dysarthria or aphasia.    Decrease attn and concentration.   At time he is agitated.     The patient is awake and alert, he is oriented x 3, no aphasia or dysarthria.     He is able to read and repeat and recognize objects.   He is able to read, objects on NIH.      Cranial Nerves   Cranial nerves II through XII intact.     CN III, IV, VI   Pupils are equal, round, and reactive to light.    Motor Exam   Right arm pronator drift: absent  Left arm pronator drift: absent    Strength   Strength 5/5 throughout.     Sensory Exam   Non focal to touch, no neglect.      Gait, Coordination, and Reflexes     Coordination   Finger to nose coordination: normal  Heel to shin coordination: normal    Tremor   Resting tremor: absent  Intention tremor: absent    Reflexes   Right brachioradialis: 1+  Left brachioradialis: 1+  Right biceps:  1+  Left biceps: 1+  Right triceps: 1+  Left triceps: 1+  Right patellar: 1+  Left patellar: 1+  Right achilles: 1+  Left achilles: 1+  Right plantar: normal  Left plantar: normalNo seizure activity observed.     No meningismus noted.     Gait normal.      Lab Results: I have personally reviewed pertinent reports.    Imaging Studies: I have personally reviewed pertinent reports.      Code Status: Prior     Counseling / Coordination of Care  Reviewed case with neurology attending, history and physical examination, labs and imaging completed, plan of care as per attending physician.    Please see attestation for further details.    Examined alongside attending physician.    Acted as scribe today, plan of care per attending physician.

## 2024-01-12 NOTE — H&P
[facility]  H&P  Name: Jeremías Verde 88 y.o. male I MRN: [id number]  Unit/Bed#: ED-40 I Date of Admission: 1/12/2024   Date of Service: 1/12/2024 I Hospital Day: 0      Assessment/Plan   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  Assessment & Plan  Continue Plavix, patient also with history of CAD status post stenting    Hyperlipidemia  Assessment & Plan  Continue rosuvastatin    Benign essential hypertension  Assessment & Plan  Resume metoprolol succinate 25 mg daily    * Altered mental status  Assessment & Plan  Patient presenting with altered mental status over the course of the past week according to family, exhibiting paranoid behaviors  Overall is alert and oriented x 3, relatively appropriate responses to simple questioning  Suspicion for organic etiology is lower, primary suspicion for psychiatric disorder  Infectious workup thus far unremarkable, MRI brain is pending  Geriatrics consult placed           VTE Pharmacologic Prophylaxis:   High Risk (Score >/= 5) - Pharmacological DVT Prophylaxis Ordered: enoxaparin (Lovenox). Sequential Compression Devices Ordered.  Code Status: Level 1 - Full Code   Discussion with family: Updated  (daughter and significant other) at bedside.    Anticipated Length of Stay: Patient will be admitted on an inpatient basis with an anticipated length of stay of greater than 2 midnights secondary to altered mental status.    Total Time Spent on Date of Encounter in care of patient: 35 mins. This time was spent on one or more of the following: performing physical exam; counseling and coordination of care; obtaining or reviewing history; documenting in the medical record; reviewing/ordering tests, medications or procedures; communicating with other healthcare professionals and discussing with patient's family/caregivers.    Chief Complaint: Altered mental status    History of Present Illness:  Jeremías Verde is a  88 y.o. male with a PMH of CAD status post stenting remotely on Plavix, basal skin cancer, polymyalgia rheumatica who presents with altered mental status.  Patient has recent history of hospitalization for urosepsis back in October.  He reports he did not return to his baseline since then with notable cognitive deficits including memory particularly short-term.  Over the past week patient has been exhibiting paranoid behavior not taking his medications stating he wants medications only mated Jacquie.  Him and his family deny any hallucinations, he was alert and oriented x 3 for me.  Patient will be admitted for geriatrics evaluation and MRI brain along with laboratory workup pending including B12 and B1.     Review of Systems:  Review of Systems   Constitutional:  Negative for chills, fatigue and fever.   HENT:  Negative for ear pain and sore throat.    Eyes:  Negative for pain and visual disturbance.   Respiratory:  Negative for cough, shortness of breath and wheezing.    Cardiovascular:  Negative for chest pain, palpitations and leg swelling.   Gastrointestinal:  Negative for abdominal distention, abdominal pain, diarrhea, nausea and vomiting.   Genitourinary:  Negative for dysuria and hematuria.   Musculoskeletal:  Negative for arthralgias and back pain.   Skin:  Negative for color change and rash.   Neurological:  Negative for seizures and syncope.   Psychiatric/Behavioral:          Paranoia, delusions   All other systems reviewed and are negative.      Past Medical and Surgical History:   Past Medical History:   Diagnosis Date    Acute respiratory failure with hypoxia (HCC)     Basal cell carcinoma     Cancer (HCC)     NSTEMI (non-ST elevated myocardial infarction) (HCC) 2014    Pneumonia     Last Assessed:  10/7/14    Polymyalgia rheumatica (HCC)     Last Assessed:  11/23/12    Polymyalgia rheumatica (HCC) 2011    Septic shock ([facility])     Squamous cell skin cancer 05/24/2022    left frontal scalp anterior     Squamous cell skin cancer 05/24/2022    Vertex of scalp       Past Surgical History:   Procedure Laterality Date    APPENDECTOMY      CORONARY ANGIOPLASTY WITH STENT PLACEMENT      x2    FL RETROGRADE PYELOGRAM  10/29/2023    FL RETROGRADE PYELOGRAM  12/15/2023    HERNIA REPAIR  2000    MOHS SURGERY  07/27/2022    left frontal scalp and vertex of scalp    [facility] CYSTO BLADDER W/URETERAL CATHETERIZATION Left 10/29/2023    Procedure: CYSTOSCOPY RETROGRADE PYELOGRAM WITH INSERTION STENT URETERAL;  Surgeon: Aamir Srinivasan MD;  [facility];  Service: Urology    VA CYSTO/URETERO W/LITHOTRIPSY &INDWELL STENT INSRT Left 12/15/2023    Procedure: CYSTOSCOPY URETEROSCOPY WITH LITHOTRIPSY HOLMIUM LASER, RETROGRADE PYELOGRAM AND INSERTION STENT URETERAL;  Surgeon: Aamir Srinivasan MD;  [facility];  Service: Urology    VA LITHOLAPAXY SMPL/SM <2.5 CM N/A 12/15/2023    Procedure: LITHOLAPAXY HOLMIUM LASER;  Surgeon: Aamir Srinivasan MD;  [facility];  Service: Urology       Meds/Allergies:  Prior to Admission medications    Medication Sig Start Date End Date Taking? Authorizing Provider   acetaminophen (TYLENOL) 325 mg tablet Take 650 mg by mouth Uses 3 x a day    Historical Provider, MD   carboxymethylcellulose (REFRESH PLUS) 0.5 % SOLN 1 drop 2 (two) times a day as needed for dry eyes  Patient not taking: Reported on 1/12/2024    Historical Provider, MD   cefdinir (OMNICEF) 300 mg capsule Take 1 capsule (300 mg total) by mouth every 12 (twelve) hours for 3 days  Patient not taking: Reported on 1/12/2024 1/9/24 1/12/24  Siddharth Ureña PA-C   clopidogrel (PLAVIX) 75 mg tablet TAKE 1 TABLET BY MOUTH EVERY DAY  Patient not taking: Reported on 1/12/2024 3/2/23   Rob Gagnon DO   Glucosamine-Chondroitin 250-200 MG TABS daily [facility]  Patient not taking: Reported on 1/9/2024    Historical Provider, MD   metoprolol succinate (TOPROL-XL) 25 mg 24 hr tablet TAKE 1/2 TABLET  BY MOUTH EVERY DAY  Patient not taking: Reported on 1/12/2024 11/16/23   Rob Gagnon DO   rosuvastatin (CRESTOR) 10 MG tablet TAKE 1 TABLET BY MOUTH EVERY DAY  Patient not taking: Reported on 1/12/2024 1/9/24   Rob Gagnon DO     I have reviewed home medications with patient family member.    Allergies:   Allergies   Allergen Reactions    Iv Dye [Iodinated Contrast Media] Rash     Possible delayed rash February 2022       Social History:  Marital Status: /Civil Union   Occupation:   Patient Pre-hospital Living Situation: Home  Patient Pre-hospital Level of Mobility: walks  Patient Pre-hospital Diet Restrictions:   Substance Use History:   Social History     Substance and Sexual Activity   Alcohol Use Not Currently    Comment: Social     Social History     Tobacco Use   Smoking Status Never   Smokeless Tobacco Never     Social History     Substance and Sexual Activity   Drug Use Never       Family History:  Family History   Problem Relation Age of Onset    Pulmonary embolism Father     Other Father         Transurethral resection of prostate    Stroke Sister        Physical Exam:     Vitals:   Blood Pressure: 143/90 (01/12/24 1727)  Pulse: 74 (01/12/24 1727)  Temperature: 98.1 °F (36.7 °C) (01/12/24 1107)  Respirations: 18 (01/12/24 1727)  Weight - Scale: 71.7 kg (158 lb 1.1 oz) (01/12/24 1104)  SpO2: 98 % (01/12/24 1727)    Physical Exam  Vitals and nursing note reviewed.   Constitutional:       General: He is not in acute distress.     Appearance: He is well-developed. He is not toxic-appearing or diaphoretic.   HENT:      Head: Normocephalic and atraumatic.   Eyes:      General: No scleral icterus.     Conjunctiva/sclera: Conjunctivae normal.   Cardiovascular:      Rate and Rhythm: Normal rate and regular rhythm.      Heart sounds: No murmur heard.     No friction rub. No gallop.   Pulmonary:      Effort: Pulmonary effort is normal. No respiratory distress.      Breath sounds: Normal breath sounds. No  stridor. No wheezing, rhonchi or rales.   Chest:      Chest wall: No tenderness.   Abdominal:      General: There is no distension.      Palpations: Abdomen is soft. There is no mass.      Tenderness: There is no abdominal tenderness. There is no guarding or rebound.      Hernia: No hernia is present.   Musculoskeletal:         General: No swelling or tenderness.      Cervical back: Neck supple.   Skin:     General: Skin is warm and dry.      Capillary Refill: Capillary refill takes less than 2 seconds.   Neurological:      Mental Status: He is alert and oriented to person, place, and time.   Psychiatric:         Mood and Affect: Mood normal.          Additional Data:     Lab Results:  Results from last 7 days   Lab Units 01/12/24  1301   WBC Thousand/uL 5.02   HEMOGLOBIN g/dL 14.0   HEMATOCRIT % 43.6   PLATELETS Thousands/uL 339   NEUTROS PCT % 67   LYMPHS PCT % 20   MONOS PCT % 11   EOS PCT % 1     Results from last 7 days   Lab Units 01/12/24  1301   SODIUM mmol/L 140   POTASSIUM mmol/L 4.0   CHLORIDE mmol/L 106   CO2 mmol/L 27   BUN mg/dL 30*   CREATININE mg/dL 0.88   ANION GAP mmol/L 7   CALCIUM mg/dL 9.4   ALBUMIN g/dL 4.3   TOTAL BILIRUBIN mg/dL 0.63   ALK PHOS U/L 63   ALT U/L 17   AST U/L 23   GLUCOSE RANDOM mg/dL 86                 Results from last 7 days   Lab Units 01/12/24  1301   LACTIC ACID mmol/L 0.7       Lines/Drains:  Invasive Devices       Peripheral Intravenous Line  Duration             Peripheral IV 01/12/24 Right Antecubital <1 day    Peripheral IV 01/12/24 Right Antecubital <1 day                        Imaging: Reviewed radiology reports from this admission including: abdominal/pelvic CT  CT head without contrast   Final Result by Nestor Barbour MD (01/12 1512)      No acute intracranial abnormality.      Chronic microangiopathic ischemic changes.                     Workstation performed: [id number]         CT chest abdomen pelvis wo contrast   Final Result by Nestor Barbour MD  (01/12 6896)      New mild left-sided hydroureteronephrosis without an obstructive ureteral calculus. Findings can be seen with a recently passed left-sided stone or infection. The previously noted distal left ureteral calculus is not seen on the current examination.    Persistent 0.4 cm dependent calculus within the left aspect of the bladder. The previously noted 0.8 cm dependent calculus is not seen on the current examination.               Workstation performed: [id number]         MRI brain w wo contrast    (Results Pending)       EKG and Other Studies Reviewed on Admission:   EKG:  reviewed.    ** Please Note: This note has been constructed using a voice recognition system. **

## 2024-01-13 ENCOUNTER — NURSE TRIAGE (OUTPATIENT)
Dept: OTHER | Facility: OTHER | Age: 89
End: 2024-01-13

## 2024-01-13 ENCOUNTER — DOCUMENTATION (OUTPATIENT)
Dept: FAMILY MEDICINE CLINIC | Facility: CLINIC | Age: 89
End: 2024-01-13

## 2024-01-13 VITALS
BODY MASS INDEX: 23.46 KG/M2 | WEIGHT: 154.76 LBS | DIASTOLIC BLOOD PRESSURE: 61 MMHG | TEMPERATURE: 97.5 F | OXYGEN SATURATION: 97 % | HEART RATE: 94 BPM | HEIGHT: 68 IN | RESPIRATION RATE: 18 BRPM | SYSTOLIC BLOOD PRESSURE: 106 MMHG

## 2024-01-13 PROBLEM — N13.30 HYDRONEPHROSIS: Status: ACTIVE | Noted: 2024-01-13

## 2024-01-13 LAB
ALBUMIN SERPL BCP-MCNC: 4 G/DL (ref 3.5–5)
ALP SERPL-CCNC: 60 U/L (ref 34–104)
ALT SERPL W P-5'-P-CCNC: 16 U/L (ref 7–52)
ANION GAP SERPL CALCULATED.3IONS-SCNC: 7 MMOL/L
AST SERPL W P-5'-P-CCNC: 21 U/L (ref 13–39)
BASOPHILS # BLD AUTO: 0.05 THOUSANDS/ÂΜL (ref 0–0.1)
BASOPHILS NFR BLD AUTO: 1 % (ref 0–1)
BILIRUB SERPL-MCNC: 0.63 MG/DL (ref 0.2–1)
BUN SERPL-MCNC: 26 MG/DL (ref 5–25)
CALCIUM SERPL-MCNC: 9 MG/DL (ref 8.4–10.2)
CHLORIDE SERPL-SCNC: 107 MMOL/L (ref 96–108)
CO2 SERPL-SCNC: 25 MMOL/L (ref 21–32)
CREAT SERPL-MCNC: 0.86 MG/DL (ref 0.6–1.3)
EOSINOPHIL # BLD AUTO: 0.1 THOUSAND/ÂΜL (ref 0–0.61)
EOSINOPHIL NFR BLD AUTO: 2 % (ref 0–6)
ERYTHROCYTE [DISTWIDTH] IN BLOOD BY AUTOMATED COUNT: 15 % (ref 11.6–15.1)
FOLATE SERPL-MCNC: 16.2 NG/ML
GFR SERPL CREATININE-BSD FRML MDRD: 77 ML/MIN/1.73SQ M
GLUCOSE SERPL-MCNC: 98 MG/DL (ref 65–140)
HCT VFR BLD AUTO: 41.1 % (ref 36.5–49.3)
HCYS SERPL-SCNC: 14.6 UMOL/L (ref 5–20)
HGB BLD-MCNC: 13.4 G/DL (ref 12–17)
IMM GRANULOCYTES # BLD AUTO: 0.01 THOUSAND/UL (ref 0–0.2)
IMM GRANULOCYTES NFR BLD AUTO: 0 % (ref 0–2)
LYMPHOCYTES # BLD AUTO: 1.33 THOUSANDS/ÂΜL (ref 0.6–4.47)
LYMPHOCYTES NFR BLD AUTO: 22 % (ref 14–44)
MCH RBC QN AUTO: 27.3 PG (ref 26.8–34.3)
MCHC RBC AUTO-ENTMCNC: 32.6 G/DL (ref 31.4–37.4)
MCV RBC AUTO: 84 FL (ref 82–98)
MONOCYTES # BLD AUTO: 0.64 THOUSAND/ÂΜL (ref 0.17–1.22)
MONOCYTES NFR BLD AUTO: 11 % (ref 4–12)
NEUTROPHILS # BLD AUTO: 3.94 THOUSANDS/ÂΜL (ref 1.85–7.62)
NEUTS SEG NFR BLD AUTO: 64 % (ref 43–75)
NRBC BLD AUTO-RTO: 0 /100 WBCS
PLATELET # BLD AUTO: 310 THOUSANDS/UL (ref 149–390)
PMV BLD AUTO: 10 FL (ref 8.9–12.7)
POTASSIUM SERPL-SCNC: 4.2 MMOL/L (ref 3.5–5.3)
PROT SERPL-MCNC: 7.1 G/DL (ref 6.4–8.4)
RBC # BLD AUTO: 4.9 MILLION/UL (ref 3.88–5.62)
SODIUM SERPL-SCNC: 139 MMOL/L (ref 135–147)
VIT B12 SERPL-MCNC: 282 PG/ML (ref 180–914)
WBC # BLD AUTO: 6.07 THOUSAND/UL (ref 4.31–10.16)

## 2024-01-13 PROCEDURE — 86255 FLUORESCENT ANTIBODY SCREEN: CPT | Performed by: PHYSICIAN ASSISTANT

## 2024-01-13 PROCEDURE — 86596 VOLTAGE-GTD CA CHNL ANTB EA: CPT | Performed by: PHYSICIAN ASSISTANT

## 2024-01-13 PROCEDURE — NC001 PR NO CHARGE: Performed by: INTERNAL MEDICINE

## 2024-01-13 PROCEDURE — 83918 ORGANIC ACIDS TOTAL QUANT: CPT | Performed by: PHYSICIAN ASSISTANT

## 2024-01-13 PROCEDURE — 85025 COMPLETE CBC W/AUTO DIFF WBC: CPT | Performed by: INTERNAL MEDICINE

## 2024-01-13 PROCEDURE — 86341 ISLET CELL ANTIBODY: CPT | Performed by: PHYSICIAN ASSISTANT

## 2024-01-13 PROCEDURE — 80053 COMPREHEN METABOLIC PANEL: CPT | Performed by: INTERNAL MEDICINE

## 2024-01-13 PROCEDURE — 97167 OT EVAL HIGH COMPLEX 60 MIN: CPT

## 2024-01-13 PROCEDURE — 97162 PT EVAL MOD COMPLEX 30 MIN: CPT

## 2024-01-13 PROCEDURE — 99239 HOSP IP/OBS DSCHRG MGMT >30: CPT | Performed by: INTERNAL MEDICINE

## 2024-01-13 PROCEDURE — 83090 ASSAY OF HOMOCYSTEINE: CPT | Performed by: PHYSICIAN ASSISTANT

## 2024-01-13 PROCEDURE — 86051 AQUAPORIN-4 ANTB ELISA: CPT | Performed by: PHYSICIAN ASSISTANT

## 2024-01-13 PROCEDURE — 86618 LYME DISEASE ANTIBODY: CPT | Performed by: PHYSICIAN ASSISTANT

## 2024-01-13 PROCEDURE — 99232 SBSQ HOSP IP/OBS MODERATE 35: CPT | Performed by: STUDENT IN AN ORGANIZED HEALTH CARE EDUCATION/TRAINING PROGRAM

## 2024-01-13 RX ORDER — CEFDINIR 300 MG/1
300 CAPSULE ORAL EVERY 12 HOURS SCHEDULED
Qty: 6 CAPSULE | Refills: 0 | Status: SHIPPED | OUTPATIENT
Start: 2024-01-13 | End: 2024-01-16

## 2024-01-13 RX ORDER — METOPROLOL SUCCINATE 25 MG/1
12.5 TABLET, EXTENDED RELEASE ORAL DAILY
Status: DISCONTINUED | OUTPATIENT
Start: 2024-01-13 | End: 2024-01-13 | Stop reason: HOSPADM

## 2024-01-13 RX ORDER — PRAVASTATIN SODIUM 80 MG/1
80 TABLET ORAL
Status: DISCONTINUED | OUTPATIENT
Start: 2024-01-13 | End: 2024-01-13 | Stop reason: HOSPADM

## 2024-01-13 RX ORDER — QUETIAPINE FUMARATE 25 MG/1
25 TABLET, FILM COATED ORAL DAILY PRN
Status: DISCONTINUED | OUTPATIENT
Start: 2024-01-13 | End: 2024-01-13 | Stop reason: HOSPADM

## 2024-01-13 RX ORDER — CLOPIDOGREL BISULFATE 75 MG/1
75 TABLET ORAL DAILY
Status: DISCONTINUED | OUTPATIENT
Start: 2024-01-13 | End: 2024-01-13 | Stop reason: HOSPADM

## 2024-01-13 RX ADMIN — CLOPIDOGREL BISULFATE 75 MG: 75 TABLET ORAL at 09:30

## 2024-01-13 RX ADMIN — CYANOCOBALAMIN TAB 500 MCG 1000 MCG: 500 TAB at 11:44

## 2024-01-13 NOTE — ASSESSMENT & PLAN NOTE
Mild left hydronephrosis on CT without obstruction.  This may be residual from recent stent/stone removal.  He was in the urology office and underwent cystoscopic stent removal on 1/9/2024  Urinalysis showed occasional bacteria only and trace blood  Complete cefdinir as prescribed  Urology follow-up as scheduled on 1/31/2024

## 2024-01-13 NOTE — PROGRESS NOTES
"Pt with noted paranoia. Upon entry into room, author introduced self and pt told author to \"get out\". Wife at bedside, asked to speak with her, pt stating \"dont leave and dont talk to her.\" Spoke with daughter in hallway, stating pt's po intake has decreased since October when he had developed sepsis. Was taking ensure supplements which was one of the few things he consumed well. Now with paranoia about only eating foods made in the [place] Since Wednesday however he has been eating very little to nothing. 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 Allergy to raw celery causing hives, can have celery seasoning/cooked without issues. Chart review of weight hx: 11/3/22 174lb, 4/20/23 179lb, 12/15/23 161lb, 1/13/24 154lb, 11.5% weight loss x > 1 year nonsignificant including 4.3% weight loss x 1 mo nonsignificant. Unable to complete physical exam at this time, pt trying to wander in hallway and calling for nurse/arguing with wife and daughter.     Continue regular diet as ordered. +ensure plus high PRO BID.   Nutrition will follow up.   "

## 2024-01-13 NOTE — ASSESSMENT & PLAN NOTE
Persistent paranoia and forgetfulness, far from previous baseline. Without acute stroke or obvious sign of infection.  Discussed with neurology who felt that he has underlying cognitive dysfunction at baseline  Stroke has been ruled out.  Low suspicion for encephalitis.  LP/EEG not recommended at this time  Will DC on B12 supplementation and refer to [facility]y memory center and [facility] for further evaluation  He will go home with his family who will provide 24/7 care

## 2024-01-13 NOTE — PROGRESS NOTES
"[facility]  Progress Note  Name: Jeremías Verde I  MRN: [id number]  Unit/Bed#: E4 -01 I Date of Admission: 1/12/2024   Date of Service: 1/13/2024 I Hospital Day: 1    Assessment/Plan   * Altered mental status  Assessment & Plan  Persistent paranoia and forgetfulness, far from previous baseline. Without acute stroke or obvious sign of infection.  He did have recent urologic procedure with stent removal but his  urinalysis last night did not show any sign of infection.  Although the family mentioned that he was \"normal\" before his episode of severe urosepsis, he may have underlying cognitive decline and encephalopathy.  Discussed with Dr. Brewer.  Will start with as needed Seroquel for delirium as needed.  If he refuses medication by mouth, next option would be IM Zyprexa  His paranoia and forgetfulness  worsened overnight probably  due to being in the hospital and in unfamiliar  environment also  Agree with formal neurocognitive  evaluation but this is an outpatient evaluation.  Vitamin B12 level is on the low normal side so we will start supplementation.  Geriatric consult placed but they are not here on weekends    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  Assessment & Plan  Continue Plavix and pravastatin.    Benign essential hypertension  Assessment & Plan  Continue Toprol-XL 12.5 mg daily    Hyperlipidemia  Assessment & Plan  Crestor switched to pravastatin while in the hospital.    Hydronephrosis  Assessment & Plan  Mild left hydronephrosis on CT without obstruction.  This may be residual from recent stent/stone removal.  He was in the urology office and underwent cystoscopic stent removal on 1/9/2024         VTE Pharmacologic Prophylaxis:    lovenox    Patient Centered Rounds: I performed bedside rounds with nursing staff today.   Discussions with Specialists or Other Care Team Provider: Discussed with neurology Dr. Brewer regarding his worsening " mental status overnight and medications.  Reviewed his chart in detail.  Education and Discussions with Family / Patient: Spoke with wife, grandson and daughter at the bedside.    Current Length of Stay: 1 day(s)  Current Patient Status: Inpatient   Certification Statement: The patient will continue to require additional inpatient hospital stay due to change in mental status  Discharge Plan: Anticipate discharge in >72 hrs to home with home services. ? Geriatric psych     Code Status: Level 1 - Full Code    Subjective:   Patient fixated on certain topics.  He said he wants  to be able to take care of his wife, he lost his [employer] insurance, he wants to be able to keep on driving, not wanting to take medications not made in the [place].  During our  extended conversation, I noticed that he had tangential speech and would jump from 1 topic to another    Objective:     Vitals:   Temp (24hrs), Av.8 °F (36.6 °C), Min:97.6 °F (36.4 °C), Max:98 °F (36.7 °C)    Temp:  [97.6 °F (36.4 °C)-98 °F (36.7 °C)] 97.8 °F (36.6 °C)  HR:  [61-80] 74  Resp:  [18] 18  BP: (120-154)/(61-94) 132/78  SpO2:  [95 %-99 %] 97 %  Body mass index is 23.53 kg/m².     Input and Output Summary (last 24 hours):   No intake or output data in the 24 hours ending 24 1119    Physical Exam:   Physical Exam  Vitals reviewed.   Constitutional:       Appearance: He is not ill-appearing.   HENT:      Head: Normocephalic and atraumatic.      Nose: No congestion or rhinorrhea.   Eyes:      General: No scleral icterus.  Cardiovascular:      Rate and Rhythm: Normal rate and regular rhythm.   Pulmonary:      Breath sounds: No wheezing or rhonchi.   Abdominal:      Palpations: Abdomen is soft.      Tenderness: There is no abdominal tenderness. There is no guarding.   Musculoskeletal:      Right lower leg: No edema.      Left lower leg: No edema.   Skin:     General: Skin is warm and dry.   Neurological:      Mental Status: He is disoriented.    Psychiatric:         Mood and Affect: Affect is angry.         Speech: Speech is tangential.         Behavior: Behavior is agitated and hyperactive.         Thought Content: Thought content is paranoid.         Additional Data:     Labs:  Results from last 7 days   Lab Units 01/13/24  0706   WBC Thousand/uL 6.07   HEMOGLOBIN g/dL 13.4   HEMATOCRIT % 41.1   PLATELETS Thousands/uL 310   NEUTROS PCT % 64   LYMPHS PCT % 22   MONOS PCT % 11   EOS PCT % 2     Results from last 7 days   Lab Units 01/13/24  0706   SODIUM mmol/L 139   POTASSIUM mmol/L 4.2   CHLORIDE mmol/L 107   CO2 mmol/L 25   BUN mg/dL 26*   CREATININE mg/dL 0.86   ANION GAP mmol/L 7   CALCIUM mg/dL 9.0   ALBUMIN g/dL 4.0   TOTAL BILIRUBIN mg/dL 0.63   ALK PHOS U/L 60   ALT U/L 16   AST U/L 21   GLUCOSE RANDOM mg/dL 98                 Results from last 7 days   Lab Units 01/12/24  1301   LACTIC ACID mmol/L 0.7       Lines/Drains:  Invasive Devices       Peripheral Intravenous Line  Duration             Peripheral IV 01/12/24 Right Antecubital <1 day                          Imaging: Reviewed radiology reports from this admission including: abdominal/pelvic CT, CT head, and MRI brain    Recent Cultures (last 7 days):   Results from last 7 days   Lab Units 01/12/24  1301   BLOOD CULTURE  Received in Microbiology Lab. Culture in Progress.  Received in Microbiology Lab. Culture in Progress.       Last 24 Hours Medication List:   Current Facility-Administered Medications   Medication Dose Route Frequency Provider Last Rate    cefdinir  300 mg Oral Q12H [facility] Rosendo Banai, DO      clopidogrel  75 mg Oral Daily Rosendo Banai, DO      cyanocobalamin  1,000 mcg Oral Daily Coleman Jacobsen MD      enoxaparin  40 mg Subcutaneous Daily Rosendo Banai, DO      metoprolol succinate  12.5 mg Oral Daily Rosendo Banai, DO      pravastatin  80 mg Oral Daily With Dinner Rosendo Amaraai, DO      QUEtiapine  25 mg Oral Daily PRN Coleman Jacobsen MD       sodium chloride  1,000 mL Intravenous Once Ras Haynes MD Stopped (01/12/24 9411)        Today, Patient Was Seen By: Coleman Jacobsen MD    **Please Note: This note may have been constructed using a voice recognition system.**

## 2024-01-13 NOTE — PLAN OF CARE
Problem: Nutrition/Hydration-ADULT  Goal: Nutrient/Hydration intake appropriate for improving, restoring or maintaining nutritional needs  Description: Monitor and assess patient's nutrition/hydration status for malnutrition. Collaborate with interdisciplinary team and initiate plan and interventions as ordered.  Monitor patient's weight and dietary intake as ordered or per policy. Utilize nutrition screening tool and intervene as necessary. Determine patient's food preferences and provide high-protein, high-caloric foods as appropriate.     INTERVENTIONS:  - Monitor oral intake, urinary output, labs, and treatment plans  - Assess nutrition and hydration status and recommend course of action  - Evaluate amount of meals eaten  - Assist patient with eating if necessary   - Allow adequate time for meals  - Recommend/ encourage appropriate diets, oral nutritional supplements, and vitamin/mineral supplements  - Order, calculate, and assess calorie counts as needed  - Recommend, monitor, and adjust tube feedings and TPN/PPN based on assessed needs  - Assess need for intravenous fluids  - Provide specific nutrition/hydration education as appropriate  - Include patient/family/caregiver in decisions related to nutrition  1/13/2024 1125 by Colleen Hughes-Behler, RN  Outcome: Progressing  1/13/2024 1125 by Colleen Hughes-Behler, RN  Outcome: Progressing

## 2024-01-13 NOTE — PHYSICAL THERAPY NOTE
PHYSICAL THERAPY EVALUATION          Patient Name: Jeremías Verde  Today's Date: 1/13/2024 01/13/24 0838   PT Last Visit   PT Visit Date 01/13/24   Note Type   Note type Evaluation   Pain Assessment   Pain Assessment Tool 0-10   Pain Score No Pain   Restrictions/Precautions   Other Precautions Cognitive;Chair Alarm;Bed Alarm   Home Living   Type of Home House   Home Layout Two level;Bed/bath upstairs;Stairs to enter without rails   Bathroom Shower/Tub Tub/shower unit   Bathroom Equipment Grab bars in shower   Home Equipment Cane   Additional Comments 1 SAM   Prior Function   Level of [place] Independent with ADLs;Independent with functional mobility;Independent with IADLS   Lives With Spouse   IADLs Independent with driving;Independent with medication management   Falls in the last 6 months 0   Vocational Retired   Comments indep without an AD.   General   Additional Pertinent History pt admitted 1/12/24 for AMS. up and oob orders. PMHx significant for stroke, CAD, BCC, OA, vertigo   Family/Caregiver Present Yes  (spouse, dtr, grandson)   Cognition   Overall Cognitive Status Impaired   Arousal/Participation Cooperative   Orientation Level Oriented X4;Disoriented to situation  (general to situation)   Memory Decreased recall of precautions;Decreased recall of recent events   Following Commands Follows one step commands without difficulty   Comments paranoid. states he is going to die from breathing poisoned air. frequently refers to being contaminated. labile moods   Bed Mobility   Supine to Sit 6  Modified independent   Additional items Bedrails   Transfers   Sit to Stand 6  Modified independent   Additional items Increased time required   Stand to Sit 7  Independent   Ambulation/Elevation   Gait pattern WNL;Excessively slow   Gait Assistance 5  Supervision  (distant S)   Additional items Assist x 1   Assistive Device None   Distance 400'   Balance   Static Sitting  Normal   Dynamic Sitting Normal   Static Standing Good   Dynamic Standing Fair +   Ambulatory Fair +   Endurance Deficit   Endurance Deficit No   Activity Tolerance   Activity Tolerance Patient tolerated treatment well   Nurse Made Aware Ivelisse RN   Assessment   Prognosis Guarded   Problem List Decreased cognition;Impaired judgement;Decreased safety awareness   Assessment Jeremías Verde is a 88 y.o. male admitted to [facility] on 1/12/2024 for Altered mental status. PT was consulted and pt was seen on 1/13/2024 for mobility assessment and d/c planning. Pt presents w low fall risk. At baseline is indep and maintains active lifestyle per dtr. Pt is currently functioning at a mod I- indep level for bed mobility and transfers, distant S for ambulation. Pt demonstrated ability to ambulate unlimited community distances without difficulty. Does present w deficits of cognition, specifically paranoid thoughts impacting mood and participation. Current sx w no impact on functional abilities. Does not demonstrate need for continued IP PT services; maintain on restorative.   Barriers to Discharge None   Plan   PT Frequency   (d/c PT; maintain on restorative)   Discharge Recommendation   Rehab Resource Intensity Level, PT No post-acute rehabilitation needs   AM-PAC Basic Mobility Inpatient   Turning in Flat Bed Without Bedrails 4   Lying on Back to Sitting on Edge of Flat Bed Without Bedrails 4   Moving Bed to Chair 4   Standing Up From Chair Using Arms 4   Walk in Room 4   Climb 3-5 Stairs With Railing 3   Basic Mobility Inpatient Raw Score 23   Basic Mobility Standardized Score 50.88   Highest Level Of Mobility   JH-HLM Goal 7: Walk 25 feet or more   JH-HLM Achieved 8: Walk 250 feet ot more   End of Consult   Patient Position at End of Consult Seated edge of bed;Bed/Chair alarm activated;All needs within reach     History: co - morbidities including age, cognition, current experience including fall risk  Exam: impairments in systems  including multiple body structures involved; neuromuscular (balance, gait, transfers), cognition; am-pac  Clinical: stable/unpredictable  Complexity: moderate    Lamar Mccarty, PT

## 2024-01-13 NOTE — OCCUPATIONAL THERAPY NOTE
Occupational Therapy Evaluation     Patient Name: Jeremías Verde  Today's Date: 1/13/2024  Problem List  Principal Problem:    Altered mental status  Active Problems:    Benign essential hypertension    Hyperlipidemia    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    Hydronephrosis    Past Medical History  Past Medical History:   Diagnosis Date    Acute respiratory failure with hypoxia (HCC)     Basal cell carcinoma     Cancer (HCC)     NSTEMI (non-ST elevated myocardial infarction) (HCC) 2014    Pneumonia     Last Assessed:  10/7/14    Polymyalgia rheumatica (HCC)     Last Assessed:  11/23/12    Polymyalgia rheumatica (HCC) 2011    Septic shock (HCC)     Squamous cell skin cancer 05/24/2022    left frontal scalp anterior    Squamous cell skin cancer 05/24/2022    Vertex of scalp     Past Surgical History  Past Surgical History:   Procedure Laterality Date    APPENDECTOMY      CORONARY ANGIOPLASTY WITH STENT PLACEMENT      x2    FL RETROGRADE PYELOGRAM  10/29/2023    FL RETROGRADE PYELOGRAM  12/15/2023    HERNIA REPAIR  2000    MOHS SURGERY  07/27/2022    left frontal scalp and vertex of scalp    FL CYSTO BLADDER W/URETERAL CATHETERIZATION Left 10/29/2023    Procedure: CYSTOSCOPY RETROGRADE PYELOGRAM WITH INSERTION STENT URETERAL;  Surgeon: Aamir Srinivasan MD;  Location: [facility];  Service: Urology    FL CYSTO/URETERO W/LITHOTRIPSY &INDWELL STENT INSRT Left 12/15/2023    Procedure: CYSTOSCOPY URETEROSCOPY WITH LITHOTRIPSY HOLMIUM LASER, RETROGRADE PYELOGRAM AND INSERTION STENT URETERAL;  Surgeon: Aamir Srinivasan MD;  Location: [facility];  Service: Urology    FL LITHOLAPAXY SMPL/SM <2.5 CM N/A 12/15/2023    Procedure: LITHOLAPAXY HOLMIUM LASER;  Surgeon: Aamir Srinivasan MD;  Location[facility];  Service: Urology         01/13/24 1029   OT Last Visit   OT Visit Date 01/13/24   Note Type   Note type Evaluation   Pain Assessment   Pain Assessment Tool 0-10   Pain  "Score No Pain   Restrictions/Precautions   Weight Bearing Precautions Per Order No   Other Precautions Cognitive;Chair Alarm;Bed Alarm;Telemetry   Home Living   Type of Home House   Home Layout Two level;Bed/bath upstairs;Stairs to enter without rails   Bathroom Shower/Tub Tub/shower unit   Bathroom Equipment Grab bars in shower   Home Equipment Cane   Additional Comments 1 SAM   Prior Function   Level of [place] Independent with ADLs;Independent with functional mobility;Independent with IADLS   Lives With Spouse   IADLs Independent with driving;Independent with medication management   Falls in the last 6 months 0   Vocational Retired   Comments no AD use. Denies use of DME. Information gathered from PT evaluation as pt somewhat reluctant to answer questions or providing inaccurate answers (states he lives w/ his 9 month old son and 5 year old daughter, calls his wife his girlfriend and unable to provide name). States he lives in an apt w/ 0 SAM. He is A&Ox3 however paranoid, somewhat disorganized speech.   General   Additional Pertinent History Comorbidities affecting pt’s functional performance include a significant PMH of: HTN, HLD, cerebral arteriosclerosis w/ hx of previous cerebellar stroke, CAD, basal cell carcinoma, BPH, hx of heart artery stent, OA b/l hands, balance problems, heart palpitations, vertigo, urolithiasis, glaucoma.  Patient with active OT orders and activity orders for Up and OOB as tolerated .   Family/Caregiver Present Yes  (spouse, son?)   Subjective   Subjective \"I can't touch that, it's contaminated.\" \"Oh - so you can't let me leave and the doctors are having a long weekend\" \"My memory has been bad since I was 41.\"   ADL   Where Assessed Edge of bed   Eating Assistance 7  Independent   LB Dressing Assistance 5  Supervision/Setup   LB Dressing Deficit Supervision/safety   Bed Mobility   Additional Comments Pt greeted sitting EOB, left seated EOB w/ family at bedside.   Transfers   Sit " to Stand 6  Modified independent   Additional items Bedrails   Stand to Sit 7  Independent   Functional Mobility   Additional Comments Declined. States he ambulated w/ PT. Does not wish to be touched.   Balance   Static Sitting Normal   Dynamic Sitting Normal   Static Standing Good   Dynamic Standing Fair +   Ambulatory Fair +   Activity Tolerance   Activity Tolerance Patient tolerated treatment well   Medical Staff Made Aware PT   Nurse Made Aware Arpita VALENCIA   RUE Assessment   RUE Assessment X  (Pt declined formal assessment, does not wish to be touched. Observed pt functional reach, ROM WFL completing tasks)   LUE Assessment   LUE Assessment X  (Pt declined formal assessment, does not wish to be touched. Observed pt functional reach, ROM WFL completing tasks)   Hand Function   Gross Motor Coordination Functional   Fine Motor Coordination Functional   Sensation   Light Touch No apparent deficits   Proprioception   Proprioception No apparent deficits   Vision-Basic Assessment   Current Vision Wears glasses all the time   Vision - Complex Assessment   Ocular Range of Motion Intact   Perception   Inattention/Neglect Appears intact   Cognition   Overall Cognitive Status Impaired   Arousal/Participation Alert;Responsive;Cooperative   Attention Attends with cues to redirect   Orientation Level Oriented to person;Oriented to place;Oriented to time   Memory Decreased recall of precautions;Decreased recall of recent events   Following Commands Follows one step commands without difficulty   Comments Pt experiencing paranoia t/o evaluation. He is somewhat agreeable to completing MOCA although declines multiple sections (in part because he does not want to touch the paper/pen for fear of contamination).   Cognition Assessment Tools MOCA   Score 13   Assessment   Limitation Decreased Safe judgement during ADL;Decreased endurance;Decreased ADL status;Decreased high-level ADLs;Mood limitation   Prognosis Fair   Assessment Patient  is a 88 y.o. year old male seen for OT eval s/p admit to [facility] on 2024 with AMS.  Personal factors affecting pt at time of IE include: difficulty performing ADLs and IADLs, difficulty with functional mobility/transfers.  Prior to admission, was (I) with ADLs and was (I) with IADLs. Pt an inconsistent historian thus utilized information provided from PT evaluation. He is alert to self, place and time. Reluctant to answer why he is here, states this hospital is keeping him here. Completed MOCA, w/ pt scoring 13/30, scoring in moderate impairment range. Unclear accuracy of assessment given pt refusal to complete sections 2/2 paranoia. Limited assessment given pt did not want to be touched by therapist. He is able to complete STS from EOB w/ Eloy. Declines taking steps. He is observed putting his shoes on without physical assistance. ROM observed to be WFL through functional reach. Pt's family expresses this is not his baseline mentation, mentation from 2023 admission listed as WFL, able to follow commands, memory WFL. Based on the aforementioned OT evaluation, functional performance deficits, and assessments, pt has been identified as a high complexity evaluation. At this time, recommendation for pt to receive post-acute rehabilitation services at a Level III (minimum resource intensity) due to above deficits and CLOF. OT will continue to follow pt 1-2x/wk to address the following goals to  w/in 10-14 days.    24/ S recommended at time of d/c.   Goals   Patient Goals to leave   LTG Time Frame 10-14   Plan   Treatment Interventions ADL retraining;Endurance training;Cognitive reorientation;Patient/family training;Continued evaluation;Activityengagement   Goal Expiration Date 24   OT Treatment Day 0   OT Frequency 1-2x/wk   Discharge Recommendation   Rehab Resource Intensity Level, OT III (Minimum Resource Intensity)  (OP OT cog?)   Additional Comments  Pt seen today as RN reports he is refusing  assessment and medications until he is seen by occupational therapy, per RN report.   AM-PAC Daily Activity Inpatient   Lower Body Dressing 3   Bathing 3   Toileting 3   Upper Body Dressing 4   Grooming 4   Eating 4   Daily Activity Raw Score 21   Daily Activity Standardized Score (Calc for Raw Score >=11) 44.27   AM-PAC Applied Cognition Inpatient   Following a Speech/Presentation 3   Understanding Ordinary Conversation 4   Taking Medications 1   Remembering Where Things Are Placed or Put Away 2   Remembering List of 4-5 Errands 2   Taking Care of Complicated Tasks 1   Applied Cognition Raw Score 13   Applied Cognition Standardized Score 30.46   MOCA   Version 7.1   Visuopatial/Executive 0  (Declined)   Naming 3   Memory 0   Attention: Digits 2   Attention: Letters 1   Attention: Serial 1   Language: Repeat 1   Language: Fluency 0   Abstraction 1   Delayed Recall 0  (declined to complete)   Orientation 5   Does patient have less than or equal to 12 years of education? 1   MOCA Total Score 15   MOCA Comments Pt refused to complete identified above sections due to paranoia- he did not want to touch pen/paper for fear of contamination.   End of Consult   Education Provided Yes;Family or social support of family present for education by provider   Patient Position at End of Consult Seated edge of bed;All needs within reach   Nurse Communication Nurse aware of consult     Occupational Therapy goals: In 7-14 days:     1- Pt will verbalize 3 potential fall hazards and identify appropriate compensatory techniques to decrease fall risk in home environment .   2- Patient will verbalize and demonstrate good body mechanics and joint protection techniques during ADLs/IADLs with no verbal cues   3- Pt will complete bed mobility at a Mod I level w/ G balance/safety demonstrated to decrease caregiver assistance required   4- Patient will increase OOB/ sitting tolerance to 2-4 hours per day for increased participation in self care  and leisure tasks with no s/s of exertion.   5-Patient will increase standing tolerance time to 5 minutes with unilateral UE support to complete sink level ADLs@ mod I level    6- Pt will improve functional transfers to Mod I on/off all surfaces using DME as needed w/ G balance/safety   7- Patient will complete UB ADLs with Eloy utilizing appropriate DME/AE PRN   8- Patient will complete LB ADLs with Eloy utilizing appropriate DME/AE PRN   9- Patient will complete toileting tasks with Eloy with G hygiene/thoroughness utilizing appropriate DME/AE PRN   10- Pt will improve functional mobility during ADL/IADL/leisure tasks to Mod I using DME as needed w/ G balance/safety    11- Pt will be attentive 100% of the time during ongoing cognitive assessment w/ G participation to assist w/ safe d/c planning/recommendations   12- Pt will participate in simulated IADL management task to increase independence to Mod I w/ G safety and endurance   13- Pt will increase BUE strength by 1MM grade via AROM/AAROM/PROM exercises to increase independence in ADLs and transfers    14- Pt will follow 100% simple one step verbal commands and be A/Ox4 consistently t/o use of external environmental cues w/ mod I      Sammie Bess, OTR/L

## 2024-01-13 NOTE — PROGRESS NOTES
"Progress Note - Neurology   Jeremías Verde 88 y.o. male MRN: [id number]  Unit/Bed#: E4 -01 Encounter: [id number]      Assessment/Plan   * Altered mental status  Assessment & Plan  Jeremías Verde is a 88 y.o. male with HTN, HLD, DM type II, prior Right cerebellar infarct, polymyalgia rheumatica, basal cell carcinoma s/p resection, CAD s/p stent on Plavix, and recent hospitalization for urosepsis (October 2023) who was referred to [place] ED by his PCP for worsening mental status, deteriorating over the last few months.     Of note, patient was hospitalized for urosepsis secondary to a kidney stone in October 2023.  Since this admission, patient's mental status has deteriorated. He was evaluated by his PCP who noted altered mental status and referred him to the ED. Family reports patient has been making paranoid comments about his medications, resulting in patient refusing medications, as well as weight loss.    Workup:  - CT head: Unremarkable for acute abnormalities  - CT CAP:  \"New mild left-sided hydroureteronephrosis without an obstructive ureteral calculus. Findings can be seen with a recently passed left-sided stone or infection. The previously noted distal left ureteral calculus is not seen on the current examination.   Persistent 0.4 cm dependent calculus within the left aspect of the bladder. The previously noted 0.8 cm dependent calculus is not seen on the current examination.\"  - MRI brain w wo: Unremarkable for evidence of acute intracranial abnormalities; chronic right cerebellar infarct noted    - Labs on presentation:  UA: Negative nitrite, 0-1 WBC, occasional bacteria  Vitamin B12 low, 282  Labs WNL: RDU, flu/COVID/RSV, TSH, lactic acid, troponin, ethanol level, ammonia, folate    Etiology remains unclear at this point.  MRI brain unremarkable for stroke. Concern for underlying cognitive impairment at baseline, possibly noticed by family.  Found to have low vitamin B12, possibly contributing " "to worsening of suspected baseline cognitive impairment. Low suspicion for encephalitis as patient appears to be intermittently agitated rather than continuously/worsening sedation, however will send off autoimmune/paraneoplastic panel.  Low suspicion for CNS infection.  Low suspicion for seizures.    Plan:  - Would not recommend routine EEG at this time  - Will hold off on LP at this time; can consider if patient worsens  - Pending: Blood cultures x 2, vitamin B1, homocystine, methylmalonic acid, Lyme total AB, autoimmune/paraneoplastic panel  - Delirium precautions  - Recommend follow-up outpatient with neurology memory clinic and geriatrics  - Medical management and supportive care per primary team, notify with changes  - No further inpatient recommendations at this time; discussed extensively with patient and family at bedside      Jeremías Verde will need follow up in 6-8 weeks with movement disorder and memory attending. He will not require outpatient neurological testing.    Subjective:   Per discussion with primary team this morning, patient reportedly more delirious and agitated.  Patient does better when his family is in the room, however becomes agitated when they leave.  Per primary team, family reporting that prior to the admission in October, patient was functioning well, able to perform his ADLs independently.  However since that admission patient has not been the same.    On exam, patients family at bedside report patient is doing much better now than he had been this morning. Apparently patient was very agitated this morning, asking for his family, meanwhile his wife stayed overnight and the patient had not recognized her.     Vitals: Blood pressure 106/61, pulse 94, temperature 97.5 °F (36.4 °C), temperature source Temporal, resp. rate 18, height 5' 8\" (1.727 m), weight 70.2 kg (154 lb 12.2 oz), SpO2 97%.,Body mass index is 23.53 kg/m².    Physical Exam  Vitals and nursing note reviewed. "   Constitutional:       General: He is not in acute distress.     Appearance: He is not ill-appearing, toxic-appearing or diaphoretic.   HENT:      Head: Normocephalic and atraumatic.      Comments: Skin lesion resection noted on top of head   Eyes:      General: No scleral icterus.        Right eye: No discharge.         Left eye: No discharge.      Extraocular Movements: EOM normal.      Conjunctiva/sclera: Conjunctivae normal.   Skin:     General: Skin is warm and dry.   Neurological:      Mental Status: He is alert.      Motor: Motor strength is normal.  Psychiatric:      Comments: Tangential        Neurologic Exam     Mental Status   Patient is alert, sitting up in bed. Oriented to person, place, month, and year. Able to name all objects provided, follows central and appendicular commands. Does not always answer questions appropriately. Patient is tangential and often does not make sense. No dysarthria or aphasia noted.      Cranial Nerves     CN II   Visual fields full to confrontation.     CN III, IV, VI   Extraocular motions are normal.   Nystagmus: none   Upgaze: normal  Downgaze: normal  Conjugate gaze: present    CN V   Facial sensation intact.     CN VII   Facial expression full, symmetric.     CN VIII   Hearing: intact    CN XI   CN XI normal.     CN XII   Tongue deviation: none    Motor Exam   Muscle bulk: normal  Overall muscle tone: normal  Right arm pronator drift: absent  Left arm pronator drift: absent    Strength   Strength 5/5 throughout.     Sensory Exam   Light touch normal.     Gait, Coordination, and Reflexes     Tremor   Resting tremor: absent  No ataxia or dysmetria noted in BUE finger to nose testing    No involuntary movements or rhythmic seizure like activity noted throughout exam      Lab Results: I have personally reviewed pertinent reports.  Recent Results (from the past 24 hour(s))   HS Troponin I 2hr    Collection Time: 01/12/24  5:07 PM   Result Value Ref Range    hs TnI 2hr 14  "\"Refer to ACS Flowchart\"- see link ng/L    Delta 2hr hsTnI 1 <20 ng/L   Vitamin B12    Collection Time: 01/12/24  5:07 PM   Result Value Ref Range    Vitamin B-12 282 180 - 914 pg/mL   Ammonia    Collection Time: 01/12/24  5:07 PM   Result Value Ref Range    Ammonia 15 (L) 18 - 72 umol/L   Folate    Collection Time: 01/12/24  5:07 PM   Result Value Ref Range    Folate 16.2 >5.9 ng/mL   Comprehensive metabolic panel    Collection Time: 01/13/24  7:06 AM   Result Value Ref Range    Sodium 139 135 - 147 mmol/L    Potassium 4.2 3.5 - 5.3 mmol/L    Chloride 107 96 - 108 mmol/L    CO2 25 21 - 32 mmol/L    ANION GAP 7 mmol/L    BUN 26 (H) 5 - 25 mg/dL    Creatinine 0.86 0.60 - 1.30 mg/dL    Glucose 98 65 - 140 mg/dL    Calcium 9.0 8.4 - 10.2 mg/dL    AST 21 13 - 39 U/L    ALT 16 7 - 52 U/L    Alkaline Phosphatase 60 34 - 104 U/L    Total Protein 7.1 6.4 - 8.4 g/dL    Albumin 4.0 3.5 - 5.0 g/dL    Total Bilirubin 0.63 0.20 - 1.00 mg/dL    eGFR 77 ml/min/1.73sq m   CBC and differential    Collection Time: 01/13/24  7:06 AM   Result Value Ref Range    WBC 6.07 4.31 - 10.16 Thousand/uL    RBC 4.90 3.88 - 5.62 Million/uL    Hemoglobin 13.4 12.0 - 17.0 g/dL    Hematocrit 41.1 36.5 - 49.3 %    MCV 84 82 - 98 fL    MCH 27.3 26.8 - 34.3 pg    MCHC 32.6 31.4 - 37.4 g/dL    RDW 15.0 11.6 - 15.1 %    MPV 10.0 8.9 - 12.7 fL    Platelets 310 149 - 390 Thousands/uL    nRBC 0 /100 WBCs    Neutrophils Relative 64 43 - 75 %    Immat GRANS % 0 0 - 2 %    Lymphocytes Relative 22 14 - 44 %    Monocytes Relative 11 4 - 12 %    Eosinophils Relative 2 0 - 6 %    Basophils Relative 1 0 - 1 %    Neutrophils Absolute 3.94 1.85 - 7.62 Thousands/µL    Immature Grans Absolute 0.01 0.00 - 0.20 Thousand/uL    Lymphocytes Absolute 1.33 0.60 - 4.47 Thousands/µL    Monocytes Absolute 0.64 0.17 - 1.22 Thousand/µL    Eosinophils Absolute 0.10 0.00 - 0.61 Thousand/µL    Basophils Absolute 0.05 0.00 - 0.10 Thousands/µL     Imaging Studies: I have personally " reviewed pertinent reports and I have personally reviewed pertinent films in PACS.    EKG, Pathology, and Other Studies: I have personally reviewed pertinent reports.    VTE Prophylaxis: Enoxaparin (Lovenox)    Counseling / Coordination of Care  Total time spent today 39 minutes.  Greater than 50% of total time was spent with the patient and/or family counseling and/or coordination of care.  A description of the counseling/coordination of care:  Patient was seen and evaluated.  Discussed with attending.  Chart reviewed thoroughly including laboratory and imaging studies.  Plan of care discussed with patient and primary team. Discussed MRI brain results with patient and family at bedside as well as recommendations for a couple more labs to be obtained prior to discharge.     Dictation voice to text software has been used in the creation of this document.  Please consider this in light of any contextual or grammatical errors.

## 2024-01-13 NOTE — ASSESSMENT & PLAN NOTE
Mild left hydronephrosis on CT without obstruction.  This may be residual from recent stent/stone removal.  He was in the urology office and underwent cystoscopic stent removal on 1/9/2024

## 2024-01-13 NOTE — NURSING NOTE
Pt very agitated. Pt states room is contaminated and refusing medications, pt also wont allow assessment

## 2024-01-13 NOTE — PLAN OF CARE
Problem: Nutrition/Hydration-ADULT  Goal: Nutrient/Hydration intake appropriate for improving, restoring or maintaining nutritional needs  Description: Monitor and assess patient's nutrition/hydration status for malnutrition. Collaborate with interdisciplinary team and initiate plan and interventions as ordered.  Monitor patient's weight and dietary intake as ordered or per policy. Utilize nutrition screening tool and intervene as necessary. Determine patient's food preferences and provide high-protein, high-caloric foods as appropriate.     INTERVENTIONS:  - Monitor oral intake, urinary output, labs, and treatment plans  - Assess nutrition and hydration status and recommend course of action  - Evaluate amount of meals eaten  - Assist patient with eating if necessary   - Allow adequate time for meals  - Recommend/ encourage appropriate diets, oral nutritional supplements, and vitamin/mineral supplements  - Order, calculate, and assess calorie counts as needed  - Recommend, monitor, and adjust tube feedings and TPN/PPN based on assessed needs  - Assess need for intravenous fluids  - Provide specific nutrition/hydration education as appropriate  - Include patient/family/caregiver in decisions related to nutrition  1/13/2024 1413 by Colleen Hughes-Behler, RN  Outcome: Adequate for Discharge  1/13/2024 1125 by Colleen Hughes-Behler, RN  Outcome: Progressing  1/13/2024 1125 by Colleen Hughes-Behler, RN  Outcome: Progressing     Problem: PHYSICAL THERAPY ADULT  Goal: Performs mobility at highest level of function for planned discharge setting.  See evaluation for individualized goals.     Problem: OCCUPATIONAL THERAPY ADULT  Goal: Performs self-care activities at highest level of function for planned discharge setting.  See evaluation for individualized goals.     Problem: SLP ADULT - THOUGHT PROCESS, DISTURBED  Goal: Initial thought process eval performed  Outcome: Adequate for Discharge  Goal: Demonstrates cognitive  skills at highest level of function for planned discharge setting.   See evaluation for individualized goals.  Description: Patient will be able to demonstrate:    1Outcome: Adequate for Discharge

## 2024-01-13 NOTE — DISCHARGE SUMMARY
[facility]  Discharge- Jeremías Verde 1935, 88 y.o. male MRN: [id number]  Unit/Bed#: E4 -01 Encounter: [id number]  Primary Care Provider: Rob Gagnon DO   Date and time admitted to hospital: 1/12/2024 11:08 AM    * Altered mental status  Assessment & Plan  Persistent paranoia and forgetfulness, far from previous baseline. Without acute stroke or obvious sign of infection.  Discussed with neurology who felt that he has underlying cognitive dysfunction at baseline  Stroke has been ruled out.  Low suspicion for encephalitis.  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    Benign essential hypertension  Assessment & Plan  Continue Toprol-XL 12.5 mg daily    Hyperlipidemia  Assessment & Plan  Resume Crestor on discharge     Hydronephrosis  Assessment & Plan  Mild left hydronephrosis on CT without obstruction.  This may be residual from recent stent/stone removal.  He was in the urology office and underwent cystoscopic stent removal on 1/9/2024  Urinalysis showed occasional bacteria only and trace blood  Complete cefdinir as prescribed  Urology follow-up as scheduled on 1/31/2024      Medical Problems       Resolved Problems  Date Reviewed: 1/13/2024   None       Discharging Physician / Practitioner: Coleman Jacobsen MD  PCP: Rob Gagnon DO  Admission Date:   Admission Orders (From admission, onward)       Ordered        01/12/24 1735  INPATIENT ADMISSION  Once                          Discharge Date: 01/13/24    Consultations During Hospital Stay:  neurology    Procedures Performed:   none    Significant Findings / Test Results:   MRI brain w wo contrast  Result Date:  "1/12/2024  Impression: 1. No acute infarction, intracranial image or mass. 2. Mild, chronic microangiopathy and chronic appearing right cerebellar infarction     CT chest abdomen pelvis wo contrast  Result Date: 1/12/2024  Impression: New mild left-sided hydroureteronephrosis without an obstructive ureteral calculus. Findings can be seen with a recently passed left-sided stone or infection. The previously noted distal left ureteral calculus is not seen on the current examination. Persistent 0.4 cm dependent calculus within the left aspect of the bladder. The previously noted 0.8 cm dependent calculus is not seen on the current examination.     CT head without contrast  Result Date: 1/12/2024  Impression: No acute intracranial abnormality. Chronic microangiopathic ischemic changes.     Incidental Findings:   See above     Test Results Pending at Discharge (will require follow up):   Lyme and paraneoplastic panel     Outpatient Tests Requested:  None    Complications:  none    Reason for Admission: \" Altered mental status\"    Hospital Course:   Jeremías Verde is a 88 y.o. male patient who originally presented to the hospital on 1/12/2024 due to persistent paranoia and change in behavior.  CT of the head and of the brain showed no acute intracranial abnormality.  Urinalysis was not consistent with infection.  CT of the abdomen showed mild left-sided hydronephrosis without obstruction can be seen from a recently passed left-sided stone of infection.  He had this and had the recent  cystoscopy with stone and stent removal on 1/9/2024.  He was evaluated by neurology who  had low suspicion for seizure or encephalitis.  He was felt to have underlying cognitive impairment and will need follow-up at the memory center/geriatrics.  Lyme studies and paraneoplastic panel were ordered prior to discharge.  Family felt comfortable taking him home and they will provide 24/7 provision.      Please see above list of diagnoses and " related plan for additional information.     Condition at Discharge: good. His mood and behavior improved during the day.  he was more pleasant, less paranoid and more compliant with medications.    Discharge Day Visit / Exam:   * Please refer to separate progress note for these details *    Discussion with Family: Updated  (wife and daughter) at bedside.    Discharge instructions/Information to patient and family:   See after visit summary for information provided to patient and family.      Provisions for Follow-Up Care:  See after visit summary for information related to follow-up care and any pertinent home health orders.       Disposition:   Home    Planned Readmission: no     Discharge Statement:  I spent >30 minutes discharging the patient. This time was spent on the day of discharge. I had direct contact with the patient on the day of discharge. Greater than 50% of the total time was spent examining patient, answering all patient questions, arranging and discussing plan of care with patient as well as directly providing post-discharge instructions.  Additional time then spent on discharge activities.    Discharge Medications:  See after visit summary for reconciled discharge medications provided to patient and/or family.      **Please Note: This note may have been constructed using a voice recognition system**

## 2024-01-13 NOTE — NURSING NOTE
"Received pt from ER @2030. Alert Oriented to place and situation. Pleasant and cooperative. Awake this am @ 0500. PCA at bedside to get weight. Pt became very agitated about scale. Stating \"I have to convert it\" \"I have to convert the numbers\". Pulling at scale and shouting at CNA. Talking about stool that he had in BR. Stating \"I have to take it out of there\" \"You're not looking at it\" You dont know about it\" Move I'll take it out myself. Pushing wife and RN to get in BR. Telling wife she will be left alone. \"Its all over\" Denies pain or discomfort. Moving all extremities. When CNA removed scale from room, patient ran out in castro stating \"Bring it back\". Unsteady on feet, lifting scale by himself. Wife was able to redirect pt to room but remains delusional, and paranoid.Covering Provider Marleny Glover made aware. Pts daughter and wife staying with pt in room. Per Provider, Attending will come to see pt and family.  "

## 2024-01-13 NOTE — PLAN OF CARE
Problem: OCCUPATIONAL THERAPY ADULT  Goal: Performs self-care activities at highest level of function for planned discharge setting.  See evaluation for individualized goals.  Description: Treatment Interventions: ADL retraining, Endurance training, Cognitive reorientation, Patient/family training, Continued evaluation, Activityengagement          See flowsheet documentation for full assessment, interventions and recommendations.   Outcome: Progressing  Note: Limitation: Decreased Safe judgement during ADL, Decreased endurance, Decreased ADL status, Decreased high-level ADLs, Mood limitation  Prognosis: Fair  Assessment: Patient is a 88 y.o. year old male seen for OT eval s/p admit to [facility] on 1/12/2024 with AMS.  Personal factors affecting pt at time of IE include: difficulty performing ADLs and IADLs, difficulty with functional mobility/transfers.  Prior to admission, was (I) with ADLs and was (I) with IADLs. Pt an inconsistent historian thus utilized information provided from PT evaluation. He is alert to self, place and time. Reluctant to answer why he is here, states this hospital is keeping him here. Completed MOCA, w/ pt scoring 13/30, scoring in moderate impairment range. Unclear accuracy of assessment given pt refusal to complete sections 2/2 paranoia. Limited assessment given pt did not want to be touched by therapist. He is able to complete STS from EOB w/ Eloy. Declines taking steps. He is observed putting his shoes on without physical assistance. ROM observed to be WFL through functional reach. Pt's family expresses this is not his baseline mentation, mentation from October 2023 admission listed as WFL, able to follow commands, memory WFL. Based on the aforementioned OT evaluation, functional performance deficits, and assessments, pt has been identified as a high complexity evaluation. At this time, recommendation for pt to receive post-acute rehabilitation services at a Level III (minimum resource  intensity) due to above deficits and CLOF. OT will continue to follow pt 1-2x/wk to address the following goals to  w/in 10-14 days.     Rehab Resource Intensity Level, OT: III (Minimum Resource Intensity) (OP OT cog?)

## 2024-01-13 NOTE — ASSESSMENT & PLAN NOTE
"Persistent paranoia and forgetfulness, far from previous baseline. Without acute stroke or obvious sign of infection.  He did have recent urologic procedure with stent removal but his  urinalysis last night did not show any sign of infection.  Although the family mentioned that he was \"normal\" before his episode of severe urosepsis, he may have underlying cognitive decline and encephalopathy.  Discussed with Dr. Brewer.  Will start with as needed Seroquel for delirium as needed.  If he refuses medication by mouth, next option would be IM Zyprexa  His paranoia and forgetfulness  worsened overnight probably  due to being in the hospital and in unfamiliar  environment also  Agree with formal neurocognitive  evaluation but this is an outpatient evaluation.  Vitamin B12 level is on the low normal side so we will start supplementation.  "

## 2024-01-14 ENCOUNTER — NURSE TRIAGE (OUTPATIENT)
Dept: OTHER | Facility: OTHER | Age: 89
End: 2024-01-14

## 2024-01-14 ENCOUNTER — TELEPHONE (OUTPATIENT)
Dept: FAMILY MEDICINE CLINIC | Facility: CLINIC | Age: 89
End: 2024-01-14

## 2024-01-14 LAB — B BURGDOR IGG+IGM SER QL IA: NEGATIVE

## 2024-01-14 NOTE — TELEPHONE ENCOUNTER
Reason for Disposition  • [1] Caller requests to speak ONLY to PCP AND [2] URGENT question    Protocols used: PCP Call - No Triage-ADULT-

## 2024-01-14 NOTE — TELEPHONE ENCOUNTER
Patients son and wife called, since discharge has been behaving erratically. Attempted to get out of the car on his way home from the hospital.  Family is worried that they cannot safely provide care at home.  Recent hospital admission 1/12-1/13, was seen by neurology, no cause was found and was discharged on b12 supplementation, with plan for outpatient follow up.  Family is worried and would like him revaluated, discussed if they felt unsafe to return to the ER, they state they will go to [facility] or [facility].      Family asks what further tests would be done in hospital, discussed neurology note with family does not appear additional tests were recommended as well that there was a concern for delirium that would be worsened in a hospital setting. Son states they will have a family meeting and will decide later today.

## 2024-01-14 NOTE — TELEPHONE ENCOUNTER
"Answer Assessment - Initial Assessment Questions  1. REASON FOR CALL or QUESTION: \"What is your reason for calling today?\" or \"How can I best  help you?\" or \"What question do you have that I can help answer?\"      Patient's wife calling back in this morning requesting to speak with Dr. Gagnon directly regarding the patient going to the [facility] for further testing. Stated the patient was at [facility] 1/12-/1/13 for his confusion but was discharged and is still experiencing confusion. She is concerned and stated the patient will only go back to the hospital after talking to Dr. Gagnon.     2. CALLER: Document the source of call. (e.g., laboratory, patient).      Patient's Wife    Protocols used: PCP Call - No Triage-ADULT-AH    "

## 2024-01-14 NOTE — TELEPHONE ENCOUNTER
"Reason for Disposition   Very strange or paranoid behavior    Answer Assessment - Initial Assessment Questions  1. LEVEL OF CONSCIOUSNESS: \"How is he (she, the patient) acting right now?\" (e.g., alert-oriented, confused, lethargic, stuporous, comatose)      Extreme behavior-getting out of car on busy street, taking steering wheel from wife, talking about color coded luggage tags and cutting open suitcases, daughter states he is alert and oriented to them    2. ONSET: \"When did the confusion start?\"  (minutes, hours, days)      Couple days ago, was admitted yesterday, discharged today and is till experiencing extreme behavior    3. PATTERN \"Does this come and go, or has it been constant since it started?\"  \"Is it present now?\"      Constant    4. ALCOHOL or DRUGS: \"Has he been drinking alcohol or taking any drugs?\"       Denies    5. NARCOTIC MEDICATIONS: \"Has he been receiving any narcotic medications?\" (e.g., morphine, Vicodin)      Denies    6. CAUSE: \"What do you think is causing the confusion?\"       Unknown    7. OTHER SYMPTOMS: \"Are there any other symptoms?\" (e.g., difficulty breathing, headache, fever, weakness)      denies    Protocols used: Confusion - Delirium-ADULT-AH    "

## 2024-01-14 NOTE — TELEPHONE ENCOUNTER
On call provider contacted, given best contact information to call the patient's wife directly. Informed the patient's wife that due to Dr. Gagnon not being on call that Dr. Ball would be made aware of the request to speak with Dr. Gagnon. Patient's wife verbalized understanding.

## 2024-01-15 ENCOUNTER — TELEPHONE (OUTPATIENT)
Age: 89
End: 2024-01-15

## 2024-01-15 ENCOUNTER — TRANSITIONAL CARE MANAGEMENT (OUTPATIENT)
Dept: FAMILY MEDICINE CLINIC | Facility: CLINIC | Age: 89
End: 2024-01-15

## 2024-01-15 NOTE — TELEPHONE ENCOUNTER
Patient daughter calling to report that patient was taken to the ER per dr. Gagnon on Friday. Patient was discharged to home but over the weekend was getting worse. They are taking him back to the ER today and wanted to let Dr. Gagnon know to keep an eye out for his Reports.

## 2024-01-16 LAB — METHYLMALONATE SERPL-SCNC: 232 NMOL/L (ref 0–378)

## 2024-01-16 NOTE — TELEPHONE ENCOUNTER
Pt wife called stating she apologizes for missing Dr. Gagnon's phone call. She states patient was in the [facility] from 1/12 to 1/13, and started to have the same issues and now is admitted at the [facility] 1/15 to present. Pt wife states if he has any questions or concerns he can give her a call. She states once he is discharged she will call back to schedule a follow up. Please advise.

## 2024-01-17 LAB
BACTERIA BLD CULT: NORMAL
BACTERIA BLD CULT: NORMAL
VIT B1 BLD-SCNC: 102.4 NMOL/L (ref 66.5–200)

## 2024-01-19 LAB
AMPAR1 AB SER QL IF: NEGATIVE
AMPAR2 AB SER QL IF: NEGATIVE
AMPHIPHYSIN AB SER QL: NEGATIVE
AQP4 H2O CHANNEL AB SERPL IA-ACNC: NEGATIVE
CASPR2 IGG SERPL QL IF: NEGATIVE
CV2 IGG SER-ACNC: NEGATIVE
DPPX IGG SERPL QL IF: NEGATIVE
GABABR AB SER QL IF: NEGATIVE
GAD65 IGG+IGM SER IA-SCNC: NEGATIVE NMOL/L
GLIAL NUC TYPE 1 AB SER QL IF: NEGATIVE
HU AB SER QL IF: NEGATIVE
HU2 AB SER QL IF: NEGATIVE
HU3 AB SER QL: NEGATIVE
IGLON5 IGG SER QL IF: NEGATIVE
INTERPRETATION: NEGATIVE
IP3R 1 IGG SER QL IF: NEGATIVE
LGI1 IGG SERPL QL IF: NEGATIVE
MA+TA AB SER QL: NEGATIVE
MGLUR1 IGG SER QL IF: NEGATIVE
NMDAR1 AB SER QL IF: NEGATIVE
PCA-1 AB SER QL IF: NEGATIVE
PCA-2 AB SER QL IF: NEGATIVE
PCA-TR AB SER QL IF: NEGATIVE
PCA-TR AB SER QL IF: NEGATIVE
VGCC AB SER-SCNC: <1 PMOL/L (ref 0–30)
ZIC4 AB SER QL: NEGATIVE

## 2024-01-22 ENCOUNTER — TELEPHONE (OUTPATIENT)
Age: 89
End: 2024-01-22

## 2024-01-22 DIAGNOSIS — F05 DELIRIUM DUE TO MEDICAL CONDITION WITH BEHAVIORAL DISTURBANCE: Primary | ICD-10-CM

## 2024-01-22 RX ORDER — LANOLIN ALCOHOL/MO/W.PET/CERES
100 CREAM (GRAM) TOPICAL DAILY
COMMUNITY
Start: 2024-01-19

## 2024-01-22 RX ORDER — OLANZAPINE 2.5 MG/1
2.5 TABLET, FILM COATED ORAL
COMMUNITY
Start: 2024-01-17 | End: 2024-01-22 | Stop reason: SDUPTHER

## 2024-01-22 RX ORDER — OLANZAPINE 2.5 MG/1
2.5 TABLET, FILM COATED ORAL
Qty: 30 TABLET | Refills: 0 | Status: SHIPPED | OUTPATIENT
Start: 2024-01-22 | End: 2024-01-26 | Stop reason: SDUPTHER

## 2024-01-22 NOTE — TELEPHONE ENCOUNTER
Please let his family know I did refill Zyprexa 2.5 mg which is now on his medication list as it was prescribed inpatient at [facility].  Please get an update on how he is doing with taking his medications

## 2024-01-22 NOTE — TELEPHONE ENCOUNTER
Medication: Zyprexa    Dose/Frequency: 2.5mg    Quantity: 30    Pharmacy: [facility] - [place]    Office:   [] PCP/Provider -   [x] Speciality/Provider - Emergency    Does the patient have enough for 3 days?   [x] Yes   [] No - Send as HP to POD

## 2024-01-22 NOTE — TELEPHONE ENCOUNTER
Not on active med list to que up. Needs to be refilled. Just got out of the hospital. Please call if not able to refill.   Reason for call:   [x] Refill   [] Prior Auth  [] Other:     Office:   [x] PCP/Provider -   [] Specialty/Provider -     Medication: Zyprexa 2.5 mg     Dose/Frequency: Take 1 tablet (2.5 mg total) by mouth nightly.    Quantity:     Pharmacy: [facility] [place]    Does the patient have enough for 3 days?   [] Yes   [x] No - Send as HP to POD                Reason for call:   [x] Refill   [] Prior Auth  [] Other:     Office:   [x] PCP/Provider -   [] Specialty/Provider -     Medication: Zyprexa 2.5 mg     Dose/Frequency: Take 1 tablet (2.5 mg total) by mouth 3 (three) times a day as needed     Quantity:     Pharmacy[facility] [place]    Does the patient have enough for 3 days?   [] Yes   [x] No - Send as HP to POD

## 2024-01-23 NOTE — TELEPHONE ENCOUNTER
Can use Zyprexa 2.5 mg up to 3 times daily if needed until I see him for a recheck appointment on Friday, January 26

## 2024-01-23 NOTE — TELEPHONE ENCOUNTER
I called s/w pt daughter she stated pt is doing better since on the medication Zyprexa 2.5 mg.      Pt daughter did state that there is a discrepancy with the Zyprexa 2.5 in order prescriptions on patient's discharge from [facility]. See below      Zyprexa 2.5 take 1 tab by mouth 3x a day PRN    Zyprexa 2.5 tab take 1 tab by mouth nightly.    Pt daughter stated that they were giving pt this nightly, but that did not work and they then started to give it to him during the day as well so he is taking this 3x daily and pt is not having any side effects. Please advise. Pt daughter stated would you like for them to come in to discuss?     Thank you!

## 2024-01-25 ENCOUNTER — HOSPITAL ENCOUNTER (OUTPATIENT)
Dept: ULTRASOUND IMAGING | Facility: MEDICAL CENTER | Age: 89
Discharge: HOME | End: 2024-01-25
Payer: MEDICARE

## 2024-01-25 DIAGNOSIS — N20.0 CALCULUS OF KIDNEY: ICD-10-CM

## 2024-01-25 PROBLEM — G93.40 ENCEPHALOPATHY: Status: ACTIVE | Noted: 2023-10-27

## 2024-01-25 PROBLEM — R41.0 DELIRIUM: Status: ACTIVE | Noted: 2024-01-15

## 2024-01-25 PROBLEM — F22 PARANOIA (HCC): Status: ACTIVE | Noted: 2024-01-15

## 2024-01-25 PROCEDURE — 76775 US EXAM ABDO BACK WALL LIM: CPT

## 2024-01-26 ENCOUNTER — TELEPHONE (OUTPATIENT)
Age: 89
End: 2024-01-26

## 2024-01-26 ENCOUNTER — OFFICE VISIT (OUTPATIENT)
Dept: FAMILY MEDICINE CLINIC | Facility: CLINIC | Age: 89
End: 2024-01-26
Payer: MEDICARE

## 2024-01-26 VITALS
BODY MASS INDEX: 24.66 KG/M2 | HEART RATE: 83 BPM | DIASTOLIC BLOOD PRESSURE: 82 MMHG | OXYGEN SATURATION: 97 % | SYSTOLIC BLOOD PRESSURE: 128 MMHG | TEMPERATURE: 98.2 F | WEIGHT: 162.2 LBS

## 2024-01-26 DIAGNOSIS — R41.82 ALTERED MENTAL STATUS, UNSPECIFIED ALTERED MENTAL STATUS TYPE: ICD-10-CM

## 2024-01-26 DIAGNOSIS — I67.2 CEREBRAL ARTERIOSCLEROSIS WITH HISTORY OF PREVIOUS STROKE: ICD-10-CM

## 2024-01-26 DIAGNOSIS — I25.10 ARTERIOSCLEROTIC CORONARY ARTERY DISEASE: ICD-10-CM

## 2024-01-26 DIAGNOSIS — F05 DELIRIUM DUE TO MEDICAL CONDITION WITH BEHAVIORAL DISTURBANCE: ICD-10-CM

## 2024-01-26 DIAGNOSIS — F22 PARANOIA (HCC): ICD-10-CM

## 2024-01-26 DIAGNOSIS — G93.40 ENCEPHALOPATHY: Primary | ICD-10-CM

## 2024-01-26 DIAGNOSIS — Z86.73 CEREBRAL ARTERIOSCLEROSIS WITH HISTORY OF PREVIOUS STROKE: ICD-10-CM

## 2024-01-26 PROCEDURE — 99214 OFFICE O/P EST MOD 30 MIN: CPT | Performed by: FAMILY MEDICINE

## 2024-01-26 RX ORDER — OLANZAPINE 2.5 MG/1
2.5 TABLET, FILM COATED ORAL 3 TIMES DAILY
Qty: 30 TABLET | Refills: 0 | Status: SHIPPED | OUTPATIENT
Start: 2024-01-26 | End: 2024-01-26 | Stop reason: SDUPTHER

## 2024-01-26 RX ORDER — OLANZAPINE 2.5 MG/1
2.5 TABLET, FILM COATED ORAL 3 TIMES DAILY
Qty: 90 TABLET | Refills: 0 | Status: SHIPPED | OUTPATIENT
Start: 2024-01-26

## 2024-01-26 NOTE — PROGRESS NOTES
FAMILY PRACTICE OFFICE VISIT  Rob Gagnon D.O.    [facility]  [facility]  [address]  [address]  [place], [address]      NAME: Jeremías Verde  AGE: 88 y.o. SEX: male  : 1935   MRN: [id number]    DATE: 2024  TIME: 1:02 PM    Assessment and Plan     Problem List Items Addressed This Visit       Altered mental status    Arteriosclerotic coronary artery disease    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    Encephalopathy - Primary    Paranoia (HCC)    Relevant Medications    OLANZapine (ZyPREXA) 2.5 mg tablet     Other Visit Diagnoses       Delirium due to medical condition with behavioral disturbance        Relevant Medications    OLANZapine (ZyPREXA) 2.5 mg tablet            Patient Instructions   I reviewed hospital notes from [facility] along with [facility].  He is much improved with the addition of Zyprexa 2.5 mg, currently family is using 4 times daily.  I would like them to decrease to 3 times daily, update me regarding how he is doing in 7 to 10 days.  Consider decreasing to twice daily at that time.  He will be staying with his daughter in [place] for the next week or 2, they will call with any questions.  Continue other medication as is.  Can continue with exercise/walking as tolerated.    He did have follow-up ultrasound yesterday, result pending, has follow-up with urology soon regarding history pyelonephritis with stent, see previous regarding retained stent which has been removed.    B12 was 258, continue on B12 as is, also on B1, multivitamin.  Most recent CBC, CMP looked okay, hemoglobin 12.3, creatinine 0.79.  Inpatient echocardiogram looked okay, ejection fraction 60 to 65%.  MRI brain earlier this month showed only chronic infarction/chronic changes.  EEG showed no seizure activity.  TSH was fine at 1.4.    Chief Complaint     Chief Complaint   Patient presents with    Medication Management       History  of Present Illness   Jeremías Verde is a 88 y.o.-year-old male who is in today accompanied by his family for a reevaluation, I had seen him January 12, he was sent to the hospital and admitted overnight with mental status changes/paranoid behavior, was discharged -on the ride home he attempted to get out of a moving vehicle, subsequently admitted to [facility].  Saw geriatrics along with neurology, started on Zyprexa 2.5 mg, family has noted significant improvement with adding medication.    He is no longer resistant to using medication, less paranoid.  They have been using Zyprexa 2.5 4 times daily.  He has no oversedation, walked 6-8 blocks yesterday with family.  Has been sleeping well.    Imaging showed no new CVA, no clear etiology for mental status changes other than acute on chronic related to pyelonephritis few months ago.  No new medication to explain changes, only thing different was cephalosporin antibiotic that I can see.    Review of Systems   Review of Systems   Constitutional:         He has had no fevers or chills, no increased cough, no increased shortness of breath, no chest pain, no increased leg swelling.  Has no change in bowel, no abdominal pain, urinating okay.  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    BP Readings from Last 3 Encounters:   01/26/24 128/82   01/13/24 106/61   01/12/24 122/76       Wt Readings from Last 3 Encounters:   01/26/24 73.6 kg (162 lb 3.2 oz)   01/13/24 70.2 kg (154 lb 12.2 oz)   01/12/24 71.7 kg (158 lb)       Physical Exam  Constitutional:       Comments: He is somewhat confused here yet today but much better than last visit on the 12th, he is alert, cooperative, answers are appropriate for the most part.  Ambulated in hallway without issue.    Heart is regular rate and rhythm, lungs are clear and equal bilaterally, abdomen is soft, nontender.         ALLERGIES:  Allergies   Allergen Reactions    Coconut Oil - Food Allergy Hives    Iv Dye [Iodinated Contrast Media] Rash     Possible delayed rash February 2022       Current Medications     Current Outpatient Medications   Medication Sig Dispense Refill    acetaminophen (TYLENOL) 325 mg tablet Take 650 mg by mouth Uses 3 x a day      carboxymethylcellulose (REFRESH PLUS) 0.5 % SOLN 1 drop 2 (two) times a day as needed for dry eyes      clopidogrel (PLAVIX) 75 mg tablet TAKE 1 TABLET BY MOUTH EVERY DAY 90 tablet 3    cyanocobalamin (VITAMIN B-12) 1000 MCG tablet Take 1 tablet (1,000 mcg total) by mouth daily 30 tablet 0    OLANZapine (ZyPREXA) 2.5 mg tablet Take 1 tablet (2.5 mg total) by mouth 3 (three) times a day ( or as directed ) 90 tablet 0    Glucosamine-Chondroitin 250-200 MG TABS daily Cosamine joint health (Patient not taking: Reported on 1/9/2024)      metoprolol succinate (TOPROL-XL) 25 mg 24 hr tablet TAKE 1/2 TABLET BY MOUTH EVERY DAY (Patient not  taking: Reported on 1/12/2024) 45 tablet 3    rosuvastatin (CRESTOR) 10 MG tablet TAKE 1 TABLET BY MOUTH EVERY DAY (Patient not taking: Reported on 1/12/2024) 90 tablet 1    thiamine 100 MG tablet Take 100 mg by mouth daily (Patient not taking: Reported on 1/26/2024)       No current facility-administered medications for this visit.            No orders of the defined types were placed in this encounter.        Rob Gagnon DO

## 2024-01-26 NOTE — PATIENT INSTRUCTIONS
I reviewed hospital notes from [facility] along with [facility].  He is much improved with the addition of Zyprexa 2.5 mg, currently family is using 4 times daily.  I would like them to decrease to 3 times daily, update me regarding how he is doing in 7 to 10 days.  Consider decreasing to twice daily at that time.  He will be staying with his daughter in [place] for the next week or 2, they will call with any questions.  Continue other medication as is.  Can continue with exercise/walking as tolerated.    He did have follow-up ultrasound yesterday, result pending, has follow-up with urology soon regarding history pyelonephritis with stent, see previous regarding retained stent which has been removed.    B12 was 258, continue on B12 as is, also on B1, multivitamin.  Most recent CBC, CMP looked okay, hemoglobin 12.3, creatinine 0.79.  Inpatient echocardiogram looked okay, ejection fraction 60 to 65%.  MRI brain earlier this month showed only chronic infarction/chronic changes.  EEG showed no seizure activity.  TSH was fine at 1.4.

## 2024-01-29 NOTE — TELEPHONE ENCOUNTER
Please call family, we received a message that his insurance will not cover Zyprexa 2.5 mg 3 times daily.  It appears they will cover once daily.    The options are to pay cash for the prescription and continue as we discussed at visit    Or    We can prescribe 5 mg tablet to use 1-1/2 tabs once at night.    Let me know

## 2024-01-30 ENCOUNTER — TELEPHONE (OUTPATIENT)
Age: 89
End: 2024-01-30

## 2024-01-30 NOTE — TELEPHONE ENCOUNTER
Patient's daughter returning call    Patient's daughter would like the provider to call her number [phone]

## 2024-01-30 NOTE — TELEPHONE ENCOUNTER
Patients daughter calling in to see if apt with Siddharth tomorrow 1/31/24 can be a virtual-phone call visit. Please call patients daughter back to make her aware. Patient is unable to make it into office but wishes to keep apt.     Patients US results are also not back from 1/24/24 not sure if we have to call radiology to see if we can get these read prior to tomorrow morning for apt.     CB: [phone]- this number can be used for the visit   (Donna)

## 2024-01-31 ENCOUNTER — TELEMEDICINE (OUTPATIENT)
Dept: UROLOGY | Facility: AMBULATORY SURGERY CENTER | Age: 89
End: 2024-01-31
Payer: MEDICARE

## 2024-01-31 DIAGNOSIS — N20.0 CALCULUS OF KIDNEY: Primary | ICD-10-CM

## 2024-01-31 PROCEDURE — 99441 PR PHYS/QHP TELEPHONE EVALUATION 5-10 MIN: CPT

## 2024-01-31 NOTE — PROGRESS NOTES
Called and spoke to pt's daughter Donna to talk through check out. She knows how to find the order for the CT and the scheduling number on it... also the orders for labs are in the Mychart for her to print and take to a lab.    She requested for next appt at [facility]) Office so appt made for:  Jeremías Verde MRN: [id number]    Date: 6/6/2024 Status: [facility]   Time: 10:40 AM Length: 20   Visit Type: FOLLOW UP PG [[id number]] Copay: $0.00   Provider: Nhi Sotelo PA-C Department: [facility] Area:  Department Phone: [phone]   Referral Number:   Referral Status:     Notes: Follow up appt in 4 months with CT prior (5/31/2024)   Made On: 1/31/2024 11:21 AM By: AVNI LUNSFORD [[id number]] (ES)

## 2024-01-31 NOTE — Clinical Note
Please email forms for urine culture/micro.  Patient will need a follow-up in 4 months with a CT scan prior.  Please call patient to arrange this

## 2024-01-31 NOTE — PROGRESS NOTES
Virtual Brief Visit    This Visit is being completed by telephone. The Patient is located at daughter's house and in the following state in which I hold an active license [place]    The patient was identified by name and date of birth. Jeremías Verde was informed that this is a telemedicine visit and that the visit is being conducted through Telephone.  My office door was closed. No one else was in the room.  He acknowledged consent and understanding of privacy and security of the video platform. The patient has agreed to participate and understands they can discontinue the visit at any time.    Patient is aware this is a billable service.       Assessment/Plan:    Nephrolithiasis/bladder stone  -S/p cystoscopy, cystolitholopaxy, left ureteroscopy with thulium laser lithotripsy, left stent insertion  -Stent was subsequently removed on 1/9/2024.  Patient was given empiric antibiotics at that visit  -On 1/12/2024 patient presented to the ER for altered mental status.  Urine testing was not consistent with infection.  CT scan demonstrated expected left-sided hydronephrosis from recent stent removal  -Patient had ultrasound of the kidneys and bladder on 1/25/2024 which demonstrated resolution of hydronephrosis.  There is a 5 mm mobile bladder calculus noted as well as debris within the bladder.  -Obtain urine micro and culture for assessment of bladder debris  -Follow-up in 4 months with a CT scan prior    History    Patient is a 88-year-old male with a past urologic history of left ureteral calculus with sepsis.  He had stent insertion in October 2023.  He had definitive stone surgery/removal of bladder stone by Dr. Srinivasan on 12/15/2023.  Stent was on a string but unfortunately the string broke so patient presented  to the office on 1/9/2024 for simple cystoscopy stent removal.  Stent was removed without difficulty.  Patient is advised to take empiric course of antibiotics.  On 1/12/2024 patient presents to the ER for  evaluation of altered mental status.  Patient did not utilize empiric course of antibiotics.  Thankfully urine testing was not consistent with urinary tract infection.  CT scan demonstrated left-sided hydronephrosis but this was expected finding.  He had subsequent ultrasound of the kidneys and bladder on 1/25/2024 which demonstrated resolution of hydronephrosis.  Finding of 5 mm bladder stone and bladder debris.  Patient denies any bothersome urinary symptoms at this point in time including frequency, dysuria, urgency, gross hematuria    Problem List Items Addressed This Visit    None      Recent Visits  Date Type Provider Dept   01/26/24 Office Visit Rob Gagnon DO [facility]   Showing recent visits within past 7 days and meeting all other requirements  Future Appointments  No visits were found meeting these conditions.  Showing future appointments within next 150 days and meeting all other requirements         Visit Time  Total Visit Duration: 10 minutes

## 2024-02-09 ENCOUNTER — APPOINTMENT (OUTPATIENT)
Dept: LAB | Facility: MEDICAL CENTER | Age: 89
End: 2024-02-09
Payer: MEDICARE

## 2024-02-09 DIAGNOSIS — N20.0 CALCULUS OF KIDNEY: ICD-10-CM

## 2024-02-09 LAB
BACTERIA UR QL AUTO: NORMAL /HPF
BILIRUB UR QL STRIP: NEGATIVE
CLARITY UR: CLEAR
COLOR UR: NORMAL
GLUCOSE UR STRIP-MCNC: NEGATIVE MG/DL
HGB UR QL STRIP.AUTO: NEGATIVE
KETONES UR STRIP-MCNC: NEGATIVE MG/DL
LEUKOCYTE ESTERASE UR QL STRIP: NEGATIVE
NITRITE UR QL STRIP: NEGATIVE
NON-SQ EPI CELLS URNS QL MICRO: NORMAL /HPF
PH UR STRIP.AUTO: 6 [PH]
PROT UR STRIP-MCNC: NEGATIVE MG/DL
RBC #/AREA URNS AUTO: NORMAL /HPF
SP GR UR STRIP.AUTO: 1.02 (ref 1–1.03)
UROBILINOGEN UR STRIP-ACNC: <2 MG/DL
WBC #/AREA URNS AUTO: NORMAL /HPF

## 2024-02-09 PROCEDURE — 81001 URINALYSIS AUTO W/SCOPE: CPT

## 2024-02-09 PROCEDURE — 87086 URINE CULTURE/COLONY COUNT: CPT

## 2024-02-10 LAB — BACTERIA UR CULT: NORMAL

## 2024-02-14 ENCOUNTER — TELEPHONE (OUTPATIENT)
Dept: UROLOGY | Facility: AMBULATORY SURGERY CENTER | Age: 89
End: 2024-02-14

## 2024-02-14 NOTE — TELEPHONE ENCOUNTER
Tried calling patient to let him know that his urine culture is negative for infection. Patient did not answer left detailed message to give us a call back to go over his results. If patient calls back please let him know PA last message.              ----- Message from Siddharth Ureña PA-C sent at 2/13/2024  3:17 PM EST -----  Please call to inform urine testing is negative for infection

## 2024-02-21 DIAGNOSIS — I63.9 CEREBELLAR STROKE (HCC): ICD-10-CM

## 2024-02-21 RX ORDER — CLOPIDOGREL BISULFATE 75 MG/1
TABLET ORAL
Qty: 90 TABLET | Refills: 1 | Status: SHIPPED | OUTPATIENT
Start: 2024-02-21

## 2024-02-23 NOTE — TELEPHONE ENCOUNTER
[facility]. PT HAD MOHS SURG IN AUG 2023  PT IS GOING TO TRY AND SEND A PHOTO ON [employer] . IS THERE AN APPT WE CAN   OFFER HIM ? ? details…

## 2024-03-01 ENCOUNTER — TELEPHONE (OUTPATIENT)
Age: 89
End: 2024-03-01

## 2024-03-01 DIAGNOSIS — R41.89 COGNITIVE IMPAIRMENT: Primary | ICD-10-CM

## 2024-03-01 DIAGNOSIS — F05 DELIRIUM DUE TO MEDICAL CONDITION WITH BEHAVIORAL DISTURBANCE: ICD-10-CM

## 2024-03-01 NOTE — TELEPHONE ENCOUNTER
Received call from patient's daughter Donna stating that patient was recently seen by Neurology, Dr. Ross on 2/9/24 and was recommended to start on Aricept.  Patient was not agreeable to having Dr Ross prescribe this for him. Daughter is asking if Dr. Gagnon would be able to prescribe this?  Daughter is willing to have either a virtual visit or in office visit next week if necessary. Please advise.

## 2024-03-04 RX ORDER — OLANZAPINE 2.5 MG/1
2.5 TABLET, FILM COATED ORAL
Qty: 90 TABLET | Refills: 0 | Status: SHIPPED | OUTPATIENT
Start: 2024-03-04

## 2024-03-04 RX ORDER — DONEPEZIL HYDROCHLORIDE 5 MG/1
5 TABLET, FILM COATED ORAL DAILY
Qty: 30 TABLET | Refills: 0 | Status: SHIPPED | OUTPATIENT
Start: 2024-03-04

## 2024-03-04 NOTE — TELEPHONE ENCOUNTER
I spoke with the daughter this morning and she would like for you to give her a call. She said the end of your day is perfect as he has other appointments. She states she would like for magaly to hear you say it is ok to take that medication

## 2024-03-04 NOTE — TELEPHONE ENCOUNTER
Please call his daughter -  I did review the note from Neurology.  Aricept could possibly slow down mental decline, hard to say how much it will help at 88 yrs of age to slow things but can be tried -  it will not make memory better.    I can send in the starter dose of Aricept 5 mg once daily to local pharmacy, can cause some loose stools at first -  we start with 5 mg once daily for the first month and then increase to 10 mg daily.    Some people get loose stools with the med.  Let me know if they want me to send that in -   he does have an appt with me 4/23/24.    I can call them later in the day on Monday if they have further questions.

## 2024-03-04 NOTE — TELEPHONE ENCOUNTER
I called and spoke to Mr Verde on speaker phone along with the rest of his family.  I reviewed visit with [facility] neurology, they had advised Aricept.  Does appear to have mixed cognitive impairment, Aricept could possibly be helpful, he will use Aricept/Donepezil 5 mg once every day, in 3 weeks I would like them to call for new prescription of 10 mg daily which will be long-term dose.  If they do note GI effects/other side effects sooner they will call.    He also is down to Zyprexa 2.5 mg in the evening, I sent in a new 90-day prescription for that

## 2024-03-28 ENCOUNTER — TELEPHONE (OUTPATIENT)
Dept: FAMILY MEDICINE CLINIC | Facility: CLINIC | Age: 89
End: 2024-03-28

## 2024-03-28 DIAGNOSIS — R41.89 COGNITIVE IMPAIRMENT: ICD-10-CM

## 2024-03-28 RX ORDER — DONEPEZIL HYDROCHLORIDE 5 MG/1
5 TABLET, FILM COATED ORAL DAILY
Qty: 30 TABLET | Refills: 0 | Status: CANCELLED | OUTPATIENT
Start: 2024-03-28

## 2024-03-28 NOTE — TELEPHONE ENCOUNTER
Please see instructions on the medication in chart. It states in 3 weeks patient needs to increase to the 10mg. What was pended and sent to our pod to fill from office is the 5 mg which is NOT correct. Please send the NEW updated dosage for the medication as Med refill specialists are unable to change patients meds.

## 2024-03-28 NOTE — TELEPHONE ENCOUNTER
Patient was told to call in 3 weeks for the increase in dosage    Reason for call:   [x] Refill   [] Prior Auth  [] Other:     Office:   [x] PCP/Provider -   [] Specialty/Provider -     Medication:   Donepezil 10mg- take 1 tablet by mouth daily      [facility] [place]    Does the patient have enough for 3 days?   [x] Yes   [] No - Send as HP to POD

## 2024-03-29 DIAGNOSIS — F05 DELIRIUM DUE TO MEDICAL CONDITION WITH BEHAVIORAL DISTURBANCE: ICD-10-CM

## 2024-03-29 DIAGNOSIS — R41.89 COGNITIVE IMPAIRMENT: ICD-10-CM

## 2024-03-29 RX ORDER — DONEPEZIL HYDROCHLORIDE 5 MG/1
5 TABLET, FILM COATED ORAL DAILY
Qty: 30 TABLET | Refills: 1 | Status: SHIPPED | OUTPATIENT
Start: 2024-03-29

## 2024-03-29 RX ORDER — OLANZAPINE 2.5 MG/1
2.5 TABLET, FILM COATED ORAL
Qty: 90 TABLET | Refills: 0 | Status: SHIPPED | OUTPATIENT
Start: 2024-03-29

## 2024-04-01 RX ORDER — DONEPEZIL HYDROCHLORIDE 10 MG/1
10 TABLET, FILM COATED ORAL
Qty: 90 TABLET | Refills: 1 | Status: SHIPPED | OUTPATIENT
Start: 2024-04-01

## 2024-04-01 NOTE — TELEPHONE ENCOUNTER
Pt wife called that the dsoage on donepezil that was filled is only 5mg and was not sure if he is still supposed to increase the dose to 10mg  Please advise

## 2024-04-10 ENCOUNTER — VBI (OUTPATIENT)
Dept: ADMINISTRATIVE | Facility: OTHER | Age: 89
End: 2024-04-10

## 2024-04-10 NOTE — TELEPHONE ENCOUNTER
04/10/24 1:50 PM    Patient contacted (left message) to bring Advance Directive, POLST, or Living Will document to next scheduled pcp visit.    Thank you.  Sharon Mackey  [facility]

## 2024-04-15 PROBLEM — R41.0 DELIRIUM: Status: RESOLVED | Noted: 2024-01-15 | Resolved: 2024-04-15

## 2024-04-15 PROBLEM — G31.84 MCI (MILD COGNITIVE IMPAIRMENT): Status: ACTIVE | Noted: 2024-04-15

## 2024-04-15 PROBLEM — G93.40 ENCEPHALOPATHY: Status: RESOLVED | Noted: 2023-10-27 | Resolved: 2024-04-15

## 2024-04-15 PROBLEM — H40.1230 LOW-TENSION GLAUCOMA OF BOTH EYES: Status: ACTIVE | Noted: 2023-12-07

## 2024-04-15 PROBLEM — N13.30 HYDRONEPHROSIS: Status: RESOLVED | Noted: 2024-01-13 | Resolved: 2024-04-15

## 2024-04-16 ENCOUNTER — OFFICE VISIT (OUTPATIENT)
Dept: FAMILY MEDICINE CLINIC | Facility: CLINIC | Age: 89
End: 2024-04-16
Payer: MEDICARE

## 2024-04-16 VITALS
WEIGHT: 166.4 LBS | BODY MASS INDEX: 25.22 KG/M2 | SYSTOLIC BLOOD PRESSURE: 102 MMHG | RESPIRATION RATE: 16 BRPM | DIASTOLIC BLOOD PRESSURE: 70 MMHG | HEART RATE: 61 BPM | HEIGHT: 68 IN | OXYGEN SATURATION: 99 %

## 2024-04-16 DIAGNOSIS — Z86.73 CEREBRAL ARTERIOSCLEROSIS WITH HISTORY OF PREVIOUS STROKE: ICD-10-CM

## 2024-04-16 DIAGNOSIS — G31.84 MCI (MILD COGNITIVE IMPAIRMENT): ICD-10-CM

## 2024-04-16 DIAGNOSIS — I10 BENIGN ESSENTIAL HYPERTENSION: Primary | ICD-10-CM

## 2024-04-16 DIAGNOSIS — N21.8 CALCULUS OF OTHER LOWER URINARY TRACT LOCATION: ICD-10-CM

## 2024-04-16 DIAGNOSIS — S61.411A LACERATION OF RIGHT HAND WITHOUT FOREIGN BODY, INITIAL ENCOUNTER: ICD-10-CM

## 2024-04-16 DIAGNOSIS — I67.2 CEREBRAL ARTERIOSCLEROSIS WITH HISTORY OF PREVIOUS STROKE: ICD-10-CM

## 2024-04-16 PROBLEM — R41.82 ALTERED MENTAL STATUS: Status: RESOLVED | Noted: 2024-01-12 | Resolved: 2024-04-16

## 2024-04-16 PROCEDURE — 99213 OFFICE O/P EST LOW 20 MIN: CPT | Performed by: FAMILY MEDICINE

## 2024-04-16 PROCEDURE — 90471 IMMUNIZATION ADMIN: CPT

## 2024-04-16 PROCEDURE — 90715 TDAP VACCINE 7 YRS/> IM: CPT

## 2024-04-16 NOTE — PATIENT INSTRUCTIONS
1. Benign essential hypertension  Assessment & Plan:  Blood pressure 102/70 here today, he is not lightheaded/orthostatic, continue to monitor.  He will continue metoprolol succinate 25 mg 1/2 tablet daily.  Echocardiogram in January looked okay, ejection fraction 60%.    Orders:  -     Comprehensive metabolic panel; Future; Expected date: 06/03/2024    2. 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  Assessment & Plan:  Continue to control cardiovascular risk as can, does continue on Plavix 75 mg daily, blood pressure is okay, is on rosuvastatin 10 mg daily.    Orders:  -     CBC; Future; Expected date: 06/03/2024  -     Comprehensive metabolic panel; Future; Expected date: 06/03/2024    3. MCI (mild cognitive impairment) - mixed  Assessment & Plan:  Again reviewed visit with [facility] neurology February night, he did use 1 month donepezil 5 mg daily, now on 10 mg daily without issue, mental status appears much improved versus few months ago, alert, oriented here today.  He will continue donepezil 10 mg daily, also doing well with Zyprexa 2.5 mg at bedtime, stay on that.    He will see geriatric group in follow-up, reevaluate here late August, call us sooner if needed.    Stay on B12, B1, multivitamin.    January hemoglobin 12.3, creatinine 0.79, TSH 1.4.    See previous imaging of brain in January, MRI showed only chronic vascular findings, EEG unremarkable      4. Laceration of right hand without foreign body, initial encounter  Comments:  Wound right hand without sign of infection, Tdap given today  Orders:  -     TDAP VACCINE GREATER THAN OR EQUAL TO 8YO IM    5. Calculus of other lower urinary tract location  Assessment & Plan:  Ultrasound in January showed hydronephrosis had resolved, did have 5 mm bladder stone, see previous visits along with hospitalization, sepsis, stent was removed in January.  No current significant urinary issues, he will have follow-up with urology

## 2024-04-16 NOTE — ASSESSMENT & PLAN NOTE
Ultrasound in January showed hydronephrosis had resolved, did have 5 mm bladder stone, see previous visits along with hospitalization, sepsis, stent was removed in January.  No current significant urinary issues, he will have follow-up with urology

## 2024-04-16 NOTE — ASSESSMENT & PLAN NOTE
Again reviewed visit with [facility] neurology February night, he did use 1 month donepezil 5 mg daily, now on 10 mg daily without issue, mental status appears much improved versus few months ago, alert, oriented here today.  He will continue donepezil 10 mg daily, also doing well with Zyprexa 2.5 mg at bedtime, stay on that.    He will see geriatric group in follow-up, reevaluate here late August, call us sooner if needed.    Stay on B12, B1, multivitamin.    January hemoglobin 12.3, creatinine 0.79, TSH 1.4.    See previous imaging of brain in January, MRI showed only chronic vascular findings, EEG unremarkable

## 2024-04-16 NOTE — ASSESSMENT & PLAN NOTE
Continue to control cardiovascular risk as can, does continue on Plavix 75 mg daily, blood pressure is okay, is on rosuvastatin 10 mg daily.

## 2024-04-16 NOTE — ASSESSMENT & PLAN NOTE
Blood pressure 102/70 here today, he is not lightheaded/orthostatic, continue to monitor.  He will continue metoprolol succinate 25 mg 1/2 tablet daily.  Echocardiogram in January looked okay, ejection fraction 60%.

## 2024-04-16 NOTE — PROGRESS NOTES
FAMILY PRACTICE OFFICE VISIT  Rob Gagnon D.O.    [facility]  [facility]  [address]   [address]  [place], [address]      NAME: Jeremías Verde  AGE: 88 y.o. SEX: male  : 1935   MRN: [id number]    DATE: 2024  TIME: 10:19 AM      Assessment and Plan         Patient Instructions   1. Benign essential hypertension  Assessment & Plan:  Blood pressure 102/70 here today, he is not lightheaded/orthostatic, continue to monitor.  He will continue metoprolol succinate 25 mg 1/2 tablet daily.  Echocardiogram in January looked okay, ejection fraction 60%.    Orders:  -     Comprehensive metabolic panel; Future; Expected date: 2024    2. 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  Assessment & Plan:  Continue to control cardiovascular risk as can, does continue on Plavix 75 mg daily, blood pressure is okay, is on rosuvastatin 10 mg daily.    Orders:  -     CBC; Future; Expected date: 2024  -     Comprehensive metabolic panel; Future; Expected date: 2024    3. MCI (mild cognitive impairment) - mixed  Assessment & Plan:  Again reviewed visit with [facility] neurology February night, he did use 1 month donepezil 5 mg daily, now on 10 mg daily without issue, mental status appears much improved versus few months ago, alert, oriented here today.  He will continue donepezil 10 mg daily, also doing well with Zyprexa 2.5 mg at bedtime, stay on that.    He will see geriatric group in follow-up, reevaluate here late August, call us sooner if needed.    Stay on B12, B1, multivitamin.    January hemoglobin 12.3, creatinine 0.79, TSH 1.4.    See previous imaging of brain in January, MRI showed only chronic vascular findings, EEG unremarkable      4. Laceration of right hand without foreign body, initial encounter  Comments:  Wound right hand without sign of infection, Tdap given today  Orders:  -     TDAP VACCINE GREATER THAN OR EQUAL TO  8YO IM    5. Calculus of other lower urinary tract location  Assessment & Plan:  Ultrasound in January showed hydronephrosis had resolved, did have 5 mm bladder stone, see previous visits along with hospitalization, sepsis, stent was removed in January.  No current significant urinary issues, he will have follow-up with urology            Chief Complaint     Chief Complaint   Patient presents with    Follow-up       History of Present Illness   Jeremías Verde is a 88 y.o.-year-old male who is in today accompanied by family, he has been doing very well, tolerated addition donepezil without issue, talks in his sleep a bit but no other behavioral issues, mental status much improved, is using Zyprexa 2.5 mg at night.  Has been walking routinely, no lightheadedness, no chest pain, no increased shortness of breath.  Has had no urinary issues, no bowel issues    Does use Tylenol for arthritic pain    He is using all medication as directed      Review of Systems   Review of Systems   Constitutional:  Negative for appetite change, fatigue, fever and unexpected weight change.   HENT:  Negative for sore throat and trouble swallowing.    Respiratory:  Negative for cough, chest tightness and shortness of breath.    Cardiovascular:  Negative for chest pain, palpitations and leg swelling.   Gastrointestinal:  Negative for abdominal pain, blood in stool, nausea and vomiting.        No acid reflux     No change in bowel   Genitourinary:  Negative for dysuria and hematuria.   Neurological:  Negative for dizziness, syncope, light-headedness and headaches.   Psychiatric/Behavioral:  Negative for behavioral problems.        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    BP Readings from Last 3 Encounters:   04/16/24 102/70   01/26/24 128/82   01/13/24 106/61       Wt Readings from Last 3 Encounters:   04/16/24 75.5 kg (166 lb 6.4 oz)   01/26/24 73.6 kg (162 lb 3.2 oz)   01/13/24 70.2 kg (154 lb 12.2 oz)       Physical Exam  Constitutional:       Appearance: Normal appearance. He is not ill-appearing.   Cardiovascular:      Rate and Rhythm: Normal rate and regular rhythm.      Heart sounds: Normal heart sounds.   Pulmonary:      Effort: Pulmonary effort is normal.      Breath sounds: Normal breath sounds. No wheezing.   Abdominal:      Palpations: Abdomen is soft.      Tenderness: There is no abdominal tenderness.   Musculoskeletal:      Right lower leg: No edema.      Left lower leg: No edema.   Skin:     Coloration: Skin is not jaundiced.      Comments: Wound right hand   Neurological:      Mental Status: He is alert and oriented to person, place, and time.   Psychiatric:         Behavior: Behavior normal.         ALLERGIES:  Allergies   Allergen Reactions    Coconut Oil - Food Allergy Hives    Iv Dye [Iodinated Contrast Media] Rash     Possible delayed rash February 2022       Current Medications     Current Outpatient Medications   Medication Sig Dispense Refill "    acetaminophen (TYLENOL) 325 mg tablet Take 650 mg by mouth Uses 3 x a day      carboxymethylcellulose (REFRESH PLUS) 0.5 % SOLN 1 drop 2 (two) times a day as needed for dry eyes      clopidogrel (PLAVIX) 75 mg tablet TAKE 1 TABLET BY MOUTH EVERY DAY 90 tablet 1    cyanocobalamin (VITAMIN B-12) 1000 MCG tablet Take 1 tablet (1,000 mcg total) by mouth daily 30 tablet 0    donepezil (Aricept) 10 mg tablet Take 1 tablet (10 mg total) by mouth daily at bedtime 90 tablet 1    metoprolol succinate (TOPROL-XL) 25 mg 24 hr tablet TAKE 1/2 TABLET BY MOUTH EVERY DAY 45 tablet 3    OLANZapine (ZyPREXA) 2.5 mg tablet Take 1 tablet (2.5 mg total) by mouth daily at bedtime 90 tablet 0    rosuvastatin (CRESTOR) 10 MG tablet TAKE 1 TABLET BY MOUTH EVERY DAY 90 tablet 1    thiamine 100 MG tablet Take 100 mg by mouth daily      Glucosamine-Chondroitin 250-200 MG TABS daily [facility] (Patient not taking: Reported on 1/9/2024)       No current facility-administered medications for this visit.            Orders Placed This Encounter   Procedures    TDAP VACCINE GREATER THAN OR EQUAL TO 8YO IM    CBC    Comprehensive metabolic panel         Rob Gagnon DO

## 2024-05-29 NOTE — ANESTHESIA POSTPROCEDURE EVALUATION
Post-Op Assessment Note    CV Status:  Stable    Pain management: adequate       Mental Status:  Sleepy   Hydration Status:  Euvolemic   PONV Controlled:  Controlled   Airway Patency:  Patent     Post Op Vitals Reviewed: Yes      Staff: CRNA               BP   122/83   Temp 97.6 °F (36.4 °C) (12/15/23 0919)    Pulse 72 (12/15/23 0919)   Resp   18   SpO2   99 2L NC Robaxin as a muscle relaxer please use as directed   Ice, alternate moist heat,  Follow-up as directed for definitive care   If your pain is persistent you may need further outpatient testing including MRI and/or physical therapy.  Return for any concerns

## 2024-06-05 ENCOUNTER — APPOINTMENT (OUTPATIENT)
Dept: LAB | Facility: MEDICAL CENTER | Age: 89
End: 2024-06-05
Payer: MEDICARE

## 2024-06-05 DIAGNOSIS — I67.2 CEREBRAL ARTERIOSCLEROSIS WITH HISTORY OF PREVIOUS STROKE: ICD-10-CM

## 2024-06-05 DIAGNOSIS — I10 BENIGN ESSENTIAL HYPERTENSION: ICD-10-CM

## 2024-06-05 DIAGNOSIS — E53.8 VITAMIN B 12 DEFICIENCY: ICD-10-CM

## 2024-06-05 DIAGNOSIS — Z86.73 CEREBRAL ARTERIOSCLEROSIS WITH HISTORY OF PREVIOUS STROKE: ICD-10-CM

## 2024-06-05 LAB
ALBUMIN SERPL BCP-MCNC: 3.9 G/DL (ref 3.5–5)
ALP SERPL-CCNC: 57 U/L (ref 34–104)
ALT SERPL W P-5'-P-CCNC: 12 U/L (ref 7–52)
ANION GAP SERPL CALCULATED.3IONS-SCNC: 9 MMOL/L (ref 4–13)
AST SERPL W P-5'-P-CCNC: 18 U/L (ref 13–39)
BILIRUB SERPL-MCNC: 0.53 MG/DL (ref 0.2–1)
BUN SERPL-MCNC: 32 MG/DL (ref 5–25)
CALCIUM SERPL-MCNC: 8.6 MG/DL (ref 8.4–10.2)
CHLORIDE SERPL-SCNC: 105 MMOL/L (ref 96–108)
CO2 SERPL-SCNC: 26 MMOL/L (ref 21–32)
CREAT SERPL-MCNC: 0.99 MG/DL (ref 0.6–1.3)
ERYTHROCYTE [DISTWIDTH] IN BLOOD BY AUTOMATED COUNT: 16.4 % (ref 11.6–15.1)
GFR SERPL CREATININE-BSD FRML MDRD: 68 ML/MIN/1.73SQ M
GLUCOSE P FAST SERPL-MCNC: 95 MG/DL (ref 65–99)
HCT VFR BLD AUTO: 38.6 % (ref 36.5–49.3)
HGB BLD-MCNC: 12.4 G/DL (ref 12–17)
MCH RBC QN AUTO: 27.9 PG (ref 26.8–34.3)
MCHC RBC AUTO-ENTMCNC: 32.1 G/DL (ref 31.4–37.4)
MCV RBC AUTO: 87 FL (ref 82–98)
PLATELET # BLD AUTO: 279 THOUSANDS/UL (ref 149–390)
PMV BLD AUTO: 11.2 FL (ref 8.9–12.7)
POTASSIUM SERPL-SCNC: 4.7 MMOL/L (ref 3.5–5.3)
PROT SERPL-MCNC: 6.6 G/DL (ref 6.4–8.4)
RBC # BLD AUTO: 4.45 MILLION/UL (ref 3.88–5.62)
SODIUM SERPL-SCNC: 140 MMOL/L (ref 135–147)
VIT B12 SERPL-MCNC: 500 PG/ML (ref 180–914)
WBC # BLD AUTO: 5.81 THOUSAND/UL (ref 4.31–10.16)

## 2024-06-05 PROCEDURE — 36415 COLL VENOUS BLD VENIPUNCTURE: CPT

## 2024-06-05 PROCEDURE — 85027 COMPLETE CBC AUTOMATED: CPT

## 2024-06-05 PROCEDURE — 82607 VITAMIN B-12: CPT

## 2024-06-05 PROCEDURE — 80053 COMPREHEN METABOLIC PANEL: CPT

## 2024-06-06 ENCOUNTER — OFFICE VISIT (OUTPATIENT)
Dept: UROLOGY | Facility: CLINIC | Age: 89
End: 2024-06-06
Payer: MEDICARE

## 2024-06-06 VITALS
SYSTOLIC BLOOD PRESSURE: 112 MMHG | WEIGHT: 164 LBS | DIASTOLIC BLOOD PRESSURE: 60 MMHG | HEIGHT: 68 IN | BODY MASS INDEX: 24.86 KG/M2 | HEART RATE: 64 BPM | OXYGEN SATURATION: 94 %

## 2024-06-06 DIAGNOSIS — N20.0 CALCULUS OF KIDNEY: Primary | ICD-10-CM

## 2024-06-06 PROCEDURE — 99213 OFFICE O/P EST LOW 20 MIN: CPT | Performed by: PHYSICIAN ASSISTANT

## 2024-06-06 NOTE — PROGRESS NOTES
6/6/2024      Chief Complaint   Patient presents with   • Follow-up     s/p cystoscopy with cystolitholapaxy along with left ureteroscopy appointment for lithotripsy, insertion of left ureteral stent. 1/9/24; 6 mo follow up          Assessment and Plan    88 y.o. male managed by Dr Srinivasan    1. Calculus of kidney  -     US kidney and bladder; Future; Expected date: 06/06/2025      Mr. Verde is doing beautifully following ureteroscopy lithotripsy and cystolitholapaxy.  He is stone free and stent free.  No voiding concerns.  Needs no medications.  I encouraged him to continue eating and drinking what ever he likes what ever he tolerates at his age.  He does follow mostly low-sodium diet.  He is very active gardening exercising and knows the importance of good water intake.  I will see him in 1 year with a renal ultrasound    History of Present Illness  Jeremías Vedre is a 88 y.o. male here for evaluation of stone follow-up.  I saw him in the hospital October 2023 fever and ureteral stone left stent was inserted he had definitive ureteroscopy laser lithotripsy of ureteral stone along with 3 other bladder stones.  His postoperative stent was removed 3 weeks later.  A follow-up ultrasound after that showed resolved hydronephrosis with 1 remaining bladder calculus 5 mm or smaller.  He has felt well the last several months without bleeding or stone passage or pain.  He sleeps through the night again now.  Active with gardening and in his home gym.  His wife and daughter are with him today at the appointment and are happy to see him recovered.        Review of Systems   Constitutional: Negative.    Respiratory: Negative.     Cardiovascular: Negative.    Genitourinary:  Negative for decreased urine volume, difficulty urinating, dysuria, flank pain, frequency, hematuria and urgency.   Musculoskeletal: Negative.                 Vitals  Vitals:    06/06/24 1034   BP: 112/60   BP Location: Right arm   Patient Position:  "Sitting   Cuff Size: Adult   Pulse: 64   SpO2: 94%   Weight: 74.4 kg (164 lb)   Height: 5' 8\" (1.727 m)       Physical Exam  Vitals and nursing note reviewed.   Constitutional:       General: He is not in acute distress.     Appearance: Normal appearance. He is well-developed. He is not diaphoretic.   HENT:      Head: Normocephalic and atraumatic.   Pulmonary:      Effort: Pulmonary effort is normal.      Comments: No cough or audible wheeze  Abdominal:      General: There is no distension.      Tenderness: There is no abdominal tenderness. There is no right CVA tenderness or left CVA tenderness.   Musculoskeletal:      Right lower leg: No edema.      Left lower leg: No edema.   Skin:     General: Skin is warm and dry.   Neurological:      Mental Status: He is alert and oriented to person, place, and time.      Gait: Gait normal.   Psychiatric:         Speech: Speech normal.         Behavior: Behavior normal.           Past History  Past Medical History:   Diagnosis Date   • Acute respiratory failure with hypoxia ([facility])    • Altered mental status 01/12/2024   • Basal cell carcinoma    • Cancer ([facility])    • Delirium 01/15/2024   • Encephalopathy 10/27/2023   • Hydronephrosis 01/13/2024   • NSTEMI (non-ST elevated myocardial infarction) ([facility]) 2014   • Pneumonia     Last Assessed:  10/7/14   • Polymyalgia rheumatica ([facility])     Last Assessed:  11/23/12   • Polymyalgia rheumatica ([facility]) 2011   • Septic shock ([facility])    • Squamous cell skin cancer 05/24/2022    left frontal scalp anterior   • Squamous cell skin cancer 05/24/2022    Vertex of scalp     Social History     Socioeconomic History   • Marital status: /Civil Union     Spouse name: None   • Number of children: None   • Years of education: None   • Highest education level: None   Occupational History   • None   Tobacco Use   • Smoking status: Never     Passive exposure: Never   • Smokeless tobacco: Never   Vaping Use   • Vaping status: Never Used   Substance and " Sexual Activity   • Alcohol use: Not Currently   • Drug use: Never   • Sexual activity: Yes   Other Topics Concern   • None   Social History Narrative    Exercises regularly        Consumes 2-3 cups of coffee per week and 1 glass of ice tea per day     Social Determinants of Health     Financial Resource Strain: Low Risk  (1/15/2024)    Received from [facility], [facility]    Overall Financial Resource Strain (CARDIA)    • Difficulty of Paying Living Expenses: Not very hard   Food Insecurity: No Food Insecurity (1/15/2024)    Received from [facility], [facility]    Hunger Vital Sign    • Worried About Running Out of Food in the Last Year: Never true    • Ran Out of Food in the Last Year: Never true   Transportation Needs: No Transportation Needs (1/15/2024)    Received from [facility], [facility]    PRAPARE - Transportation    • Lack of Transportation (Medical): No    • Lack of Transportation (Non-Medical): No   Physical Activity: Not on file   Stress: Not on file   Social Connections: Not on file   Intimate Partner Violence: Not At Risk (1/15/2024)    Received from [facility], [facility]    Humiliation, Afraid, Rape, and Kick questionnaire    • Fear of Current or Ex-Partner: No    • Emotionally Abused: No    • Physically Abused: No    • Sexually Abused: No   Housing Stability: Low Risk  (1/15/2024)    Received from [facility], [facility]    Housing Stability Vital Sign    • Unable to Pay for Housing in the Last Year: No    • Number of Places Lived in the Last Year: 1    • Unstable Housing in the Last Year: No     Social History     Tobacco Use   Smoking Status Never   • Passive exposure: Never   Smokeless Tobacco Never     Family History   Problem Relation Age of Onset   • Pulmonary embolism Father    • Other Father         Transurethral  "resection of prostate   • Stroke Sister        The following portions of the patient's history were reviewed and updated as appropriate: allergies, current medications, past medical history, past social history, past surgical history and problem list.    Results  No results found for this or any previous visit (from the past 1 hour(s)).]  No results found for: \"PSA\"  Lab Results   Component Value Date    GLUCOSE 99 06/30/2015    CALCIUM 8.6 06/05/2024     06/30/2015    K 4.7 06/05/2024    CO2 26 06/05/2024     06/05/2024    BUN 32 (H) 06/05/2024    CREATININE 0.99 06/05/2024     Lab Results   Component Value Date    WBC 5.81 06/05/2024    HGB 12.4 06/05/2024    HCT 38.6 06/05/2024    MCV 87 06/05/2024     06/05/2024       "

## 2024-06-24 DIAGNOSIS — F05 DELIRIUM DUE TO MEDICAL CONDITION WITH BEHAVIORAL DISTURBANCE: ICD-10-CM

## 2024-06-25 RX ORDER — OLANZAPINE 2.5 MG/1
TABLET, FILM COATED ORAL
Qty: 90 TABLET | Refills: 0 | Status: SHIPPED | OUTPATIENT
Start: 2024-06-25

## 2024-07-03 DIAGNOSIS — I25.10 ARTERIOSCLEROTIC CORONARY ARTERY DISEASE: ICD-10-CM

## 2024-07-03 DIAGNOSIS — E78.5 HYPERLIPIDEMIA, UNSPECIFIED HYPERLIPIDEMIA TYPE: ICD-10-CM

## 2024-07-03 RX ORDER — ROSUVASTATIN CALCIUM 10 MG/1
10 TABLET, COATED ORAL DAILY
Qty: 100 TABLET | Refills: 1 | Status: SHIPPED | OUTPATIENT
Start: 2024-07-03

## 2024-08-14 ENCOUNTER — RA CDI HCC (OUTPATIENT)
Dept: OTHER | Facility: HOSPITAL | Age: 89
End: 2024-08-14

## 2024-08-14 ENCOUNTER — TELEPHONE (OUTPATIENT)
Dept: ADMINISTRATIVE | Facility: OTHER | Age: 89
End: 2024-08-14

## 2024-08-14 NOTE — TELEPHONE ENCOUNTER
08/14/24 1:21 PM    Patient contacted to bring Advance Directive, POLST, or Living Will document to next scheduled pcp visit.[facility] Department left message.    Thank you.  Ana Thomas MA  [facility]

## 2024-08-20 ENCOUNTER — OFFICE VISIT (OUTPATIENT)
Dept: FAMILY MEDICINE CLINIC | Facility: CLINIC | Age: 89
End: 2024-08-20
Payer: MEDICARE

## 2024-08-20 VITALS
BODY MASS INDEX: 27.06 KG/M2 | HEIGHT: 66 IN | OXYGEN SATURATION: 97 % | HEART RATE: 67 BPM | WEIGHT: 168.4 LBS | SYSTOLIC BLOOD PRESSURE: 128 MMHG | DIASTOLIC BLOOD PRESSURE: 76 MMHG

## 2024-08-20 DIAGNOSIS — Z86.73 CEREBRAL ARTERIOSCLEROSIS WITH HISTORY OF PREVIOUS STROKE: ICD-10-CM

## 2024-08-20 DIAGNOSIS — I25.10 ARTERIOSCLEROTIC CORONARY ARTERY DISEASE: Primary | ICD-10-CM

## 2024-08-20 DIAGNOSIS — N13.8 BPH WITH OBSTRUCTION/LOWER URINARY TRACT SYMPTOMS: ICD-10-CM

## 2024-08-20 DIAGNOSIS — I67.2 CEREBRAL ARTERIOSCLEROSIS WITH HISTORY OF PREVIOUS STROKE: ICD-10-CM

## 2024-08-20 DIAGNOSIS — R26.89 BALANCE PROBLEMS: ICD-10-CM

## 2024-08-20 DIAGNOSIS — N40.1 BPH WITH OBSTRUCTION/LOWER URINARY TRACT SYMPTOMS: ICD-10-CM

## 2024-08-20 DIAGNOSIS — G31.84 MCI (MILD COGNITIVE IMPAIRMENT): ICD-10-CM

## 2024-08-20 DIAGNOSIS — I10 BENIGN ESSENTIAL HYPERTENSION: ICD-10-CM

## 2024-08-20 PROBLEM — F22 PARANOIA (HCC): Status: RESOLVED | Noted: 2024-01-15 | Resolved: 2024-08-20

## 2024-08-20 PROCEDURE — G2211 COMPLEX E/M VISIT ADD ON: HCPCS | Performed by: FAMILY MEDICINE

## 2024-08-20 PROCEDURE — 99214 OFFICE O/P EST MOD 30 MIN: CPT | Performed by: FAMILY MEDICINE

## 2024-08-20 NOTE — PATIENT INSTRUCTIONS
Reviewed previous blood work, in June B12 was improved at 500, creatinine 0.99 with potassium 4.7, glucose okay at 95, hemoglobin 12.4.    Back in January TSH 1.4.  __________________________________________________    1. Arteriosclerotic coronary artery disease  Assessment & Plan:  Blood pressure is fine here today 128/76.  Had echocardiogram back in January that looked okay.  Last cholesterol in October 2023 was excellent at 133 with HDL 58, LDL 61.    Continue metoprolol succinate 25 mg 1/2 tablet daily, rosuvastatin 10 mg daily.    Also continues on clopidogrel 75 mg daily.    Has seen [facility] cardiology in the past  2. Benign essential hypertension  Assessment & Plan:  Blood pressure was fine today at 128/76  3. MCI (mild cognitive impairment) - mixed  Assessment & Plan:  Reviewed previous visit with neurology in February along with geriatric group in May.  In May he scored 27/30 on testing.  Previous EEG unremarkable, see previous MRI in January 2024 showing chronic cerebellar infarction/atherosclerosis.    Appears more related to vascular issues/history of stroke and not Alzheimer's, after discussion he will stop donepezil/Aricept    He is doing well with Zyprexa 2.5 mg at night, continue that as is      4. 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  Continue to control stroke risk as can  Orders:  -     Ambulatory Referral to Physical Therapy; Future  5. Balance problems   Assessment & Plan:  Appears related to prior cerebellar stroke, he will do therapy.  Orders:  -     Ambulatory Referral to Physical Therapy; Future  6. BPH with obstruction/lower urinary tract symptoms  Assessment & Plan:  He saw urology in follow-up in June, is to recheck with them in 1 year.    See previous imaging/CT scan chest, abdomen, pelvis back in January 2024.    We will see him again in  4 months with an annual wellness.  He will do flu shot at the pharmacy.

## 2024-08-20 NOTE — PROGRESS NOTES
FAMILY PRACTICE OFFICE VISIT  Rob Gagnon D.O.    [facility]  [facility]  [address]  [address]  [place], [address]      NAME: Jeremías Verde  AGE: 89 y.o. SEX: male  : 1935   MRN: [id number]    DATE: 2024  TIME: 3:04 PM      Assessment and Plan         Patient Instructions   Reviewed previous blood work, in  B12 was improved at 500, creatinine 0.99 with potassium 4.7, glucose okay at 95, hemoglobin 12.4.    Back in January TSH 1.4.  __________________________________________________    1. Arteriosclerotic coronary artery disease  Assessment & Plan:  Blood pressure is fine here today 128/76.  Had echocardiogram back in January that looked okay.  Last cholesterol in 2023 was excellent at 133 with HDL 58, LDL 61.    Continue metoprolol succinate 25 mg 1/2 tablet daily, rosuvastatin 10 mg daily.    Also continues on clopidogrel 75 mg daily.    Has seen [facility] cardiology in the past  2. Benign essential hypertension  Assessment & Plan:  Blood pressure was fine today at 128/76  3. MCI (mild cognitive impairment) - mixed  Assessment & Plan:  Reviewed previous visit with neurology in February along with geriatric group in May.  In May he scored 27/30 on testing.  Previous EEG unremarkable, see previous MRI in 2024 showing chronic cerebellar infarction/atherosclerosis.    Appears more related to vascular issues/history of stroke and not Alzheimer's, after discussion he will stop donepezil/Aricept    He is doing well with Zyprexa 2.5 mg at night, continue that as is      4. 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  Continue to control stroke risk as can  Orders:  -     Ambulatory Referral to Physical Therapy; Future  5. Balance problems   Assessment & Plan:  Appears related to prior cerebellar stroke,  he will do therapy.  Orders:  -     Ambulatory Referral to Physical Therapy; Future  6. BPH with obstruction/lower urinary tract symptoms  Assessment & Plan:  He saw urology in follow-up in June, is to recheck with them in 1 year.    See previous imaging/CT scan chest, abdomen, pelvis back in January 2024.    We will see him again in 4 months with an annual wellness.  He will do flu shot at the pharmacy.    Chief Complaint     Chief Complaint   Patient presents with    Follow-up     4m. Vit B 12    loss of balance     For couple weeks is unable to stand steady    Memory Loss       History of Present Illness   Jeremías Verde is a 89 y.o.-year-old male who is in today accompanied by his daughter/grandson.  Has noted some issues with increased balance issues past few weeks.  Has had no falls.  He is using medications as directed, no significant paranoia.    He is currently living at home with his wife, family checks on him routinely.      Review of Systems   Review of Systems   Constitutional:         He has had no fevers or chills, did see urology, is to see them yearly.  Has had no new chest pains, no increased palpitations, no increased shortness of breath.  No change in bowel or bladder.  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    BP Readings from Last 3 Encounters:   08/20/24 128/76   06/06/24 112/60   04/16/24 102/70       Wt Readings from Last 3 Encounters:   08/20/24 76.4 kg (168 lb 6.4 oz)   06/06/24 74.4 kg (164 lb)   04/16/24 75.5 kg (166 lb 6.4 oz)       Physical Exam  Constitutional:       Comments: He looks well today, he is alert.  Heart is regular rate and rhythm, lungs are clear, abdomen is soft, nontender.  He has no significant edema at ankles.  Neurologically he appears about the same as last visit         ALLERGIES:  Allergies   Allergen Reactions    Coconut Oil - Food Allergy Hives    Iv Dye [Iodinated Contrast Media] Rash     Possible delayed rash February 2022       Current Medications     Current Outpatient Medications   Medication Sig Dispense Refill    acetaminophen (TYLENOL) 325 mg tablet Take 650 mg by mouth Uses 3 x a day      carboxymethylcellulose (REFRESH PLUS) 0.5 % SOLN 1 drop 2 (two) times a day as needed for dry eyes      clopidogrel (PLAVIX) 75 mg tablet TAKE 1 TABLET BY MOUTH EVERY DAY 90 tablet 1    cyanocobalamin (VITAMIN B-12) 1000 MCG tablet Take 1 tablet (1,000 mcg total) by mouth daily 30 tablet 0    metoprolol succinate (TOPROL-XL) 25 mg 24 hr tablet TAKE 1/2 TABLET BY MOUTH EVERY DAY 45 tablet 3    OLANZapine (ZyPREXA) 2.5 mg tablet TAKE 1 TABLET (2.5 MG TOTAL) BY MOUTH EVERY DAY AT BEDTIME 90 tablet 0    rosuvastatin (CRESTOR) 10 MG tablet TAKE 1 TABLET BY MOUTH EVERY  tablet 1     No current facility-administered medications for this visit.            Orders Placed This Encounter   Procedures    Ambulatory Referral to Physical Therapy         Rob Gagnon DO  "

## 2024-08-20 NOTE — ASSESSMENT & PLAN NOTE
Had MRI in January 2024 showing chronic infarction cerebellar, this does affect his balance.  Continue to control stroke risk as can

## 2024-08-20 NOTE — ASSESSMENT & PLAN NOTE
He saw urology in follow-up in June, is to recheck with them in 1 year.    See previous imaging/CT scan chest, abdomen, pelvis back in January 2024.

## 2024-08-20 NOTE — ASSESSMENT & PLAN NOTE
Blood pressure is fine here today 128/76.  Had echocardiogram back in January that looked okay.  Last cholesterol in October 2023 was excellent at 133 with HDL 58, LDL 61.    Continue metoprolol succinate 25 mg 1/2 tablet daily, rosuvastatin 10 mg daily.    Also continues on clopidogrel 75 mg daily.    Has seen [facility] cardiology in the past

## 2024-08-20 NOTE — ASSESSMENT & PLAN NOTE
Reviewed previous visit with neurology in February along with geriatric group in May.  In May he scored 27/30 on testing.  Previous EEG unremarkable, see previous MRI in January 2024 showing chronic cerebellar infarction/atherosclerosis.    Appears more related to vascular issues/history of stroke and not Alzheimer's, after discussion he will stop donepezil/Aricept    He is doing well with Zyprexa 2.5 mg at night, continue that as is

## 2024-08-28 DIAGNOSIS — I63.9 CEREBELLAR STROKE (HCC): ICD-10-CM

## 2024-08-29 RX ORDER — CLOPIDOGREL BISULFATE 75 MG/1
TABLET ORAL
Qty: 90 TABLET | Refills: 1 | Status: SHIPPED | OUTPATIENT
Start: 2024-08-29

## 2024-09-06 ENCOUNTER — EVALUATION (OUTPATIENT)
Facility: REHABILITATION | Age: 89
End: 2024-09-06
Payer: MEDICARE

## 2024-09-06 DIAGNOSIS — R26.9 GAIT DISTURBANCE: Primary | ICD-10-CM

## 2024-09-06 DIAGNOSIS — Z86.73 CEREBRAL ARTERIOSCLEROSIS WITH HISTORY OF PREVIOUS STROKE: ICD-10-CM

## 2024-09-06 DIAGNOSIS — I67.2 CEREBRAL ARTERIOSCLEROSIS WITH HISTORY OF PREVIOUS STROKE: ICD-10-CM

## 2024-09-06 DIAGNOSIS — R26.89 BALANCE PROBLEMS: ICD-10-CM

## 2024-09-06 PROCEDURE — 97162 PT EVAL MOD COMPLEX 30 MIN: CPT | Performed by: PHYSICAL THERAPIST

## 2024-09-06 NOTE — PROGRESS NOTES
PT Evaluation     Today's date: 2024  Patient name: Jeremías Verde  : 1935  MRN: [id number]  Referring provider: Rob Gagnon DO  Dx:   Encounter Diagnosis     ICD-10-CM    1. Gait disturbance  R26.9       2. 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 Balance problems   R26.89 Ambulatory Referral to Physical Therapy          Start Time: 1100  Stop Time: 1150  Total time in clinic (min): 50 minutes    Assessment  Impairments: abnormal gait, impaired balance, impaired physical strength, lacks appropriate home exercise program and endurance    Assessment details: Patient is an 89 year old male referred to out patient physical therapy due to gait disturbance and balance deficits. His  MRI Shows cerebellar infarct and he has a h/o arthritic Right hip, which was treated by [facility] with injections. Presently 0/10 pain, but can increase up to 5/10 pain. Upon assessment patient had low scoring 5x sit to stand, MCTSIB, TUG and 6 MWT demonstrating, decreased balance, endurance strength and increased risk of falls. Of not his TUG with Cognitive component showed concerns.  A cog assessment by Speech therapy would be of benefit.He will continue to benefit from skilled PT intervention.  Understanding of Dx/Px/POC: fair     Prognosis: good    Goals  Short Term Goal: 4 weeks  1. Patient will be independent with initial HEP  2. Patient will improve 5 x sit to stand by 4 sec for increased LE strength  3. Patient will show improvement in MCTSIB by 15 seconds for improved balance    Long Term Goals  1. Patient will show improvement in 6 MWT by 20 % for improved endurance  2. Patient will score >23/30 in FGA for improved dynamic balance  3. Patient will score below 11 points in TUG with cog component for increased risk of falls.  4. Patient will be compliant with HEP      Plan  Patient would benefit from: skilled speech  therapy  Referral necessary: Yes    Planned therapy interventions: neuromuscular re-education, strengthening, therapeutic activities, home exercise program, gait training and balance    Frequency: 2x week  Duration in weeks: 10 (Anticipating two week hold  - OCT 8due to family vacation)  Plan of Care beginning date: 2024  Plan of Care expiration date: 2024  Treatment plan discussed with: patient and family        Subjective Evaluation    History of Present Illness  Date of onset: 2024  Mechanism of injury: I had a stroke in January.  I got shots at [facility] two months ago. He reports has a home gym and works out 3x/week.  My problem is the balance. I feel I need to stay moving. Wife reports balance deficits for long time.          Not a recurrent problem   Quality of life: good    Patient Goals  Patient goals for therapy: increased strength, improved balance and decreased pain  Patient goal: to walk more normally  Pain  Current pain ratin  At worst pain ratin  Location: Right Hip  Quality: dull ache  Relieving factors: medications and change in position    Social Support  Exterior steps/ramp assessed: 1.  Interior stairs assessed: 13.   Lives in: multiple-level home  Lives with: spouse    Employment status: not working  Hand dominance: right      Diagnostic Tests  MRI studies: abnormal (IMPRESSION:     1. No acute infarction, intracranial image or mass. 2. Mild, chronic microangiopathy and chronic appearing right cerebellar infarction 2024)  Treatments  Previous treatment: injection treatment      Objective     Active Range of Motion   Left Hip   Flexion: WFL  Extension: WFL  Abduction: WFL  Adduction: WFL    Right Hip   Flexion: WFL  Extension: WFL  Abduction: WFL  Adduction: WFL  Left Knee   Flexion: WFL  Extension: WFL    Right Knee   Flexion: WFL  Extension: WFL  Left Ankle/Foot   Dorsiflexion (ke): WFL  Plantar flexion: WFL    Right Ankle/Foot   Dorsiflexion (ke): WFL  Dorsiflexion (kf):  [facility]  Neuro Exam:     Functional outcomes   6 minute walk test: 1150  5x sit to stand: 16 (seconds)  TU (seconds)  TUG cognitive: 19 (seconds)  Functional outcome comment: Modified Clinical Test of Sensory Interaction on Balance  30 eyes open firm surface   30 eyes closed firm surface   17 eyes open foam surface   2 eyes closed foam surface  79/120     Functional outcome gait comment: Decreased reciprocal arm movement, trunk rotation, step length and heel strike.       Precautions: CVA , hypoxic episode              Neuro Re-Ed                                                        Ther Ex                                                               Ther Activity

## 2024-09-09 ENCOUNTER — OFFICE VISIT (OUTPATIENT)
Facility: REHABILITATION | Age: 89
End: 2024-09-09
Payer: MEDICARE

## 2024-09-09 DIAGNOSIS — Z86.73 CEREBRAL ARTERIOSCLEROSIS WITH HISTORY OF PREVIOUS STROKE: Primary | ICD-10-CM

## 2024-09-09 DIAGNOSIS — R26.89 BALANCE PROBLEMS: ICD-10-CM

## 2024-09-09 DIAGNOSIS — R26.9 GAIT DISTURBANCE: ICD-10-CM

## 2024-09-09 DIAGNOSIS — I67.2 CEREBRAL ARTERIOSCLEROSIS WITH HISTORY OF PREVIOUS STROKE: Primary | ICD-10-CM

## 2024-09-09 DIAGNOSIS — F05 DELIRIUM DUE TO MEDICAL CONDITION WITH BEHAVIORAL DISTURBANCE: ICD-10-CM

## 2024-09-09 PROCEDURE — 97112 NEUROMUSCULAR REEDUCATION: CPT | Performed by: PHYSICAL THERAPIST

## 2024-09-09 PROCEDURE — 97110 THERAPEUTIC EXERCISES: CPT | Performed by: PHYSICAL THERAPIST

## 2024-09-09 NOTE — PROGRESS NOTES
"Daily Note     Today's date: 2024  Patient name: Jeremías Verde  : 1935  MRN: [id number]  Referring provider: Rob Gagnon DO  Dx:   Encounter Diagnosis     ICD-10-CM    1. 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 Balance problems   R26.89       3. Gait disturbance  R26.9                      Subjective: Reports feeling well today.  States that most of his balance issues occur when he is standing in place versus walking.      Objective: See treatment diary below    Flowsheet Rows      Flowsheet Row Most Recent Value   Functional Gait Assessment    Gait Level Surface  2  [6.5s]   Change in GaiT Speed 2   Gait with horizontal head turns 3   Gait with vertical head turns 3   Gait and pivot tuen  2   Step over obstacle 2   Gait with narrow base of supprt 2   Gait with eyes closed 1  [11s slowd mild instability]   Ambulating backwards 2  [shuffling gait. 9s]   Steps 2   Total score  21             Assessment: Tolerated treatment well.  Performed additional testing today with FGA which did indicate some risk for falls and impaired dynamic balance.  Per patient values worked on static balance today and with eyes closed which she was most challenged with.  Patient initially hesitant to perform eyes closed balance on foam but with use of harness system had improved confidence.  Demonstrating postural sway and instability with negotiating hurdles and uneven surface but able to self-correct balance.  Would benefit from continued PT.  Educated on performing feet together eyes closed balance at home near a stable surface to hold.      Plan: Continue per plan of care.  Progress as tolerated     Precautions: CVA , hypoxic episode       Neuro Re-Ed       FGA performed      Static balance  FT EO foam 30\"x2    FT EC foam 30\"x4    FT EO foam dynamic reaching 4# ball 2min    FT EO foam 4# ball toss 2min      Hurdles  High   Fwd - x3 laps    Lat x1 " laps      Uneven mat 3 foams under. + water ball carry.   Fwd- x6 laps                           Ther Ex       Treadmill        Scifit for aerobic endurance training L5 8min at start    HR: 114                                                    Ther Activity

## 2024-09-10 RX ORDER — OLANZAPINE 2.5 MG/1
TABLET, FILM COATED ORAL
Qty: 90 TABLET | Refills: 0 | Status: SHIPPED | OUTPATIENT
Start: 2024-09-10

## 2024-09-12 ENCOUNTER — OFFICE VISIT (OUTPATIENT)
Facility: REHABILITATION | Age: 89
End: 2024-09-12
Payer: MEDICARE

## 2024-09-12 DIAGNOSIS — I67.2 CEREBRAL ARTERIOSCLEROSIS WITH HISTORY OF PREVIOUS STROKE: Primary | ICD-10-CM

## 2024-09-12 DIAGNOSIS — Z86.73 CEREBRAL ARTERIOSCLEROSIS WITH HISTORY OF PREVIOUS STROKE: Primary | ICD-10-CM

## 2024-09-12 DIAGNOSIS — R26.89 BALANCE PROBLEMS: ICD-10-CM

## 2024-09-12 DIAGNOSIS — R26.9 GAIT DISTURBANCE: ICD-10-CM

## 2024-09-12 PROCEDURE — 97110 THERAPEUTIC EXERCISES: CPT | Performed by: PHYSICAL THERAPIST

## 2024-09-12 PROCEDURE — 97112 NEUROMUSCULAR REEDUCATION: CPT | Performed by: PHYSICAL THERAPIST

## 2024-09-12 NOTE — PROGRESS NOTES
"Daily Note     Today's date: 2024  Patient name: Jeremías Verde  : 1935  MRN: [id number]  Referring provider: Rob Gagnon DO  Dx:   Encounter Diagnosis     ICD-10-CM    1. 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 Balance problems   R26.89       3. Gait disturbance  R26.9           Start Time: 1045  Stop Time: 1130  Total time in clinic (min): 45 minutes    Subjective: Compliant with ambulation and static balance exercises at home.      Objective: See treatment diary below    Precautions: CVA , hypoxic episode      Neuro Re-Ed       FGA performed      Static balance  FT EO foam 30\"x2    FT EC foam 30\"x4    FT EO foam dynamic reaching 4# ball 2min    FT EO foam 4# ball toss 2min      Hurdles  High   Fwd - x3 laps    Lat x1 laps High  Forwards 3 laps* solostep. Difficulty clearing  Lateral 1 lap     Uneven mat 3 foams under. + water ball carry.   Fwd- x6 laps 3 foams under  Forwards 10# hydrosphere  3 laps forward  3 laps sideways     Dynamic balance FGA * Forwards EC  25' x 5  Backwards   25' x 5  Tandem walk 25' x 2 laps      Added EC   2 laps * considerable LOB       HT 25' laps x 2  HT  25' laps x 2  Added EC to both 1 lap  25'     HEP  Daily walks  EC supported 30' x 3 holding on to his treadmill supports     Ther Ex       Treadmill        Scifit for aerobic endurance training L5 8min at start    HR: 114     L4 8 min at end of session       Overground ambulation  300' large amplitude arm/brisk walk                          Ther Activity                                   Assessment: Tolerated treatment well.  Solostep used for balance recovery. Difficulty with amarilis and compliant surface negotiation. Added dynamic balance activities with EC component as tolerated. Has been compliant with HEP. Would benefit from continued PT.     Plan: Continue per plan of care.  Progress as tolerated            "

## 2024-09-13 ENCOUNTER — TELEPHONE (OUTPATIENT)
Dept: OTHER | Facility: HOSPITAL | Age: 89
End: 2024-09-13

## 2024-09-13 DIAGNOSIS — I10 BENIGN ESSENTIAL HYPERTENSION: ICD-10-CM

## 2024-09-13 DIAGNOSIS — I25.10 ARTERIOSCLEROTIC CORONARY ARTERY DISEASE: ICD-10-CM

## 2024-09-13 RX ORDER — METOPROLOL SUCCINATE 25 MG/1
12.5 TABLET, EXTENDED RELEASE ORAL DAILY
Qty: 45 TABLET | Refills: 3 | Status: SHIPPED | OUTPATIENT
Start: 2024-09-13

## 2024-09-13 NOTE — TELEPHONE ENCOUNTER
Patient called requesting refill for olanzapine. Patient made aware medication was refilled on 9/10 for 90 with 0 refills to [facility] pharmacy. Patient instructed to contact the pharmacy to obtain refills of medication. Patient verbalized understanding.      Pt's going on vacation and needs this early, informed her that she should be able to fill it early by telling the pharmacist that they're going on vacation but if any issues after speaking with someone, to have them reach out to us

## 2024-09-16 ENCOUNTER — OFFICE VISIT (OUTPATIENT)
Dept: PHYSICAL THERAPY | Facility: REHABILITATION | Age: 89
End: 2024-09-16
Payer: MEDICARE

## 2024-09-16 DIAGNOSIS — R26.89 BALANCE PROBLEMS: ICD-10-CM

## 2024-09-16 DIAGNOSIS — R26.9 GAIT DISTURBANCE: ICD-10-CM

## 2024-09-16 DIAGNOSIS — I67.2 CEREBRAL ARTERIOSCLEROSIS WITH HISTORY OF PREVIOUS STROKE: Primary | ICD-10-CM

## 2024-09-16 DIAGNOSIS — Z86.73 CEREBRAL ARTERIOSCLEROSIS WITH HISTORY OF PREVIOUS STROKE: Primary | ICD-10-CM

## 2024-09-16 PROCEDURE — 97112 NEUROMUSCULAR REEDUCATION: CPT | Performed by: PHYSICAL THERAPY ASSISTANT

## 2024-09-16 PROCEDURE — 97110 THERAPEUTIC EXERCISES: CPT | Performed by: PHYSICAL THERAPY ASSISTANT

## 2024-09-16 NOTE — PROGRESS NOTES
"Daily Note     Today's date: 2024  Patient name: Jeremías Verde  : 1935  MRN: [id number]  Referring provider: Rob Gagnon DO  Dx:   Encounter Diagnosis     ICD-10-CM    1. 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 Balance problems   R26.89       3. Gait disturbance  R26.9                      Subjective: No new complaints.  Pt reports he had a good weekend.    Objective: See treatment diary below    Precautions: CVA , hypoxic episode 24    Neuro Re-Ed   Solostep     FGA performed      Static balance  FT EO foam 30\"x2    FT EC foam 30\"x4    FT EO foam dynamic reaching 4# ball 2min    FT EO foam 4# ball toss 2min      Hurdles  High   Fwd - x3 laps    Lat x1 laps High  Forwards 3 laps* solostep. Difficulty clearing  Lateral 1 lap High fwd and lat x 3 laps ea   difficulty clearing fwd>lat    Uneven mat 3 foams under. + water ball carry.   Fwd- x6 laps 3 foams under  Forwards 10# hydrosphere  3 laps forward  3 laps sideways     Dynamic balance FGA * Forwards EC  25' x 5  Backwards   25' x 5  Tandem walk 25' x 2 laps      Added EC   2 laps * considerable LOB Fwd EC 25'x4  Bkwd EC 25'x4    Foam beams (2) fwd and lat x4 ea    Amb up/down foam incline with tossing activity at top x 9      HT 25' laps x 2  HT  25' laps x 2  Added EC to both 1 lap  25' HT/HN 25' x 6 ea  EC HT/HN 25'x2 ea      HEP  Daily walks  EC supported 30' x 3 holding on to his treadmill supports     Ther Ex       Treadmill        Scifit for aerobic endurance training L5 8min at start    HR: 114     L4 8 min at end of session   L4 9 min HR 98    Overground ambulation  300' large amplitude arm/brisk walk 100' laps x 4 for speed  25\"  26\"  23\"  22\"                         Ther Activity                                   Assessment: Tolerated treatment well.  Solostep used for balance recovery. Added amb on foam incline this session.  Moderate " difficulty with maintaining anterior weight shift when ascending incline improved with verbal cues. Has been compliant with HEP. Would benefit from continued PT.     Plan: Continue per plan of care.  Progress as tolerated

## 2024-09-18 ENCOUNTER — OFFICE VISIT (OUTPATIENT)
Facility: REHABILITATION | Age: 89
End: 2024-09-18
Payer: MEDICARE

## 2024-09-18 DIAGNOSIS — R26.89 BALANCE PROBLEMS: ICD-10-CM

## 2024-09-18 DIAGNOSIS — Z86.73 CEREBRAL ARTERIOSCLEROSIS WITH HISTORY OF PREVIOUS STROKE: Primary | ICD-10-CM

## 2024-09-18 DIAGNOSIS — R26.9 GAIT DISTURBANCE: ICD-10-CM

## 2024-09-18 DIAGNOSIS — I67.2 CEREBRAL ARTERIOSCLEROSIS WITH HISTORY OF PREVIOUS STROKE: Primary | ICD-10-CM

## 2024-09-18 PROCEDURE — 97112 NEUROMUSCULAR REEDUCATION: CPT | Performed by: PHYSICAL THERAPIST

## 2024-09-18 PROCEDURE — 97110 THERAPEUTIC EXERCISES: CPT | Performed by: PHYSICAL THERAPIST

## 2024-09-18 NOTE — PROGRESS NOTES
"Daily Note     Today's date: 2024  Patient name: Jeremías Verde  : 1935  MRN: [id number]  Referring provider: Rob Gagnon DO  Dx:   Encounter Diagnosis     ICD-10-CM    1. 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 Balance problems   R26.89       3. Gait disturbance  R26.9             Start Time: 905  Stop Time: 945  Total time in clinic (min): 40 minutes    Subjective: I have been doing the treadmill at home 2.0 for 8 min  Objective: See treatment diary below    Precautions: CVA , hypoxic episode 24   Neuro Re-Ed   Solostep  solostep   FGA performed      Static balance  FT EO foam 30\"x2    FT EC foam 30\"x4    FT EO foam dynamic reaching 4# ball 2min    FT EO foam 4# ball toss 2min        Hurdles  High   Fwd - x3 laps    Lat x1 laps High  Forwards 3 laps* solostep. Difficulty clearing  Lateral 1 lap High fwd and lat x 3 laps ea   difficulty clearing fwd>lat High fwd and lat x 3 laps ea   Improved LE clearance   Uneven mat 3 foams under. + water ball carry.   Fwd- x6 laps 3 foams under  Forwards 10# hydrosphere  3 laps forward  3 laps sideways  3 foams under  Forwards 10# hydrosphere  3 laps forward  3 laps sideways   Added EC 2 sets ea fwd/sideways   Dynamic balance FGA * Forwards EC  25' x 5  Backwards   25' x 5  Tandem walk 25' x 2 laps      Added EC   2 laps * considerable LOB Fwd EC 25'x4  Bkwd EC 25'x4    Foam beams (2) fwd and lat x4 ea    Amb up/down foam incline with tossing activity at top x 9 Foam beams (2) fwd and lat x4 ea      Forwards progression  100' x 3 HT  100' x 2 HN  Forwards HTEC 25x 4  Backwrds 25' x 4     HT 25' laps x 2  HT  25' laps x 2  Added EC to both 1 lap  25' HT/HN 25' x 6 ea  EC HT/HN 25'x2 ea   Step ups   1 UE support  10 x each  Pt/family educated to perform at home   HEP  Daily walks  EC supported 30' x 3 holding on to his treadmill supports     Ther Ex       Treadmill      " "  Scifit for aerobic endurance training L5 8min at start    HR: 114     L4 8 min at end of session   L4 9 min HR 98 L4 10 min    Overground ambulation  300' large amplitude arm/brisk walk 100' laps x 4 for speed  25\"  26\"  23\"  22\"                         Ther Activity                                   Assessment: Tolerated treatment well.  Improving balance and LE clearance  during activities. Would benefit from continued PT.     Plan: Continue per plan of care.  Progress as tolerated            "

## 2024-10-09 ENCOUNTER — EVALUATION (OUTPATIENT)
Facility: REHABILITATION | Age: 89
End: 2024-10-09
Payer: MEDICARE

## 2024-10-09 DIAGNOSIS — R26.9 GAIT DISTURBANCE: ICD-10-CM

## 2024-10-09 DIAGNOSIS — R26.89 BALANCE PROBLEMS: ICD-10-CM

## 2024-10-09 DIAGNOSIS — Z86.73 CEREBRAL ARTERIOSCLEROSIS WITH HISTORY OF PREVIOUS STROKE: Primary | ICD-10-CM

## 2024-10-09 DIAGNOSIS — I67.2 CEREBRAL ARTERIOSCLEROSIS WITH HISTORY OF PREVIOUS STROKE: Primary | ICD-10-CM

## 2024-10-09 PROCEDURE — 97110 THERAPEUTIC EXERCISES: CPT | Performed by: PHYSICAL THERAPIST

## 2024-10-09 PROCEDURE — 97112 NEUROMUSCULAR REEDUCATION: CPT | Performed by: PHYSICAL THERAPIST

## 2024-10-09 NOTE — PROGRESS NOTES
"Daily Note/Update     Today's date: 10/9/2024  Patient name: Jeremías Verde  : 1935  MRN: [id number]  Referring provider: Rob Gagnon DO  Dx:   Encounter Diagnosis     ICD-10-CM    1. 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 Balance problems   R26.89       3. Gait disturbance  R26.9               Start Time: 0900  Stop Time: 0945  Total time in clinic (min): 45 minutes    Subjective: I just returned form [employer] vacation  Objective: See treatment diary below    Functional outcomes                            RE-Eval     6 minute walk test: 1150                           1200    5x sit to stand: 16 (seconds)                     13 seconds    TU (seconds)                                    11 second  TUG cognitive: 19 (seconds)                     -7 unable , -5 11 sec, -3 13 seconds    Functional outcome comment: Modified Clinical Test of Sensory Interaction on Balance  30 eyes open firm surface                         30  30 eyes closed firm surface                       30   17 eyes open foam surface                        25  2 eyes closed foam surface                        5 sec  79/120                                                       90/120    FGA  ()                                                                               Flowsheet Rows      Flowsheet Row Most Recent Value   Functional Gait Assessment    Gait Level Surface  3   Change in GaiT Speed 3   Gait with horizontal head turns 2   Gait with vertical head turns 3   Gait and pivot tuen  3   Step over obstacle 3   Gait with narrow base of supprt 1   Gait with eyes closed 2   Ambulating backwards 2   Steps 2   Total score  24        Precautions: CVA , hypoxic episode 1/24       9/9 9/12 9/16/24 9/19 10/09   Neuro Re-Ed   Solostep  solostep See above FOM treatment measures   FGA performed       Static balance  FT EO foam 30\"x2    FT EC foam 30\"x4    FT EO foam " "dynamic reaching 4# ball 2min    FT EO foam 4# ball toss 2min         Hurdles  High   Fwd - x3 laps    Lat x1 laps High  Forwards 3 laps* solostep. Difficulty clearing  Lateral 1 lap High fwd and lat x 3 laps ea   difficulty clearing fwd>lat High fwd and lat x 3 laps ea   Improved LE clearance    Uneven mat 3 foams under. + water ball carry.   Fwd- x6 laps 3 foams under  Forwards 10# hydrosphere  3 laps forward  3 laps sideways  3 foams under  Forwards 10# hydrosphere  3 laps forward  3 laps sideways   Added EC 2 sets ea fwd/sideways    Dynamic balance FGA 21/30* Forwards EC  25' x 5  Backwards   25' x 5  Tandem walk 25' x 2 laps      Added EC   2 laps * considerable LOB Fwd EC 25'x4  Bkwd EC 25'x4    Foam beams (2) fwd and lat x4 ea    Amb up/down foam incline with tossing activity at top x 9 Foam beams (2) fwd and lat x4 ea      Forwards progression  100' x 3 HT  100' x 2 HN  Forwards HTEC 25x 4  Backwrds 25' x 4      HT 25' laps x 2  HT  25' laps x 2  Added EC to both 1 lap  25' HT/HN 25' x 6 ea  EC HT/HN 25'x2 ea   Step ups   1 UE support  10 x each  Pt/family educated to perform at home    HEP  Daily walks  EC supported 30' x 3 holding on to his treadmill supports      Ther Ex        Treadmill         Scifit for aerobic endurance training L5 8min at start    HR: 114     L4 8 min at end of session   L4 9 min HR 98 L4 10 min  L4 10  min   Overground ambulation  300' large amplitude arm/brisk walk 100' laps x 4 for speed  25\"  26\"  23\"  22\"                             Ther Activity                                       Assessment: Patient is making gains towards his goals. He was compliant with his functional mobility activity  (HEP) during his vacation.  We will continue to work on making gains towards his goals.  Goals  Short Term Goal: 4 weeks  1. Patient will be independent with initial HEP MET  2. Patient will improve 5 x sit to stand by 4 sec for increased LE strength ongoing  3. Patient will show " improvement in MCTSIB by 15 seconds for improved balance  ongoing    Long Term Goals  1. Patient will show improvement in 6 MWT by 20 % for improved endurance ongoing  2. Patient will score >25/30 in FGA for improved dynamic balance  ongoing  3. Patient will score below 11 points in TUG with cog component for increased risk of falls. ongoing  4. Patient will be compliant with HEP ongoing      Plan    Planned therapy interventions: neuromuscular re-education, strengthening, therapeutic activities, home exercise program, gait training and balance    Frequency: 2x week  Duration in weeks: 10 (Anticipating two week hold  Sep18- OCT 8due to family vacation)  Plan of Care beginning date: 9/6/2024  Plan of Care expiration date: 11/8/2024  Treatment plan discussed with: patient and family

## 2024-10-11 ENCOUNTER — OFFICE VISIT (OUTPATIENT)
Facility: REHABILITATION | Age: 89
End: 2024-10-11
Payer: MEDICARE

## 2024-10-11 DIAGNOSIS — Z86.73 CEREBRAL ARTERIOSCLEROSIS WITH HISTORY OF PREVIOUS STROKE: Primary | ICD-10-CM

## 2024-10-11 DIAGNOSIS — R26.89 BALANCE PROBLEMS: ICD-10-CM

## 2024-10-11 DIAGNOSIS — I67.2 CEREBRAL ARTERIOSCLEROSIS WITH HISTORY OF PREVIOUS STROKE: Primary | ICD-10-CM

## 2024-10-11 DIAGNOSIS — R26.9 GAIT DISTURBANCE: ICD-10-CM

## 2024-10-11 PROCEDURE — 97112 NEUROMUSCULAR REEDUCATION: CPT | Performed by: PHYSICAL THERAPIST

## 2024-10-11 PROCEDURE — 97110 THERAPEUTIC EXERCISES: CPT | Performed by: PHYSICAL THERAPIST

## 2024-10-11 NOTE — PROGRESS NOTES
"Daily Note     Today's date: 10/11/2024  Patient name: Jeremías Verde  : 1935  MRN: [id number]  Referring provider: Rob Gagnon DO  Dx:   Encounter Diagnosis     ICD-10-CM    1. 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 Balance problems   R26.89       3. Gait disturbance  R26.9               Start Time: 1300  Stop Time: 1355  Total time in clinic (min): 55 minutes    Subjective: I just returned form [employer] vacation  Objective: See treatment diary below  120/78 BP HR 58                                          Precautions: CVA , hypoxic episode 1/24       9/16/24 9/19 10/09 10/11   Neuro Re-Ed Solostep  solostep See above FOM treatment measures    FGA       Static balance       Foam:  FTEO :30 x 3  FTEC :30 x 2 cg    FTEO HT :30 x 2  FTEO HN :30 x 2    Challenging balance solostep used     Hurdles  High fwd and lat x 3 laps ea   difficulty clearing fwd>lat High fwd and lat x 3 laps ea   Improved LE clearance  High fwd and lat x 3 laps ea    Uneven mat  3 foams under  Forwards 10# hydrosphere  3 laps forward  3 laps sideways   Added EC 2 sets ea fwd/sideways  Blaze pods (4)  Random  1.5 min   30 sec rest    Uneven mat 3 foams under/ 6\" stepper    5, 8 ,8 resting period   Dynamic balance Fwd EC 25'x4  Bkwd EC 25'x4    Foam beams (2) fwd and lat x4 ea    Amb up/down foam incline with tossing activity at top x 9 Foam beams (2) fwd and lat x4 ea      Forwards progression  100' x 3 HT  100' x 2 HN  Forwards HTEC 25x 4  Backwrds 25' x 4  Incline negotiation  5 x each forwards/backwards  Forwards/turn    Foam beams (2) fwd and lat x4 ea inititally     HT/HN 25' x 6 ea  EC HT/HN 25'x2 ea   Step ups   1 UE support  10 x each  Pt/family educated to perform at home  Biodex treadmill ambulation  2-1 UE support  Vc for increased Right LE step length  10 min 1.7mph   HEP     Outdoor ambulation 15 min x 2/day   Ther Ex              Scifit for aerobic endurance " "training L4 9 min HR 98 L4 10 min  L4 10  min 3 x 10 heel lifts due to ankle weakness    2 x 10 10# overhead   Overground ambulation 100' laps x 4 for speed  25\"  26\"  23\"  22\"                           Ther Activity                                   Assessment:  Increased reactive balance challenge with use of blazepods. Solostep used for balance recovery. Initiated treadmill ambulation at self pace of 1.7 mph.  Ankle weakness identified. Added heel lifts and sit to stand for increased LE strengthening.Patient is making gains towards his goals. He was compliant with his functional mobility activity  (HEP) during his vacation.  We will continue to work on making gains towards his goals.        Plan    Planned therapy interventions: neuromuscular re-education, strengthening, therapeutic activities, home exercise program, gait training and balance    Frequency: 2x week  Duration in weeks: 10 (Anticipating two week hold  Sep18- OCT 8due to family vacation)  Plan of Care beginning date: 9/6/2024  Plan of Care expiration date: 11/8/2024  Treatment plan discussed with: patient and family                "

## 2024-10-14 ENCOUNTER — OFFICE VISIT (OUTPATIENT)
Facility: REHABILITATION | Age: 89
End: 2024-10-14
Payer: MEDICARE

## 2024-10-14 DIAGNOSIS — I67.2 CEREBRAL ARTERIOSCLEROSIS WITH HISTORY OF PREVIOUS STROKE: Primary | ICD-10-CM

## 2024-10-14 DIAGNOSIS — Z86.73 CEREBRAL ARTERIOSCLEROSIS WITH HISTORY OF PREVIOUS STROKE: Primary | ICD-10-CM

## 2024-10-14 DIAGNOSIS — R26.89 BALANCE PROBLEMS: ICD-10-CM

## 2024-10-14 PROCEDURE — 97112 NEUROMUSCULAR REEDUCATION: CPT | Performed by: PHYSICAL THERAPIST

## 2024-10-14 PROCEDURE — 97110 THERAPEUTIC EXERCISES: CPT | Performed by: PHYSICAL THERAPIST

## 2024-10-14 NOTE — PROGRESS NOTES
"Daily Note     Today's date: 10/14/2024  Patient name: Jeremías Verde  : 1935  MRN: [id number]  Referring provider: Rob Gagnon DO  Dx:   Encounter Diagnosis     ICD-10-CM    1. 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 Balance problems   R26.89                 Start Time: 1230  Stop Time: 1315  Total time in clinic (min): 45 minutes    Subjective:I have been working on the garage this weekend. Lot' s of exercise\"    Objective: See treatment diary below  120/78 BP HR 58                                          Precautions: CVA , hypoxic episode 1/24       9/16/24 9/19 10/09 10/11 10/14   Neuro Re-Ed Solostep  solostep See above FOM treatment measures     FGA        Static balance       Foam:  FTEO :30 x 3  FTEC :30 x 2 cg    FTEO HT :30 x 2  FTEO HN :30 x 2    Challenging balance solostep used   Foam:    FTEO :30 x 1  FTEO HT :30 x 2  FTEO HN :30 x 2    FTEC :30 x 2 cg     Hurdles  High fwd and lat x 3 laps ea   difficulty clearing fwd>lat High fwd and lat x 3 laps ea   Improved LE clearance  High fwd and lat x 3 laps ea     Uneven mat  3 foams under  Forwards 10# hydrosphere  3 laps forward  3 laps sideways   Added EC 2 sets ea fwd/sideways  Blaze pods (4)  Random  1.5 min   30 sec rest    Uneven mat 3 foams under/ 6\" stepper    5, 8 ,8 resting period    Dynamic balance Fwd EC 25'x4  Bkwd EC 25'x4    Foam beams (2) fwd and lat x4 ea    Amb up/down foam incline with tossing activity at top x 9 Foam beams (2) fwd and lat x4 ea      Forwards progression  100' x 3 HT  100' x 2 HN  Forwards HTEC 25x 4  Backwrds 25' x 4  Incline negotiation  5 x each forwards/backwards  Forwards/turn    Foam beams (2) fwd and lat x4 ea inititally  Foam beams    3 laps forwards  3 laps sideways  Solostep for Lob recovery    Incline with 10# hydrosphere  4 FWD/bACK  4 F      HT/HN 25' x 6 ea  EC HT/HN 25'x2 ea   Step ups   1 UE support  10 x each  Pt/family educated to " "perform at home  Biodex treadmill ambulation  2-1 UE support  Vc for increased Right LE step length  10 min 1.7mph Biodex treadmill   1-0 UE support  2.0 mph x 10 min    HR 89   HEP     Outdoor ambulation 15 min x 2/day Performing @ home   Ther Ex                Scifit for aerobic endurance training L4 9 min HR 98 L4 10 min  L4 10  min 3 x 10 heel lifts due to ankle weakness    2 x 10 10# overhead Level2-4 10 min     At 3\" step heel lifts/ stretches 10 x 2   Overground ambulation 100' laps x 4 for speed  25\"  26\"  23\"  22\"    Step ups 6\", 8\"compliant   20 x 1-2 UE support                           Ther Activity                                       Assessment:  Jeremías is participating in increased challenge home projects/activities.  Continue with increasing endurance and balance challenge today with good response. Resting periods taken between activities.  Ankle weakness present with additionof ankle there exercise challenge.He will continue to benefit from skilled PT intervention to maximize functional mobility.      Plan    Planned therapy interventions: neuromuscular re-education, strengthening, therapeutic activities, home exercise program, gait training and balance    Frequency: 2x week  Duration in weeks: 10 (Anticipating two week hold  Sep18- OCT 8due to family vacation)  Plan of Care beginning date: 9/6/2024  Plan of Care expiration date: 11/8/2024  Treatment plan discussed with: patient and family                "

## 2024-10-16 ENCOUNTER — OFFICE VISIT (OUTPATIENT)
Facility: REHABILITATION | Age: 89
End: 2024-10-16
Payer: MEDICARE

## 2024-10-16 DIAGNOSIS — I67.2 CEREBRAL ARTERIOSCLEROSIS WITH HISTORY OF PREVIOUS STROKE: Primary | ICD-10-CM

## 2024-10-16 DIAGNOSIS — Z86.73 CEREBRAL ARTERIOSCLEROSIS WITH HISTORY OF PREVIOUS STROKE: Primary | ICD-10-CM

## 2024-10-16 DIAGNOSIS — R26.89 BALANCE PROBLEMS: ICD-10-CM

## 2024-10-16 DIAGNOSIS — R26.9 GAIT DISTURBANCE: ICD-10-CM

## 2024-10-16 PROCEDURE — 97112 NEUROMUSCULAR REEDUCATION: CPT | Performed by: PHYSICAL THERAPIST

## 2024-10-16 PROCEDURE — 97110 THERAPEUTIC EXERCISES: CPT | Performed by: PHYSICAL THERAPIST

## 2024-10-16 NOTE — PROGRESS NOTES
"Daily Note     Today's date: 10/16/2024  Patient name: Jeremías Verde  : 1935  MRN: [id number]  Referring provider: Rob Gagnon DO  Dx:   Encounter Diagnosis     ICD-10-CM    1. 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 Balance problems   R26.89       3. Gait disturbance  R26.9           Start Time: 1130  Stop Time: 1215  Total time in clinic (min): 45 minutes    Subjective: Right groin pain for a day post last session but it resolved over night.  Objective: See treatment diary below                                            Precautions: CVA , hypoxic episode 1/24       10/09 10/11 10/14 10/16   Neuro Re-Ed See above FOM treatment measures      FGA       Static balance   Foam:  FTEO :30 x 3  FTEC :30 x 2 cg    FTEO HT :30 x 2  FTEO HN :30 x 2    Challenging balance solostep used   Foam:    FTEO :30 x 1  FTEO HT :30 x 2  FTEO HN :30 x 2    FTEC :30 x 2 cg   Single leg stance  At bar  :30 x 3 each leg    *only able to maintain stance for 4-5 sec at a time   Hurdles   High fwd and lat x 3 laps ea      Uneven mat  Blaze pods (4)  Random  1.5 min   30 sec rest    Uneven mat 3 foams under/ 6\" stepper    5, 8 ,8 resting period     Dynamic balance  Incline negotiation  5 x each forwards/backwards  Forwards/turn    Foam beams (2) fwd and lat x4 ea inititally  Foam beams    3 laps forwards  3 laps sideways  Solostep for Lob recovery    Incline with 10# hydrosphere  4 FWD/bACK  4 F   Foam beams  3 laps forwards  3 sideways    Added 4\" hurdles  Min assist LOB solostep for balance recovery     Biodex treadmill ambulation  2-1 UE support  Vc for increased Right LE step length  10 min 1.7mph Biodex treadmill   1-0 UE support  2.0 mph x 10 min    HR 89 Biodex treadmill    Backwards .5mph x 4 min    Downhill 10 % incline  6 minutes   HEP   Outdoor ambulation 15 min x 2/day Performing @ home    Ther Ex              Scifit for aerobic endurance training L4 10  min 3 " "x 10 heel lifts due to ankle weakness    2 x 10 10# overhead Level2-4 10 min     At 3\" step heel lifts/ stretches 10 x 2 Level 4 10 min    At 3\" step heel lifts/ stretches 10 x 2   Overground ambulation   Step ups 6\", 8\"compliant   20 x 1-2 UE support                         Ther Activity                                   Assessment:  Added single leg stance for pelvic girdle strengthening. Difficulty maintaining position < 4-5 sec.  Added hurdles to foam beam for balance challenge with increased LOB.  Initiated downhill walking today with 1 UE support.  He will continue to benefit from skilled PT intervention to maximize functional mobility.      Plan: progress strengthening/balance challenge.    Planned therapy interventions: neuromuscular re-education, strengthening, therapeutic activities, home exercise program, gait training and balance    Frequency: 2x week  Duration in weeks: 10 (Anticipating two week hold  Sep18- OCT 8due to family vacation)  Plan of Care beginning date: 9/6/2024  Plan of Care expiration date: 11/8/2024  Treatment plan discussed with: patient and family                "

## 2024-10-21 ENCOUNTER — OFFICE VISIT (OUTPATIENT)
Facility: REHABILITATION | Age: 89
End: 2024-10-21
Payer: MEDICARE

## 2024-10-21 DIAGNOSIS — Z86.73 CEREBRAL ARTERIOSCLEROSIS WITH HISTORY OF PREVIOUS STROKE: Primary | ICD-10-CM

## 2024-10-21 DIAGNOSIS — R26.89 BALANCE PROBLEMS: ICD-10-CM

## 2024-10-21 DIAGNOSIS — I67.2 CEREBRAL ARTERIOSCLEROSIS WITH HISTORY OF PREVIOUS STROKE: Primary | ICD-10-CM

## 2024-10-21 DIAGNOSIS — R42 DIZZINESS: ICD-10-CM

## 2024-10-21 DIAGNOSIS — R26.9 GAIT DISTURBANCE: ICD-10-CM

## 2024-10-21 PROCEDURE — 97112 NEUROMUSCULAR REEDUCATION: CPT | Performed by: PHYSICAL THERAPIST

## 2024-10-21 PROCEDURE — 97110 THERAPEUTIC EXERCISES: CPT | Performed by: PHYSICAL THERAPIST

## 2024-10-21 NOTE — PROGRESS NOTES
"Daily Note     Today's date: 10/21/2024  Patient name: Jeremías Verde  : 1935  MRN: [id number]  Referring provider: Rob Gagnon DO  Dx:   Encounter Diagnosis     ICD-10-CM    1. 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 Balance problems   R26.89       3. Gait disturbance  R26.9       4. Dizziness  R42           Start Time: 1100  Stop Time: 1145  Total time in clinic (min): 45 minutes    Subjective: We had a yard sale from the block.  Mild Right groin pain today.   Objective: See treatment diary below                                            Precautions: CVA , hypoxic episode 1/24       10/09 10/11 10/14 10/16 10/21   Neuro Re-Ed See above FOM treatment measures       FGA        Static balance   Foam:  FTEO :30 x 3  FTEC :30 x 2 cg    FTEO HT :30 x 2  FTEO HN :30 x 2    Challenging balance solostep used   Foam:    FTEO :30 x 1  FTEO HT :30 x 2  FTEO HN :30 x 2    FTEC :30 x 2 cg   Single leg stance  At bar  :30 x 3 each leg    *only able to maintain stance for 4-5 sec at a time Single leg stance  At bar  :30 x 3 each leg  Unable to maintain>6 seconds   Hurdles   High fwd and lat x 3 laps ea       Uneven mat  Blaze pods (4)  Random  1.5 min   30 sec rest    Uneven mat 3 foams under/ 6\" stepper    5, 8 ,8 resting period      Dynamic balance  Incline negotiation  5 x each forwards/backwards  Forwards/turn    Foam beams (2) fwd and lat x4 ea inititally  Foam beams    3 laps forwards  3 laps sideways  Solostep for Lob recovery    Incline with 10# hydrosphere  4 FWD/bACK  4 F   Foam beams  3 laps forwards  3 sideways    Added 4\" hurdles  Min assist LOB solostep for balance recovery Foam beams 4\" hurdles    3 laps forwards  3 laps sideways  20 5 use o walking stick     Biodex treadmill ambulation  2-1 UE support  Vc for increased Right LE step length  10 min 1.7mph Biodex treadmill   1-0 UE support  2.0 mph x 10 min    HR 89 Biodex treadmill    Backwards " ".5mph x 4 min    Downhill 10 % incline  6 minutes Biodex treadmill 2.1 forwards 8 min  No UE support, Improving Le stride.   HEP   Outdoor ambulation 15 min x 2/day Performing @ home     Ther Ex                Scifit for aerobic endurance training L4 10  min 3 x 10 heel lifts due to ankle weakness    2 x 10 10# overhead Level2-4 10 min      Level 4 10 min     Level 3.5 10 min    LE  strengthening   At 3\" step heel lifts/ stretches 10 x 2 At 3\" step heel lifts/ stretches 10 x 2 Heel lifts doble 2 x 10    Single 10 x 2 each   Overground ambulation   Step ups 6\", 8\"compliant   20 x 1-2 UE support  8'                            Ther Activity                                       Assessment:  added single heel lifts post bilateral lifts. Encouraged single leg stance at home for improved LE strengthening and balance. Continued with challenged balance activities today as well as endurance training.   He will continue to benefit from skilled PT intervention to maximize functional mobility.      Plan: progress strengthening/balance challenge.    Planned therapy interventions: neuromuscular re-education, strengthening, therapeutic activities, home exercise program, gait training and balance    Frequency: 2x week  Duration in weeks: 10 (Anticipating two week hold  Sep18- OCT 8due to family vacation)  Plan of Care beginning date: 9/6/2024  Plan of Care expiration date: 11/8/2024  Treatment plan discussed with: patient and family                "

## 2024-10-23 ENCOUNTER — OFFICE VISIT (OUTPATIENT)
Facility: REHABILITATION | Age: 89
End: 2024-10-23
Payer: MEDICARE

## 2024-10-23 DIAGNOSIS — R26.89 BALANCE PROBLEMS: ICD-10-CM

## 2024-10-23 DIAGNOSIS — I67.2 CEREBRAL ARTERIOSCLEROSIS WITH HISTORY OF PREVIOUS STROKE: Primary | ICD-10-CM

## 2024-10-23 DIAGNOSIS — Z86.73 CEREBRAL ARTERIOSCLEROSIS WITH HISTORY OF PREVIOUS STROKE: Primary | ICD-10-CM

## 2024-10-23 DIAGNOSIS — R26.9 GAIT DISTURBANCE: ICD-10-CM

## 2024-10-23 PROCEDURE — 97112 NEUROMUSCULAR REEDUCATION: CPT | Performed by: PHYSICAL THERAPIST

## 2024-10-23 PROCEDURE — 97110 THERAPEUTIC EXERCISES: CPT | Performed by: PHYSICAL THERAPIST

## 2024-10-23 NOTE — PROGRESS NOTES
"Daily Note     Today's date: 10/23/2024  Patient name: Jeremías Verde  : 1935  MRN: [id number]  Referring provider: Rob Gagnon DO  Dx:   Encounter Diagnosis     ICD-10-CM    1. 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 Balance problems   R26.89       3. Gait disturbance  R26.9           Start Time: 1145  Stop Time: 1235  Total time in clinic (min): 50 minutes    Subjective: We were working outdoor yesterday and getting rid of a lot of stuff from the yard sale. Right hip discomfort, \"but no pain\"  Objective: See treatment diary below                                            Precautions: CVA , hypoxic episode 1/24       10/14 10/16 10/21 10/23   Neuro Re-Ed       FGA       Static balance  Foam:    FTEO :30 x 1  FTEO HT :30 x 2  FTEO HN :30 x 2    FTEC :30 x 2 cg   Single leg stance  At bar  :30 x 3 each leg    *only able to maintain stance for 4-5 sec at a time Single leg stance  At bar  :30 x 3 each leg  Unable to maintain>6 seconds SLS    :30 x 3 each cg for balance   Hurdles        Uneven mat       Dynamic balance Foam beams    3 laps forwards  3 laps sideways  Solostep for Lob recovery    Incline with 10# hydrosphere  4 FWD/bACK  4 F   Foam beams  3 laps forwards  3 sideways    Added 4\" hurdles  Min assist LOB solostep for balance recovery Foam beams 4\" hurdles    3 laps forwards  3 laps sideways    5 x with use of walking stick Foam beams 4\" hurdles    3 laps forwards  3 laps sideways      Biodex treadmill   1-0 UE support  2.0 mph x 10 min    HR 89 Biodex treadmill    Backwards .5mph x 4 min    Downhill 10 % incline  6 minutes Biodex treadmill 2.1 forwards 8 min  No UE support, Improving Le stride. Biodex treadmill 2.   HEP Performing @ home      Ther Ex              Scifit for aerobic endurance training Level2-4 10 min      Level 4 10 min     Level 3.5 10 min  Level 3.5 15 min   LE  strengthening At 3\" step heel lifts/ stretches 10 x 2 At " "3\" step heel lifts/ stretches 10 x 2 Heel lifts doble 2 x 10    Single 10 x 2 each Heel lifts doble 2 x 10    Single 10 x 2 each    Step ups 6\", 8\"compliant   20 x 1-2 UE support  8'  Step ups 20' x 2 each  Forwards    Sideways  10' x 1 each                            Ther Activity                                   Assessment:  Continues with difficulty performing heel lifts and SLS.  Solostep used for balance recovery. Decreased use of ad during amarilis/foam negotiation. He will continue to benefit from skilled PT intervention to maximize functional mobility.      Plan: progress strengthening/balance challenge.    Planned therapy interventions: neuromuscular re-education, strengthening, therapeutic activities, home exercise program, gait training and balance    Frequency: 2x week  Duration in weeks: 10 (Anticipating two week hold  Sep18- OCT 8due to family vacation)  Plan of Care beginning date: 9/6/2024  Plan of Care expiration date: 11/8/2024  Treatment plan discussed with: patient and family                "

## 2024-10-28 ENCOUNTER — OFFICE VISIT (OUTPATIENT)
Facility: REHABILITATION | Age: 89
End: 2024-10-28
Payer: MEDICARE

## 2024-10-28 DIAGNOSIS — R26.89 BALANCE PROBLEMS: ICD-10-CM

## 2024-10-28 DIAGNOSIS — I67.2 CEREBRAL ARTERIOSCLEROSIS WITH HISTORY OF PREVIOUS STROKE: Primary | ICD-10-CM

## 2024-10-28 DIAGNOSIS — R26.9 GAIT DISTURBANCE: ICD-10-CM

## 2024-10-28 DIAGNOSIS — Z86.73 CEREBRAL ARTERIOSCLEROSIS WITH HISTORY OF PREVIOUS STROKE: Primary | ICD-10-CM

## 2024-10-28 PROCEDURE — 97112 NEUROMUSCULAR REEDUCATION: CPT | Performed by: PHYSICAL THERAPIST

## 2024-10-28 PROCEDURE — 97110 THERAPEUTIC EXERCISES: CPT | Performed by: PHYSICAL THERAPIST

## 2024-10-28 NOTE — PROGRESS NOTES
"Daily Note     Today's date: 10/28/2024  Patient name: Jeremías Verde  : 1935  MRN: [id number]  Referring provider: Rob Gagnon DO  Dx:   Encounter Diagnosis     ICD-10-CM    1. 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 Gait disturbance  R26.9       3. Balance problems   R26.89           Start Time: 1100  Stop Time: 1145  Total time in clinic (min): 45 minutes    Subjective: I have been walking outdoors.  Objective: See treatment diary below                                            Precautions: CVA , hypoxic episode 1/24       10/14 10/16 10/21 10/23 10/28   Neuro Re-Ed        FGA        Static balance  Foam:    FTEO :30 x 1  FTEO HT :30 x 2  FTEO HN :30 x 2    FTEC :30 x 2 cg   Single leg stance  At bar  :30 x 3 each leg    *only able to maintain stance for 4-5 sec at a time Single leg stance  At bar  :30 x 3 each leg  Unable to maintain>6 seconds SLS    :30 x 3 each cg for balance Foam  FTEO :30 x 1  FAEC :30 x 3 cg   Hurdles         Uneven mat        Dynamic balance Foam beams    3 laps forwards  3 laps sideways  Solostep for Lob recovery    Incline with 10# hydrosphere  4 FWD/bACK  4 F   Foam beams  3 laps forwards  3 sideways    Added 4\" hurdles  Min assist LOB solostep for balance recovery Foam beams 4\" hurdles    3 laps forwards  3 laps sideways    5 x with use of walking stick Foam beams 4\" hurdles    3 laps forwards  3 laps sideways   Foam beams 4\" hurdles    3 laps forwards  Added 4\" hurdles walking stick initially    3 laps sideways    Biodex treadmill   1-0 UE support  2.0 mph x 10 min    HR 89 Biodex treadmill    Backwards .5mph x 4 min    Downhill 10 % incline  6 minutes Biodex treadmill 2.1 forwards 8 min  No UE support, Improving Le stride.  Biodex treadmill 2.1 4 mi   2.0 x 6 min 1% incline   HEP Performing @ home       Ther Ex                Scifit for aerobic endurance training Level2-4 10 min      Level 4 10 min     Level 3.5 " "10 min  Level 3.5 15 min Level 3.5 x 15 min for endurance training   LE  strengthening At 3\" step heel lifts/ stretches 10 x 2 At 3\" step heel lifts/ stretches 10 x 2 Heel lifts doble 2 x 10    Single 10 x 2 each Heel lifts doble 2 x 10    Single 10 x 2 each Heel lifts doble 2 x (10 finger assist for balance on bar)    Single 10 x 2 each    Step ups 6\", 8\"compliant   20 x 1-2 UE support  8'  Step ups 20' x 2 each  Forwards    Sideways  10' x 1 each     8\" step ups  10 x 2 each no E support                           Ther Activity                                       Assessment:  Continues to make improvement with his strength and endurance.  He requires to benenfit redirection nd reminder about proper technique for HEP. He will continue to benefit from skilled PT intervention to maximize functional mobility.      Plan: progress strengthening/balance challenge.    Planned therapy interventions: neuromuscular re-education, strengthening, therapeutic activities, home exercise program, gait training and balance    Frequency: 2x week  Duration in weeks: 10 (Anticipating two week hold  Sep18- OCT 8due to family vacation)  Plan of Care beginning date: 9/6/2024  Plan of Care expiration date: 11/8/2024  Treatment plan discussed with: patient and family                "

## 2024-10-30 ENCOUNTER — APPOINTMENT (OUTPATIENT)
Facility: REHABILITATION | Age: 89
End: 2024-10-30
Payer: MEDICARE

## 2024-10-31 ENCOUNTER — OFFICE VISIT (OUTPATIENT)
Facility: REHABILITATION | Age: 89
End: 2024-10-31
Payer: MEDICARE

## 2024-10-31 DIAGNOSIS — Z86.73 CEREBRAL ARTERIOSCLEROSIS WITH HISTORY OF PREVIOUS STROKE: Primary | ICD-10-CM

## 2024-10-31 DIAGNOSIS — R26.89 BALANCE PROBLEMS: ICD-10-CM

## 2024-10-31 DIAGNOSIS — I67.2 CEREBRAL ARTERIOSCLEROSIS WITH HISTORY OF PREVIOUS STROKE: Primary | ICD-10-CM

## 2024-10-31 DIAGNOSIS — R26.9 GAIT DISTURBANCE: ICD-10-CM

## 2024-10-31 PROCEDURE — 97112 NEUROMUSCULAR REEDUCATION: CPT | Performed by: PHYSICAL THERAPIST

## 2024-10-31 PROCEDURE — 97110 THERAPEUTIC EXERCISES: CPT | Performed by: PHYSICAL THERAPIST

## 2024-10-31 NOTE — PROGRESS NOTES
"Daily Note     Today's date: 10/31/2024  Patient name: Jeremías Verde  : 1935  MRN: [id number]  Referring provider: Rob Gagnon DO  Dx:   Encounter Diagnosis     ICD-10-CM    1. 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 Gait disturbance  R26.9       3. Balance problems   R26.89           Start Time: 1000  Stop Time: 1050  Total time in clinic (min): 50 minutes    Subjective: I have been walking outdoors  Objective: See treatment diary below                                            Precautions: CVA , hypoxic episode 1/24       10/21 10/23 10/28 10/31   Neuro Re-Ed       FGA       Static balance  Single leg stance  At bar  :30 x 3 each leg  Unable to maintain>6 seconds SLS    :30 x 3 each cg for balance Foam  FTEO :30 x 1  FAEC :30 x 3 cg    Hurdles        Uneven mat       Dynamic balance Foam beams 4\" hurdles    3 laps forwards  3 laps sideways    5 x with use of walking stick Foam beams 4\" hurdles    3 laps forwards  3 laps sideways   Foam beams 4\" hurdles    3 laps forwards  Added 4\" hurdles walking stick initially    3 laps sideways Foam beams 4\" hurdles    3 laps forwards  Added 4\" hurdles walking shorter straight cane. * goal no device    Incline surface  Forwards/back  Forwards/ turn  5 laps each 10# hydrosphere    Biodex treadmill 2.1 forwards 8 min  No UE support, Improving Le stride.  Biodex treadmill 2.1 4 mi   2.0 x 6 min 1% incline True treadmill ambulation 2.3 mph 3% incline 5 min   Resisted ambulation    150' x 3 30# drag, difficulty negotiating turns   Ther Ex              Scifit for aerobic endurance training Level 3.5 10 min  Level 3.5 15 min Level 3.5 x 15 min for endurance training Level 3.5 x 15 min for endurance training   LE  strengthening Heel lifts doble 2 x 10    Single 10 x 2 each Heel lifts doble 2 x 10    Single 10 x 2 each Heel lifts doble 2 x (10 finger assist for balance on bar)    Single 10 x 2 each Heel lifts doble " "2 x (10 finger assist for balance on bar)    Single 10 x 2 each  Difficulty maintaining balance    8'  Step ups 20' x 2 each  Forwards    Sideways  10' x 1 each     8\" step ups  10 x 2 each no E support                         Assessment: Added ambulation drag for increased strengthening and  balance challenge during turns. Added incline to treadmill ambulation with decreased tolerance.    He requires to benenfit redirection nd reminder about proper technique for HEP. He will continue to benefit from skilled PT intervention to maximize functional mobility.      Plan: progress strengthening/balance challenge.    Planned therapy interventions: neuromuscular re-education, strengthening, therapeutic activities, home exercise program, gait training and balance    Frequency: 2x week  Duration in weeks: 10 (Anticipating two week hold  Sep18- OCT 8due to family vacation)  Plan of Care beginning date: 9/6/2024  Plan of Care expiration date: 11/8/2024  Treatment plan discussed with: patient and family                "

## 2024-11-04 ENCOUNTER — OFFICE VISIT (OUTPATIENT)
Facility: REHABILITATION | Age: 89
End: 2024-11-04
Payer: MEDICARE

## 2024-11-04 DIAGNOSIS — R26.9 GAIT DISTURBANCE: ICD-10-CM

## 2024-11-04 DIAGNOSIS — I67.2 CEREBRAL ARTERIOSCLEROSIS WITH HISTORY OF PREVIOUS STROKE: Primary | ICD-10-CM

## 2024-11-04 DIAGNOSIS — Z86.73 CEREBRAL ARTERIOSCLEROSIS WITH HISTORY OF PREVIOUS STROKE: Primary | ICD-10-CM

## 2024-11-04 DIAGNOSIS — R26.89 BALANCE PROBLEMS: ICD-10-CM

## 2024-11-04 PROCEDURE — 97110 THERAPEUTIC EXERCISES: CPT | Performed by: PHYSICAL THERAPIST

## 2024-11-04 PROCEDURE — 97112 NEUROMUSCULAR REEDUCATION: CPT | Performed by: PHYSICAL THERAPIST

## 2024-11-04 NOTE — PROGRESS NOTES
"Daily Note     Today's date: 2024  Patient name: Jeremías Verde  : 1935  MRN: [id number]  Referring provider: Rob Gagnon DO  Dx:   Encounter Diagnosis     ICD-10-CM    1. 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 Balance problems   R26.89       3. Gait disturbance  R26.9             Start Time: 1145  Stop Time: 1230  Total time in clinic (min): 45 minutes    Subjective: I have been walking outdoors    Objective: See treatment diary below                                            Precautions: CVA , hypoxic episode 1/24       10/28 10/31 11/4   Neuro Re-Ed      FGA      Static balance  Foam  FTEO :30 x 1  FAEC :30 x 3 cg  MCTSIB   Hurdles       Uneven mat      Dynamic balance Foam beams 4\" hurdles    3 laps forwards  Added 4\" hurdles walking stick initially    3 laps sideways Foam beams 4\" hurdles    3 laps forwards  Added 4\" hurdles walking shorter straight cane. * goal no device    Incline surface  Forwards/back  Forwards/ turn  5 laps each 10# hydrosphere Foam beams 4\" hurdles    5 laps forwards/ sideways  Added 4\" hurdles     no device    Biodex treadmill 2.1 4 mi   2.0 x 6 min 1% incline True treadmill ambulation 2.3 mph 3% incline 5 min True treadmill ambulation 2.3 mph 1% incline 5 min   Resisted ambulation  150' x 3 30# drag, difficulty negotiating turns 150' x 6 30# drag, difficulty negotiating turns   Ther Ex            Scifit for aerobic endurance training Level 3.5 x 15 min for endurance training Level 3.5 x 15 min for endurance training Level 3.5 x 16 min   LE  strengthening Heel lifts doble 2 x (10 finger assist for balance on bar)    Single 10 x 2 each Heel lifts doble 2 x (10 finger assist for balance on bar)    Single 10 x 2 each  Difficulty maintaining balance     8\" step ups  10 x 2 each no E support                        Assessment: Increased challenged repetitions with 30# drag for increased muscle strength and endurance. " Improving dynamic balance without use of cane during foam/amarilis negotiation.  He will continue to benefit from skilled PT intervention to maximize functional mobility.      Plan: progress strengthening/balance challenge.    Planned therapy interventions: neuromuscular re-education, strengthening, therapeutic activities, home exercise program, gait training and balance    Frequency: 2x week  Duration in weeks: 10 (Anticipating two week hold  Sep18- OCT 8due to family vacation)  Plan of Care beginning date: 9/6/2024  Plan of Care expiration date: 11/8/2024  Treatment plan discussed with: patient and family

## 2024-11-07 ENCOUNTER — EVALUATION (OUTPATIENT)
Facility: REHABILITATION | Age: 89
End: 2024-11-07
Payer: MEDICARE

## 2024-11-07 DIAGNOSIS — I67.2 CEREBRAL ARTERIOSCLEROSIS WITH HISTORY OF PREVIOUS STROKE: Primary | ICD-10-CM

## 2024-11-07 DIAGNOSIS — Z86.73 CEREBRAL ARTERIOSCLEROSIS WITH HISTORY OF PREVIOUS STROKE: Primary | ICD-10-CM

## 2024-11-07 PROCEDURE — 97112 NEUROMUSCULAR REEDUCATION: CPT | Performed by: PHYSICAL THERAPIST

## 2024-11-07 PROCEDURE — 97110 THERAPEUTIC EXERCISES: CPT | Performed by: PHYSICAL THERAPIST

## 2024-11-07 NOTE — PROGRESS NOTES
Progress Update     Today's date: 2024  Patient name: Jeremías Verde  : 1935  MRN: [id number]  Referring provider: Rob Gagnon DO  Dx:   Encounter Diagnosis     ICD-10-CM    1. 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  I67.2     Z86.73                          Subjective: Jeremías reports feeling his balance is better over the past month of PT. He likes to walk for about 10 blocks outside 30 minutes or so. Not noticing as much issue on uneven side walks. Doing better on the stairs, more reciprocal pattern now.    Pt treated 1 on  from 12:15 to 12:55    Objective: See treatment diary below           Functional outcomes                            RE-Eval      6 minute walk test: 1150                           1200     5x sit to stand: 16 (seconds)                     13 seconds     TU (seconds)                                    11 second  TUG cognitive: 19 (seconds)                     -7 unable , -5 11 sec, -3 13 seconds     Functional outcome comment: Modified Clinical Test of Sensory Interaction on Balance  30 eyes open firm surface                         30  30 eyes closed firm surface                       30   17 eyes open foam surface                        25  2 eyes closed foam surface                        5 sec  79/120                                                       90/120     FGA 21/30 ()                                       24/30    Balance Test 11/7      5x Sit to Stand: 11s      TU.5s      Gait Speed:        2 Minute Walk Test:       6 Minute Walk Test: 1416ft      Estrada Balance Scale:       FGA:                                     MCTSIB:  30s  30s  30s  30s                      Flowsheet Rows      Flowsheet Row Most Recent Value   Functional Gait Assessment    Gait Level Surface  3  [5.6s]   Change in GaiT Speed 3   Gait with horizontal head turns 3   Gait with vertical head turns 3   Gait and pivot tuen  3   Step over obstacle 3  "  Gait with narrow base of supprt 1   Gait with eyes closed 2  [8s]   Ambulating backwards 3  [9.1s no evidence of instability]   Steps 2   Total score  26          Precautions: CVA 1/24, hypoxic episode 1/24       10/28 10/31 11/4 11/7   Neuro Re-Ed    See testing    FGA       Static balance  Foam  FTEO :30 x 1  FAEC :30 x 3 cg  MCTSIB    Hurdles        Uneven mat       Dynamic balance Foam beams 4\" hurdles    3 laps forwards  Added 4\" hurdles walking stick initially    3 laps sideways Foam beams 4\" hurdles    3 laps forwards  Added 4\" hurdles walking shorter straight cane. * goal no device    Incline surface  Forwards/back  Forwards/ turn  5 laps each 10# hydrosphere Foam beams 4\" hurdles    5 laps forwards/ sideways  Added 4\" hurdles     no device Foam beams 4\" hurdles  Fwd - x3 laps    Biodex treadmill 2.1 4 mi   2.0 x 6 min 1% incline True treadmill ambulation 2.3 mph 3% incline 5 min True treadmill ambulation 2.3 mph 1% incline 5 min    Resisted ambulation  150' x 3 30# drag, difficulty negotiating turns 150' x 6 30# drag, difficulty negotiating turns    Ther Ex    See testing          Scifit for aerobic endurance training Level 3.5 x 15 min for endurance training Level 3.5 x 15 min for endurance training Level 3.5 x 16 min L3.5 12min at end    LE  strengthening Heel lifts doble 2 x (10 finger assist for balance on bar)    Single 10 x 2 each Heel lifts doble 2 x (10 finger assist for balance on bar)    Single 10 x 2 each  Difficulty maintaining balance      8\" step ups  10 x 2 each no E support                           Assessment: Since last progress update, Jeremías demonstrates significant gain in PT. Demonstrates improved dynamic and static balance per FGA and MCTSIB. Also demonstrates significant improvements in functional walking endurance and speed per 6MWT. Slight gains noted in functional LE strength and spee don TUG. In addition to improvements on functional outcome measures, pt demonstrating improved " functional balance with stair negotiation and community mobility outside of PT. minor deficits in dynamic balance persisting on FGA.  At this time only performing walking for home exercise program but wishes to return to utilizing his home gym.  Recommended utilizing 1-2 more weeks of therapy in order to help facilitate more robust home exercise program but at that time patient will likely be appropriate for discharge.  Can address any new or further balance concerns that arise during that time.       Plan: Continue to progress high-level dynamic balance training as able.  Facilitate return to using his home gym.  Continue PT for 2-4 more weeks at 2 times per week and discharge when able.     Planned therapy interventions: neuromuscular re-education, strengthening, therapeutic activities, home exercise program, gait training and balance    Frequency: 2x week  Duration in weeks: 10 (Anticipating two week hold  Sep18- OCT 8due to family vacation)  Plan of Care beginning date: 9/6/2024  Plan of Care expiration date: 11/8/2024  Treatment plan discussed with: patient and family

## 2024-11-12 ENCOUNTER — APPOINTMENT (OUTPATIENT)
Facility: REHABILITATION | Age: 89
End: 2024-11-12
Payer: MEDICARE

## 2024-11-18 ENCOUNTER — NURSE TRIAGE (OUTPATIENT)
Age: 89
End: 2024-11-18

## 2024-11-18 NOTE — TELEPHONE ENCOUNTER
recommend urine culture to r/o UTI for his acute symptoms  if unable to void at all, see if nurse visit available for PVR/spann in office

## 2024-11-18 NOTE — TELEPHONE ENCOUNTER
Patient of Nhi MONTENEGRO, last seen 6/6/24    Patient calling in with complaints that starting last night when he would urinate it would spray everywhere and he has to sit to empty his bladder. He does feel he is emptying completely when he sits. He is also having some low abdomen pressure     Denies fever, hematuria, chills, pain    Encouraged to increase water, decrease bladder irritants, call back if new or worsening symptoms, ED precautions reviewed    Please advise on recommendations, schedule a follow up?  Reason for Disposition   The patient has difficulty voiding but feels as though they empty their bladder    Answer Assessment - Initial Assessment Questions  1. When was the last time you voided?   This morning, when standing it is spraying everywhere  2. Are you straining to void?   States he has to push a little bit to get urine flow to start  3. Do you have any abdominal pain? If yes, can you please describe it? Do you have suprapubic or pelvic pressure?   Pressure in low abdomen  4. Do you have other urinary symptoms such as frequency, urgency, incontinence, dysuria?   denies  5. Are your bowel movements regular?   yes  6. Are you taking any pain medication, or have you taken any allergy or cold medication?   denies  7. Have you had a recent urologic surgery or procedures? Denies  8. Do you have a history of BPH with obstruction or urethral stricture?  BPH    Protocols used: Urology-Difficulty / Unable To Void-ADULT-[place]

## 2024-11-18 NOTE — TELEPHONE ENCOUNTER
Patient seen by Nhi at [facility]    Patient called stating he is having urinary retention right now.  He stated it started last night.  He wants to know how to proceed. Warm transferred to Triage nurse for further evaluation.

## 2024-11-18 NOTE — TELEPHONE ENCOUNTER
Contacted the patient who states he continues to spray with urination. Patient needs to sit down to urinate.    Patient in [place] today. Will be home tomorrow. Holding 11/20/2024 at 1140 am with Siddharth WHIPPLE at the [facility] office.    Patient asked if the office can contact him tomorrow.

## 2024-11-19 NOTE — TELEPHONE ENCOUNTER
"Call placed and spoke with the patient to offer an appointment for tomorrow.  Patient states the \"spraying\" lessened overnight but wishes to get checked.  Patient scheduled for 11/20/2024 at 1140 am with Siddharth WHIPPLE at the [facility] office.  "

## 2024-11-20 ENCOUNTER — OFFICE VISIT (OUTPATIENT)
Dept: UROLOGY | Facility: CLINIC | Age: 89
End: 2024-11-20
Payer: MEDICARE

## 2024-11-20 VITALS
DIASTOLIC BLOOD PRESSURE: 70 MMHG | SYSTOLIC BLOOD PRESSURE: 116 MMHG | WEIGHT: 171 LBS | HEART RATE: 73 BPM | HEIGHT: 66 IN | OXYGEN SATURATION: 97 % | BODY MASS INDEX: 27.48 KG/M2

## 2024-11-20 DIAGNOSIS — N20.0 CALCULUS OF KIDNEY: ICD-10-CM

## 2024-11-20 DIAGNOSIS — R33.9 URINARY RETENTION: Primary | ICD-10-CM

## 2024-11-20 LAB — POST-VOID RESIDUAL VOLUME, ML POC: 19 ML

## 2024-11-20 PROCEDURE — 99213 OFFICE O/P EST LOW 20 MIN: CPT

## 2024-11-20 PROCEDURE — 51798 US URINE CAPACITY MEASURE: CPT

## 2024-11-20 NOTE — PROGRESS NOTES
Office Visit- Urology  Jeremías Verde 1935 MRN: [id number]      Assessment/Discussion/Plan    89 y.o. male managed by     Lower urinary tract symptoms  -Patient called in on 11/18/2024 due to concern for urinary retention as well as sensation of spraying everywhere and having to sit when emptying his bladder when he would sit he would have adequate bladder emptying  -PVR today is thankfully low at 19 ml demonstrate that patient is completely emptying his bladder  -Discussed that his symptoms could be either related to BPH or possible postprocedural stricture disease.  Offered trial of alpha-blocker but discussed risk of orthostatic hypotension.  Patient and family is okay with observation at this point in time given that patient has complete bladder emptying I think that this is reasonable.  They will reach out if he has any symptoms such as inability to urinate, gross hematuria, dysuria      Chief Complaint:   Jeremías is a 89 y.o. male presenting to the office for a follow up visit regarding lower urinary tract symptoms        Subjective    Hx 1/31/2024  Patient is a 88-year-old male with a past urologic history of left ureteral calculus with sepsis. He had stent insertion in October 2023. He had definitive stone surgery/removal of bladder stone by Dr. Srinivasan on 12/15/2023. Stent was on a string but unfortunately the string broke so patient presented to the office on 1/9/2024 for simple cystoscopy stent removal. Stent was removed without difficulty. Patient is advised to take empiric course of antibiotics. On 1/12/2024 patient presents to the ER for evaluation of altered mental status. Patient did not utilize empiric course of antibiotics. Thankfully urine testing was not consistent with urinary tract infection. CT scan demonstrated left-sided hydronephrosis but this was expected finding. He had subsequent ultrasound of the kidneys and bladder on 1/25/2024 which demonstrated resolution of hydronephrosis.  Finding of 5 mm bladder stone and bladder debris. Patient denies any bothersome urinary symptoms at this point in time including frequency, dysuria, urgency, gross hematuria     Hx 11/20/2024  Patient presents for an urgent follow-up due to sudden development of urinary tract symptoms with patient describing intermittent splitting of the urinary stream and sensation of incomplete bladder emptying.  Patient states that he is able to urinate better when sitting down.  Today he states that his symptoms have improved from what was before.  No dysuria or gross hematuria he is accompanied by family members    AUA SYMPTOM SCORE      Flowsheet Row Most Recent Value   AUA SYMPTOM SCORE    How often have you had a sensation of not emptying your bladder completely after you finished urinating? 0 (P)     How often have you had to urinate again less than two hours after you finished urinating? 1 (P)     How often have you found you stopped and started again several times when you urinate? 0 (P)     How often have you found it difficult to postpone urination? 2 (P)     How often have you had a weak urinary stream? 1 (P)     How often have you had to push or strain to begin urination? 0 (P)     How many times did you most typically get up to urinate from the time you went to bed at night until the time you got up in the morning? 5 (P)     Quality of Life: If you were to spend the rest of your life with your urinary condition just the way it is now, how would you feel about that? 4 (P)     AUA SYMPTOM SCORE 9 (P)              ROS:   Review of Systems   Constitutional: Negative.  Negative for chills, fatigue and fever.   HENT: Negative.     Respiratory:  Negative for shortness of breath.    Cardiovascular:  Negative for chest pain.   Gastrointestinal: Negative.  Negative for abdominal pain.   Endocrine: Negative.    Musculoskeletal: Negative.    Skin: Negative.    Neurological: Negative.  Negative for dizziness and light-headedness.    Hematological: Negative.    Psychiatric/Behavioral: Negative.           Past Medical History  Past Medical History:   Diagnosis Date    Acute respiratory failure with hypoxia ([facility]) 10/27/2023    Altered mental status 01/12/2024    Basal cell carcinoma     Cancer ([facility])     Delirium 01/15/2024    Encephalopathy 10/27/2023    Hydronephrosis 01/13/2024    NSTEMI (non-ST elevated myocardial infarction) ([facility]) 2014    Paranoia ([facility]) 01/15/2024    Pneumonia     Last Assessed:  10/7/14    Polymyalgia rheumatica (HCC)     Last Assessed:  11/23/12    Polymyalgia rheumatica (HCC) 2011    Septic shock (HCC) 10/27/2023    Squamous cell skin cancer 05/24/2022    left frontal scalp anterior    Squamous cell skin cancer 05/24/2022    Vertex of scalp       Past Surgical History  Past Surgical History:   Procedure Laterality Date    APPENDECTOMY      CORONARY ANGIOPLASTY WITH STENT PLACEMENT      x2    FL RETROGRADE PYELOGRAM  10/29/2023    FL RETROGRADE PYELOGRAM  12/15/2023    HERNIA REPAIR  2000    MOHS SURGERY  07/27/2022    left frontal scalp and vertex of scalp    AK CYSTO BLADDER W/URETERAL CATHETERIZATION Left 10/29/2023    Procedure: CYSTOSCOPY RETROGRADE PYELOGRAM WITH INSERTION STENT URETERAL;  Surgeon: Aamir Srinivasan MD;  Location: [facility];  Service: Urology    AK CYSTO/URETERO W/LITHOTRIPSY &INDWELL STENT INSRT Left 12/15/2023    Procedure: CYSTOSCOPY URETEROSCOPY WITH LITHOTRIPSY HOLMIUM LASER, RETROGRADE PYELOGRAM AND INSERTION STENT URETERAL;  Surgeon: Aamir Srinivasan MD;  Location: [facility];  Service: Urology    AK LITHOLAPAXY SMPL/SM <2.5 CM N/A 12/15/2023    Procedure: LITHOLAPAXY HOLMIUM LASER;  Surgeon: Aamir Srinivasan MD;  Location: [facility];  Service: Urology       Past Family History  Family History   Problem Relation Age of Onset    Pulmonary embolism Father     Other Father         Transurethral resection of prostate    Stroke Sister        Past Social history  Social History      Socioeconomic History    Marital status: /Civil Union     Spouse name: Not on file    Number of children: Not on file    Years of education: Not on file    Highest education level: Not on file   Occupational History    Not on file   Tobacco Use    Smoking status: Never     Passive exposure: Never    Smokeless tobacco: Never   Vaping Use    Vaping status: Never Used   Substance and Sexual Activity    Alcohol use: Not Currently    Drug use: Never    Sexual activity: Yes   Other Topics Concern    Not on file   Social History Narrative    Exercises regularly        Consumes 2-3 cups of coffee per week and 1 glass of ice tea per day     Social Drivers of Health     Financial Resource Strain: Low Risk  (1/15/2024)    [facility]    Overall Financial Resource Strain (CARDIA)     Difficulty of Paying Living Expenses: Not very hard   Food Insecurity: No Food Insecurity (1/15/2024)    [facility]    Hunger Vital Sign     Worried About Running Out of Food in the Last Year: Never true     Ran Out of Food in the Last Year: Never true   Transportation Needs: No Transportation Needs (1/15/2024)    [facility]    PRAPARE - Transportation     Lack of Transportation (Medical): No     Lack of Transportation (Non-Medical): No   Physical Activity: Not on file   Stress: Not on file   Social Connections: Not on file   Intimate Partner Violence: Not At Risk (1/15/2024)    [facility]    Humiliation, Afraid, Rape, and Kick questionnaire     Fear of Current or Ex-Partner: No     Emotionally Abused: No     Physically Abused: No     Sexually Abused: No   Housing Stability: Low Risk  (1/15/2024)    [facility]    Housing Stability  "Vital Sign     Unable to Pay for Housing in the Last Year: No     Number of Places Lived in the Last Year: 1     Unstable Housing in the Last Year: No       Current Medications  Current Outpatient Medications   Medication Sig Dispense Refill    acetaminophen (TYLENOL) 325 mg tablet Take 650 mg by mouth Uses 3 x a day      carboxymethylcellulose (REFRESH PLUS) 0.5 % SOLN 1 drop 2 (two) times a day as needed for dry eyes      clopidogrel (PLAVIX) 75 mg tablet TAKE 1 TABLET BY MOUTH EVERY DAY 90 tablet 1    cyanocobalamin (VITAMIN B-12) 1000 MCG tablet Take 1 tablet (1,000 mcg total) by mouth daily 30 tablet 0    metoprolol succinate (TOPROL-XL) 25 mg 24 hr tablet TAKE 1/2 TABLET BY MOUTH DAILY 45 tablet 3    OLANZapine (ZyPREXA) 2.5 mg tablet TAKE 1 TABLET (2.5 MG TOTAL) BY MOUTH EVERY DAY AT BEDTIME 90 tablet 0    rosuvastatin (CRESTOR) 10 MG tablet TAKE 1 TABLET BY MOUTH EVERY  tablet 1     No current facility-administered medications for this visit.       Allergies  Allergies   Allergen Reactions    Coconut Oil - Food Allergy Hives    Iv Dye [Iodinated Contrast Media] Rash     Possible delayed rash February 2022       OBJECTIVE    Vitals   Vitals:    11/20/24 1127   BP: 116/70   BP Location: Left arm   Patient Position: Sitting   Cuff Size: Adult   Pulse: 73   SpO2: 97%   Weight: 77.6 kg (171 lb)   Height: 5' 6\" (1.676 m)       PVR:    Physical Exam  Constitutional:       General: He is not in acute distress.     Appearance: Normal appearance. He is normal weight. He is not ill-appearing or toxic-appearing.   HENT:      Head: Normocephalic and atraumatic.   Eyes:      Conjunctiva/sclera: Conjunctivae normal.   Cardiovascular:      Rate and Rhythm: Normal rate.   Pulmonary:      Effort: Pulmonary effort is normal. No respiratory distress.   Skin:     General: Skin is warm and dry.   Neurological:      General: No focal deficit present.      Mental Status: He is alert and oriented to person, place, and time. "      Cranial Nerves: No cranial nerve deficit.   Psychiatric:         Mood and Affect: Mood normal.         Behavior: Behavior normal.         Thought Content: Thought content normal.          Labs:     Lab Results   Component Value Date    CREATININE 0.99 06/05/2024      Lab Results   Component Value Date    HGBA1C 6.3 (H) 10/27/2023     Lab Results   Component Value Date    GLUCOSE 99 06/30/2015    CALCIUM 8.6 06/05/2024     06/30/2015    K 4.7 06/05/2024    CO2 26 06/05/2024     06/05/2024    BUN 32 (H) 06/05/2024    CREATININE 0.99 06/05/2024       I have personally reviewed all pertinent lab results and reviewed with patient    Imaging       Siddharth Ureña PA-C  Date: 11/20/2024 Time: 11:53 AM  [facility]    This note was written using fluency dictation software. Please excuse any resulting minor grammatical errors.

## 2024-11-21 ENCOUNTER — APPOINTMENT (OUTPATIENT)
Facility: REHABILITATION | Age: 89
End: 2024-11-21
Payer: MEDICARE

## 2024-11-25 ENCOUNTER — APPOINTMENT (OUTPATIENT)
Facility: REHABILITATION | Age: 89
End: 2024-11-25
Payer: MEDICARE

## 2024-11-27 ENCOUNTER — APPOINTMENT (OUTPATIENT)
Facility: REHABILITATION | Age: 89
End: 2024-11-27
Payer: MEDICARE

## 2024-11-27 ENCOUNTER — TELEPHONE (OUTPATIENT)
Age: 89
End: 2024-11-27

## 2024-11-27 NOTE — TELEPHONE ENCOUNTER
Donna daughter calling to informed the office and Dr. Gagnon that Jeremías's wife passed away 12/13/2024

## 2024-12-01 NOTE — PATIENT INSTRUCTIONS
"He did eat about 4 hours ago, he will  CBC, CMP, TSH today.  Back in June B12 level was fine at 500, creatinine was 0.99, potassium 4.7, glucose 95, hemoglobin 12.4.  January 2024 TSH was okay 1.4.    Immunization History   Administered Date(s) Administered    COVID-19 MODERNA VACC 0.5 ML IM 01/21/2021, 02/18/2021    COVID-19 PFIZER VACCINE 0.3 ML IM 11/03/2021    COVID-19 Pfizer Vac BIVALENT Omar-sucrose 12 Yr+ IM 09/12/2022    COVID-19 Pfizer mRNA vacc PF omar-sucrose 12 yr and older (Comirnaty) 09/27/2023, 04/24/2024, 09/09/2024    COVID-19 Pfizer vac (Omar-sucrose, gray cap) 12 yr+ IM 04/10/2022    INFLUENZA 10/27/2016, 10/09/2017, 09/29/2018, 10/23/2019, 09/28/2020, 10/06/2021, 11/02/2022, 10/17/2023    Influenza Split High Dose Preservative Free IM 10/27/2016, 09/29/2018, 10/23/2019    Influenza, seasonal, injectable 10/29/2007, 01/12/2009    Pneumococcal Conjugate 13-Valent 11/22/2019    Pneumococcal Conjugate PCV 7 09/16/2014    Pneumococcal Polysaccharide PPV23 09/01/2006, 09/16/2014    TD (adult) Preservative Free 09/01/2006    Tdap 09/01/2006, 04/16/2024    Zoster 01/01/2012    Zoster Vaccine Recombinant 01/08/2013, 12/10/2021, 03/19/2022    influenza, trivalent, adjuvanted 09/28/2020       Discussed Vaccines,    Pneumococcal vax prev rcvd   He does do yearly Flu shot.  Relates he received 1 at [facility] this fall  Tdap/tetanus shot is up to date  (done every 10 yrs for superficial cuts, every 5 yrs for deep wounds)  Prev rcvd 'shingles' shot, Shingrix.  Covid vaccine up-to-date    Non-smoker    We discussed end of life planning, does have a  \"LIVING WILL\"     Glaucoma screening is up-to-date    Discussed importance of routine exercise, healthy diet.    He will be staying with family in [place], they plan to return in February for a follow-up visit prior to my alf March 3, call with any issues/refill request in the meantime        Advance Directives   What are advance directives?  Advance " directives are legal documents that state your wishes and plans for medical care. These plans are made ahead of time in case you lose your ability to make decisions for yourself. Advance directives can apply to any medical decision, such as the treatments you want, and if you want to donate organs.   What are the types of advance directives?  There are many types of advance directives, and each state has rules about how to use them. You may choose a combination of any of the following:  Living will:  This is a written record of the treatment you want. You can also choose which treatments you do not want, which to limit, and which to stop at a certain time. This includes surgery, medicine, IV fluid, and tube feedings.   Durable power of  for healthcare (DPAHC):  This is a written record that states who you want to make healthcare choices for you when you are unable to make them for yourself. This person, called a proxy, is usually a family member or a friend. You may choose more than 1 proxy.  Do not resuscitate (DNR) order:  A DNR order is used in case your heart stops beating or you stop breathing. It is a request not to have certain forms of treatment, such as CPR. A DNR order may be included in other types of advance directives.  Medical directive:  This covers the care that you want if you are in a coma, near death, or unable to make decisions for yourself. You can list the treatments you want for each condition. Treatment may include pain medicine, surgery, blood transfusions, dialysis, IV or tube feedings, and a ventilator (breathing machine).  Values history:  This document has questions about your views, beliefs, and how you feel and think about life. This information can help others choose the care that you would choose.  Why are advance directives important?  An advance directive helps you control your care. Although spoken wishes may be used, it is better to have your wishes written down. Spoken  wishes can be misunderstood, or not followed. Treatments may be given even if you do not want them. An advance directive may make it easier for your family to make difficult choices about your care.

## 2024-12-02 ENCOUNTER — APPOINTMENT (OUTPATIENT)
Dept: LAB | Facility: MEDICAL CENTER | Age: 89
End: 2024-12-02
Payer: MEDICARE

## 2024-12-02 ENCOUNTER — OFFICE VISIT (OUTPATIENT)
Dept: FAMILY MEDICINE CLINIC | Facility: CLINIC | Age: 89
End: 2024-12-02
Payer: MEDICARE

## 2024-12-02 VITALS
BODY MASS INDEX: 27.76 KG/M2 | OXYGEN SATURATION: 96 % | WEIGHT: 172 LBS | SYSTOLIC BLOOD PRESSURE: 110 MMHG | HEART RATE: 75 BPM | DIASTOLIC BLOOD PRESSURE: 72 MMHG

## 2024-12-02 DIAGNOSIS — I67.2 CEREBRAL ARTERIOSCLEROSIS WITH HISTORY OF PREVIOUS STROKE: ICD-10-CM

## 2024-12-02 DIAGNOSIS — Z86.73 CEREBRAL ARTERIOSCLEROSIS WITH HISTORY OF PREVIOUS STROKE: ICD-10-CM

## 2024-12-02 DIAGNOSIS — R26.89 BALANCE PROBLEMS: ICD-10-CM

## 2024-12-02 DIAGNOSIS — G31.84 MCI (MILD COGNITIVE IMPAIRMENT): ICD-10-CM

## 2024-12-02 DIAGNOSIS — I25.10 ARTERIOSCLEROTIC CORONARY ARTERY DISEASE: ICD-10-CM

## 2024-12-02 DIAGNOSIS — I10 BENIGN ESSENTIAL HYPERTENSION: ICD-10-CM

## 2024-12-02 DIAGNOSIS — R39.198 URINARY DYSFUNCTION: ICD-10-CM

## 2024-12-02 DIAGNOSIS — M16.11 PRIMARY OSTEOARTHRITIS OF RIGHT HIP: ICD-10-CM

## 2024-12-02 DIAGNOSIS — Z00.00 MEDICARE ANNUAL WELLNESS VISIT, SUBSEQUENT: Primary | ICD-10-CM

## 2024-12-02 LAB
ALBUMIN SERPL BCG-MCNC: 4.1 G/DL (ref 3.5–5)
ALP SERPL-CCNC: 60 U/L (ref 34–104)
ALT SERPL W P-5'-P-CCNC: 16 U/L (ref 7–52)
ANION GAP SERPL CALCULATED.3IONS-SCNC: 9 MMOL/L (ref 4–13)
AST SERPL W P-5'-P-CCNC: 22 U/L (ref 13–39)
BILIRUB SERPL-MCNC: 0.44 MG/DL (ref 0.2–1)
BUN SERPL-MCNC: 31 MG/DL (ref 5–25)
CALCIUM SERPL-MCNC: 9.2 MG/DL (ref 8.4–10.2)
CHLORIDE SERPL-SCNC: 103 MMOL/L (ref 96–108)
CO2 SERPL-SCNC: 28 MMOL/L (ref 21–32)
CREAT SERPL-MCNC: 0.95 MG/DL (ref 0.6–1.3)
ERYTHROCYTE [DISTWIDTH] IN BLOOD BY AUTOMATED COUNT: 14.5 % (ref 11.6–15.1)
GFR SERPL CREATININE-BSD FRML MDRD: 71 ML/MIN/1.73SQ M
GLUCOSE SERPL-MCNC: 82 MG/DL (ref 65–140)
HCT VFR BLD AUTO: 40.5 % (ref 36.5–49.3)
HGB BLD-MCNC: 12.9 G/DL (ref 12–17)
MCH RBC QN AUTO: 28.1 PG (ref 26.8–34.3)
MCHC RBC AUTO-ENTMCNC: 31.9 G/DL (ref 31.4–37.4)
MCV RBC AUTO: 88 FL (ref 82–98)
PLATELET # BLD AUTO: 300 THOUSANDS/UL (ref 149–390)
PMV BLD AUTO: 10.7 FL (ref 8.9–12.7)
POTASSIUM SERPL-SCNC: 4.5 MMOL/L (ref 3.5–5.3)
PROT SERPL-MCNC: 7.2 G/DL (ref 6.4–8.4)
RBC # BLD AUTO: 4.59 MILLION/UL (ref 3.88–5.62)
SODIUM SERPL-SCNC: 140 MMOL/L (ref 135–147)
TSH SERPL DL<=0.05 MIU/L-ACNC: 2.04 UIU/ML (ref 0.45–4.5)
WBC # BLD AUTO: 8.07 THOUSAND/UL (ref 4.31–10.16)

## 2024-12-02 PROCEDURE — G0439 PPPS, SUBSEQ VISIT: HCPCS | Performed by: FAMILY MEDICINE

## 2024-12-02 PROCEDURE — 80053 COMPREHEN METABOLIC PANEL: CPT | Performed by: FAMILY MEDICINE

## 2024-12-02 PROCEDURE — 36415 COLL VENOUS BLD VENIPUNCTURE: CPT | Performed by: FAMILY MEDICINE

## 2024-12-02 PROCEDURE — 85027 COMPLETE CBC AUTOMATED: CPT | Performed by: FAMILY MEDICINE

## 2024-12-02 PROCEDURE — 99213 OFFICE O/P EST LOW 20 MIN: CPT | Performed by: FAMILY MEDICINE

## 2024-12-02 PROCEDURE — 84443 ASSAY THYROID STIM HORMONE: CPT | Performed by: FAMILY MEDICINE

## 2024-12-02 NOTE — ASSESSMENT & PLAN NOTE
Continues on Plavix 75 mg daily.  Orders:    CBC    Comprehensive metabolic panel    TSH, 3rd generation with Free T4 reflex

## 2024-12-02 NOTE — ASSESSMENT & PLAN NOTE
Continue to control cardiovascular risk as can, he had seen cardiology in the past.  1 year ago cholesterol was excellent at 133 with HDL 58, LDL 61.    Continue rosuvastatin 10 mg daily, metoprolol succinate 25 mg 1/2 tablet daily.  Orders:    CBC    Comprehensive metabolic panel    TSH, 3rd generation with Free T4 reflex

## 2024-12-02 NOTE — ASSESSMENT & PLAN NOTE
Had seen [facility] over the summer with injection right hip, follow-up with them if needed.  Orders:    Ambulatory Referral to Physical Therapy; Future

## 2024-12-02 NOTE — ASSESSMENT & PLAN NOTE
Has benefited from therapy in the past, has done that locally, can also do therapy evaluation in [place].  Does walk with family about 30 minutes, continue with exercise as tolerated.  Orders:    CBC    Comprehensive metabolic panel    TSH, 3rd generation with Free T4 reflex    Ambulatory Referral to Physical Therapy; Future

## 2024-12-02 NOTE — PROGRESS NOTES
Name: Jeremías Verde      : 1935      MRN: [id number]  Encounter Provider: Rob Gangon DO  Encounter Date: 2024   Encounter department: [facility]  :  Assessment & Plan  Medicare annual wellness visit, subsequent         Arteriosclerotic coronary artery disease  Continue to control cardiovascular risk as can, he had seen cardiology in the past.  1 year ago cholesterol was excellent at 133 with HDL 58, LDL 61.    Continue rosuvastatin 10 mg daily, metoprolol succinate 25 mg 1/2 tablet daily.  Orders:    CBC    Comprehensive metabolic panel    TSH, 3rd generation with Free T4 reflex    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  Continues on Plavix 75 mg daily.  Orders:    CBC    Comprehensive metabolic panel    TSH, 3rd generation with Free T4 reflex    Benign essential hypertension  Blood pressure after sitting was 110/72, watch for orthostatic symptoms, continues on metoprolol succinate 25 mg 1/2 tablet daily.  Echocardiogram in 2024 showed 60% ejection fraction.  Orders:    CBC    Comprehensive metabolic panel    MCI (mild cognitive impairment) - mixed  See previous evaluation with neurology/geriatric group.  Had MRI of brain back in 2024, previously had EEG showing no seizure activity.  He will continue with medication as is, has been doing very well with low-dose Zyprexa 2.5 mg at bedtime, continue with that as is.    B12 level was low in the past, continue on 1000 mcg B12 daily.    His wife passed away from a stroke last month, he will be staying with his daughter/family in [facility].  Orders:    CBC    Comprehensive metabolic panel    TSH, 3rd generation with Free T4 reflex    Balance problems   Has benefited from therapy in the past, has done that locally, can also do therapy evaluation in [place].  Does walk with family about 30 minutes, continue with exercise as tolerated.  Orders:    CBC     Comprehensive metabolic panel    TSH, 3rd generation with Free T4 reflex    Ambulatory Referral to Physical Therapy; Future    Primary osteoarthritis of right hip  Had seen [facility] over the summer with injection right hip, follow-up with them if needed.  Orders:    Ambulatory Referral to Physical Therapy; Future    Urinary dysfunction  He did see urology in November, postvoid residual on on imaging was okay, he has no new complaints, past history retention.  Just observe         Patient Instructions   He did eat about 4 hours ago, he will  CBC, CMP, TSH today.  Back in June B12 level was fine at 500, creatinine was 0.99, potassium 4.7, glucose 95, hemoglobin 12.4.  January 2024 TSH was okay 1.4.    Immunization History   Administered Date(s) Administered    COVID-19 MODERNA VACC 0.5 ML IM 01/21/2021, 02/18/2021    COVID-19 PFIZER VACCINE 0.3 ML IM 11/03/2021    COVID-19 Pfizer Vac BIVALENT Omar-sucrose 12 Yr+ IM 09/12/2022    COVID-19 Pfizer mRNA vacc PF omar-sucrose 12 yr and older (Comirnaty) 09/27/2023, 04/24/2024, 09/09/2024    COVID-19 Pfizer vac (Omar-sucrose, gray cap) 12 yr+ IM 04/10/2022    INFLUENZA 10/27/2016, 10/09/2017, 09/29/2018, 10/23/2019, 09/28/2020, 10/06/2021, 11/02/2022, 10/17/2023    Influenza Split High Dose Preservative Free IM 10/27/2016, 09/29/2018, 10/23/2019    Influenza, seasonal, injectable 10/29/2007, 01/12/2009    Pneumococcal Conjugate 13-Valent 11/22/2019    Pneumococcal Conjugate PCV 7 09/16/2014    Pneumococcal Polysaccharide PPV23 09/01/2006, 09/16/2014    TD (adult) Preservative Free 09/01/2006    Tdap 09/01/2006, 04/16/2024    Zoster 01/01/2012    Zoster Vaccine Recombinant 01/08/2013, 12/10/2021, 03/19/2022    influenza, trivalent, adjuvanted 09/28/2020       Discussed Vaccines,    Pneumococcal vax prev rcvd   He does do yearly Flu shot.  Relates he received 1 at [facility] this fall  Tdap/tetanus shot is up to date  (done every 10 yrs for superficial cuts, every 5 yrs for deep  "wounds)  Prev rcvd 'shingles' shot, Shingrix.  Covid vaccine up-to-date    Non-smoker    We discussed end of life planning, does have a  \"LIVING WILL\"     Glaucoma screening is up-to-date    Discussed importance of routine exercise, healthy diet.    He will be staying with family in [place], they plan to return in February for a follow-up visit prior to [facility] March [facility], call with any issues/refill request in the meantime        Advance Directives   What are advance directives?  Advance directives are legal documents that state your wishes and plans for medical care. These plans are made ahead of time in case you lose your ability to make decisions for yourself. Advance directives can apply to any medical decision, such as the treatments you want, and if you want to donate organs.   What are the types of advance directives?  There are many types of advance directives, and each state has rules about how to use them. You may choose a combination of any of the following:  Living will:  This is a written record of the treatment you want. You can also choose which treatments you do not want, which to limit, and which to stop at a certain time. This includes surgery, medicine, IV fluid, and tube feedings.   Durable power of  for healthcare (DPAHC):  This is a written record that states who you want to make healthcare choices for you when you are unable to make them for yourself. This person, called a proxy, is usually a family member or a friend. You may choose more than 1 proxy.  Do not resuscitate (DNR) order:  A DNR order is used in case your heart stops beating or you stop breathing. It is a request not to have certain forms of treatment, such as CPR. A DNR order may be included in other types of advance directives.  Medical directive:  This covers the care that you want if you are in a coma, near death, or unable to make decisions for yourself. You can list the treatments you want for each " condition. Treatment may include pain medicine, surgery, blood transfusions, dialysis, IV or tube feedings, and a ventilator (breathing machine).  Values history:  This document has questions about your views, beliefs, and how you feel and think about life. This information can help others choose the care that you would choose.  Why are advance directives important?  An advance directive helps you control your care. Although spoken wishes may be used, it is better to have your wishes written down. Spoken wishes can be misunderstood, or not followed. Treatments may be given even if you do not want them. An advance directive may make it easier for your family to make difficult choices about your care.                   History of Present Illness     He is in today accompanied by family for a routine follow-up/annual wellness check, I had seen him back in August.  Unfortunately his wife passed away last month, he is currently living with his daughter.    He does get out walking with family.  Physical therapy has helped with balance.  Does have some right hip/inguinal pain, sees orthopedist at [facility].  Appetite is good.  Is moving his bowels okay.  Did see urology in November regarding some urinary issues, had no significant postvoid residual, recently has been doing okay.  Has had no behavioral issues, he is grieving the loss of his wife.  Continues on Zyprexa 2.5 at night.    Has not had any significant chest pain, no increased shortness of breath.  No new focal weakness in arms or legs, no falls      Hip Pain       Review of Systems   Constitutional:         See HPI       Current Outpatient Medications on File Prior to Visit   Medication Sig Dispense Refill    acetaminophen (TYLENOL) 325 mg tablet Take 650 mg by mouth Uses 3 x a day      carboxymethylcellulose (REFRESH PLUS) 0.5 % SOLN 1 drop 2 (two) times a day as needed for dry eyes      clopidogrel (PLAVIX) 75 mg tablet TAKE 1 TABLET BY MOUTH EVERY DAY 90 tablet 1     cyanocobalamin (VITAMIN B-12) 1000 MCG tablet Take 1 tablet (1,000 mcg total) by mouth daily 30 tablet 0    metoprolol succinate (TOPROL-XL) 25 mg 24 hr tablet TAKE 1/2 TABLET BY MOUTH DAILY 45 tablet 3    OLANZapine (ZyPREXA) 2.5 mg tablet TAKE 1 TABLET (2.5 MG TOTAL) BY MOUTH EVERY DAY AT BEDTIME 90 tablet 0    rosuvastatin (CRESTOR) 10 MG tablet TAKE 1 TABLET BY MOUTH EVERY  tablet 1     No current facility-administered medications on file prior to visit.         Objective   /72   Pulse 75   Wt 78 kg (172 lb)   SpO2 96%   BMI 27.76 kg/m²      Physical Exam  Constitutional:       Comments: 89-year-old seated in chair, alert, no acute distress.   Eyes:      General: No scleral icterus.     Conjunctiva/sclera: Conjunctivae normal.   Cardiovascular:      Rate and Rhythm: Normal rate and regular rhythm.   Pulmonary:      Effort: No respiratory distress.      Breath sounds: Normal breath sounds. No wheezing, rhonchi or rales.   Abdominal:      General: There is no distension.      Palpations: Abdomen is soft.      Tenderness: There is no abdominal tenderness. There is no guarding.   Musculoskeletal:      Right lower leg: No edema.      Left lower leg: No edema.      Comments: Had some increased right hip pain with internal, external rotation of hip, no focal weakness in leg.   Lymphadenopathy:      Cervical: No cervical adenopathy.   Skin:     Coloration: Skin is not jaundiced.   Neurological:      Mental Status: Mental status is at baseline.   Psychiatric:         Behavior: Behavior normal.             Annual Wellness Visit Questionnaire       Health Risk Assessment:   Patient rates overall health as fair. Patient feels that their physical health rating is same. Patient is satisfied with their life. Eyesight was rated as same. Hearing was rated as same. Patient feels that their emotional and mental health rating is same. Patients states they are never, rarely angry. Patient states they are sometimes  unusually tired/fatigued. Pain experienced in the last 7 days has been some. Patient's pain rating has been 7/10. Patient states that he has experienced weight loss or gain in last 6 months.     Depression Screening:   PHQ-2 Score: 0      Fall Risk Screening:   In the past year, patient has experienced: no history of falling in past year      Home Safety:  Patient does not have trouble with stairs inside or outside of their home. Patient has working smoke alarms and has working carbon monoxide detector. Home safety hazards include: none.     Nutrition:   Current diet is Regular.     Medications:   Patient is currently taking over-the-counter supplements. OTC medications include: see medication list. Patient is able to manage medications.     Activities of Daily Living (ADLs)/Instrumental Activities of Daily Living (IADLs):   Walk and transfer into and out of bed and chair?: Yes  Dress and groom yourself?: Yes    Bathe or shower yourself?: Yes    Feed yourself? Yes  Do your laundry/housekeeping?: No  Manage your money, pay your bills and track your expenses?: No  Make your own meals?: No    Do your own shopping?: No    Previous Hospitalizations:   Any hospitalizations or ED visits within the last 12 months?: Yes    How many hospitalizations have you had in the last year?: 1-2    PREVENTIVE SCREENINGS      Cardiovascular Screening:    General: Screening Not Indicated and History Lipid Disorder      Diabetes Screening:     General: Screening Current      Colorectal Cancer Screening:     General: Screening Not Indicated      Prostate Cancer Screening:    General: Screening Not Indicated      Lung Cancer Screening:     General: Screening Not Indicated    Screening, Brief Intervention, and Referral to Treatment (SBIRT)    Screening  Typical number of drinks in a day: 0  Typical number of drinks in a week: 0  Interpretation: Low risk drinking behavior.    Single Item Drug Screening:  How often have you used an illegal drug  (including marijuana) or a prescription medication for non-medical reasons in the past year? never    Single Item Drug Screen Score: 0  Interpretation: Negative screen for possible drug use disorder    Social Drivers of Health     Financial Resource Strain: Low Risk  (1/15/2024)    [facility]    Overall Financial Resource Strain (CARDIA)     Difficulty of Paying Living Expenses: Not very hard   Food Insecurity: No Food Insecurity (12/2/2024)    Hunger Vital Sign     Worried About Running Out of Food in the Last Year: Never true     Ran Out of Food in the Last Year: Never true   Transportation Needs: No Transportation Needs (12/2/2024)    PRAPARE - Transportation     Lack of Transportation (Medical): No     Lack of Transportation (Non-Medical): No   Housing Stability: Unknown (12/2/2024)    Housing Stability Vital Sign     Unable to Pay for Housing in the Last Year: No     Homeless in the Last Year: No   Utilities: Not At Risk (12/2/2024)    [facility] Utilities     Threatened with loss of utilities: No

## 2024-12-02 NOTE — ASSESSMENT & PLAN NOTE
See previous evaluation with neurology/geriatric group.  Had MRI of brain back in January 2024, previously had EEG showing no seizure activity.  He will continue with medication as is, has been doing very well with low-dose Zyprexa 2.5 mg at bedtime, continue with that as is.    B12 level was low in the past, continue on 1000 mcg B12 daily.    His wife passed away from a stroke last month, he will be staying with his daughter/family in [facility].  Orders:    CBC    Comprehensive metabolic panel    TSH, 3rd generation with Free T4 reflex

## 2024-12-03 ENCOUNTER — RESULTS FOLLOW-UP (OUTPATIENT)
Dept: FAMILY MEDICINE CLINIC | Facility: CLINIC | Age: 89
End: 2024-12-03

## 2024-12-03 ENCOUNTER — EVALUATION (OUTPATIENT)
Facility: REHABILITATION | Age: 89
End: 2024-12-03
Payer: MEDICARE

## 2024-12-03 DIAGNOSIS — R26.9 GAIT DISTURBANCE: ICD-10-CM

## 2024-12-03 DIAGNOSIS — Z86.73 CEREBRAL ARTERIOSCLEROSIS WITH HISTORY OF PREVIOUS STROKE: Primary | ICD-10-CM

## 2024-12-03 DIAGNOSIS — I67.2 CEREBRAL ARTERIOSCLEROSIS WITH HISTORY OF PREVIOUS STROKE: Primary | ICD-10-CM

## 2024-12-03 DIAGNOSIS — R26.89 BALANCE PROBLEMS: ICD-10-CM

## 2024-12-03 PROCEDURE — 97112 NEUROMUSCULAR REEDUCATION: CPT | Performed by: PHYSICAL THERAPIST

## 2024-12-03 PROCEDURE — 97110 THERAPEUTIC EXERCISES: CPT | Performed by: PHYSICAL THERAPIST

## 2024-12-03 NOTE — PROGRESS NOTES
"Progress Update         Today's date: 12/3/2024  Patient name: Jeremías Verde  : 1935  MRN: [id number]  Referring provider: Rob Gagnon DO  Dx:   Encounter Diagnosis     ICD-10-CM    1. 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 Balance problems   R26.89       3. Gait disturbance  R26.9           Start Time: 1145  Stop Time: 1230  Total time in clinic (min): 45 minutes    Subjective: Daughter and son present.  Wife passed away suddenly and he has had to hold PT. Anticipating transfering care to clinic closer to the Daughter's home.      Objective: See treatment diary below             Balance Test 09/09 10/09 11/7 12/3   5x Sit to Stand: 16 13 11s 14 sec   TU 11 sec 9.5s 12  sec   Gait Speed:     1.0 m/s   6 Minute Walk Test: 1150 1200 1416ft 1100   FGA:     MCTSIB 79/120 90/120 120/120 69/120                       MCTSIB:   30s           FTEO    6s           FTEC  30s  foam FTEO   3s            FTEC                         Flowsheet Rows      Flowsheet Row Most Recent Value   Functional Gait Assessment    Gait Level Surface  3   Change in GaiT Speed 2   Gait with horizontal head turns 2   Gait with vertical head turns 2   Gait and pivot tuen  3   Step over obstacle 3   Gait with narrow base of supprt 1   Gait with eyes closed 2   Ambulating backwards 2   Steps 2   Total score  22            Precautions: CVA , hypoxic episode 1/24       10/28 10/31 11/4 11/7 12/03   Neuro Re-Ed    See testing  Re-evaluation   FGA        Static balance  Foam  FTEO :30 x 1  FAEC :30 x 3 cg  MCTSIB     Hurdles         Uneven mat        Dynamic balance Foam beams 4\" hurdles    3 laps forwards  Added 4\" hurdles walking stick initially    3 laps sideways Foam beams 4\" hurdles    3 laps forwards  Added 4\" hurdles walking shorter straight cane. * goal no device    Incline surface  Forwards/back  Forwards/ turn  5 laps each 10# hydrosphere Foam " "beams 4\" hurdles    5 laps forwards/ sideways  Added 4\" hurdles     no device Foam beams 4\" hurdles  Fwd - x3 laps FGA  MCTSIB  6MWT  TUG  5 x sit to stand    Biodex treadmill 2.1 4 mi   2.0 x 6 min 1% incline True treadmill ambulation 2.3 mph 3% incline 5 min True treadmill ambulation 2.3 mph 1% incline 5 min     Resisted ambulation  150' x 3 30# drag, difficulty negotiating turns 150' x 6 30# drag, difficulty negotiating turns     Ther Ex    See testing            Scifit for aerobic endurance training Level 3.5 x 15 min for endurance training Level 3.5 x 15 min for endurance training Level 3.5 x 16 min L3.5 12min at end  Level 3.5 x 13 minutes for endurance training   LE  strengthening Heel lifts doble 2 x (10 finger assist for balance on bar)    Single 10 x 2 each Heel lifts doble 2 x (10 finger assist for balance on bar)    Single 10 x 2 each  Difficulty maintaining balance       8\" step ups  10 x 2 each no E support                              Assessment: Jeremías is a very pleasant male who has been followed by the [facility] for balance and gait disturbance. He had been making great progress with PT until last assessment. Unfortunately, his wife suddenly passed away and he was unable to return to PT. As demonstrated wit his re-tested Functional Outcome measures, there has been a decline in his strength, balance, endurance and increased risk of falls.  He will benefit from continued skilled PT intervention to maximal functional mobility. Patient is anticipating a possible move to daughter's home.  At that time, his PT care will be transfer to a closed clinic.    Goals  Short Term Goal: 4 weeks  1. Patient will be independent with initial HEP MET  2. Patient will improve 5 x sit to stand by 4 sec for increased LE strength ongoing  3. Patient will show improvement in MCTSIB by 15 seconds for improved balance  ongoing    Long Term Goals  1. Patient will show improvement in 6 MWT by 20 % for improved " endurance ongoing  2. Patient will score >25/30 in FGA for improved dynamic balance  ongoing  3. Patient will score below 11 points in TUG with cog component for increased risk of falls. ongoing  4. Patient will be compliant with HEP ongoing  Plan:   Planned therapy interventions: neuromuscular re-education, strengthening, therapeutic activities, home exercise program, gait training and balance    Frequency: 2x week  Duration in weeks: 8   Plan of Care beginning date: 12/03/2024  Plan of Care expiration date: 02/01/2025  Treatment plan discussed with: patient and family

## 2024-12-05 ENCOUNTER — OFFICE VISIT (OUTPATIENT)
Facility: REHABILITATION | Age: 89
End: 2024-12-05
Payer: MEDICARE

## 2024-12-05 DIAGNOSIS — R26.9 GAIT DISTURBANCE: ICD-10-CM

## 2024-12-05 DIAGNOSIS — R26.89 BALANCE PROBLEMS: Primary | ICD-10-CM

## 2024-12-05 DIAGNOSIS — Z86.73 CEREBRAL ARTERIOSCLEROSIS WITH HISTORY OF PREVIOUS STROKE: ICD-10-CM

## 2024-12-05 DIAGNOSIS — I67.2 CEREBRAL ARTERIOSCLEROSIS WITH HISTORY OF PREVIOUS STROKE: ICD-10-CM

## 2024-12-05 PROCEDURE — 97112 NEUROMUSCULAR REEDUCATION: CPT | Performed by: PHYSICAL THERAPIST

## 2024-12-05 PROCEDURE — 97110 THERAPEUTIC EXERCISES: CPT | Performed by: PHYSICAL THERAPIST

## 2024-12-05 NOTE — PROGRESS NOTES
"Daily Note    Today's date: 2024  Patient name: Jeremías Verde  : 1935  MRN: [id number]  Referring provider: Rob Gagnon DO  Dx:   Encounter Diagnosis     ICD-10-CM    1. Balance problems   R26.89       2. 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  I67.2     Z86.73       3. Gait disturbance  R26.9           Start Time: 1045  Stop Time: 1130  Total time in clinic (min): 45 minutes    Subjective: Daughter reports visits will be on call basis due to present living situation    Objective: See treatment diary below  HR 79 Spo2 98%    Precautions: CVA , hypoxic episode    Neuro Re-Ed  See testing  Re-evaluation Developed and issued hard copy of HEP   FGA       Static balance  MCTSIB      Hurdles        Uneven mat    Foam beams  Forward 4 laps  Sideways 3 laps  Added EC  1 lap min assit   Dynamic balance Foam beams 4\" hurdles    5 laps forwards/ sideways  Added 4\" hurdles     no device Foam beams 4\" hurdles  Fwd - x3 laps FGA  MCTSIB  6MWT  TUG  5 x sit to stand Incline surface  Forwards/backwards    Forwards/turn    5 laps each    Solostep    True treadmill ambulation 2.3 mph 1% incline 5 min   True treadmill ambulation 2.0 mph    Resisted ambulation 150' x 6 30# drag, difficulty negotiating turns      Ther Ex  See testing            Scifit for aerobic endurance training Level 3.5 x 16 min L3.5 12min at end  Level 3.5 x 13 minutes for endurance training Level 3.5 5 min            3.8  3 min           4.0  2min  10 min   LE  strengthening    10# overhead lift sit to stand 2 x 15       Stair climbing 24' x 1 1 rail                   Access Code: [email or web address]  URL: [email or web address]  Date: 2024  Prepared by: Nikki Hugo-Sprgodwin    Exercises  - Sit to Stand Without Arm Support  - 1 x daily - 7 x weekly - 3 sets - 10 reps  - Walking  - 1 x daily - 7 x weekly - 15 timed walking (min)  - Step Up  - 1 x daily - 7 x weekly - 2 " sets - 10 reps  - Lateral Step Up  using rail  - 1 x daily - 7 x weekly - 2 sets - 10 reps     Assessment: HEP developed and issued to client to perform during this challenging times, when PT session frequency is not guaranteed due to present life circumstance. He has a treadmill at daughters home. Discussed supervides treadmill use due to new environment.  Educated family in increased sit to sriram challenge with weights to continue to progression of strengthening activity. He will continue to benefit from skilled PT.      Plan:   Planned therapy interventions: neuromuscular re-education, strengthening, therapeutic activities, home exercise program, gait training and balance    Frequency: 2x week  Duration in weeks: 8   Plan of Care beginning date: 12/03/2024  Plan of Care expiration date: 02/01/2025  Treatment plan discussed with: patient and family

## 2024-12-12 ENCOUNTER — APPOINTMENT (OUTPATIENT)
Facility: REHABILITATION | Age: 89
End: 2024-12-12
Payer: MEDICARE

## 2024-12-13 DIAGNOSIS — F05 DELIRIUM DUE TO MEDICAL CONDITION WITH BEHAVIORAL DISTURBANCE: ICD-10-CM

## 2024-12-13 RX ORDER — OLANZAPINE 2.5 MG/1
TABLET, FILM COATED ORAL
Qty: 90 TABLET | Refills: 0 | Status: SHIPPED | OUTPATIENT
Start: 2024-12-13

## 2025-01-02 DIAGNOSIS — I25.10 ARTERIOSCLEROTIC CORONARY ARTERY DISEASE: ICD-10-CM

## 2025-01-02 DIAGNOSIS — E78.5 HYPERLIPIDEMIA, UNSPECIFIED HYPERLIPIDEMIA TYPE: ICD-10-CM

## 2025-01-03 RX ORDER — ROSUVASTATIN CALCIUM 10 MG/1
10 TABLET, COATED ORAL DAILY
Qty: 30 TABLET | Refills: 0 | Status: SHIPPED | OUTPATIENT
Start: 2025-01-03 | End: 2025-01-03 | Stop reason: SDUPTHER

## 2025-01-03 RX ORDER — ROSUVASTATIN CALCIUM 10 MG/1
10 TABLET, COATED ORAL DAILY
Qty: 90 TABLET | Refills: 3 | Status: SHIPPED | OUTPATIENT
Start: 2025-01-03

## 2025-01-29 ENCOUNTER — TELEPHONE (OUTPATIENT)
Age: 89
End: 2025-01-29

## 2025-01-29 NOTE — TELEPHONE ENCOUNTER
Pt's daughter Donna called.  Stated her father was seen  by Dr Gagnon and discussed bringing him in for a final visit before he retired.  Nothing available to schedule.    Daughter states the pt has moved to [place] with her since his wife(mom)  in November.  They will be establishing care somewhere in [place] but do not know where yet.    They are requesting an appt with Dr. Gagnon to finalize some things with his care.    They will be in the area on , Feb 28, March 3.  If Dr. Gagnon can squeeze him in.  They are also willing to drive up another day if needed.    Please contact Donna [phone]

## 2025-01-31 ENCOUNTER — TELEPHONE (OUTPATIENT)
Dept: FAMILY MEDICINE CLINIC | Facility: CLINIC | Age: 89
End: 2025-01-31

## 2025-01-31 DIAGNOSIS — M16.11 PRIMARY OSTEOARTHRITIS OF RIGHT HIP: ICD-10-CM

## 2025-01-31 DIAGNOSIS — R42 VERTIGO: Primary | ICD-10-CM

## 2025-01-31 DIAGNOSIS — R26.89 BALANCE PROBLEMS: ICD-10-CM

## 2025-01-31 DIAGNOSIS — G31.84 MCI (MILD COGNITIVE IMPAIRMENT): ICD-10-CM

## 2025-01-31 NOTE — TELEPHONE ENCOUNTER
Donna (Daughter) is requesting another Ambulatory Referral to Physical Therapy so that Patient can go to Christiana Hayes,PT NPI [id number] [address] [place] [address] P: [phone]. Please fax to [phone], attn: Caro and let Donna know when this has been done. They would like to get PT to Physical Therapy before the 2/27/25 appt w/PCP, if possible.

## 2025-02-03 NOTE — TELEPHONE ENCOUNTER
Call daughter and let know I placed PT ordered-   please fax the order as they requested -    Christiana Hayes,PT NPI [id number] [address] [place] [address] P: [phone]. Please fax to [phone], attn: Caor

## 2025-02-03 NOTE — TELEPHONE ENCOUNTER
2/3 8:35am: [facility] to let Donna know that PCP wrote the referral and it was faxed to [facility] this morning.

## 2025-02-21 ENCOUNTER — RA CDI HCC (OUTPATIENT)
Dept: OTHER | Facility: HOSPITAL | Age: 89
End: 2025-02-21

## 2025-02-26 PROBLEM — D64.89 OTHER SPECIFIED ANEMIAS: Status: RESOLVED | Noted: 2023-10-28 | Resolved: 2025-02-26

## 2025-02-27 ENCOUNTER — OFFICE VISIT (OUTPATIENT)
Dept: FAMILY MEDICINE CLINIC | Facility: CLINIC | Age: 89
End: 2025-02-27
Payer: MEDICARE

## 2025-02-27 VITALS
WEIGHT: 176 LBS | HEART RATE: 66 BPM | OXYGEN SATURATION: 99 % | DIASTOLIC BLOOD PRESSURE: 74 MMHG | BODY MASS INDEX: 28.41 KG/M2 | SYSTOLIC BLOOD PRESSURE: 126 MMHG

## 2025-02-27 DIAGNOSIS — R42 VERTIGO: ICD-10-CM

## 2025-02-27 DIAGNOSIS — I25.10 ARTERIOSCLEROTIC CORONARY ARTERY DISEASE: Primary | ICD-10-CM

## 2025-02-27 DIAGNOSIS — R26.89 BALANCE PROBLEMS: ICD-10-CM

## 2025-02-27 DIAGNOSIS — Z86.73 CEREBRAL ARTERIOSCLEROSIS WITH HISTORY OF PREVIOUS STROKE: ICD-10-CM

## 2025-02-27 DIAGNOSIS — I67.2 CEREBRAL ARTERIOSCLEROSIS WITH HISTORY OF PREVIOUS STROKE: ICD-10-CM

## 2025-02-27 DIAGNOSIS — G31.84 MCI (MILD COGNITIVE IMPAIRMENT): ICD-10-CM

## 2025-02-27 DIAGNOSIS — I10 BENIGN ESSENTIAL HYPERTENSION: ICD-10-CM

## 2025-02-27 PROCEDURE — 99212 OFFICE O/P EST SF 10 MIN: CPT | Performed by: FAMILY MEDICINE

## 2025-02-27 PROCEDURE — G2211 COMPLEX E/M VISIT ADD ON: HCPCS | Performed by: FAMILY MEDICINE

## 2025-02-27 NOTE — ASSESSMENT & PLAN NOTE
Continue to try to control cardiovascular risk as can, see previous MRI brain 1 year ago.    Is doing physical therapy again for balance, has elevated fall risk.  Therapy is helping, continue with therapy as is.

## 2025-02-27 NOTE — ASSESSMENT & PLAN NOTE
Reviewed December 2024 blood work, CBC, CMP, TSH looked okay, creatinine 0.95, hemoglobin 12.9  Last cholesterol 133 with HDL 58, LDL 61.    Last echocardiogram 1 year ago showed 60% ejection fraction.    Continue to control cardiovascular risk as can.

## 2025-02-27 NOTE — ASSESSMENT & PLAN NOTE
Had seen [facility] neurology in the past, also had seen geriatric group.  See previous MRI brain January 2024, see previous negative EEG.    No apparent worsening, continues on medication including Zyprexa 2 point 5 at night, use as is

## 2025-02-27 NOTE — PROGRESS NOTES
Name: Jeremías Verde      : 1935      MRN: [id number]  Encounter Provider: Rob Gagnon DO  Encounter Date: 2025   Encounter department: [facility]  :  Assessment & Plan  Arteriosclerotic coronary artery disease    Reviewed 2024 blood work, CBC, CMP, TSH looked okay, creatinine 0.95, hemoglobin 12.9  Last cholesterol 133 with HDL 58, LDL 61.    Last echocardiogram 1 year ago showed 60% ejection fraction.    Continue to control cardiovascular risk as can.       Benign essential hypertension  Blood pressure today 126/74, watch for orthostatic symptoms       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  Continue to try to control cardiovascular risk as can, see previous MRI brain 1 year ago.    Is doing physical therapy again for balance, has elevated fall risk.  Therapy is helping, continue with therapy as is.       MCI (mild cognitive impairment) - mixed  Had seen [facility] neurology in the past, also had seen geriatric group.  See previous MRI brain 2024, see previous negative EEG.    No apparent worsening, continues on medication including Zyprexa 2 point 5 at night, use as is       Vertigo  Is doing therapy, watch for worsening symptoms.       Balance problems   Continue with therapy       See plan under full annual wellness visit  regarding other medical history/etc    He will be seeing new provider out near [place] in May, they will call if they need new prescriptions from us in the meantime, we can send those to [facility] locally and [facility]    __________________________________________________           History of Present Illness   He is in for a recheck visit, he had a full annual wellness .  Overall has been feeling well, good appetite, gained some weight since he is living with family.  Is doing physical therapy with benefit, no falls.    Feels is sleeping okay, using  medication as directed-no issues with paranoid thought, no new neurological deficit.    Does have some right knee pain      Review of Systems   Constitutional:         See HPI, no recent illness, no new chest pain, no increased shortness of breath, no change in bowel or bladder         Current Outpatient Medications:     acetaminophen (TYLENOL) 325 mg tablet, Take 650 mg by mouth Uses 3 x a day, Disp: , Rfl:     carboxymethylcellulose (REFRESH PLUS) 0.5 % SOLN, 1 drop 2 (two) times a day as needed for dry eyes, Disp: , Rfl:     clopidogrel (PLAVIX) 75 mg tablet, TAKE 1 TABLET BY MOUTH EVERY DAY, Disp: 90 tablet, Rfl: 1    cyanocobalamin (VITAMIN B-12) 1000 MCG tablet, Take 1 tablet (1,000 mcg total) by mouth daily, Disp: 30 tablet, Rfl: 0    metoprolol succinate (TOPROL-XL) 25 mg 24 hr tablet, TAKE 1/2 TABLET BY MOUTH DAILY, Disp: 45 tablet, Rfl: 3    OLANZapine (ZyPREXA) 2.5 mg tablet, TAKE 1 TABLET (2.5 MG TOTAL) BY MOUTH EVERY DAY AT BEDTIME, Disp: 90 tablet, Rfl: 0    rosuvastatin (CRESTOR) 10 MG tablet, Take 1 tablet (10 mg total) by mouth daily, Disp: 90 tablet, Rfl: 3         Objective   /74   Pulse 66   Wt 79.8 kg (176 lb)   SpO2 99%   BMI 28.41 kg/m²      Physical Exam  Constitutional:       Comments: Looks well here today, he rises from chair without using his arms, was able to kneel on the ground and arise on his own-was describing his right knee issue.    No paranoid thought process noted today.    He is up a couple pounds with weight, appears related to nutrition, no sign of increased fluid retention.   Eyes:      General: No scleral icterus.  Cardiovascular:      Rate and Rhythm: Normal rate and regular rhythm.   Pulmonary:      Effort: No respiratory distress.      Breath sounds: Normal breath sounds. No wheezing, rhonchi or rales.   Abdominal:      Palpations: Abdomen is soft.      Tenderness: There is no abdominal tenderness.   Musculoskeletal:      Right lower leg: No edema.      Left  lower leg: No edema.   Skin:     Coloration: Skin is jaundiced.

## 2025-03-02 DIAGNOSIS — I63.9 CEREBELLAR STROKE (HCC): ICD-10-CM

## 2025-03-03 RX ORDER — CLOPIDOGREL BISULFATE 75 MG/1
75 TABLET ORAL DAILY
Qty: 90 TABLET | Refills: 1 | Status: SHIPPED | OUTPATIENT
Start: 2025-03-03

## 2025-04-11 ENCOUNTER — TELEPHONE (OUTPATIENT)
Age: 89
End: 2025-04-11

## 2025-04-11 NOTE — TELEPHONE ENCOUNTER
[facility] called to check the status of the signed plan of care based on the patients recert from 3/19/25. I do not see that in file. She will refax.

## 2025-04-11 NOTE — TELEPHONE ENCOUNTER
Informed Rehab dept that the Pt's PCP has retired and he has not EST care with another provider so we cannot have just anyone sign the order.

## 2025-04-14 DIAGNOSIS — F05 DELIRIUM DUE TO MEDICAL CONDITION WITH BEHAVIORAL DISTURBANCE: ICD-10-CM

## 2025-04-14 RX ORDER — OLANZAPINE 2.5 MG/1
TABLET, FILM COATED ORAL
Qty: 90 TABLET | Refills: 0 | Status: SHIPPED | OUTPATIENT
Start: 2025-04-14

## 2025-04-14 NOTE — TELEPHONE ENCOUNTER
Dr. Gagnon assured patient that they could get refills until he sees his new provider.    Reason for call:   [x] Refill   [] Prior Auth  [] Other:     Office:   [x] PCP/Provider -   [] Specialty/Provider -     Medication: OLANZapine (ZyPREXA) 2.5 mg tablet    Dose/Frequency: TAKE 1 TABLET (2.5 MG TOTAL) BY MOUTH EVERY DAY AT BEDTIME     Quantity: 90    Pharmacy: [facility]/pharmacy #[id number] - [place], [place] - [address] [facility]   Does the patient have enough for 3 days?   [] Yes   [x] No - Send as HP to POD

## 2025-04-14 NOTE — TELEPHONE ENCOUNTER
I called s/w pt daughter and she stated to me that pt was seen by Dr. Gagnon in feb. Dr. Gagnon had placed a note at that visit see below Dr Gagnon message from that encounter. Pt only has 2 pills left.       Per Dr. Gagnon: He will be seeing new provider out near [place] in May, they will call if they need new prescriptions from us in the meantime, we can send those to [facility] locally and [facility]

## 2025-04-15 ENCOUNTER — TELEPHONE (OUTPATIENT)
Age: 89
End: 2025-04-15

## 2025-04-15 NOTE — TELEPHONE ENCOUNTER
Daughter of the patient was calling because her mother passed away. Therefore, her father (the patient) will be moving in with her. She is not in the [place] area. They would like to finish up care with this last visit just to check on every thing before he moves. (And then have to establish with a new Urologist)    They are not in [facility]. Was able to assist with the help of central scheduling to move the [place] appt to fit when they will be back in the area.     Unfortunately, she will not be able to drive her father (the patient) back again a few weeks later for the follow up appt and to review the results.     She does have access to his [employer]. Was wondering if the appt can be converted to a virtual visit via video in [employer]. Or even a telemed call. Either would work so the patient does not miss the appt.     Can this please be reviewed with the provider as to which she would approve?    Daughter of the patient would appreciate a call back to let her know what to do.     Donna   [phone]

## 2025-04-15 NOTE — TELEPHONE ENCOUNTER
Called and spoke with the patient's daughter Donna. Advised Donna that Nhi SOLE is willing to do a VV for the 6/12/2025 appointment for her father.  Plan to call Donna not the patient. Appointment desk updated.

## 2025-04-15 NOTE — TELEPHONE ENCOUNTER
sure, as long as he is in the state [facility] I can absolutely do virtual (but not new [place], [place], [place] etc[place] if there is a location closer to them for the [place] that is OK too we can fax

## 2025-05-28 ENCOUNTER — HOSPITAL ENCOUNTER (OUTPATIENT)
Dept: ULTRASOUND IMAGING | Facility: MEDICAL CENTER | Age: 89
Discharge: HOME | End: 2025-05-28
Attending: PHYSICIAN ASSISTANT
Payer: MEDICARE

## 2025-05-28 DIAGNOSIS — N20.0 CALCULUS OF KIDNEY: ICD-10-CM

## 2025-05-28 PROCEDURE — 76775 US EXAM ABDO BACK WALL LIM: CPT

## (undated) DEVICE — SPECIMEN CONTAINER STERILE PEEL PACK

## (undated) DEVICE — GLOVE INDICATOR PI UNDERGLOVE SZ 8 BLUE

## (undated) DEVICE — INTENDED FOR TISSUE SEPARATION, AND OTHER PROCEDURES THAT REQUIRE A SHARP SURGICAL BLADE TO PUNCTURE OR CUT.: Brand: BARD-PARKER SAFETY BLADES SIZE 15, STERILE

## (undated) DEVICE — FIBER STD QUANTA 365 MICRON

## (undated) DEVICE — PACK TUR

## (undated) DEVICE — BASIC SINGLE BASIN 2-LF: Brand: MEDLINE INDUSTRIES, INC.

## (undated) DEVICE — UROLOGIC DRAIN BAG: Brand: UNBRANDED

## (undated) DEVICE — GLOVE SRG BIOGEL 7.5

## (undated) DEVICE — CATH FOLEY COUDE HEMATURIA 24FR 30ML 3 WAY LUBRICATH

## (undated) DEVICE — BASKET SPECIMEN RETRIVAL 1.9FR 120CM

## (undated) DEVICE — TUBING SUCTION 5MM X 12 FT

## (undated) DEVICE — GLOVE SRG BIOGEL ECLIPSE 7.5

## (undated) DEVICE — 3M™ STERI-STRIP™ COMPOUND BENZOIN TINCTURE 40 BAGS/CARTON 4 CARTONS/CASE C1544: Brand: 3M™ STERI-STRIP™

## (undated) DEVICE — SCD SEQUENTIAL COMPRESSION COMFORT SLEEVE MEDIUM KNEE LENGTH: Brand: KENDALL SCD

## (undated) DEVICE — EVACUATOR BLADDER ELLIK DISP STRL

## (undated) DEVICE — GUIDEWIRE STRGHT TIP 0.035 IN  SOLO PLUS

## (undated) DEVICE — SYRINGE 10ML LL

## (undated) DEVICE — BAG URINE DRAINAGE 4000ML CONTINUOUS IRR

## (undated) DEVICE — STERILE SURGICAL LUBRICANT,  TUBE: Brand: SURGILUBE

## (undated) DEVICE — CYSTO TUBING TUR Y IRRIGATION

## (undated) DEVICE — SINGLE PORT MANIFOLD: Brand: NEPTUNE 2

## (undated) DEVICE — CATH URETERAL 5FR X 70 CM FLEX TIP POLYUR BARD